# Patient Record
Sex: MALE | Race: WHITE | NOT HISPANIC OR LATINO | Employment: OTHER | URBAN - METROPOLITAN AREA
[De-identification: names, ages, dates, MRNs, and addresses within clinical notes are randomized per-mention and may not be internally consistent; named-entity substitution may affect disease eponyms.]

---

## 2017-05-11 ENCOUNTER — CONVERSION ENCOUNTER (OUTPATIENT)
Dept: CARDIOLOGY | Facility: CLINIC | Age: 52
End: 2017-05-11

## 2017-05-22 ENCOUNTER — HOSPITAL ENCOUNTER (OUTPATIENT)
Dept: CARDIOLOGY | Facility: HOSPITAL | Age: 52
Discharge: HOME | End: 2017-05-22
Attending: INTERNAL MEDICINE | Admitting: INTERNAL MEDICINE

## 2017-12-09 ENCOUNTER — CONVERSION ENCOUNTER (OUTPATIENT)
Dept: FAMILY MEDICINE CLINIC | Facility: CLINIC | Age: 52
End: 2017-12-09

## 2017-12-09 LAB — PSA SERPL-MCNC: 0.7 NG/ML

## 2017-12-13 LAB
ALBUMIN SERPL-MCNC: 4.3 G/DL (ref 3.6–5.1)
ALP SERPL-CCNC: 101 U/L (ref 40–115)
ALT SERPL-CCNC: 85 U/L (ref 9–46)
AST SERPL-CCNC: 49 U/L (ref 10–35)
BASOPHILS # BLD AUTO: 70 CELLS/UL (ref 0–200)
BASOPHILS NFR BLD AUTO: 1.2 %
BILIRUB SERPL-MCNC: 0.4 MG/DL (ref 0.2–1.2)
BUN SERPL-MCNC: 17 MG/DL (ref 7–25)
BUN/CREAT SERPL: ABNORMAL (CALC) (ref 6–22)
CALCIUM SERPL-MCNC: 9.4 MG/DL (ref 8.6–10.3)
CHLORIDE SERPL-SCNC: 104 MMOL/L (ref 98–110)
CHOLEST SERPL-MCNC: 157 MG/DL
CHOLEST/HDLC SERPL: 2.6 (CALC)
CONV CO2: 25 MMOL/L (ref 20–31)
CONV TOTAL PROTEIN: 7 G/DL (ref 6.1–8.1)
CREAT UR-MCNC: 0.95 MG/DL (ref 0.7–1.33)
EOSINOPHIL # BLD AUTO: 10.1 %
EOSINOPHIL # BLD AUTO: 586 CELLS/UL (ref 15–500)
ERYTHROCYTE [DISTWIDTH] IN BLOOD BY AUTOMATED COUNT: 13 % (ref 11–15)
GLOBULIN UR ELPH-MCNC: 2.7 MG/DL (ref 1.9–3.7)
GLUCOSE UR QL: 146 MG/DL (ref 65–99)
HCT VFR BLD AUTO: 48.6 % (ref 38.5–50)
HDLC SERPL-MCNC: 60 MG/DL
HGB BLD-MCNC: 16.3 G/DL (ref 13.2–17.1)
INSULIN SERPL-ACNC: 1.6 (CALC) (ref 1–2.5)
LDLC SERPL CALC-MCNC: 80 MG/DL
LYMPHOCYTES # BLD AUTO: 1293 CELLS/UL (ref 850–3900)
LYMPHOCYTES NFR BLD AUTO: 22.3 %
MCH RBC QN AUTO: 29 PG (ref 27–33)
MCHC RBC AUTO-ENTMCNC: 33.5 G/DL (ref 32–36)
MCV RBC AUTO: 86.3 FL (ref 80–100)
MONOCYTES # BLD AUTO: 986 CELLS/UL (ref 200–950)
MONOCYTES NFR BLD AUTO: 17 %
NEUTROPHILS # BLD AUTO: 2865 CELLS/UL (ref 1500–7800)
NEUTROPHILS NFR BLD AUTO: 49.4 %
NONHDLC SERPL-MCNC: 97 MG/DL
PLATELET # BLD AUTO: 254 10*3/UL (ref 140–400)
PMV BLD AUTO: 11 FL (ref 7.5–12.5)
POTASSIUM SERPL-SCNC: 4.8 MMOL/L (ref 3.5–5.3)
RBC # BLD AUTO: 5.63 MILLION/UL (ref 4.2–5.8)
SODIUM SERPL-SCNC: 139 MMOL/L (ref 135–146)
TRIGL SERPL-MCNC: 85 MG/DL
TSH SERPL-ACNC: 0.84 MIU/L (ref 0.4–4.5)
WBC # BLD AUTO: 5.8 10*3/UL (ref 3.8–10.8)

## 2018-03-22 ENCOUNTER — CONVERSION ENCOUNTER (OUTPATIENT)
Dept: CARDIOLOGY | Facility: CLINIC | Age: 53
End: 2018-03-22

## 2018-11-15 ENCOUNTER — CONVERSION ENCOUNTER (OUTPATIENT)
Dept: CARDIOLOGY | Facility: CLINIC | Age: 53
End: 2018-11-15

## 2019-06-04 VITALS
SYSTOLIC BLOOD PRESSURE: 142 MMHG | WEIGHT: 226 LBS | OXYGEN SATURATION: 98 % | OXYGEN SATURATION: 98 % | DIASTOLIC BLOOD PRESSURE: 81 MMHG | BODY MASS INDEX: 36.48 KG/M2 | DIASTOLIC BLOOD PRESSURE: 89 MMHG | WEIGHT: 227.5 LBS | HEART RATE: 72 BPM | WEIGHT: 216 LBS | OXYGEN SATURATION: 97 % | DIASTOLIC BLOOD PRESSURE: 89 MMHG | HEART RATE: 65 BPM | SYSTOLIC BLOOD PRESSURE: 147 MMHG | BODY MASS INDEX: 34.86 KG/M2 | HEART RATE: 75 BPM | BODY MASS INDEX: 36.72 KG/M2 | SYSTOLIC BLOOD PRESSURE: 147 MMHG

## 2019-07-23 RX ORDER — ROSUVASTATIN CALCIUM 20 MG/1
20 TABLET, COATED ORAL DAILY
Qty: 30 TABLET | Refills: 6 | Status: SHIPPED | OUTPATIENT
Start: 2019-07-23 | End: 2020-02-14

## 2019-07-29 PROBLEM — Z12.12 ENCOUNTER FOR COLORECTAL CANCER SCREENING: Status: ACTIVE | Noted: 2019-07-29

## 2019-07-29 PROBLEM — Z13.9 ENCOUNTER FOR SCREENING: Status: ACTIVE | Noted: 2019-07-29

## 2019-07-29 PROBLEM — Z12.5 SCREENING PSA (PROSTATE SPECIFIC ANTIGEN): Status: ACTIVE | Noted: 2019-07-29

## 2019-07-29 PROBLEM — IMO0002 ELEVATION OF LEVEL OF TRANSAMINASE OR LACTIC ACID DEHYDROGENASE (LDH): Status: ACTIVE | Noted: 2019-07-29

## 2019-07-29 PROBLEM — E11.9 DIABETES MELLITUS: Status: ACTIVE | Noted: 2018-11-15

## 2019-07-29 PROBLEM — I10 BENIGN HYPERTENSION: Status: ACTIVE | Noted: 2019-07-29

## 2019-07-29 PROBLEM — R19.5 POSITIVE COLORECTAL CANCER SCREENING USING COLOGUARD TEST: Status: ACTIVE | Noted: 2019-07-29

## 2019-07-29 PROBLEM — Z12.11 ENCOUNTER FOR COLORECTAL CANCER SCREENING: Status: ACTIVE | Noted: 2019-07-29

## 2019-07-29 PROBLEM — E88.81 METABOLIC SYNDROME: Status: ACTIVE | Noted: 2019-07-29

## 2019-07-29 PROBLEM — R09.89 BRUIT OF LEFT CAROTID ARTERY: Status: ACTIVE | Noted: 2019-07-29

## 2019-07-29 PROBLEM — K21.9 GASTROESOPHAGEAL REFLUX DISEASE: Status: ACTIVE | Noted: 2019-07-29

## 2019-07-29 PROBLEM — Z00.01 ENCOUNTER FOR ANNUAL GENERAL MEDICAL EXAMINATION WITH ABNORMAL FINDINGS IN ADULT: Status: ACTIVE | Noted: 2019-07-29

## 2019-07-29 PROBLEM — E66.3 OVERWEIGHT: Status: ACTIVE | Noted: 2019-07-29

## 2019-07-29 PROBLEM — R73.01 ELEVATED FASTING GLUCOSE: Status: ACTIVE | Noted: 2019-07-29

## 2019-07-29 PROBLEM — Z23 NEED FOR DIPHTHERIA-TETANUS-PERTUSSIS (TDAP) VACCINE, ADULT/ADOLESCENT: Status: ACTIVE | Noted: 2019-07-29

## 2019-07-29 PROBLEM — Z00.00 ANNUAL PHYSICAL EXAM: Status: ACTIVE | Noted: 2019-07-29

## 2019-07-29 PROBLEM — E78.5 HYPERLIPIDEMIA: Status: ACTIVE | Noted: 2019-07-29

## 2019-07-29 PROBLEM — E88.810 METABOLIC SYNDROME: Status: ACTIVE | Noted: 2019-07-29

## 2019-07-29 PROBLEM — M25.571 ACUTE BILATERAL ANKLE PAIN: Status: ACTIVE | Noted: 2019-07-29

## 2019-07-29 PROBLEM — E11.9 DIABETES MELLITUS (HCC): Status: ACTIVE | Noted: 2018-11-01

## 2019-07-29 PROBLEM — R73.09 ELEVATED HEMOGLOBIN A1C: Status: ACTIVE | Noted: 2019-07-29

## 2019-07-29 PROBLEM — M25.572 ACUTE BILATERAL ANKLE PAIN: Status: ACTIVE | Noted: 2019-07-29

## 2019-07-29 PROBLEM — J01.00 ACUTE NON-RECURRENT MAXILLARY SINUSITIS: Status: ACTIVE | Noted: 2019-07-29

## 2019-07-29 PROBLEM — Z02.89 ENCOUNTER FOR OCCUPATIONAL HEALTH EXAMINATION: Status: ACTIVE | Noted: 2019-07-29

## 2019-07-29 PROBLEM — J30.1 SEASONAL ALLERGIC RHINITIS DUE TO POLLEN: Status: ACTIVE | Noted: 2019-07-29

## 2019-07-29 PROBLEM — N52.9 VASCULOGENIC ERECTILE DYSFUNCTION: Status: ACTIVE | Noted: 2019-07-29

## 2019-07-29 PROBLEM — Z00.00 GENERAL MEDICAL EXAMINATION: Status: ACTIVE | Noted: 2019-07-29

## 2019-07-29 PROBLEM — Z23 NEED FOR IMMUNIZATION AGAINST INFLUENZA: Status: ACTIVE | Noted: 2019-07-29

## 2019-07-29 RX ORDER — MONTELUKAST SODIUM 10 MG/1
10 TABLET ORAL NIGHTLY
COMMUNITY
Start: 2019-05-07 | End: 2020-05-15

## 2019-07-29 RX ORDER — NITROGLYCERIN 0.4 MG/1
TABLET SUBLINGUAL
COMMUNITY
Start: 2016-10-04 | End: 2019-08-13

## 2019-07-29 RX ORDER — LORATADINE 10 MG/1
TABLET ORAL
COMMUNITY
Start: 2019-05-07 | End: 2019-08-13

## 2019-07-29 RX ORDER — FLUTICASONE PROPIONATE 50 MCG
SPRAY, SUSPENSION (ML) NASAL
COMMUNITY
Start: 2019-05-07 | End: 2019-08-13

## 2019-07-29 RX ORDER — SILDENAFIL 50 MG/1
TABLET, FILM COATED ORAL DAILY
COMMUNITY
Start: 2019-05-24 | End: 2019-08-13

## 2019-07-29 RX ORDER — METOPROLOL TARTRATE 50 MG/1
TABLET, FILM COATED ORAL 3 TIMES DAILY
COMMUNITY
Start: 2015-05-11 | End: 2019-08-01 | Stop reason: SDUPTHER

## 2019-07-29 RX ORDER — AMLODIPINE BESYLATE 5 MG/1
5 TABLET ORAL DAILY
COMMUNITY
Start: 2018-08-23 | End: 2020-02-17

## 2019-07-29 RX ORDER — ASPIRIN 81 MG/1
81 TABLET ORAL DAILY
COMMUNITY
Start: 2019-05-07

## 2019-08-02 RX ORDER — METOPROLOL TARTRATE 50 MG/1
TABLET, FILM COATED ORAL
Qty: 90 TABLET | Refills: 3 | Status: SHIPPED | OUTPATIENT
Start: 2019-08-02 | End: 2019-12-01 | Stop reason: SDUPTHER

## 2019-08-12 ENCOUNTER — OFFICE VISIT (OUTPATIENT)
Dept: CARDIOLOGY | Facility: CLINIC | Age: 54
End: 2019-08-12

## 2019-08-12 VITALS
BODY MASS INDEX: 36.14 KG/M2 | SYSTOLIC BLOOD PRESSURE: 143 MMHG | DIASTOLIC BLOOD PRESSURE: 79 MMHG | HEART RATE: 73 BPM | WEIGHT: 220.5 LBS | OXYGEN SATURATION: 96 %

## 2019-08-12 DIAGNOSIS — I48.0 PAROXYSMAL ATRIAL FIBRILLATION (HCC): ICD-10-CM

## 2019-08-12 DIAGNOSIS — I10 ESSENTIAL HYPERTENSION: ICD-10-CM

## 2019-08-12 DIAGNOSIS — I25.118 CORONARY ARTERY DISEASE OF NATIVE ARTERY OF NATIVE HEART WITH STABLE ANGINA PECTORIS (HCC): Primary | ICD-10-CM

## 2019-08-12 DIAGNOSIS — E78.00 PURE HYPERCHOLESTEROLEMIA: ICD-10-CM

## 2019-08-12 DIAGNOSIS — I65.23 BILATERAL CAROTID ARTERY STENOSIS: ICD-10-CM

## 2019-08-12 DIAGNOSIS — E11.9 TYPE 2 DIABETES MELLITUS WITHOUT COMPLICATION, WITHOUT LONG-TERM CURRENT USE OF INSULIN (HCC): ICD-10-CM

## 2019-08-12 PROBLEM — J01.00 ACUTE NON-RECURRENT MAXILLARY SINUSITIS: Status: RESOLVED | Noted: 2019-07-29 | Resolved: 2019-08-12

## 2019-08-12 PROBLEM — E88.810 METABOLIC SYNDROME: Status: RESOLVED | Noted: 2019-07-29 | Resolved: 2019-08-12

## 2019-08-12 PROBLEM — Z23 NEED FOR IMMUNIZATION AGAINST INFLUENZA: Status: RESOLVED | Noted: 2019-07-29 | Resolved: 2019-08-12

## 2019-08-12 PROBLEM — R19.5 POSITIVE COLORECTAL CANCER SCREENING USING COLOGUARD TEST: Status: RESOLVED | Noted: 2019-07-29 | Resolved: 2019-08-12

## 2019-08-12 PROBLEM — M25.572 ACUTE BILATERAL ANKLE PAIN: Status: RESOLVED | Noted: 2019-07-29 | Resolved: 2019-08-12

## 2019-08-12 PROBLEM — R73.09 ELEVATED HEMOGLOBIN A1C: Status: RESOLVED | Noted: 2019-07-29 | Resolved: 2019-08-12

## 2019-08-12 PROBLEM — R73.01 ELEVATED FASTING GLUCOSE: Status: RESOLVED | Noted: 2019-07-29 | Resolved: 2019-08-12

## 2019-08-12 PROBLEM — E78.5 HYPERLIPIDEMIA: Status: RESOLVED | Noted: 2019-07-29 | Resolved: 2019-08-12

## 2019-08-12 PROBLEM — Z23 NEED FOR DIPHTHERIA-TETANUS-PERTUSSIS (TDAP) VACCINE, ADULT/ADOLESCENT: Status: RESOLVED | Noted: 2019-07-29 | Resolved: 2019-08-12

## 2019-08-12 PROBLEM — M25.571 ACUTE BILATERAL ANKLE PAIN: Status: RESOLVED | Noted: 2019-07-29 | Resolved: 2019-08-12

## 2019-08-12 PROBLEM — Z00.00 GENERAL MEDICAL EXAMINATION: Status: RESOLVED | Noted: 2019-07-29 | Resolved: 2019-08-12

## 2019-08-12 PROBLEM — IMO0002 ELEVATION OF LEVEL OF TRANSAMINASE OR LACTIC ACID DEHYDROGENASE (LDH): Status: RESOLVED | Noted: 2019-07-29 | Resolved: 2019-08-12

## 2019-08-12 PROBLEM — E88.81 METABOLIC SYNDROME: Status: RESOLVED | Noted: 2019-07-29 | Resolved: 2019-08-12

## 2019-08-12 PROCEDURE — 99214 OFFICE O/P EST MOD 30 MIN: CPT | Performed by: INTERNAL MEDICINE

## 2019-08-12 NOTE — PROGRESS NOTES
Subjective:     Encounter Date:08/12/2019      Patient ID: Mendel Melendez is a 54 y.o. male.    Chief Complaint:  History of Present Illness 54-year-old white male with history of coronary artery disease status post coronary bypass surgery history of diabetes coronary disease paroxysmal atrial fibrillation hypertension hyperlipidemia presents to my office for follow-up.  Patient is having occasional episodes of chest pain which she describes as a stabbing kind of pain but no shortness of breath.  No complaints of any PND orthopnea.  He has occasional palpitation without any dizziness or syncope.  No swelling of the feet.  Patient says that he is taking his medicines regularly.  He does not smoke.  He is also trying to exercise regularly.  He follows a good diet.    The following portions of the patient's history were reviewed and updated as appropriate: allergies, current medications, past family history, past medical history, past social history, past surgical history and problem list.  Past Medical History:   Diagnosis Date   • Coronary artery disease    • Hyperlipidemia    • Hypertension      History reviewed. No pertinent surgical history.  /79 (BP Location: Left arm, Patient Position: Sitting)   Pulse 73   Wt 100 kg (220 lb 8 oz)   SpO2 96%   BMI 36.14 kg/m²   Family History   Problem Relation Age of Onset   • Heart disease Father    • Heart disease Paternal Uncle        Current Outpatient Medications:   •  amLODIPine (NORVASC) 5 MG tablet, Daily., Disp: , Rfl:   •  aspirin (ASPIRIN ADULT LOW STRENGTH) 81 MG EC tablet, Take  by mouth Daily., Disp: , Rfl:   •  fluticasone (FLONASE) 50 MCG/ACT nasal spray, into the nostril(s) as directed by provider., Disp: , Rfl:   •  loratadine (CLARITIN) 10 MG tablet, Take  by mouth., Disp: , Rfl:   •  metFORMIN (GLUCOPHAGE) 500 MG tablet, Take  by mouth Daily., Disp: , Rfl:   •  metoprolol tartrate (LOPRESSOR) 50 MG tablet, TAKE 1 TABLET BY MOUTH EVERY 8 HOURS,  Disp: 90 tablet, Rfl: 3  •  montelukast (SINGULAIR) 10 MG tablet, Take  by mouth., Disp: , Rfl:   •  nitroglycerin (NITROSTAT) 0.4 MG SL tablet, NITROSTAT 0.4 MG SUBL, Disp: , Rfl:   •  rosuvastatin (CRESTOR) 20 MG tablet, Take 1 tablet by mouth Daily., Disp: 30 tablet, Rfl: 6  •  sildenafil (VIAGRA) 50 MG tablet, Take  by mouth Daily., Disp: , Rfl:   Allergies   Allergen Reactions   • Niacin Swelling     Social History     Socioeconomic History   • Marital status:      Spouse name: Not on file   • Number of children: Not on file   • Years of education: Not on file   • Highest education level: Not on file   Tobacco Use   • Smoking status: Never Smoker   Substance and Sexual Activity   • Alcohol use: No     Frequency: Never     Review of Systems   Constitution: Positive for malaise/fatigue. Negative for fever.   HENT: Negative for ear pain and nosebleeds.    Eyes: Negative for blurred vision and double vision.   Cardiovascular: Positive for chest pain and palpitations. Negative for dyspnea on exertion.   Respiratory: Negative for cough and shortness of breath.    Skin: Negative for rash.   Musculoskeletal: Negative for joint pain.   Gastrointestinal: Negative for abdominal pain, nausea and vomiting.   Neurological: Negative for focal weakness and headaches.   Psychiatric/Behavioral: Negative for depression. The patient is not nervous/anxious.    All other systems reviewed and are negative.             Objective:     Physical Exam   Constitutional: He appears well-developed and well-nourished.   HENT:   Head: Normocephalic and atraumatic.   Eyes: Conjunctivae and EOM are normal. Pupils are equal, round, and reactive to light. No scleral icterus.   Neck: Normal range of motion. Neck supple. No JVD present. Carotid bruit is not present.   Cardiovascular: Normal rate, regular rhythm, S1 normal, S2 normal, normal heart sounds and intact distal pulses. PMI is not displaced.   Pulmonary/Chest: Effort normal and  breath sounds normal. He has no wheezes. He has no rales.   Abdominal: Soft. Bowel sounds are normal.   Musculoskeletal: Normal range of motion.   Neurological: He is alert. He has normal strength.   No focal deficits   Skin: Skin is warm and dry. No rash noted.   Psychiatric: He has a normal mood and affect.     Procedures    Lab Review:       Assessment:          Diagnosis Plan   1. Coronary artery disease of native artery of native heart with stable angina pectoris (CMS/[facility])     2. Essential hypertension     3. Pure hypercholesterolemia     4. Type 2 diabetes mellitus without complication, without long-term current use of insulin (CMS/formerly [facility])     5. Paroxysmal atrial fibrillation (CMS/formerly [facility])     6. Bilateral carotid artery stenosis            Plan:       Patient has occasional episodes of chest pain which is atypical for angina.  Patient has nonobstructive disease in the past with normal function is currently stable on medications per  Patient's lipid levels and sugar levels are followed by primary care doctor per  Patient has occasional episodes of palpitations but no significant atrial fibrillation.  Patient has bilateral coronary disease and is followed by the primary care doctor for the same.  Continue current medical therapy and follow-up in 6 months with a stress Myoview study.

## 2019-08-13 ENCOUNTER — OFFICE VISIT (OUTPATIENT)
Dept: FAMILY MEDICINE CLINIC | Facility: CLINIC | Age: 54
End: 2019-08-13

## 2019-08-13 VITALS
DIASTOLIC BLOOD PRESSURE: 70 MMHG | OXYGEN SATURATION: 98 % | WEIGHT: 221.8 LBS | SYSTOLIC BLOOD PRESSURE: 128 MMHG | RESPIRATION RATE: 18 BRPM | HEART RATE: 86 BPM | TEMPERATURE: 99.1 F | BODY MASS INDEX: 35.65 KG/M2 | HEIGHT: 66 IN

## 2019-08-13 DIAGNOSIS — E78.2 MIXED HYPERLIPIDEMIA: ICD-10-CM

## 2019-08-13 DIAGNOSIS — E11.59 TYPE 2 DIABETES MELLITUS WITH OTHER CIRCULATORY COMPLICATION, WITHOUT LONG-TERM CURRENT USE OF INSULIN (HCC): Primary | ICD-10-CM

## 2019-08-13 DIAGNOSIS — I10 BENIGN HYPERTENSION: ICD-10-CM

## 2019-08-13 DIAGNOSIS — I48.0 PAROXYSMAL ATRIAL FIBRILLATION (HCC): ICD-10-CM

## 2019-08-13 DIAGNOSIS — I25.10 CORONARY ARTERY DISEASE INVOLVING NATIVE CORONARY ARTERY OF NATIVE HEART WITHOUT ANGINA PECTORIS: ICD-10-CM

## 2019-08-13 LAB
BILIRUB BLD-MCNC: NEGATIVE MG/DL
CLARITY, POC: CLEAR
COLOR UR: YELLOW
GLUCOSE BLDC GLUCOMTR-MCNC: 134 MG/DL (ref 70–130)
GLUCOSE UR STRIP-MCNC: NEGATIVE MG/DL
HBA1C MFR BLD: 6.6 %
KETONES UR QL: NEGATIVE
LEUKOCYTE EST, POC: NEGATIVE
NITRITE UR-MCNC: NEGATIVE MG/ML
PH UR: 5 [PH] (ref 5–8)
PROT UR STRIP-MCNC: NEGATIVE MG/DL
RBC # UR STRIP: NEGATIVE /UL
SP GR UR: 1.01 (ref 1–1.03)
UROBILINOGEN UR QL: NORMAL

## 2019-08-13 PROCEDURE — 81002 URINALYSIS NONAUTO W/O SCOPE: CPT | Performed by: FAMILY MEDICINE

## 2019-08-13 PROCEDURE — 99214 OFFICE O/P EST MOD 30 MIN: CPT | Performed by: FAMILY MEDICINE

## 2019-08-13 PROCEDURE — 83036 HEMOGLOBIN GLYCOSYLATED A1C: CPT | Performed by: FAMILY MEDICINE

## 2019-08-13 PROCEDURE — 82962 GLUCOSE BLOOD TEST: CPT | Performed by: FAMILY MEDICINE

## 2019-08-13 NOTE — PROGRESS NOTES
Subjective   Mendel Melendez is a 54 y.o. male.     Mendel was seen by Dr. Griffin- cardiology on 08/12/2019. He was scheduled to have a stress test in 06/2020 and is suppose to have a carotid doppler at some point prior to stress test.      Diabetes   He presents for his follow-up diabetic visit. He has type 2 diabetes mellitus. Pertinent negatives for hypoglycemia include no dizziness or headaches. Pertinent negatives for diabetes include no blurred vision, no chest pain, no fatigue, no polydipsia, no polyuria and no weakness. Risk factors for coronary artery disease include diabetes mellitus, dyslipidemia, hypertension, male sex, obesity and family history. Meal planning includes avoidance of concentrated sweets. He participates in exercise three times a week. His overall blood glucose range is 140-180 mg/dl. An ACE inhibitor/angiotensin II receptor blocker is not being taken. He does not see a podiatrist.Eye exam is not current.   Hypertension   This is a chronic problem. The current episode started more than 1 year ago. The problem is controlled. Pertinent negatives include no blurred vision, chest pain, headaches, palpitations, peripheral edema or shortness of breath. Risk factors for coronary artery disease include dyslipidemia, diabetes mellitus, family history, male gender and obesity. Current antihypertension treatment includes calcium channel blockers and beta blockers. The current treatment provides significant improvement.   Hyperlipidemia   This is a chronic problem. The current episode started more than 1 year ago. The problem is controlled. Recent lipid tests were reviewed and are normal. Exacerbating diseases include diabetes and obesity. Pertinent negatives include no chest pain, myalgias or shortness of breath. Current antihyperlipidemic treatment includes statins. The current treatment provides significant improvement of lipids. Risk factors for coronary artery disease include diabetes mellitus,  dyslipidemia, family history, hypertension, male sex and obesity.   Coronary Artery Disease   Presents for follow-up visit. Pertinent negatives include no chest pain, chest pressure, chest tightness, dizziness, leg swelling, palpitations or shortness of breath. Risk factors include hyperlipidemia and obesity. The symptoms have been stable. Compliance with diet is good. Compliance with exercise is good. Compliance with medications is good.        The following portions of the patient's history were reviewed and updated as appropriate: allergies, current medications, past family history, past medical history, past social history, past surgical history and problem list.    Family History   Problem Relation Age of Onset   • Heart disease Father    • Heart disease Paternal Uncle    • Heart disease Maternal Aunt    • Heart disease Maternal Uncle    • Breast cancer Paternal Aunt    • Heart disease Paternal Aunt    • Ovarian cancer Paternal Aunt        Social History     Tobacco Use   • Smoking status: Never Smoker   • Smokeless tobacco: Never Used   Substance Use Topics   • Alcohol use: No     Frequency: Never   • Drug use: No       Past Surgical History:   Procedure Laterality Date   • CARDIAC CATHETERIZATION  04/09/2015    BHF Left main distal 60-70% LCX 80-90% RCA proximal 90% and mid 100% with left to right collaterals.   • CORONARY ARTERY BYPASS GRAFT  04/13/2015    Four, left main and three vessel cononary artery disease. Dr. Franklyn Smith   • VASECTOMY  1998       Patient Active Problem List   Diagnosis   • Annual physical exam   • Encounter for annual general medical examination with abnormal findings in adult   • Encounter for colorectal cancer screening   • Encounter for occupational health examination   • Encounter for screening   • Screening PSA (prostate specific antigen)   • Benign hypertension   • Bruit of left carotid artery   • Coronary artery disease involving native coronary artery of native heart without  angina pectoris   • Diabetes mellitus (CMS/HCC)   • Gastroesophageal reflux disease   • Mixed hyperlipidemia   • Occlusion and stenosis of unspecified carotid artery   • Overweight   • Paroxysmal atrial fibrillation (CMS/HCC)   • Seasonal allergic rhinitis due to pollen   • Status post coronary artery bypass graft   • Vasculogenic erectile dysfunction       Current Outpatient Medications on File Prior to Visit   Medication Sig Dispense Refill   • amLODIPine (NORVASC) 5 MG tablet Take 5 mg by mouth Daily.     • aspirin (ASPIRIN ADULT LOW STRENGTH) 81 MG EC tablet Take 81 mg by mouth Daily.     • metFORMIN (GLUCOPHAGE) 500 MG tablet Take 500 mg by mouth Daily.     • metoprolol tartrate (LOPRESSOR) 50 MG tablet TAKE 1 TABLET BY MOUTH EVERY 8 HOURS 90 tablet 3   • montelukast (SINGULAIR) 10 MG tablet Take 10 mg by mouth Every Night.     • rosuvastatin (CRESTOR) 20 MG tablet Take 1 tablet by mouth Daily. 30 tablet 6     No current facility-administered medications on file prior to visit.        Allergies   Allergen Reactions   • Niacin Swelling       Review of Systems   Constitutional: Negative for appetite change, fatigue and fever.   HENT: Negative for congestion, ear pain, sinus pressure, sore throat and tinnitus.    Eyes: Negative for blurred vision and visual disturbance.   Respiratory: Negative for cough, chest tightness, shortness of breath and wheezing.    Cardiovascular: Negative for chest pain, palpitations and leg swelling.   Gastrointestinal: Negative for abdominal pain, constipation, diarrhea, nausea and vomiting.   Endocrine: Negative.  Negative for cold intolerance, heat intolerance, polydipsia and polyuria.   Genitourinary: Negative for dysuria, frequency and hematuria.   Musculoskeletal: Negative for arthralgias, joint swelling and myalgias.   Skin: Negative for rash and bruise.   Allergic/Immunologic: Negative for environmental allergies.   Neurological: Negative for dizziness, syncope, weakness and  "headache.   Hematological: Negative for adenopathy. Does not bruise/bleed easily.   Psychiatric/Behavioral: Negative for depressed mood.       Objective   Visit Vitals  /70 (BP Location: Right arm, Patient Position: Sitting, Cuff Size: Large Adult)   Pulse 86   Temp 99.1 °F (37.3 °C) (Oral)   Resp 18   Ht 166.4 cm (65.5\")   Wt 101 kg (221 lb 12.8 oz)   SpO2 98% Comment: Room air   BMI 36.35 kg/m²     Physical Exam   Constitutional: He is oriented to person, place, and time. He appears well-developed and well-nourished. No distress.   HENT:   Right Ear: Tympanic membrane and external ear normal.   Left Ear: Tympanic membrane and external ear normal.   Mouth/Throat: Oropharynx is clear and moist.   Eyes: Conjunctivae are normal.   Neck: Neck supple. No JVD present. No thyromegaly present.   Cardiovascular: Normal rate, regular rhythm, normal heart sounds and intact distal pulses. Exam reveals no gallop and no friction rub.   No murmur heard.  Pulses:       Carotid pulses are 2+ on the right side, and 2+ on the left side.       Dorsalis pedis pulses are 2+ on the right side, and 2+ on the left side.   Pulmonary/Chest: Effort normal and breath sounds normal. No respiratory distress. He has no wheezes. He has no rales.   Abdominal: Soft. Bowel sounds are normal. He exhibits no distension and no mass. There is no tenderness.   Musculoskeletal: He exhibits no edema.   Lymphadenopathy:     He has no cervical adenopathy.   Neurological: He is alert and oriented to person, place, and time. He displays normal reflexes. Coordination normal.   Skin: Skin is warm. No rash noted. No erythema.   Psychiatric: He has a normal mood and affect.   Vitals reviewed.        Assessment/Plan .  Problem List Items Addressed This Visit        Cardiovascular and Mediastinum    Benign hypertension    Overview     Controlled.         Relevant Orders    TSH (Completed)    Coronary artery disease involving native coronary artery of native " heart without angina pectoris    Overview     No sxs doing well  Followed with Gaby  s/p CABG x 3, 2015         Paroxysmal atrial fibrillation (CMS/[facility])    Overview     During heart surgery 2015, no break thru since         Mixed hyperlipidemia    Overview     Stable, Continue Crestor  LDL 85         Current Assessment & Plan     Lipid abnormalities are improving with treatment.  Pharmacotherapy as ordered.  Lipids will be reassessed in 3 months.         Relevant Orders    Lipid Panel With / Chol / HDL Ratio (Completed)       Endocrine    Diabetes mellitus (CMS/[facility]) - Primary    Overview     A1c 6.6 0813/2019 improved.   A1c 6.9 5/7/2019 on  A1c 7.0 02/12/2018    A1c 7.1, 11/1/2018 new onset         Current Assessment & Plan     Diabetes is improving with treatment.   Continue current treatment regimen.  Diabetes will be reassessed in 3 months.         Relevant Orders    POCT urinalysis dipstick, manual (Completed)    POCT Glucose (Completed)    POC Glycosylated Hemoglobin (Hb A1C) (Completed)    CBC Auto Differential    Comprehensive Metabolic Panel (Completed)

## 2019-08-13 NOTE — PATIENT INSTRUCTIONS
Atherosclerosis  Atherosclerosis is narrowing and hardening of the arteries. Arteries are blood vessels that carry blood from the heart to all parts of the body. This blood contains oxygen. Arteries can become narrow or clogged with a buildup of fat, cholesterol, calcium, and other substances (plaque). Plaque decreases the amount of blood that can flow through the artery.  Atherosclerosis can affect any artery in the body, including:  · Heart arteries (coronary artery disease). This may cause a heart attack.  · Brain arteries. This may cause a stroke (cerebrovascular accident).  · Leg, arm, and pelvis arteries (peripheral artery disease). This may cause pain and numbness.  · Kidney arteries. This may cause kidney (renal) failure.    Treatment may slow the disease and prevent further damage to the heart, brain, peripheral arteries, and kidneys.  What are the causes?  Atherosclerosis develops slowly over many years. The inner layers of your arteries become damaged and allow the gradual buildup of plaque. The exact cause of atherosclerosis is not fully understood. Symptoms of atherosclerosis do not occur until the artery becomes narrow or blocked.  What increases the risk?  The following factors may make you more likely to develop this condition:  · High blood pressure.  · High cholesterol.  · Being middle-aged or older.  · Having a family history of atherosclerosis.  · Having high blood fats (triglycerides).  · Diabetes.  · Being overweight.  · Smoking tobacco.  · Not exercising enough (sedentary lifestyle).  · Having a substance in the blood called C-reactive protein (CRP). This is a sign of increased levels of inflammation in the body.  · Sleep apnea.  · Being stressed.  · Drinking too much alcohol.    What are the signs or symptoms?  This condition may not cause any symptoms. If you have symptoms, they are caused by damage to an area of your body that is not getting enough blood.  · Coronary artery disease may  "cause chest pain and shortness of breath.  · Decreased blood supply to your brain may cause a stroke. Signs of a stroke may include sudden:  ? Weakness on one side of the body.  ? Confusion.  ? Changes in vision.  ? Inability to speak or understand speech.  ? Loss of balance, coordination, or the ability to walk.  ? Severe headache.  ? Loss of consciousness.  · Peripheral arterial disease may cause pain and numbness, often in the legs and hips.  · Renal failure may cause fatigue, nausea, swelling, and itchy skin.    How is this diagnosed?  This condition is diagnosed based on your medical history and a physical exam. During the exam:  · Your health care provider will:  ? Check your pulse in different places.  ? Listen for a \"whooshing\" sound over your arteries (bruit).  · You may have tests, such as:  ? Blood tests to check your levels of cholesterol, triglycerides, and CRP.  ? Electrocardiogram (ECG) to check for heart damage.  ? Chest X-ray to see if you have an enlarged heart, which is a sign of heart failure.  ? Stress test to see how your heart reacts to exercise.  ? Echocardiogram to get images of the inside of your heart.  ? Ankle-brachial index to compare blood pressure in your arms to blood pressure in your ankles.  ? Ultrasound of your peripheral arteries to check blood flow.  ? CT scan to check for damage to your heart or brain.  ? X-rays of blood vessels after dye has been injected (angiogram) to check blood flow.    How is this treated?  Treatment starts with lifestyle changes, which may include:  · Changing your diet.  · Losing weight.  · Reducing stress.  · Exercising and being physically active more regularly.  · Not smoking.    You may also need medicine to:  · Lower triglycerides and cholesterol.  · Control blood pressure.  · Prevent blood clots.  · Lower inflammation in your body.  · Control your blood sugar.    Sometimes, surgery is needed to:  · Remove plaque from an artery " (endarterectomy).  · Open or widen a narrowed heart artery (angioplasty).  · Create a new path for your blood with one of these procedures:  ? Heart (coronary) artery bypass graft surgery.  ? Peripheral artery bypass graft surgery.    Follow these instructions at home:  Eating and drinking  · Eat a heart-healthy diet. Talk with your health care provider or a diet and nutrition specialist (dietitian) if you need help. A heart-healthy diet involves:  ? Limiting unhealthy fats and increasing healthy fats. Some examples of healthy fats are olive oil and canola oil.  ? Eating plant-based foods, such as fruits, vegetables, nuts, whole grains, and legumes (such as peas and lentils).  · Limit alcohol intake to no more than 1 drink a day for nonpregnant women and 2 drinks a day for men. One drink equals 12 oz of beer, 5 oz of wine, or 1½ oz of hard liquor.  Lifestyle  · Follow an exercise program as told by your health care provider.  · Maintain a healthy weight. Lose weight if your health care provider says that you need to do that.  · Rest when you are tired.  · Learn to manage your stress.  · Do not use any products that contain nicotine or tobacco, such as cigarettes and e-cigarettes. If you need help quitting, ask your health care provider.  · Do not abuse drugs.  General instructions  · Take over-the-counter and prescription medicines only as told by your health care provider.  · Manage other health conditions as told by your health care provider.  · Keep all follow-up visits as told by your health care provider. This is important.  Contact a health care provider if:  · You have chest pain or discomfort. This includes squeezing chest pain that may feel like indigestion (angina).  · You have shortness of breath.  · You have an irregular heartbeat.  · You have unexplained fatigue.  · You have unexplained pain or numbness in an arm, leg, or hip.  · You have nausea, swelling of your hands or feet, and itchy skin.  Get help  "right away if:  · You have any symptoms of a heart attack, such as:  ? Chest pain.  ? Shortness of breath.  ? Pain in your neck, jaw, arms, back, or stomach.  ? Cold sweat.  ? Nausea.  ? Light-headedness.  · You have any symptoms of a stroke. \"BE FAST\" is an easy way to remember the main warning signs of a stroke:  ? B - Balance. Signs are dizziness, sudden trouble walking, or loss of balance.  ? E - Eyes. Signs are trouble seeing or a sudden change in vision.  ? F - Face. Signs are sudden weakness or numbness of the face, or the face or eyelid drooping on one side.  ? A - Arms. Signs are weakness or numbness in an arm. This happens suddenly and usually on one side of the body.  ? S - Speech. Signs are sudden trouble speaking, slurred speech, or trouble understanding what people say.  ? T - Time. Time to call emergency services. Write down what time symptoms started.  · You have other signs of a stroke, such as:  ? A sudden, severe headache with no known cause.  ? Nausea or vomiting.  ? Seizure.  These symptoms may represent a serious problem that is an emergency. Do not wait to see if the symptoms will go away. Get medical help right away. Call your local emergency services (911 in the [place] Do not drive yourself to the hospital.  Summary  · Atherosclerosis is narrowing and hardening of the arteries.  · Arteries can become narrow or clogged with a buildup of fat, cholesterol, calcium, and other substances (plaque).  · This condition may not cause any symptoms. If you do have symptoms, they are caused by damage to an area of your body that is not getting enough blood.  · Treatment may include lifestyle changes and medicines. In some cases, surgery is needed.  This information is not intended to replace advice given to you by your health care provider. Make sure you discuss any questions you have with your health care provider.  Document Released: 03/09/2005 Document Revised: 08/23/2018 Document Reviewed: " 08/23/2018  [employer] Interactive Patient Education © 2019 [employer]    Cholesterol  Cholesterol is a white, waxy, fat-like substance that is needed by the human body in small amounts. The liver makes all the cholesterol we need. Cholesterol is carried from the liver by the blood through the blood vessels. Deposits of cholesterol (plaques) may build up on blood vessel (artery) walls. Plaques make the arteries narrower and stiffer. Cholesterol plaques increase the risk for heart attack and stroke.  You cannot feel your cholesterol level even if it is very high. The only way to know that it is high is to have a blood test. Once you know your cholesterol levels, you should keep a record of the test results. Work with your health care provider to keep your levels in the desired range.  What do the results mean?  · Total cholesterol is a rough measure of all the cholesterol in your blood.  · LDL (low-density lipoprotein) is the “bad” cholesterol. This is the type that causes plaque to build up on the artery walls. You want this level to be low.  · HDL (high-density lipoprotein) is the “good” cholesterol because it cleans the arteries and carries the LDL away. You want this level to be high.  · Triglycerides are fat that the body can either burn for energy or store. High levels are closely linked to heart disease.  What are the desired levels of cholesterol?  · Total cholesterol below 200.  · LDL below 100 for people who are at risk, below 70 for people at very high risk.  · HDL above 40 is good. A level of 60 or higher is considered to be protective against heart disease.  · Triglycerides below 150.  How can I lower my cholesterol?  Diet  Follow your diet program as told by your health care provider.  · Choose fish or white meat chicken and turkey, roasted or baked. Limit fatty cuts of red meat, fried foods, and processed meats, such as sausage and lunch meats.  · Eat lots of fresh fruits and vegetables.  · Choose  whole grains, beans, pasta, potatoes, and cereals.  · Choose olive oil, corn oil, or canola oil, and use only small amounts.  · Avoid butter, mayonnaise, shortening, or palm kernel oils.  · Avoid foods with trans fats.  · Drink skim or nonfat milk and eat low-fat or nonfat yogurt and cheeses. Avoid whole milk, cream, ice cream, egg yolks, and full-fat cheeses.  · Healthier desserts include hal food cake, teodora snaps, animal crackers, hard candy, popsicles, and low-fat or nonfat frozen yogurt. Avoid pastries, cakes, pies, and cookies.    Exercise  · Follow your exercise program as told by your health care provider. A regular program:  ? Helps to decrease LDL and raise HDL.  ? Helps with weight control.  · Do things that increase your activity level, such as gardening, walking, and taking the stairs.  · Ask your health care provider about ways that you can be more active in your daily life.  Medicine  · Take over-the-counter and prescription medicines only as told by your health care provider.  ? Medicine may be prescribed by your health care provider to help lower cholesterol and decrease the risk for heart disease. This is usually done if diet and exercise have failed to bring down cholesterol levels.  ? If you have several risk factors, you may need medicine even if your levels are normal.  This information is not intended to replace advice given to you by your health care provider. Make sure you discuss any questions you have with your health care provider.  Document Released: 09/12/2002 Document Revised: 07/15/2017 Document Reviewed: 06/17/2017  [employer] Interactive Patient Education © 2019 [employer]

## 2019-08-14 LAB
ALBUMIN SERPL-MCNC: 4.5 G/DL (ref 3.5–5.5)
ALBUMIN/GLOB SERPL: 1.8 {RATIO} (ref 1.2–2.2)
ALP SERPL-CCNC: 81 IU/L (ref 39–117)
ALT SERPL-CCNC: 49 IU/L (ref 0–44)
AST SERPL-CCNC: 33 IU/L (ref 0–40)
BASOPHILS # BLD AUTO: 0.1 X10E3/UL (ref 0–0.2)
BASOPHILS NFR BLD AUTO: 1 %
BILIRUB SERPL-MCNC: 0.3 MG/DL (ref 0–1.2)
BUN SERPL-MCNC: 15 MG/DL (ref 6–24)
BUN/CREAT SERPL: 16 (ref 9–20)
CALCIUM SERPL-MCNC: 9.4 MG/DL (ref 8.7–10.2)
CHLORIDE SERPL-SCNC: 104 MMOL/L (ref 96–106)
CHOLEST SERPL-MCNC: 163 MG/DL (ref 100–199)
CHOLEST/HDLC SERPL: 3 RATIO (ref 0–5)
CO2 SERPL-SCNC: 23 MMOL/L (ref 20–29)
CREAT SERPL-MCNC: 0.92 MG/DL (ref 0.76–1.27)
EOSINOPHIL # BLD AUTO: 0.2 X10E3/UL (ref 0–0.4)
EOSINOPHIL NFR BLD AUTO: 3 %
ERYTHROCYTE [DISTWIDTH] IN BLOOD BY AUTOMATED COUNT: 14.1 % (ref 12.3–15.4)
GLOBULIN SER CALC-MCNC: 2.5 G/DL (ref 1.5–4.5)
GLUCOSE SERPL-MCNC: 140 MG/DL (ref 65–99)
HCT VFR BLD AUTO: 47.7 % (ref 37.5–51)
HDLC SERPL-MCNC: 55 MG/DL
HGB BLD-MCNC: 15.7 G/DL (ref 13–17.7)
IMM GRANULOCYTES # BLD AUTO: 0 X10E3/UL (ref 0–0.1)
IMM GRANULOCYTES NFR BLD AUTO: 0 %
LDLC SERPL CALC-MCNC: 90 MG/DL (ref 0–99)
LYMPHOCYTES # BLD AUTO: 1.4 X10E3/UL (ref 0.7–3.1)
LYMPHOCYTES NFR BLD AUTO: 19 %
MCH RBC QN AUTO: 29 PG (ref 26.6–33)
MCHC RBC AUTO-ENTMCNC: 32.9 G/DL (ref 31.5–35.7)
MCV RBC AUTO: 88 FL (ref 79–97)
MONOCYTES # BLD AUTO: 0.8 X10E3/UL (ref 0.1–0.9)
MONOCYTES NFR BLD AUTO: 10 %
NEUTROPHILS # BLD AUTO: 5.1 X10E3/UL (ref 1.4–7)
NEUTROPHILS NFR BLD AUTO: 67 %
PLATELET # BLD AUTO: 251 X10E3/UL (ref 150–450)
POTASSIUM SERPL-SCNC: 4.9 MMOL/L (ref 3.5–5.2)
PROT SERPL-MCNC: 7 G/DL (ref 6–8.5)
RBC # BLD AUTO: 5.42 X10E6/UL (ref 4.14–5.8)
SODIUM SERPL-SCNC: 142 MMOL/L (ref 134–144)
TRIGL SERPL-MCNC: 92 MG/DL (ref 0–149)
TSH SERPL DL<=0.005 MIU/L-ACNC: 0.99 UIU/ML (ref 0.45–4.5)
VLDLC SERPL CALC-MCNC: 18 MG/DL (ref 5–40)
WBC # BLD AUTO: 7.6 X10E3/UL (ref 3.4–10.8)

## 2019-08-30 LAB
PSA SERPL-MCNC: 1.1 NG/ML (ref 0–4)
WRITTEN AUTHORIZATION: NORMAL

## 2019-11-18 ENCOUNTER — OFFICE VISIT (OUTPATIENT)
Dept: FAMILY MEDICINE CLINIC | Facility: CLINIC | Age: 54
End: 2019-11-18

## 2019-11-18 VITALS
WEIGHT: 226.6 LBS | BODY MASS INDEX: 36.42 KG/M2 | RESPIRATION RATE: 18 BRPM | HEART RATE: 96 BPM | TEMPERATURE: 97.9 F | OXYGEN SATURATION: 98 % | SYSTOLIC BLOOD PRESSURE: 130 MMHG | HEIGHT: 66 IN | DIASTOLIC BLOOD PRESSURE: 70 MMHG

## 2019-11-18 DIAGNOSIS — E78.2 MIXED HYPERLIPIDEMIA: ICD-10-CM

## 2019-11-18 DIAGNOSIS — R09.89 BRUIT OF LEFT CAROTID ARTERY: ICD-10-CM

## 2019-11-18 DIAGNOSIS — I25.10 CORONARY ARTERY DISEASE INVOLVING NATIVE CORONARY ARTERY OF NATIVE HEART WITHOUT ANGINA PECTORIS: ICD-10-CM

## 2019-11-18 DIAGNOSIS — E66.3 OVERWEIGHT: ICD-10-CM

## 2019-11-18 DIAGNOSIS — I48.0 PAROXYSMAL ATRIAL FIBRILLATION (HCC): ICD-10-CM

## 2019-11-18 DIAGNOSIS — E11.59 TYPE 2 DIABETES MELLITUS WITH OTHER CIRCULATORY COMPLICATION, WITHOUT LONG-TERM CURRENT USE OF INSULIN (HCC): Primary | ICD-10-CM

## 2019-11-18 DIAGNOSIS — I10 BENIGN HYPERTENSION: ICD-10-CM

## 2019-11-18 DIAGNOSIS — Z23 NEED FOR INFLUENZA VACCINATION: ICD-10-CM

## 2019-11-18 LAB
BILIRUB BLD-MCNC: NEGATIVE MG/DL
CLARITY, POC: CLEAR
COLOR UR: YELLOW
GLUCOSE BLDC GLUCOMTR-MCNC: 99 MG/DL (ref 70–130)
GLUCOSE UR STRIP-MCNC: NEGATIVE MG/DL
HBA1C MFR BLD: 7.3 %
KETONES UR QL: NEGATIVE
LEUKOCYTE EST, POC: NEGATIVE
NITRITE UR-MCNC: NEGATIVE MG/ML
PH UR: 5 [PH] (ref 5–8)
PROT UR STRIP-MCNC: NEGATIVE MG/DL
RBC # UR STRIP: NEGATIVE /UL
SP GR UR: 1.02 (ref 1–1.03)
UROBILINOGEN UR QL: NORMAL

## 2019-11-18 PROCEDURE — 90471 IMMUNIZATION ADMIN: CPT | Performed by: FAMILY MEDICINE

## 2019-11-18 PROCEDURE — 99214 OFFICE O/P EST MOD 30 MIN: CPT | Performed by: FAMILY MEDICINE

## 2019-11-18 PROCEDURE — 82962 GLUCOSE BLOOD TEST: CPT | Performed by: FAMILY MEDICINE

## 2019-11-18 PROCEDURE — 83036 HEMOGLOBIN GLYCOSYLATED A1C: CPT | Performed by: FAMILY MEDICINE

## 2019-11-18 PROCEDURE — 81002 URINALYSIS NONAUTO W/O SCOPE: CPT | Performed by: FAMILY MEDICINE

## 2019-11-18 PROCEDURE — 90674 CCIIV4 VAC NO PRSV 0.5 ML IM: CPT | Performed by: FAMILY MEDICINE

## 2019-11-18 NOTE — PROGRESS NOTES
Subjective   Mendel Melendez is a 54 y.o. male.     Chief Complaint   Patient presents with   • Diabetes   • Coronary Artery Disease   • Atrial Fibrillation   • Hypertension   • Hyperlipidemia       Diabetes   He presents for his follow-up diabetic visit. He has type 2 diabetes mellitus. Pertinent negatives for hypoglycemia include no dizziness or headaches. Pertinent negatives for diabetes include no chest pain, no fatigue, no polydipsia, no polyuria and no weakness. Risk factors for coronary artery disease include diabetes mellitus, dyslipidemia, hypertension, male sex, obesity and family history. Meal planning includes avoidance of concentrated sweets. His overall blood glucose range is 140-180 mg/dl. An ACE inhibitor/angiotensin II receptor blocker is not being taken. He does not see a podiatrist.Eye exam is current (10/2019 [facility]).   Coronary Artery Disease   Presents for follow-up visit. Pertinent negatives include no chest pain, chest pressure, chest tightness, dizziness, leg swelling, palpitations or shortness of breath. Risk factors include hyperlipidemia and obesity. The symptoms have been stable. Compliance with diet is good. Compliance with exercise is good. Compliance with medications is good.   Atrial Fibrillation   Presents for follow-up visit. Symptoms are negative for chest pain, dizziness, palpitations, shortness of breath and weakness. The symptoms have been stable. Past medical history includes atrial fibrillation, CAD and hyperlipidemia.   Hypertension   This is a chronic problem. The current episode started more than 1 year ago. The problem is controlled. Pertinent negatives include no chest pain, headaches, palpitations, peripheral edema or shortness of breath. Risk factors for coronary artery disease include dyslipidemia, diabetes mellitus, family history, male gender and obesity. Current antihypertension treatment includes calcium channel blockers and beta blockers. The current treatment  provides significant improvement.   Hyperlipidemia   This is a chronic problem. The current episode started more than 1 year ago. The problem is controlled. Recent lipid tests were reviewed and are normal. Exacerbating diseases include diabetes and obesity. Pertinent negatives include no chest pain, myalgias or shortness of breath. Current antihyperlipidemic treatment includes statins. The current treatment provides significant improvement of lipids. Risk factors for coronary artery disease include diabetes mellitus, dyslipidemia, family history, hypertension, male sex and obesity.        The following portions of the patient's history were reviewed and updated as appropriate: allergies, current medications, past family history, past medical history, past social history, past surgical history and problem list.    Allergies:  Allergies   Allergen Reactions   • Niacin Swelling       Social History:  Social History     Socioeconomic History   • Marital status:      Spouse name: Not on file   • Number of children: Not on file   • Years of education: Not on file   • Highest education level: Not on file   Tobacco Use   • Smoking status: Never Smoker   • Smokeless tobacco: Never Used   Substance and Sexual Activity   • Alcohol use: No     Frequency: Never   • Drug use: No   • Sexual activity: Defer       Family History:  Family History   Problem Relation Age of Onset   • Heart disease Father    • Heart disease Paternal Uncle    • Heart disease Maternal Aunt    • Heart disease Maternal Uncle    • Breast cancer Paternal Aunt    • Heart disease Paternal Aunt    • Ovarian cancer Paternal Aunt        Past Medical History :  Patient Active Problem List   Diagnosis   • Annual physical exam   • Encounter for annual general medical examination with abnormal findings in adult   • Encounter for colorectal cancer screening   • Encounter for occupational health examination   • Encounter for screening   • Screening PSA (prostate  specific antigen)   • Benign hypertension   • Bruit of left carotid artery   • Coronary artery disease involving native coronary artery of native heart without angina pectoris   • Diabetes mellitus (CMS/HCC)   • Gastroesophageal reflux disease   • Mixed hyperlipidemia   • Occlusion and stenosis of unspecified carotid artery   • Overweight   • Paroxysmal atrial fibrillation (CMS/HCC)   • Seasonal allergic rhinitis due to pollen   • Status post coronary artery bypass graft   • Vasculogenic erectile dysfunction       Medication List:    Current Outpatient Medications:   •  amLODIPine (NORVASC) 5 MG tablet, Take 5 mg by mouth Daily., Disp: , Rfl:   •  aspirin (ASPIRIN ADULT LOW STRENGTH) 81 MG EC tablet, Take 81 mg by mouth Daily., Disp: , Rfl:   •  metFORMIN (GLUCOPHAGE) 500 MG tablet, Take 500 mg by mouth Daily., Disp: , Rfl:   •  metoprolol tartrate (LOPRESSOR) 50 MG tablet, TAKE 1 TABLET BY MOUTH EVERY 8 HOURS, Disp: 90 tablet, Rfl: 3  •  montelukast (SINGULAIR) 10 MG tablet, Take 10 mg by mouth Every Night., Disp: , Rfl:   •  rosuvastatin (CRESTOR) 20 MG tablet, Take 1 tablet by mouth Daily., Disp: 30 tablet, Rfl: 6    Past Surgical History:  Past Surgical History:   Procedure Laterality Date   • CARDIAC CATHETERIZATION  04/09/2015    BHF Left main distal 60-70% LCX 80-90% RCA proximal 90% and mid 100% with left to right collaterals.   • CORONARY ARTERY BYPASS GRAFT  04/13/2015    Four, left main and three vessel cononary artery disease. Dr. Franklyn Smith   • VASECTOMY  1998       Review of Systems:  Review of Systems   Constitutional: Negative for appetite change, fatigue and fever.   HENT: Negative for congestion and sore throat.    Eyes: Negative for visual disturbance.   Respiratory: Negative for cough, chest tightness, shortness of breath and wheezing.    Cardiovascular: Negative for chest pain, palpitations and leg swelling.   Gastrointestinal: Negative for abdominal pain, constipation, diarrhea, nausea and  "vomiting.   Endocrine: Negative.  Negative for cold intolerance, heat intolerance, polydipsia and polyuria.   Genitourinary: Negative for dysuria, frequency and hematuria.   Musculoskeletal: Negative for arthralgias, joint swelling and myalgias.   Skin: Negative for rash and bruise.   Neurological: Negative for dizziness, syncope, weakness, numbness and headache.   Hematological: Negative for adenopathy. Does not bruise/bleed easily.   Psychiatric/Behavioral: Negative for depressed mood.       Physical Exam:  Vital Signs:  Visit Vitals  /70 (BP Location: Right arm, Patient Position: Sitting, Cuff Size: Large Adult)   Pulse 96   Temp 97.9 °F (36.6 °C) (Oral)   Resp 18   Ht 166.4 cm (65.5\")   Wt 103 kg (226 lb 9.6 oz)   SpO2 98% Comment: Room air   BMI 37.13 kg/m²       Physical Exam   Constitutional: He is oriented to person, place, and time. He appears well-developed and well-nourished. No distress.   Eyes: Conjunctivae are normal.   Neck: Neck supple. No JVD present. No thyromegaly present.   Cardiovascular: Normal rate, regular rhythm, normal heart sounds and intact distal pulses. Exam reveals no gallop and no friction rub.   No murmur heard.  Pulses:       Carotid pulses are 2+ on the right side, and 2+ on the left side.       Dorsalis pedis pulses are 2+ on the right side, and 2+ on the left side.   Negative homans' no edema.    Pulmonary/Chest: Effort normal and breath sounds normal. No respiratory distress. He has no wheezes. He has no rales.   Musculoskeletal: He exhibits no edema.   Lymphadenopathy:     He has no cervical adenopathy.   Neurological: He is alert and oriented to person, place, and time. He displays normal reflexes. Coordination normal.   Skin: Skin is warm. No rash noted. No erythema.   Psychiatric: He has a normal mood and affect.   Vitals reviewed.      Assessment and Plan:  Problem List Items Addressed This Visit        High    Benign hypertension    Overview     Controlled.         " Current Assessment & Plan     Hypertension is improving with treatment.  Continue current treatment regimen.  Blood pressure will be reassessed in 3 months.         Bruit of left carotid artery    Overview     60% stenosis by Carotid doppler 08/03/2016   No sxs  Continue Crestor and follow         Coronary artery disease involving native coronary artery of native heart without angina pectoris    Overview     No sxs doing well  Followed with Gaby  s/p CABG x 3, 2015         Diabetes mellitus (CMS/[facility]) - Primary    Overview     A1c 7.3 11/18/2019 slightly worse.   A1c 6.6 08/13/2019    A1c 6.9 5/7/2019 on  A1c 7.0 02/12/2018    A1c 7.1, 11/1/2018 new onset         Current Assessment & Plan     Diabetes is worsening.   Continue current treatment regimen.  Diabetes will be reassessed in 3 months.  He will improve diet and exercise which maybe difficult with winter.          Relevant Orders    POCT urinalysis dipstick, manual (Completed)    POCT Glucose (Completed)    POC Glycosylated Hemoglobin (Hb A1C) (Completed)    Paroxysmal atrial fibrillation (CMS/[facility])    Overview     During heart surgery 2015, no break thru since            Medium    Mixed hyperlipidemia    Overview     Stable, Continue Crestor  LDL 85         Current Assessment & Plan     Lipid abnormalities are improving with treatment.  Pharmacotherapy as ordered.  Lipids will be reassessed in 3 months.            Low    Overweight    Overview     Diet exercise and wt loss encouraged.            Other Visit Diagnoses     Need for influenza vaccination        Relevant Orders    Flucelvax Quad=>4Years (PFS) (Completed)          An After Visit Summary and PPPS were given to the patient.

## 2019-11-18 NOTE — ASSESSMENT & PLAN NOTE
Diabetes is worsening.   Continue current treatment regimen.  Diabetes will be reassessed in 3 months.  He will improve diet and exercise which maybe difficult with winter.

## 2019-12-02 RX ORDER — METOPROLOL TARTRATE 50 MG/1
TABLET, FILM COATED ORAL
Qty: 90 TABLET | Refills: 3 | Status: SHIPPED | OUTPATIENT
Start: 2019-12-02 | End: 2020-04-13

## 2019-12-30 ENCOUNTER — TELEPHONE (OUTPATIENT)
Dept: FAMILY MEDICINE CLINIC | Facility: CLINIC | Age: 54
End: 2019-12-30

## 2019-12-30 DIAGNOSIS — R09.89 BRUIT OF LEFT CAROTID ARTERY: Primary | ICD-10-CM

## 2020-01-03 ENCOUNTER — HOSPITAL ENCOUNTER (OUTPATIENT)
Dept: ULTRASOUND IMAGING | Facility: HOSPITAL | Age: 55
Discharge: HOME | End: 2020-01-03
Admitting: FAMILY MEDICINE

## 2020-01-03 DIAGNOSIS — R09.89 BRUIT OF LEFT CAROTID ARTERY: ICD-10-CM

## 2020-01-03 PROCEDURE — 93880 EXTRACRANIAL BILAT STUDY: CPT

## 2020-01-06 ENCOUNTER — TELEPHONE (OUTPATIENT)
Dept: FAMILY MEDICINE CLINIC | Facility: CLINIC | Age: 55
End: 2020-01-06

## 2020-01-06 NOTE — TELEPHONE ENCOUNTER
Spoke with carlos at   [phone]  No PA required for   [id number], reference is [id number]  bl

## 2020-01-14 ENCOUNTER — TELEPHONE (OUTPATIENT)
Dept: FAMILY MEDICINE CLINIC | Facility: CLINIC | Age: 55
End: 2020-01-14

## 2020-01-15 DIAGNOSIS — E78.2 MIXED HYPERLIPIDEMIA: Primary | ICD-10-CM

## 2020-01-27 ENCOUNTER — HOSPITAL ENCOUNTER (OUTPATIENT)
Dept: CT IMAGING | Facility: HOSPITAL | Age: 55
Discharge: HOME | End: 2020-01-27
Admitting: FAMILY MEDICINE

## 2020-01-27 ENCOUNTER — HOSPITAL ENCOUNTER (OUTPATIENT)
Dept: CT IMAGING | Facility: HOSPITAL | Age: 55
Discharge: HOME | End: 2020-01-27

## 2020-01-27 DIAGNOSIS — N32.0 STENOSIS (ACQUIRED) OF BLADDER NECK OR VESICOURETHRAL ORIFICE: ICD-10-CM

## 2020-01-27 LAB — CREAT BLDA-MCNC: 0.9 MG/DL (ref 0.6–1.3)

## 2020-01-27 PROCEDURE — 0 IOPAMIDOL PER 1 ML: Performed by: FAMILY MEDICINE

## 2020-01-27 PROCEDURE — 70498 CT ANGIOGRAPHY NECK: CPT

## 2020-01-27 PROCEDURE — 70496 CT ANGIOGRAPHY HEAD: CPT

## 2020-01-27 PROCEDURE — 82565 ASSAY OF CREATININE: CPT

## 2020-01-27 RX ADMIN — IOPAMIDOL 100 ML: 755 INJECTION, SOLUTION INTRAVENOUS at 10:50

## 2020-02-04 ENCOUNTER — APPOINTMENT (OUTPATIENT)
Dept: VASCULAR SURGERY | Facility: HOSPITAL | Age: 55
End: 2020-02-04

## 2020-02-04 PROCEDURE — G0463 HOSPITAL OUTPT CLINIC VISIT: HCPCS

## 2020-02-12 PROBLEM — Z00.00 ANNUAL PHYSICAL EXAM: Status: RESOLVED | Noted: 2019-07-29 | Resolved: 2020-02-12

## 2020-02-12 PROBLEM — Z13.9 ENCOUNTER FOR SCREENING: Status: RESOLVED | Noted: 2019-07-29 | Resolved: 2020-02-12

## 2020-02-14 ENCOUNTER — OFFICE VISIT (OUTPATIENT)
Dept: FAMILY MEDICINE CLINIC | Facility: CLINIC | Age: 55
End: 2020-02-14

## 2020-02-14 ENCOUNTER — TELEPHONE (OUTPATIENT)
Dept: FAMILY MEDICINE CLINIC | Facility: CLINIC | Age: 55
End: 2020-02-14

## 2020-02-14 VITALS
OXYGEN SATURATION: 98 % | WEIGHT: 228.6 LBS | HEART RATE: 72 BPM | RESPIRATION RATE: 18 BRPM | BODY MASS INDEX: 36.74 KG/M2 | TEMPERATURE: 98.6 F | DIASTOLIC BLOOD PRESSURE: 85 MMHG | HEIGHT: 66 IN | SYSTOLIC BLOOD PRESSURE: 136 MMHG

## 2020-02-14 DIAGNOSIS — R06.83 SNORING: ICD-10-CM

## 2020-02-14 DIAGNOSIS — E66.01 CLASS 2 SEVERE OBESITY DUE TO EXCESS CALORIES WITH SERIOUS COMORBIDITY AND BODY MASS INDEX (BMI) OF 37.0 TO 37.9 IN ADULT (HCC): ICD-10-CM

## 2020-02-14 DIAGNOSIS — I10 BENIGN HYPERTENSION: ICD-10-CM

## 2020-02-14 DIAGNOSIS — Z12.5 SCREENING PSA (PROSTATE SPECIFIC ANTIGEN): ICD-10-CM

## 2020-02-14 DIAGNOSIS — Z12.12 ENCOUNTER FOR COLORECTAL CANCER SCREENING: ICD-10-CM

## 2020-02-14 DIAGNOSIS — Z23 NEED FOR PNEUMOCOCCAL VACCINATION: ICD-10-CM

## 2020-02-14 DIAGNOSIS — I25.10 CORONARY ARTERY DISEASE INVOLVING NATIVE CORONARY ARTERY OF NATIVE HEART WITHOUT ANGINA PECTORIS: ICD-10-CM

## 2020-02-14 DIAGNOSIS — E11.59 TYPE 2 DIABETES MELLITUS WITH OTHER CIRCULATORY COMPLICATION, WITHOUT LONG-TERM CURRENT USE OF INSULIN (HCC): ICD-10-CM

## 2020-02-14 DIAGNOSIS — Z12.11 ENCOUNTER FOR COLORECTAL CANCER SCREENING: ICD-10-CM

## 2020-02-14 DIAGNOSIS — E78.2 MIXED HYPERLIPIDEMIA: ICD-10-CM

## 2020-02-14 DIAGNOSIS — I48.0 PAROXYSMAL ATRIAL FIBRILLATION (HCC): ICD-10-CM

## 2020-02-14 DIAGNOSIS — Z00.01 ENCOUNTER FOR ANNUAL GENERAL MEDICAL EXAMINATION WITH ABNORMAL FINDINGS IN ADULT: Primary | ICD-10-CM

## 2020-02-14 LAB
BILIRUB BLD-MCNC: NEGATIVE MG/DL
CLARITY, POC: CLEAR
COLOR UR: YELLOW
GLUCOSE BLDC GLUCOMTR-MCNC: 120 MG/DL (ref 70–130)
GLUCOSE UR STRIP-MCNC: NEGATIVE MG/DL
HBA1C MFR BLD: 7.5 %
KETONES UR QL: NEGATIVE
LEUKOCYTE EST, POC: NEGATIVE
NITRITE UR-MCNC: NEGATIVE MG/ML
PH UR: 7 [PH] (ref 5–8)
PROT UR STRIP-MCNC: NEGATIVE MG/DL
RBC # UR STRIP: NEGATIVE /UL
SP GR UR: 1.01 (ref 1–1.03)
UROBILINOGEN UR QL: NORMAL

## 2020-02-14 PROCEDURE — 83036 HEMOGLOBIN GLYCOSYLATED A1C: CPT | Performed by: FAMILY MEDICINE

## 2020-02-14 PROCEDURE — 99396 PREV VISIT EST AGE 40-64: CPT | Performed by: FAMILY MEDICINE

## 2020-02-14 PROCEDURE — 90732 PPSV23 VACC 2 YRS+ SUBQ/IM: CPT | Performed by: FAMILY MEDICINE

## 2020-02-14 PROCEDURE — 81002 URINALYSIS NONAUTO W/O SCOPE: CPT | Performed by: FAMILY MEDICINE

## 2020-02-14 PROCEDURE — 36415 COLL VENOUS BLD VENIPUNCTURE: CPT | Performed by: FAMILY MEDICINE

## 2020-02-14 PROCEDURE — 90471 IMMUNIZATION ADMIN: CPT | Performed by: FAMILY MEDICINE

## 2020-02-14 PROCEDURE — 99213 OFFICE O/P EST LOW 20 MIN: CPT | Performed by: FAMILY MEDICINE

## 2020-02-14 PROCEDURE — 82962 GLUCOSE BLOOD TEST: CPT | Performed by: FAMILY MEDICINE

## 2020-02-14 RX ORDER — ROSUVASTATIN CALCIUM 40 MG/1
40 TABLET, COATED ORAL DAILY
Qty: 30 TABLET | Refills: 12 | Status: SHIPPED | OUTPATIENT
Start: 2020-02-14 | End: 2021-03-09

## 2020-02-14 NOTE — PROGRESS NOTES
Subjective   Mendel Melendez is a 54 y.o. male.     Chief Complaint   Patient presents with   • Annual Exam   • Diabetes   • Coronary Artery Disease   • Atrial Fibrillation   • Hyperlipidemia       The patient is here: for coordination of medical care to discuss health maintenance and disease prevention to follow up on multiple medical problems. Overall has: moderate activity with work/home activities, good appetite, good energy level and is sleeping poorly. Nutrition: balanced diet. Last tetanus shot was 2018.     Diabetes   He presents for his follow-up diabetic visit. He has type 2 diabetes mellitus. Pertinent negatives for hypoglycemia include no confusion, dizziness or nervousness/anxiousness. Pertinent negatives for diabetes include no chest pain, no fatigue, no polydipsia, no polyuria, no weakness and no weight loss. Risk factors for coronary artery disease include diabetes mellitus, dyslipidemia, hypertension, male sex, obesity and family history. Current diabetic treatment includes oral agent (monotherapy). He is following a diabetic diet. Meal planning includes avoidance of concentrated sweets. He never participates in exercise. (Does not check blood sugars regularly) An ACE inhibitor/angiotensin II receptor blocker is not being taken. He does not see a podiatrist.Eye exam is current (10/2019 [facility]).   Coronary Artery Disease   Presents for follow-up visit. Pertinent negatives include no chest pain, chest pressure, dizziness, leg swelling, palpitations, shortness of breath or weight gain. Risk factors include hyperlipidemia and obesity. The symptoms have been stable. Compliance with diet is good. Compliance with exercise is good. Compliance with medications is good.   Atrial Fibrillation   Presents for follow-up visit. Symptoms are negative for chest pain, dizziness, palpitations, shortness of breath and weakness. The symptoms have been stable. Past medical history includes atrial fibrillation, CAD and  hyperlipidemia.   Hypertension   This is a chronic problem. The current episode started more than 1 year ago. The problem is controlled. Pertinent negatives include no chest pain, palpitations or shortness of breath. Risk factors for coronary artery disease include diabetes mellitus, family history, dyslipidemia, male gender and obesity. Current antihypertension treatment includes calcium channel blockers and beta blockers. The current treatment provides significant improvement. Hypertensive end-organ damage includes CAD/MI.   Hyperlipidemia   This is a chronic problem. The current episode started more than 1 year ago. The problem is controlled. Recent lipid tests were reviewed and are normal. Exacerbating diseases include diabetes and obesity. Pertinent negatives include no chest pain, myalgias or shortness of breath. Current antihyperlipidemic treatment includes statins. The current treatment provides significant improvement of lipids. Risk factors for coronary artery disease include diabetes mellitus, dyslipidemia, family history, hypertension, obesity and male sex.        I personally reviewed and updated the patient's allergies, medications, problem list, and past medical, surgical, social, and family history.     Allergies:  Allergies   Allergen Reactions   • Niacin Swelling       Social History:  Social History     Socioeconomic History   • Marital status:      Spouse name: Not on file   • Number of children: Not on file   • Years of education: Not on file   • Highest education level: Not on file   Tobacco Use   • Smoking status: Never Smoker   • Smokeless tobacco: Never Used   Substance and Sexual Activity   • Alcohol use: No     Frequency: Never   • Drug use: No   • Sexual activity: Defer       Family History:  Family History   Problem Relation Age of Onset   • Heart disease Father    • Heart disease Paternal Uncle    • Heart disease Maternal Aunt    • Heart disease Maternal Uncle    • Breast cancer  Paternal Aunt    • Heart disease Paternal Aunt    • Ovarian cancer Paternal Aunt        Past Medical History :  Patient Active Problem List   Diagnosis   • Encounter for annual general medical examination with abnormal findings in adult   • Encounter for colorectal cancer screening   • Encounter for occupational health examination   • Screening PSA (prostate specific antigen)   • Benign hypertension   • Bruit of left carotid artery   • Coronary artery disease involving native coronary artery of native heart without angina pectoris   • Diabetes mellitus (CMS/HCC)   • Gastroesophageal reflux disease   • Mixed hyperlipidemia   • Occlusion and stenosis of unspecified carotid artery   • Class 2 severe obesity due to excess calories with serious comorbidity and body mass index (BMI) of 37.0 to 37.9 in adult (CMS/HCC)   • Paroxysmal atrial fibrillation (CMS/[facility])   • Seasonal allergic rhinitis due to pollen   • Status post coronary artery bypass graft   • Vasculogenic erectile dysfunction   • Stenosis of left carotid artery       Medication List:    Current Outpatient Medications:   •  aspirin (ASPIRIN ADULT LOW STRENGTH) 81 MG EC tablet, Take 81 mg by mouth Daily., Disp: , Rfl:   •  metFORMIN (GLUCOPHAGE) 500 MG tablet, Take 1 tablet by mouth 2 (Two) Times a Day With Meals. with food., Disp: 60 tablet, Rfl: 12  •  metoprolol tartrate (LOPRESSOR) 50 MG tablet, TAKE 1 TABLET BY MOUTH EVERY 8 HOURS, Disp: 90 tablet, Rfl: 3  •  montelukast (SINGULAIR) 10 MG tablet, Take 10 mg by mouth Every Night., Disp: , Rfl:   •  rosuvastatin (CRESTOR) 40 MG tablet, Take 1 tablet by mouth Daily., Disp: 30 tablet, Rfl: 12  •  amLODIPine (NORVASC) 5 MG tablet, TAKE 1 TABLET BY MOUTH ONCE DAILY, Disp: 90 tablet, Rfl: 0  •  zolpidem (AMBIEN) 10 MG tablet, One po at hs for sleep test, Disp: 1 tablet, Rfl: 0    Past Surgical History:  Past Surgical History:   Procedure Laterality Date   • CARDIAC CATHETERIZATION  04/09/2015    F Left main  distal 60-70% LCX 80-90% RCA proximal 90% and mid 100% with left to right collaterals.   • CORONARY ARTERY BYPASS GRAFT  04/13/2015    Four, left main and three vessel cononary artery disease. Dr. Franklyn Smith   • VASECTOMY  1998       Depression Screen:   PHQ-2/PHQ-9 Depression Screening 8/13/2019   Little interest or pleasure in doing things 0   Feeling down, depressed, or hopeless 0   Total Score 0       Review Of Systems:  Review of Systems   Constitutional: Negative for activity change, fatigue, fever, unexpected weight gain and unexpected weight loss.   HENT: Negative for congestion, ear pain, hearing loss, rhinorrhea, sinus pressure, sore throat, swollen glands, trouble swallowing and voice change.    Eyes: Negative for visual disturbance.   Respiratory: Negative for apnea, shortness of breath and wheezing.         He has moderate snoring, sleep study will be arranged. His sxs are moderate of 1 -2 yr duration and worsening   Cardiovascular: Negative for chest pain, palpitations and leg swelling.   Gastrointestinal: Negative for abdominal pain, blood in stool, constipation, diarrhea, nausea, vomiting and indigestion.   Endocrine: Negative for cold intolerance, heat intolerance, polydipsia and polyuria.   Genitourinary: Negative for dysuria, flank pain, frequency, hematuria, testicular pain, urgency and urinary incontinence.   Musculoskeletal: Negative for arthralgias, joint swelling and myalgias.   Skin: Negative for rash, skin lesions and bruise.   Allergic/Immunologic: Negative for environmental allergies and immunocompromised state.   Neurological: Negative for dizziness, syncope, weakness, numbness, headache, memory problem and confusion.   Hematological: Negative for adenopathy. Does not bruise/bleed easily.   Psychiatric/Behavioral: Negative for suicidal ideas and depressed mood. The patient is not nervous/anxious.        I have reviewed and confirmed the accuracy of the ROS as documented by the MA/LPN/RN  "Mariajose Hernandez MD    Vital Signs:  Visit Vitals  /85 (BP Location: Right arm, Patient Position: Sitting, Cuff Size: Large Adult)   Pulse 72   Temp 98.6 °F (37 °C) (Oral)   Resp 18   Ht 167.6 cm (66\")   Wt 104 kg (228 lb 9.6 oz)   SpO2 98% Comment: Room air   BMI 36.90 kg/m²       Physical Exam   Constitutional: He is oriented to person, place, and time. He appears well-developed and well-nourished. No distress.   HENT:   Head: Normocephalic.   Right Ear: Tympanic membrane and external ear normal.   Left Ear: Tympanic membrane and external ear normal.   Nose: No mucosal edema.   Mouth/Throat: No oral lesions. No oropharyngeal exudate.   Eyes: Pupils are equal, round, and reactive to light. Conjunctivae and EOM are normal. No scleral icterus.   Neck: Normal range of motion. Neck supple. No JVD present. No edema present. No thyromegaly present.   Cardiovascular: Normal rate, regular rhythm, normal heart sounds and intact distal pulses.  No extrasystoles are present. Exam reveals no gallop and no friction rub.   No murmur heard.  Pulses:       Carotid pulses are 2+ on the right side, and 2+ on the left side.       Femoral pulses are 2+ on the right side, and 2+ on the left side.       Dorsalis pedis pulses are 2+ on the right side, and 2+ on the left side.   Pulmonary/Chest: Effort normal. He has no decreased breath sounds.   Abdominal: Soft. Normal appearance and bowel sounds are normal. He exhibits no distension and no mass. There is no hepatosplenomegaly. There is no tenderness. There is no CVA tenderness. No hernia. Hernia confirmed negative in the right inguinal area and confirmed negative in the left inguinal area.   Genitourinary: Testes normal.   Musculoskeletal: Normal range of motion.    Mendel had a diabetic foot exam performed today.   During the foot exam he had a monofilament test performed.  Lymphadenopathy:        Head (right side): No submandibular adenopathy present.        Head (left side): No " submandibular adenopathy present.     He has no cervical adenopathy.     He has no axillary adenopathy.        Right: No inguinal and no supraclavicular adenopathy present.        Left: No inguinal and no supraclavicular adenopathy present.   Neurological: He is alert and oriented to person, place, and time. He has normal strength and normal reflexes. He is not disoriented. No cranial nerve deficit or sensory deficit. He exhibits normal muscle tone. Coordination normal.   Skin: Skin is warm, dry and intact. Turgor is normal. No ecchymosis and no rash noted. No cyanosis or erythema.   Psychiatric: He has a normal mood and affect. His speech is normal. Judgment and thought content normal. Cognition and memory are normal.       Assessment and Plan:  Problem List Items Addressed This Visit        High    Benign hypertension    Overview     Controlled.         Current Assessment & Plan     Hypertension is improving with treatment.  Continue current treatment regimen.  Dietary sodium restriction.  Weight loss.  Regular aerobic exercise.  Blood pressure will be reassessed  1 yr.         Relevant Orders    Comprehensive Metabolic Panel (Completed)    CBC & Differential (Completed)    TSH (Completed)    Coronary artery disease involving native coronary artery of native heart without angina pectoris    Overview     No sxs doing well  Followed with Gaby  s/p CABG x 3, 2015         Diabetes mellitus (CMS/[facility])    Overview     A1c 7.5 02/14/2020 slightly worse, he will improve diet, and exercise and follow up  A1c 7.3 11/18/2019    A1c 6.6 08/13/2019    A1c 6.9 5/7/2019 on  A1c 7.0 02/12/2018    A1c 7.1, 11/1/2018 new onset         Current Assessment & Plan     Diabetes is worsening.   Continue current treatment regimen.  Reminded to bring in blood sugar diary at next visit.  Regular aerobic exercise.  Diabetes will be reassessed in 3 months.         Relevant Medications    metFORMIN (GLUCOPHAGE) 500 MG tablet    Other Relevant  Orders    POCT urinalysis dipstick, manual (Completed)    POC Glycosylated Hemoglobin (Hb A1C) (Completed)    POCT Glucose (Completed)    Paroxysmal atrial fibrillation (CMS/[facility])    Overview     During heart surgery 2015, no break thru since            Medium    Mixed hyperlipidemia    Overview     Stable, Continue Crestor 20mg  LDL 85            Relevant Medications    rosuvastatin (CRESTOR) 40 MG tablet    Other Relevant Orders    Lipid Panel With / Chol / HDL Ratio (Completed)       Low    Class 2 severe obesity due to excess calories with serious comorbidity and body mass index (BMI) of 37.0 to 37.9 in adult (CMS/[facility])    Overview     Diet exercise and wt loss encouraged.             Unprioritized    Encounter for annual general medical examination with abnormal findings in adult - Primary    Overview     Diet exercise wt loss, seat belts, sunscreens. Ear protection.   Previous lab reviewed, he eas notified of today's lab.  Meds refilled.           Encounter for colorectal cancer screening    Overview     Last colonoscopy 03/04/2019 repeat 2029         Screening PSA (prostate specific antigen)    Relevant Orders    PSA Screen (Completed)      Other Visit Diagnoses     Snoring        Relevant Orders    Ambulatory Referral to Sleep Medicine    Need for pneumococcal vaccination        Relevant Orders    Pneumococcal Polysaccharide Vaccine 23-Valent Greater Than or Equal To 3yo Subcutaneous / IM (Completed)          An After Visit Summary and PPPS were given to the patient.      Site care done- procedure tolerated well, dressing applied. Venipuncture was obtained after 1 time(s 2 tubes were drawn. Needle jono used was 22.

## 2020-02-15 LAB
ALBUMIN SERPL-MCNC: 4.3 G/DL (ref 3.8–4.9)
ALBUMIN/GLOB SERPL: 1.6 {RATIO} (ref 1.2–2.2)
ALP SERPL-CCNC: 105 IU/L (ref 39–117)
ALT SERPL-CCNC: 63 IU/L (ref 0–44)
AST SERPL-CCNC: 28 IU/L (ref 0–40)
BASOPHILS # BLD AUTO: 0.1 X10E3/UL (ref 0–0.2)
BASOPHILS NFR BLD AUTO: 1 %
BILIRUB SERPL-MCNC: 0.3 MG/DL (ref 0–1.2)
BUN SERPL-MCNC: 18 MG/DL (ref 6–24)
BUN/CREAT SERPL: 19 (ref 9–20)
CALCIUM SERPL-MCNC: 9.5 MG/DL (ref 8.7–10.2)
CHLORIDE SERPL-SCNC: 102 MMOL/L (ref 96–106)
CHOLEST SERPL-MCNC: 169 MG/DL (ref 100–199)
CHOLEST/HDLC SERPL: 3 RATIO (ref 0–5)
CO2 SERPL-SCNC: 24 MMOL/L (ref 20–29)
CREAT SERPL-MCNC: 0.95 MG/DL (ref 0.76–1.27)
EOSINOPHIL # BLD AUTO: 0.3 X10E3/UL (ref 0–0.4)
EOSINOPHIL NFR BLD AUTO: 3 %
ERYTHROCYTE [DISTWIDTH] IN BLOOD BY AUTOMATED COUNT: 13.1 % (ref 11.6–15.4)
GLOBULIN SER CALC-MCNC: 2.7 G/DL (ref 1.5–4.5)
GLUCOSE SERPL-MCNC: 149 MG/DL (ref 65–99)
HCT VFR BLD AUTO: 47 % (ref 37.5–51)
HDLC SERPL-MCNC: 57 MG/DL
HGB BLD-MCNC: 16 G/DL (ref 13–17.7)
IMM GRANULOCYTES # BLD AUTO: 0 X10E3/UL (ref 0–0.1)
IMM GRANULOCYTES NFR BLD AUTO: 0 %
LDLC SERPL CALC-MCNC: 87 MG/DL (ref 0–99)
LYMPHOCYTES # BLD AUTO: 1.6 X10E3/UL (ref 0.7–3.1)
LYMPHOCYTES NFR BLD AUTO: 19 %
MCH RBC QN AUTO: 29.4 PG (ref 26.6–33)
MCHC RBC AUTO-ENTMCNC: 34 G/DL (ref 31.5–35.7)
MCV RBC AUTO: 86 FL (ref 79–97)
MONOCYTES # BLD AUTO: 1 X10E3/UL (ref 0.1–0.9)
MONOCYTES NFR BLD AUTO: 12 %
NEUTROPHILS # BLD AUTO: 5.3 X10E3/UL (ref 1.4–7)
NEUTROPHILS NFR BLD AUTO: 65 %
PLATELET # BLD AUTO: 258 X10E3/UL (ref 150–450)
POTASSIUM SERPL-SCNC: 5.1 MMOL/L (ref 3.5–5.2)
PROT SERPL-MCNC: 7 G/DL (ref 6–8.5)
PSA SERPL-MCNC: 1 NG/ML (ref 0–4)
RBC # BLD AUTO: 5.44 X10E6/UL (ref 4.14–5.8)
SODIUM SERPL-SCNC: 140 MMOL/L (ref 134–144)
TRIGL SERPL-MCNC: 127 MG/DL (ref 0–149)
TSH SERPL DL<=0.005 MIU/L-ACNC: 1.29 UIU/ML (ref 0.45–4.5)
VLDLC SERPL CALC-MCNC: 25 MG/DL (ref 5–40)
WBC # BLD AUTO: 8.2 X10E3/UL (ref 3.4–10.8)

## 2020-02-17 ENCOUNTER — TRANSCRIBE ORDERS (OUTPATIENT)
Dept: ADMINISTRATIVE | Facility: HOSPITAL | Age: 55
End: 2020-02-17

## 2020-02-17 ENCOUNTER — OFFICE VISIT (OUTPATIENT)
Dept: NEUROLOGY | Facility: CLINIC | Age: 55
End: 2020-02-17

## 2020-02-17 VITALS
WEIGHT: 230 LBS | HEIGHT: 66 IN | SYSTOLIC BLOOD PRESSURE: 152 MMHG | DIASTOLIC BLOOD PRESSURE: 79 MMHG | HEART RATE: 85 BPM | BODY MASS INDEX: 36.96 KG/M2

## 2020-02-17 DIAGNOSIS — G47.33 OBSTRUCTIVE SLEEP APNEA: Primary | ICD-10-CM

## 2020-02-17 DIAGNOSIS — I65.23 BILATERAL CAROTID ARTERY OCCLUSION: Primary | ICD-10-CM

## 2020-02-17 DIAGNOSIS — E66.09 CLASS 2 OBESITY DUE TO EXCESS CALORIES WITH BODY MASS INDEX (BMI) OF 37.0 TO 37.9 IN ADULT, UNSPECIFIED WHETHER SERIOUS COMORBIDITY PRESENT: ICD-10-CM

## 2020-02-17 PROCEDURE — 99204 OFFICE O/P NEW MOD 45 MIN: CPT | Performed by: PSYCHIATRY & NEUROLOGY

## 2020-02-17 RX ORDER — ZOLPIDEM TARTRATE 10 MG/1
TABLET ORAL
Qty: 1 TABLET | Refills: 0 | Status: SHIPPED | OUTPATIENT
Start: 2020-02-17 | End: 2020-05-20

## 2020-02-17 RX ORDER — AMLODIPINE BESYLATE 5 MG/1
TABLET ORAL
Qty: 90 TABLET | Refills: 0 | Status: SHIPPED | OUTPATIENT
Start: 2020-02-17 | End: 2020-05-18

## 2020-02-17 NOTE — PROGRESS NOTES
Sleep Medicine initial Consultation    Mendel Melendez  : 1965  54 y.o. male   Date of Service: 2020  Referring provider: Mariajose Hernandez MD    Chief complaint: snoring and possible colton  New Sleep, neck measures 17 1/2 inches.     Mr. Mendel Melendez  is a 54 y.o. right handed  male patient has a H/O HTN, GERD is here for the evaluation of Snoring, Excessive Daytime Sleepiness, Restless Leg Syndrome, Insomnia and Disturbed Sleep.  Patient c/o tossing and turning wakes up allot during the night,   has RLS.   Patient has a history heart surgery 2016 at that time he had GERD really bad and snored, after the surgery those symptoms went away and started coming back this past year and heart burn time 2 weeks.   Patient mom snores but no family history of sleep apnea.          The patient c/o daytime sleepiness issues:  excessive daytime sleepiness,  and There is no H/O sleep paralysis, hypnagogic hallucinations or cataplexy..      The patient complains of snoring loud in all sleeping positions, has dry mouth at times when wakes, has gained 10 lbs of weight the the last 5 years.    The patient complains of Leg symptoms: discomfort on a creepy crawly feeling in legs when resting or relaxing and this may partially or completely improved by stretching or moving.     The patient complains of problems with insomnia:  difficulty falling asleep, frequent awakenings 3 and has restless sleep.    The patient reports history of these childhood sleep problems: There is no h/O sleepwalking or bedwetting or nightmares or sleep eating or acting out dreams    Sleep schedule: Bedtime:10-11 , gets out of bed at 6, sleep latency: 30-1 hour, Gets about 5-6 hours of sleep.      EPWORTH SLEEPINESS SCALE  Sitting and reading  2  WatchingTV  3  Sitting, inactive, in a public place  3  As a passenger in a car for 1 hour w/o a break  0  Lying down to rest in the afternoon  3  Sitting and talking to someone  0  Sitting quietly  after a lunch  3  In a car, while stopped for traffic or a light  0  Total 14        14/24.  Past Medical History:   Diagnosis Date   • Bruit of left carotid artery    • Coronary artery disease    • Hyperlipidemia    • Hypertension    • Nonspecific elevation of levels of transaminase or lactic acid dehydrogenase (LDH)    • Paroxysmal atrial fibrillation (CMS/HCC)    • Seasonal allergic rhinitis due to pollen    • 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)      Past Surgical History:   Procedure Laterality Date   • CARDIAC CATHETERIZATION  04/09/2015    BHF Left main distal 60-70% LCX 80-90% RCA proximal 90% and mid 100% with left to right collaterals.   • CORONARY ARTERY BYPASS GRAFT  04/13/2015    Four, left main and three vessel cononary artery disease. Dr. Franklyn Smith   • VASECTOMY  1998     Current Outpatient Medications on File Prior to Visit   Medication Sig Dispense Refill   • amLODIPine (NORVASC) 5 MG tablet TAKE 1 TABLET BY MOUTH ONCE DAILY 90 tablet 0   • aspirin (ASPIRIN ADULT LOW STRENGTH) 81 MG EC tablet Take 81 mg by mouth Daily.     • metFORMIN (GLUCOPHAGE) 500 MG tablet Take 1 tablet by mouth 2 (Two) Times a Day With Meals. with food. 60 tablet 12   • metoprolol tartrate (LOPRESSOR) 50 MG tablet TAKE 1 TABLET BY MOUTH EVERY 8 HOURS 90 tablet 3   • montelukast (SINGULAIR) 10 MG tablet Take 10 mg by mouth Every Night.     • rosuvastatin (CRESTOR) 40 MG tablet Take 1 tablet by mouth Daily. 30 tablet 12   • [DISCONTINUED] amLODIPine (NORVASC) 5 MG tablet Take 5 mg by mouth Daily.       No current facility-administered medications on file prior to visit.      Allergies   Allergen Reactions   • Niacin Swelling     Family History   Problem Relation Age of Onset   • Heart disease Father    • Heart disease Paternal Uncle    • Heart disease Maternal Aunt    • Heart disease Maternal Uncle    • Breast cancer Paternal Aunt    • Heart disease Paternal Aunt    • Ovarian cancer Paternal  Aunt      Social History     Socioeconomic History   • Marital status:      Spouse name: Not on file   • Number of children: Not on file   • Years of education: Not on file   • Highest education level: Not on file   Tobacco Use   • Smoking status: Never Smoker   • Smokeless tobacco: Never Used   Substance and Sexual Activity   • Alcohol use: No     Frequency: Never   • Drug use: No   • Sexual activity: Defer     Review of Systems   Constitutional: Positive for fatigue. Negative for appetite change.   HENT: Positive for postnasal drip. Negative for sinus pain.    Eyes: Negative for pain and itching.   Respiratory: Positive for shortness of breath. Negative for cough.    Cardiovascular: Positive for chest pain. Negative for palpitations.   Gastrointestinal: Negative for constipation and diarrhea.   Endocrine: Negative for cold intolerance and heat intolerance.   Genitourinary: Positive for frequency. Negative for difficulty urinating.   Musculoskeletal: Negative for back pain and neck pain.   Allergic/Immunologic: Negative for environmental allergies.   Neurological: Negative for dizziness, tremors, seizures, syncope, facial asymmetry, speech difficulty, weakness, light-headedness, numbness and headaches.   Psychiatric/Behavioral: Negative for agitation and confusion.       I reviewed and addressed ROS entered by MA.    Patient examination:  Vitals:    02/17/20 1713   BP: 152/79   Pulse: 85    Body mass index is 37.12 kg/m².     Physical Exam   Constitutional: He is oriented to person, place, and time. He appears well-developed and well-nourished.   HENT:   Nose: Nose normal.   Mouth/Throat: Oropharynx is clear and moist.   Eyes: Pupils are equal, round, and reactive to light. Conjunctivae and EOM are normal.   Cardiovascular: Normal rate, regular rhythm and normal heart sounds.   Pulmonary/Chest: Effort normal and breath sounds normal. No respiratory distress.   Musculoskeletal: Normal range of motion. He  exhibits no edema or deformity.   Neurological: He is alert and oriented to person, place, and time.   Station and gait normal   Psychiatric: He has a normal mood and affect. His behavior is normal.   Vitals reviewed.      ASSESSMENT AND PLAN:    The patient has symptoms of obstructive sleep apnea syndrome with hypersomnia. We will proceed with polysomnography and treat with CPAP therapy if needed.     Obesity,     Encounter Diagnoses   Name Primary?   • Obstructive sleep apnea Yes   • Class 2 obesity due to excess calories with body mass index (BMI) of 37.0 to 37.9 in adult, unspecified whether serious comorbidity present        Orders Placed This Encounter   Procedures   • Polysomnography 4 or More Parameters       Return in about 3 months (around 5/17/2020) for Recheck.    This document has been electronically signed by Joseph Seipel, MD  on February 17, 2020 5:57 PM

## 2020-02-22 PROBLEM — E66.01 CLASS 2 SEVERE OBESITY DUE TO EXCESS CALORIES WITH SERIOUS COMORBIDITY AND BODY MASS INDEX (BMI) OF 37.0 TO 37.9 IN ADULT (HCC): Status: ACTIVE | Noted: 2019-07-29

## 2020-02-22 PROBLEM — E66.812 CLASS 2 SEVERE OBESITY DUE TO EXCESS CALORIES WITH SERIOUS COMORBIDITY AND BODY MASS INDEX (BMI) OF 37.0 TO 37.9 IN ADULT: Status: ACTIVE | Noted: 2019-07-29

## 2020-02-23 PROBLEM — I65.22 STENOSIS OF LEFT CAROTID ARTERY: Status: ACTIVE | Noted: 2020-02-23

## 2020-02-23 NOTE — ASSESSMENT & PLAN NOTE
Diabetes is worsening.   Continue current treatment regimen.  Reminded to bring in blood sugar diary at next visit.  Regular aerobic exercise.  Diabetes will be reassessed in 3 months.

## 2020-03-08 ENCOUNTER — HOSPITAL ENCOUNTER (OUTPATIENT)
Dept: SLEEP MEDICINE | Facility: HOSPITAL | Age: 55
Discharge: HOME | End: 2020-03-08
Admitting: PSYCHIATRY & NEUROLOGY

## 2020-03-08 DIAGNOSIS — G47.33 OBSTRUCTIVE SLEEP APNEA: ICD-10-CM

## 2020-03-08 PROCEDURE — 95811 POLYSOM 6/>YRS CPAP 4/> PARM: CPT

## 2020-03-09 PROCEDURE — 95811 POLYSOM 6/>YRS CPAP 4/> PARM: CPT | Performed by: PSYCHIATRY & NEUROLOGY

## 2020-03-19 ENCOUNTER — TELEPHONE (OUTPATIENT)
Dept: NEUROLOGY | Facility: CLINIC | Age: 55
End: 2020-03-19

## 2020-03-19 DIAGNOSIS — G47.33 OBSTRUCTIVE SLEEP APNEA: Primary | ICD-10-CM

## 2020-03-23 ENCOUNTER — TELEPHONE (OUTPATIENT)
Dept: NEUROLOGY | Facility: CLINIC | Age: 55
End: 2020-03-23

## 2020-03-24 ENCOUNTER — CLINICAL SUPPORT (OUTPATIENT)
Dept: FAMILY MEDICINE CLINIC | Facility: CLINIC | Age: 55
End: 2020-03-24

## 2020-03-24 VITALS
TEMPERATURE: 98.1 F | HEART RATE: 79 BPM | OXYGEN SATURATION: 95 % | BODY MASS INDEX: 36.64 KG/M2 | WEIGHT: 228 LBS | DIASTOLIC BLOOD PRESSURE: 70 MMHG | RESPIRATION RATE: 18 BRPM | HEIGHT: 66 IN | SYSTOLIC BLOOD PRESSURE: 108 MMHG

## 2020-03-24 DIAGNOSIS — Z02.4 ENCOUNTER FOR CDL (COMMERCIAL DRIVING LICENSE) EXAM: Primary | ICD-10-CM

## 2020-03-24 LAB
BILIRUB BLD-MCNC: NEGATIVE MG/DL
CLARITY, POC: CLEAR
COLOR UR: YELLOW
GLUCOSE UR STRIP-MCNC: NEGATIVE MG/DL
KETONES UR QL: NEGATIVE
LEUKOCYTE EST, POC: NEGATIVE
NITRITE UR-MCNC: NEGATIVE MG/ML
PH UR: 5 [PH] (ref 5–8)
PROT UR STRIP-MCNC: NEGATIVE MG/DL
RBC # UR STRIP: NEGATIVE /UL
SP GR UR: 1.01 (ref 1–1.03)
UROBILINOGEN UR QL: NORMAL

## 2020-03-24 PROCEDURE — DOTPHY: Performed by: FAMILY MEDICINE

## 2020-03-24 PROCEDURE — 81003 URINALYSIS AUTO W/O SCOPE: CPT | Performed by: FAMILY MEDICINE

## 2020-03-24 NOTE — PROGRESS NOTES
" Medical Examination    Subjective   Mendel Melendez is a 54 y.o. male who presents today for a  fitness determination physical exam. The patient reports no problems.  The following portions of the patient's history were reviewed and updated as appropriate: allergies, current medications, past family history, past medical history, past social history, past surgical history and problem list.  Review of Systems  A comprehensive review of systems was negative.    Objective    Vision:   Visual Acuity Screening    Right eye Left eye Both eyes   Without correction:      With correction: 20/20 20/20 20/20       Applicant can recognize and distinguish among traffic control signals and devices showing standard red, green, and sera colors.  Applicant has peripheral vision to 90 degrees in each eye.    Applicant meets visual acuity requirement only when wearing corrective lenses.    Monocular Vision?: No      Hearing:  Applicant can distinguish forced whisper at a distance of 5 feet with both ears.         /70   Pulse 79   Temp 98.1 °F (36.7 °C)   Resp 18   Ht 167.6 cm (66\")   Wt 103 kg (228 lb)   SpO2 95%   BMI 36.80 kg/m²     General Appearance:    Alert, cooperative, no distress, appears stated age   Head:    Normocephalic, without obvious abnormality, atraumatic   Eyes:    PERRL, conjunctiva/corneas clear, EOM's intact, fundi     benign, both eyes        Ears:    Normal TM's and external ear canals, both ears   Nose:   Nares normal, septum midline, mucosa normal, no drainage    or sinus tenderness   Throat:   Lips, mucosa, and tongue normal; teeth and gums normal   Neck:   Supple, symmetrical, trachea midline, no adenopathy;        thyroid:  No enlargement/tenderness/nodules; no carotid    bruit or JVD   Back:     Symmetric, no curvature, ROM normal, no CVA tenderness   Lungs:     Clear to auscultation bilaterally, respirations unlabored   Chest wall:    No tenderness or " deformity   Heart:    Regular rate and rhythm, S1 and S2 normal, no murmur, rub   or gallop   Abdomen:     Soft, non-tender, bowel sounds active all four quadrants,     no masses, no organomegaly   Genitalia:    Normal male without lesion, discharge or tenderness   Rectal:    Normal tone, normal prostate, no masses or tenderness;    guaiac negative stool   Extremities:   Extremities normal, atraumatic, no cyanosis or edema   Pulses:   2+ and symmetric all extremities   Skin:   Skin color, texture, turgor normal, no rashes or lesions   Lymph nodes:   Cervical, supraclavicular, and axillary nodes normal   Neurologic:   CNII-XII intact. Normal strength, sensation and reflexes       throughout       Labs:  Lab Results   Component Value Date    SPECGRAV 1.015 02/14/2020    BILIRUBINUR Negative 02/14/2020    GLUCOSEU Negative 04/15/2019       Assessment/Plan   Healthy male exam.   Meets standards in 51 .41;  qualifies for 2 year certificate.     Medical examiners certificate completed and printed.  Return as needed.

## 2020-04-13 RX ORDER — METOPROLOL TARTRATE 50 MG/1
TABLET, FILM COATED ORAL
Qty: 90 TABLET | Refills: 1 | Status: SHIPPED | OUTPATIENT
Start: 2020-04-13 | End: 2020-06-15

## 2020-05-08 DIAGNOSIS — N52.9 VASCULOGENIC ERECTILE DYSFUNCTION, UNSPECIFIED VASCULOGENIC ERECTILE DYSFUNCTION TYPE: Primary | ICD-10-CM

## 2020-05-08 RX ORDER — SILDENAFIL CITRATE 20 MG/1
20 TABLET ORAL DAILY
Qty: 30 TABLET | Refills: 12 | Status: SHIPPED | OUTPATIENT
Start: 2020-05-08 | End: 2020-11-19

## 2020-05-15 RX ORDER — MONTELUKAST SODIUM 10 MG/1
TABLET ORAL
Qty: 30 TABLET | Refills: 0 | Status: SHIPPED | OUTPATIENT
Start: 2020-05-15 | End: 2020-06-15

## 2020-05-18 RX ORDER — AMLODIPINE BESYLATE 5 MG/1
TABLET ORAL
Qty: 90 TABLET | Refills: 0 | Status: SHIPPED | OUTPATIENT
Start: 2020-05-18 | End: 2020-08-14

## 2020-05-20 ENCOUNTER — OFFICE VISIT (OUTPATIENT)
Dept: FAMILY MEDICINE CLINIC | Facility: CLINIC | Age: 55
End: 2020-05-20

## 2020-05-20 VITALS
HEART RATE: 75 BPM | WEIGHT: 222.8 LBS | HEIGHT: 66 IN | RESPIRATION RATE: 18 BRPM | DIASTOLIC BLOOD PRESSURE: 70 MMHG | TEMPERATURE: 97.1 F | OXYGEN SATURATION: 95 % | BODY MASS INDEX: 35.81 KG/M2 | SYSTOLIC BLOOD PRESSURE: 122 MMHG

## 2020-05-20 DIAGNOSIS — R79.89 ELEVATED LFTS: ICD-10-CM

## 2020-05-20 DIAGNOSIS — I48.0 PAROXYSMAL ATRIAL FIBRILLATION (HCC): ICD-10-CM

## 2020-05-20 DIAGNOSIS — I10 BENIGN HYPERTENSION: ICD-10-CM

## 2020-05-20 DIAGNOSIS — E78.2 MIXED HYPERLIPIDEMIA: ICD-10-CM

## 2020-05-20 DIAGNOSIS — E11.59 TYPE 2 DIABETES MELLITUS WITH OTHER CIRCULATORY COMPLICATION, WITHOUT LONG-TERM CURRENT USE OF INSULIN (HCC): Primary | ICD-10-CM

## 2020-05-20 DIAGNOSIS — I25.10 CORONARY ARTERY DISEASE INVOLVING NATIVE CORONARY ARTERY OF NATIVE HEART WITHOUT ANGINA PECTORIS: ICD-10-CM

## 2020-05-20 DIAGNOSIS — E66.01 CLASS 2 SEVERE OBESITY DUE TO EXCESS CALORIES WITH SERIOUS COMORBIDITY AND BODY MASS INDEX (BMI) OF 37.0 TO 37.9 IN ADULT (HCC): ICD-10-CM

## 2020-05-20 LAB
BILIRUB BLD-MCNC: NEGATIVE MG/DL
CLARITY, POC: CLEAR
COLOR UR: YELLOW
GLUCOSE BLDC GLUCOMTR-MCNC: 138 MG/DL (ref 70–130)
GLUCOSE UR STRIP-MCNC: NEGATIVE MG/DL
HBA1C MFR BLD: 7 %
KETONES UR QL: NEGATIVE
LEUKOCYTE EST, POC: NEGATIVE
NITRITE UR-MCNC: NEGATIVE MG/ML
PH UR: 5 [PH] (ref 5–8)
PROT UR STRIP-MCNC: NEGATIVE MG/DL
RBC # UR STRIP: NEGATIVE /UL
SP GR UR: 1.02 (ref 1–1.03)
UROBILINOGEN UR QL: NORMAL

## 2020-05-20 PROCEDURE — 83036 HEMOGLOBIN GLYCOSYLATED A1C: CPT | Performed by: FAMILY MEDICINE

## 2020-05-20 PROCEDURE — 82962 GLUCOSE BLOOD TEST: CPT | Performed by: FAMILY MEDICINE

## 2020-05-20 PROCEDURE — 99214 OFFICE O/P EST MOD 30 MIN: CPT | Performed by: FAMILY MEDICINE

## 2020-05-20 PROCEDURE — 81002 URINALYSIS NONAUTO W/O SCOPE: CPT | Performed by: FAMILY MEDICINE

## 2020-05-20 NOTE — PROGRESS NOTES
Subjective   Mendel Melendez is a 54 y.o. male.     Chief Complaint   Patient presents with   • Diabetes   • Hypertension   • Hyperlipidemia       Mendel was seen on 02/17/2020 with Dr. Carpenter- Sleep apnea for a sleep evaluation.    Diabetes   He presents for his follow-up diabetic visit. He has type 2 diabetes mellitus. There are no hypoglycemic associated symptoms. Pertinent negatives for hypoglycemia include no dizziness, headaches or sweats. Pertinent negatives for diabetes include no chest pain, no fatigue, no polydipsia, no polyuria and no weakness. There are no hypoglycemic complications. Risk factors for coronary artery disease include diabetes mellitus, dyslipidemia, hypertension, obesity, male sex and family history. Current diabetic treatment includes oral agent (monotherapy). Meal planning includes avoidance of concentrated sweets. He participates in exercise daily. His overall blood glucose range is 130-140 mg/dl. He does not see a podiatrist.Eye exam is current (10/2019 [facility] ).   Hypertension   This is a chronic problem. The current episode started more than 1 year ago. The problem is controlled. Pertinent negatives include no chest pain, headaches, orthopnea, palpitations, peripheral edema, PND, shortness of breath or sweats. Risk factors for coronary artery disease include diabetes mellitus, dyslipidemia, family history, male gender and obesity. Current antihypertension treatment includes calcium channel blockers and beta blockers. The current treatment provides significant improvement.   Hyperlipidemia   This is a chronic problem. The current episode started more than 1 year ago. The problem is controlled. Recent lipid tests were reviewed and are normal. Exacerbating diseases include diabetes and obesity. Pertinent negatives include no chest pain, myalgias or shortness of breath. Current antihyperlipidemic treatment includes statins. The current treatment provides significant improvement of lipids.  Risk factors for coronary artery disease include diabetes mellitus, dyslipidemia, family history, hypertension, male sex and obesity.        The following portions of the patient's history were reviewed and updated as appropriate: allergies, current medications, past family history, past medical history, past social history, past surgical history and problem list.    Allergies:  Allergies   Allergen Reactions   • Niacin Swelling       Social History:  Social History     Socioeconomic History   • Marital status:      Spouse name: Not on file   • Number of children: Not on file   • Years of education: Not on file   • Highest education level: Not on file   Tobacco Use   • Smoking status: Never Smoker   • Smokeless tobacco: Never Used   Substance and Sexual Activity   • Alcohol use: No     Frequency: Never   • Drug use: No   • Sexual activity: Defer       Family History:  Family History   Problem Relation Age of Onset   • Heart disease Father    • Heart disease Paternal Uncle    • Heart disease Maternal Aunt    • Heart disease Maternal Uncle    • Breast cancer Paternal Aunt    • Heart disease Paternal Aunt    • Ovarian cancer Paternal Aunt        Past Medical History :  Patient Active Problem List   Diagnosis   • Encounter for annual general medical examination with abnormal findings in adult   • Encounter for colorectal cancer screening   • Encounter for occupational health examination   • Screening PSA (prostate specific antigen)   • Benign hypertension   • Bruit of left carotid artery   • Coronary artery disease involving native coronary artery of native heart without angina pectoris   • Diabetes mellitus (CMS/[facility])   • Gastroesophageal reflux disease   • Mixed hyperlipidemia   • Occlusion and stenosis of unspecified carotid artery   • Class 2 severe obesity due to excess calories with serious comorbidity and body mass index (BMI) of 37.0 to 37.9 in adult (CMS/HCC)   • Paroxysmal atrial fibrillation (CMS/HCC)    • Seasonal allergic rhinitis due to pollen   • Status post coronary artery bypass graft   • Vasculogenic erectile dysfunction   • Stenosis of left carotid artery   • Elevated LFTs       Medication List:  Outpatient Encounter Medications as of 5/20/2020   Medication Sig Dispense Refill   • amLODIPine (NORVASC) 5 MG tablet Take 1 tablet by mouth once daily 90 tablet 0   • aspirin (ASPIRIN ADULT LOW STRENGTH) 81 MG EC tablet Take 81 mg by mouth Daily.     • metFORMIN (GLUCOPHAGE) 500 MG tablet Take 1 tablet by mouth 2 (Two) Times a Day With Meals. with food. 60 tablet 12   • metoprolol tartrate (LOPRESSOR) 50 MG tablet TAKE 1 TABLET BY MOUTH EVERY 8 HOURS 90 tablet 1   • montelukast (SINGULAIR) 10 MG tablet TAKE 1 TABLET BY MOUTH ONCE DAILY FOR ALLERGIES WITH  LORATADINE 30 tablet 0   • rosuvastatin (CRESTOR) 40 MG tablet Take 1 tablet by mouth Daily. 30 tablet 12   • sildenafil (REVATIO) 20 MG tablet Take 1 tablet by mouth Daily. As needed 30 tablet 12   • [DISCONTINUED] zolpidem (AMBIEN) 10 MG tablet One po at hs for sleep test 1 tablet 0     No facility-administered encounter medications on file as of 5/20/2020.        Past Surgical History:  Past Surgical History:   Procedure Laterality Date   • CARDIAC CATHETERIZATION  04/09/2015    BHF Left main distal 60-70% LCX 80-90% RCA proximal 90% and mid 100% with left to right collaterals.   • CORONARY ARTERY BYPASS GRAFT  04/13/2015    Four, left main and three vessel cononary artery disease. Dr. Franklyn Smith   • VASECTOMY  1998       Review of Systems:  Review of Systems   Constitutional: Negative for appetite change, fatigue and fever.   HENT: Negative for congestion and sore throat.    Eyes: Negative for visual disturbance.   Respiratory: Negative for cough, shortness of breath and wheezing.    Cardiovascular: Negative for chest pain, palpitations, orthopnea, leg swelling and PND.   Gastrointestinal: Negative for abdominal pain, constipation, diarrhea, nausea and  "vomiting.   Endocrine: Negative.  Negative for cold intolerance, heat intolerance, polydipsia and polyuria.   Genitourinary: Negative for dysuria.   Musculoskeletal: Negative for arthralgias, joint swelling and myalgias.   Skin: Negative for rash and bruise.   Allergic/Immunologic: Negative for environmental allergies.   Neurological: Negative for dizziness, syncope, weakness and headache.   Hematological: Negative for adenopathy. Does not bruise/bleed easily.   Psychiatric/Behavioral: Negative for depressed mood.       I have reviewed and confirmed the accuracy of the ROS as documented by the MA/LPN/RN Mariajose Hernandez MD    Vital Signs:  Visit Vitals  /70 (BP Location: Right arm, Patient Position: Sitting, Cuff Size: Large Adult)   Pulse 75   Temp 97.1 °F (36.2 °C) (Oral)   Resp 18   Ht 167.6 cm (66\")   Wt 101 kg (222 lb 12.8 oz)   SpO2 95% Comment: Room air   BMI 35.96 kg/m²       Physical Exam   Constitutional: He is oriented to person, place, and time. He appears well-developed and well-nourished. No distress.   Eyes: Conjunctivae are normal.   Neck: Neck supple. No JVD present. No thyromegaly present.   Cardiovascular: Normal rate, regular rhythm, normal heart sounds and intact distal pulses. Exam reveals no gallop and no friction rub.   No murmur heard.  Pulses:       Carotid pulses are 2+ on the right side, and 2+ on the left side.       Dorsalis pedis pulses are 2+ on the right side, and 2+ on the left side.   Negative homans' no edema.    Pulmonary/Chest: Effort normal and breath sounds normal. No respiratory distress. He has no wheezes. He has no rales.   Musculoskeletal: He exhibits no edema.   Lymphadenopathy:     He has no cervical adenopathy.   Neurological: He is alert and oriented to person, place, and time. Coordination normal.   Skin: Skin is warm. No rash noted. No erythema.   Psychiatric: He has a normal mood and affect.       Assessment and Plan:  Problem List Items Addressed This Visit  "       High    Benign hypertension    Overview     Controlled.         Current Assessment & Plan     Hypertension is improving with treatment.  Continue current treatment regimen.  Dietary sodium restriction.  Weight loss.  Regular aerobic exercise.  Blood pressure will be reassessed in 3 months.         Coronary artery disease involving native coronary artery of native heart without angina pectoris    Overview     No sxs doing well  Followed with Gaby  s/p CABG x 3, 2015         Diabetes mellitus (CMS/[facility]) - Primary    Overview     A1c 7.0 improved, continue diet and exercise.   A1c 7.5 02/14/2020   A1c 7.3 11/18/2019    A1c 6.6 08/13/2019    A1c 6.9 5/7/2019 on  A1c 7.0 02/12/2018    A1c 7.1, 11/1/2018 new onset         Current Assessment & Plan     Diabetes is improving with treatment.   Continue current treatment regimen.  Reminded to bring in blood sugar diary at next visit.  Dietary recommendations for ADA diet.  Regular aerobic exercise.  Diabetes will be reassessed in 3 months.         Relevant Orders    POCT urinalysis dipstick, manual (Completed)    POCT Glucose (Completed)    POC Glycosylated Hemoglobin (Hb A1C) (Completed)    Microalbumin / Creatinine Urine Ratio - Urine, Clean Catch    Paroxysmal atrial fibrillation (CMS/[facility])    Overview     During heart surgery 2015, no break thru since            Medium    Mixed hyperlipidemia    Overview     Stable, Continue Crestor 40mg  LDL 87 02/2020               Low    Class 2 severe obesity due to excess calories with serious comorbidity and body mass index (BMI) of 37.0 to 37.9 in adult (CMS/[facility])    Overview     Diet exercise and wt loss encouraged.          Current Assessment & Plan     Obesity is improving with lifestyle modifications.  Discussed the patient's BMI.  The BMI is above average; BMI management plan is completed.  General weight loss/lifestyle modification strategies discussed (elicit support from others; identify saboteurs; non-food rewards,  etc).            Unprioritized    Elevated LFTs    Overview     Repeat ALT 65 02/2020               An After Visit Summary and PPPS were given to the patient.

## 2020-05-21 LAB
ALBUMIN/CREAT UR: 5 MG/G CREAT (ref 0–29)
CREAT UR-MCNC: 153.7 MG/DL
MICROALBUMIN UR-MCNC: 7.1 UG/ML

## 2020-06-01 ENCOUNTER — TELEPHONE (OUTPATIENT)
Dept: CARDIOLOGY | Facility: HOSPITAL | Age: 55
End: 2020-06-01

## 2020-06-03 DIAGNOSIS — E78.2 MIXED HYPERLIPIDEMIA: Primary | ICD-10-CM

## 2020-06-04 ENCOUNTER — APPOINTMENT (OUTPATIENT)
Dept: CARDIOLOGY | Facility: HOSPITAL | Age: 55
End: 2020-06-04

## 2020-06-04 ENCOUNTER — OFFICE VISIT (OUTPATIENT)
Dept: CARDIOLOGY | Facility: CLINIC | Age: 55
End: 2020-06-04

## 2020-06-04 VITALS
SYSTOLIC BLOOD PRESSURE: 137 MMHG | WEIGHT: 221 LBS | OXYGEN SATURATION: 98 % | HEART RATE: 69 BPM | HEIGHT: 66 IN | BODY MASS INDEX: 35.52 KG/M2 | DIASTOLIC BLOOD PRESSURE: 80 MMHG

## 2020-06-04 DIAGNOSIS — E11.9 TYPE 2 DIABETES MELLITUS WITHOUT COMPLICATION, WITHOUT LONG-TERM CURRENT USE OF INSULIN (HCC): ICD-10-CM

## 2020-06-04 DIAGNOSIS — I25.118 CORONARY ARTERY DISEASE OF NATIVE ARTERY OF NATIVE HEART WITH STABLE ANGINA PECTORIS (HCC): ICD-10-CM

## 2020-06-04 DIAGNOSIS — G47.33 OBSTRUCTIVE SLEEP APNEA: ICD-10-CM

## 2020-06-04 DIAGNOSIS — I20.9 ANGINA PECTORIS (HCC): Primary | ICD-10-CM

## 2020-06-04 DIAGNOSIS — I10 ESSENTIAL HYPERTENSION: ICD-10-CM

## 2020-06-04 DIAGNOSIS — R06.02 SHORTNESS OF BREATH: ICD-10-CM

## 2020-06-04 DIAGNOSIS — I77.9 CAROTID ARTERY DISEASE, UNSPECIFIED LATERALITY, UNSPECIFIED TYPE (HCC): ICD-10-CM

## 2020-06-04 DIAGNOSIS — E78.00 PURE HYPERCHOLESTEROLEMIA: ICD-10-CM

## 2020-06-04 PROCEDURE — 99214 OFFICE O/P EST MOD 30 MIN: CPT | Performed by: INTERNAL MEDICINE

## 2020-06-04 NOTE — PROGRESS NOTES
Subjective:     Encounter Date:06/04/2020      Patient ID: Mendel Melendez is a 55 y.o. male.    Chief Complaint:  History of Present Illness 55-year-old white male with history of coronary status post and placement the past history of carotid artery disease diabetes hypertension hyperlipidemia history of paroxysmal atrial fibrillation sleep apnea presents to my office for follow-up.  Patient has been having symptoms of chest pain or shortness of breath on and off the last few days.  Patient's chest pain is mostly substernal without radiation but is worse with shortness of breath.  Chest pain occurs with exertion relieved with rest.  He also has been having some palpitations without any dizziness syncope or swelling of the feet.  Has been taking his medicine regularly.  He was recently diagnosed with sleep apnea and is starting on CPAP machine.  Patient has been trying to exercise regularly and follow a good diet.  He does not smoke.    The following portions of the patient's history were reviewed and updated as appropriate: allergies, current medications, past family history, past medical history, past social history, past surgical history and problem list.  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   • CORONARY ARTERY BYPASS GRAFT  04/13/2015    Four, left main and three vessel cononary artery disease. Dr. Franklyn Smith   • VASECTOMY  1998     /80   " Pulse 69   Ht 167.6 cm (66\")   Wt 100 kg (221 lb)   SpO2 98%   BMI 35.67 kg/m²   Family History   Problem Relation Age of Onset   • Heart disease Father    • Heart disease Paternal Uncle    • Heart disease Maternal Aunt    • Heart disease Maternal Uncle    • Breast cancer Paternal Aunt    • Heart disease Paternal Aunt    • Ovarian cancer Paternal Aunt        Current Outpatient Medications:   •  amLODIPine (NORVASC) 5 MG tablet, Take 1 tablet by mouth once daily, Disp: 90 tablet, Rfl: 0  •  aspirin (ASPIRIN ADULT LOW STRENGTH) 81 MG EC tablet, Take 81 mg by mouth Daily., Disp: , Rfl:   •  metFORMIN (GLUCOPHAGE) 500 MG tablet, Take 1 tablet by mouth 2 (Two) Times a Day With Meals. with food., Disp: 60 tablet, Rfl: 12  •  metoprolol tartrate (LOPRESSOR) 50 MG tablet, TAKE 1 TABLET BY MOUTH EVERY 8 HOURS, Disp: 90 tablet, Rfl: 1  •  montelukast (SINGULAIR) 10 MG tablet, TAKE 1 TABLET BY MOUTH ONCE DAILY FOR ALLERGIES WITH  LORATADINE, Disp: 30 tablet, Rfl: 0  •  rosuvastatin (CRESTOR) 40 MG tablet, Take 1 tablet by mouth Daily., Disp: 30 tablet, Rfl: 12  •  sildenafil (REVATIO) 20 MG tablet, Take 1 tablet by mouth Daily. As needed, Disp: 30 tablet, Rfl: 12  Allergies   Allergen Reactions   • Niacin Swelling     Social History     Socioeconomic History   • Marital status:      Spouse name: Not on file   • Number of children: Not on file   • Years of education: Not on file   • Highest education level: Not on file   Tobacco Use   • Smoking status: Never Smoker   • Smokeless tobacco: Never Used   Substance and Sexual Activity   • Alcohol use: No     Frequency: Never   • Drug use: No   • Sexual activity: Defer     Review of Systems   Constitution: Positive for malaise/fatigue. Negative for fever.   HENT: Negative for ear pain and nosebleeds.    Eyes: Negative for blurred vision and double vision.   Cardiovascular: Positive for chest pain and palpitations. Negative for dyspnea on exertion and leg swelling. "   Respiratory: Positive for shortness of breath. Negative for cough.    Skin: Negative for rash.   Musculoskeletal: Negative for joint pain.   Gastrointestinal: Negative for abdominal pain, nausea and vomiting.   Neurological: Positive for numbness (hands). Negative for focal weakness, headaches and light-headedness.   Psychiatric/Behavioral: Negative for depression. The patient is not nervous/anxious.    All other systems reviewed and are negative.             Objective:     Physical Exam   Constitutional: He appears well-developed and well-nourished.   HENT:   Head: Normocephalic and atraumatic.   Eyes: Pupils are equal, round, and reactive to light. Conjunctivae and EOM are normal. No scleral icterus.   Neck: Normal range of motion. Neck supple. No JVD present. Carotid bruit is not present.   Cardiovascular: Normal rate, regular rhythm, S1 normal, S2 normal, normal heart sounds and intact distal pulses. PMI is not displaced.   Pulmonary/Chest: Effort normal and breath sounds normal. He has no wheezes. He has no rales.   Abdominal: Soft. Bowel sounds are normal.   Musculoskeletal: Normal range of motion.   Neurological: He is alert. He has normal strength.   No focal deficits   Skin: Skin is warm and dry. No rash noted.   Psychiatric: He has a normal mood and affect.     Procedures    Lab Review:       Assessment:          Diagnosis Plan   1. Angina pectoris (CMS/HCC)     2. Shortness of breath     3. Coronary artery disease of native artery of native heart with stable angina pectoris (CMS/HCC)     4. Pure hypercholesterolemia     5. Essential hypertension     6. Type 2 diabetes mellitus without complication, without long-term current use of insulin (CMS/HCC)     7. Obstructive sleep apnea     8. Carotid artery disease, unspecified laterality, unspecified type (CMS/HCC)            Plan:       Patient has history of coronary status post and placement the past and is currently having symptoms of chest pain or  shortness of breath and hence I will perform a stress my study  Patient's blood pressure and heart rate are stable  Patient's lipid levels are followed by the primary care doctor and is on a statin  Patient has diabetes and is on oral medicines  Patient is recently diagnosed sleep apnea and is using his CPAP machine  Patient also has carotid artery disease and is followed by the primary care doctor  We will follow him with the stress test results.

## 2020-06-15 DIAGNOSIS — J30.1 SEASONAL ALLERGIC RHINITIS DUE TO POLLEN: Primary | ICD-10-CM

## 2020-06-15 RX ORDER — METOPROLOL TARTRATE 50 MG/1
TABLET, FILM COATED ORAL
Qty: 90 TABLET | Refills: 1 | Status: SHIPPED | OUTPATIENT
Start: 2020-06-15 | End: 2020-08-14

## 2020-06-15 RX ORDER — MONTELUKAST SODIUM 10 MG/1
10 TABLET ORAL NIGHTLY
Qty: 30 TABLET | Refills: 12 | Status: SHIPPED | OUTPATIENT
Start: 2020-06-15 | End: 2021-07-06

## 2020-06-29 ENCOUNTER — TELEPHONE (OUTPATIENT)
Dept: CARDIOLOGY | Facility: HOSPITAL | Age: 55
End: 2020-06-29

## 2020-07-01 ENCOUNTER — HOSPITAL ENCOUNTER (OUTPATIENT)
Dept: CARDIOLOGY | Facility: HOSPITAL | Age: 55
Discharge: HOME | End: 2020-07-01

## 2020-07-01 DIAGNOSIS — R06.02 SHORTNESS OF BREATH: ICD-10-CM

## 2020-07-01 DIAGNOSIS — I25.118 CORONARY ARTERY DISEASE OF NATIVE ARTERY OF NATIVE HEART WITH STABLE ANGINA PECTORIS (HCC): ICD-10-CM

## 2020-07-01 DIAGNOSIS — G47.33 OBSTRUCTIVE SLEEP APNEA: ICD-10-CM

## 2020-07-01 DIAGNOSIS — I77.9 CAROTID ARTERY DISEASE, UNSPECIFIED LATERALITY, UNSPECIFIED TYPE (HCC): ICD-10-CM

## 2020-07-01 DIAGNOSIS — I10 ESSENTIAL HYPERTENSION: ICD-10-CM

## 2020-07-01 DIAGNOSIS — E11.9 TYPE 2 DIABETES MELLITUS WITHOUT COMPLICATION, WITHOUT LONG-TERM CURRENT USE OF INSULIN (HCC): ICD-10-CM

## 2020-07-01 DIAGNOSIS — E78.00 PURE HYPERCHOLESTEROLEMIA: ICD-10-CM

## 2020-07-01 DIAGNOSIS — I20.8 ANGINA AT REST (HCC): Primary | ICD-10-CM

## 2020-07-01 DIAGNOSIS — R94.39 ABNORMAL NUCLEAR STRESS TEST: ICD-10-CM

## 2020-07-01 DIAGNOSIS — I20.9 ANGINA PECTORIS (HCC): ICD-10-CM

## 2020-07-01 LAB
BH CV STRESS COMMENTS STAGE 1: NORMAL
BH CV STRESS DOSE REGADENOSON STAGE 1: 0.4
BH CV STRESS DURATION MIN STAGE 1: 0
BH CV STRESS DURATION SEC STAGE 1: 10
BH CV STRESS PROTOCOL 1: NORMAL
BH CV STRESS RECOVERY BP: NORMAL MMHG
BH CV STRESS RECOVERY HR: 94 BPM
BH CV STRESS STAGE 1: 1
LV EF NUC BP: 56 %
MAXIMAL PREDICTED HEART RATE: 167 BPM
STRESS BASELINE BP: NORMAL MMHG
STRESS BASELINE HR: 62 BPM
STRESS TARGET HR: 142 BPM

## 2020-07-01 PROCEDURE — A9500 TC99M SESTAMIBI: HCPCS | Performed by: INTERNAL MEDICINE

## 2020-07-01 PROCEDURE — 0 TECHNETIUM SESTAMIBI: Performed by: INTERNAL MEDICINE

## 2020-07-01 PROCEDURE — 93018 CV STRESS TEST I&R ONLY: CPT | Performed by: INTERNAL MEDICINE

## 2020-07-01 PROCEDURE — 93016 CV STRESS TEST SUPVJ ONLY: CPT | Performed by: INTERNAL MEDICINE

## 2020-07-01 PROCEDURE — 78452 HT MUSCLE IMAGE SPECT MULT: CPT | Performed by: INTERNAL MEDICINE

## 2020-07-01 PROCEDURE — 93017 CV STRESS TEST TRACING ONLY: CPT

## 2020-07-01 PROCEDURE — 78452 HT MUSCLE IMAGE SPECT MULT: CPT

## 2020-07-01 PROCEDURE — 25010000002 REGADENOSON 0.4 MG/5ML SOLUTION: Performed by: INTERNAL MEDICINE

## 2020-07-01 RX ADMIN — REGADENOSON 0.4 MG: 0.08 INJECTION, SOLUTION INTRAVENOUS at 11:15

## 2020-07-01 RX ADMIN — TECHNETIUM TC 99M SESTAMIBI 1 DOSE: 1 INJECTION INTRAVENOUS at 09:15

## 2020-07-06 DIAGNOSIS — Z01.818 PREOP TESTING: Primary | ICD-10-CM

## 2020-07-06 PROBLEM — I20.89 ANGINA AT REST: Status: ACTIVE | Noted: 2020-07-06

## 2020-07-06 PROBLEM — I20.8 ANGINA AT REST: Status: ACTIVE | Noted: 2020-07-06

## 2020-07-06 PROBLEM — R94.39 ABNORMAL NUCLEAR STRESS TEST: Status: ACTIVE | Noted: 2020-07-06

## 2020-07-08 ENCOUNTER — LAB (OUTPATIENT)
Dept: LAB | Facility: HOSPITAL | Age: 55
End: 2020-07-08

## 2020-07-08 DIAGNOSIS — I20.8 ANGINA AT REST (HCC): ICD-10-CM

## 2020-07-08 DIAGNOSIS — R94.39 ABNORMAL NUCLEAR STRESS TEST: ICD-10-CM

## 2020-07-08 DIAGNOSIS — Z01.818 PREOP TESTING: ICD-10-CM

## 2020-07-08 LAB
ANION GAP SERPL CALCULATED.3IONS-SCNC: 11.9 MMOL/L (ref 5–15)
BASOPHILS # BLD AUTO: 0.07 10*3/MM3 (ref 0–0.2)
BASOPHILS NFR BLD AUTO: 0.8 % (ref 0–1.5)
BUN SERPL-MCNC: 23 MG/DL (ref 6–20)
BUN/CREAT SERPL: 23 (ref 7–25)
CALCIUM SPEC-SCNC: 9.7 MG/DL (ref 8.6–10.5)
CHLORIDE SERPL-SCNC: 99 MMOL/L (ref 98–107)
CHOLEST SERPL-MCNC: 143 MG/DL (ref 0–200)
CO2 SERPL-SCNC: 24.1 MMOL/L (ref 22–29)
CREAT SERPL-MCNC: 1 MG/DL (ref 0.76–1.27)
DEPRECATED RDW RBC AUTO: 41.3 FL (ref 37–54)
EOSINOPHIL # BLD AUTO: 0.24 10*3/MM3 (ref 0–0.4)
EOSINOPHIL NFR BLD AUTO: 2.8 % (ref 0.3–6.2)
ERYTHROCYTE [DISTWIDTH] IN BLOOD BY AUTOMATED COUNT: 12.9 % (ref 12.3–15.4)
GFR SERPL CREATININE-BSD FRML MDRD: 78 ML/MIN/1.73
GLUCOSE SERPL-MCNC: 306 MG/DL (ref 65–99)
HCT VFR BLD AUTO: 46.3 % (ref 37.5–51)
HDLC SERPL-MCNC: 54 MG/DL (ref 40–60)
HGB BLD-MCNC: 15.2 G/DL (ref 13–17.7)
IMM GRANULOCYTES # BLD AUTO: 0.04 10*3/MM3 (ref 0–0.05)
IMM GRANULOCYTES NFR BLD AUTO: 0.5 % (ref 0–0.5)
LDLC SERPL CALC-MCNC: 62 MG/DL (ref 0–100)
LDLC/HDLC SERPL: 1.14 {RATIO}
LYMPHOCYTES # BLD AUTO: 1.1 10*3/MM3 (ref 0.7–3.1)
LYMPHOCYTES NFR BLD AUTO: 12.7 % (ref 19.6–45.3)
MCH RBC QN AUTO: 28.8 PG (ref 26.6–33)
MCHC RBC AUTO-ENTMCNC: 32.8 G/DL (ref 31.5–35.7)
MCV RBC AUTO: 87.9 FL (ref 79–97)
MONOCYTES # BLD AUTO: 0.72 10*3/MM3 (ref 0.1–0.9)
MONOCYTES NFR BLD AUTO: 8.3 % (ref 5–12)
NEUTROPHILS NFR BLD AUTO: 6.52 10*3/MM3 (ref 1.7–7)
NEUTROPHILS NFR BLD AUTO: 74.9 % (ref 42.7–76)
NRBC BLD AUTO-RTO: 0 /100 WBC (ref 0–0.2)
PLATELET # BLD AUTO: 277 10*3/MM3 (ref 140–450)
PMV BLD AUTO: 10.6 FL (ref 6–12)
POTASSIUM SERPL-SCNC: 4.5 MMOL/L (ref 3.5–5.2)
RBC # BLD AUTO: 5.27 10*6/MM3 (ref 4.14–5.8)
SODIUM SERPL-SCNC: 135 MMOL/L (ref 136–145)
TRIGL SERPL-MCNC: 136 MG/DL (ref 0–150)
VLDLC SERPL-MCNC: 27.2 MG/DL (ref 5–40)
WBC # BLD AUTO: 8.69 10*3/MM3 (ref 3.4–10.8)

## 2020-07-08 PROCEDURE — 80061 LIPID PANEL: CPT

## 2020-07-08 PROCEDURE — 80048 BASIC METABOLIC PNL TOTAL CA: CPT

## 2020-07-08 PROCEDURE — U0002 COVID-19 LAB TEST NON-CDC: HCPCS

## 2020-07-08 PROCEDURE — C9803 HOPD COVID-19 SPEC COLLECT: HCPCS

## 2020-07-08 PROCEDURE — 36415 COLL VENOUS BLD VENIPUNCTURE: CPT

## 2020-07-08 PROCEDURE — U0004 COV-19 TEST NON-CDC HGH THRU: HCPCS

## 2020-07-08 PROCEDURE — 85025 COMPLETE CBC W/AUTO DIFF WBC: CPT

## 2020-07-09 LAB
REF LAB TEST METHOD: NORMAL
SARS-COV-2 RNA RESP QL NAA+PROBE: NOT DETECTED

## 2020-07-10 ENCOUNTER — HOSPITAL ENCOUNTER (OUTPATIENT)
Facility: HOSPITAL | Age: 55
Setting detail: HOSPITAL OUTPATIENT SURGERY
Discharge: HOME | End: 2020-07-10
Attending: INTERNAL MEDICINE | Admitting: INTERNAL MEDICINE

## 2020-07-10 VITALS
BODY MASS INDEX: 35.56 KG/M2 | SYSTOLIC BLOOD PRESSURE: 125 MMHG | OXYGEN SATURATION: 97 % | WEIGHT: 213.41 LBS | HEIGHT: 65 IN | DIASTOLIC BLOOD PRESSURE: 62 MMHG | RESPIRATION RATE: 15 BRPM | HEART RATE: 73 BPM | TEMPERATURE: 97.7 F

## 2020-07-10 DIAGNOSIS — I20.8 ANGINA AT REST (HCC): ICD-10-CM

## 2020-07-10 DIAGNOSIS — R94.39 ABNORMAL NUCLEAR STRESS TEST: ICD-10-CM

## 2020-07-10 LAB — GLUCOSE BLDC GLUCOMTR-MCNC: 134 MG/DL (ref 70–105)

## 2020-07-10 PROCEDURE — 25010000002 FENTANYL CITRATE (PF) 100 MCG/2ML SOLUTION: Performed by: INTERNAL MEDICINE

## 2020-07-10 PROCEDURE — 93459 L HRT ART/GRFT ANGIO: CPT | Performed by: INTERNAL MEDICINE

## 2020-07-10 PROCEDURE — C1894 INTRO/SHEATH, NON-LASER: HCPCS | Performed by: INTERNAL MEDICINE

## 2020-07-10 PROCEDURE — C1769 GUIDE WIRE: HCPCS | Performed by: INTERNAL MEDICINE

## 2020-07-10 PROCEDURE — 0 IOPAMIDOL PER 1 ML: Performed by: INTERNAL MEDICINE

## 2020-07-10 PROCEDURE — 82962 GLUCOSE BLOOD TEST: CPT

## 2020-07-10 PROCEDURE — 25010000002 MIDAZOLAM PER 1 MG: Performed by: INTERNAL MEDICINE

## 2020-07-10 RX ORDER — FENTANYL CITRATE 50 UG/ML
INJECTION, SOLUTION INTRAMUSCULAR; INTRAVENOUS AS NEEDED
Status: DISCONTINUED | OUTPATIENT
Start: 2020-07-10 | End: 2020-07-10 | Stop reason: HOSPADM

## 2020-07-10 RX ORDER — ACETAMINOPHEN 325 MG/1
650 TABLET ORAL EVERY 4 HOURS PRN
Status: DISCONTINUED | OUTPATIENT
Start: 2020-07-10 | End: 2020-07-10 | Stop reason: HOSPADM

## 2020-07-10 RX ORDER — MIDAZOLAM HYDROCHLORIDE 1 MG/ML
INJECTION INTRAMUSCULAR; INTRAVENOUS AS NEEDED
Status: DISCONTINUED | OUTPATIENT
Start: 2020-07-10 | End: 2020-07-10 | Stop reason: HOSPADM

## 2020-07-10 RX ORDER — SODIUM CHLORIDE 9 MG/ML
250 INJECTION, SOLUTION INTRAVENOUS ONCE AS NEEDED
Status: DISCONTINUED | OUTPATIENT
Start: 2020-07-10 | End: 2020-07-10 | Stop reason: HOSPADM

## 2020-07-10 RX ORDER — SODIUM CHLORIDE 9 MG/ML
75 INJECTION, SOLUTION INTRAVENOUS CONTINUOUS
Status: DISCONTINUED | OUTPATIENT
Start: 2020-07-10 | End: 2020-07-10 | Stop reason: HOSPADM

## 2020-07-10 NOTE — DISCHARGE INSTRUCTIONS
Post Cath Instructions      Call Dr. Griffin’s office to schedule a follow up appointment in 3-4 weeks at [phone].  Specific Physician Instructions:    1) Drink plenty of fluids for the next 24 hours.  This helps to eliminate the dye used in your procedure through urination.  You may resume a normal diet; however, try to avoid foods that would cause gas or constipation.    2) Sedative medication given to you during your catheterization may decrease your judgement and reaction time for up to 24-48 hours.  Therefore:  a. DO NOT drive or operate hazardous machinery (48 hours)  b. DO NOT consume alcoholic beverages  c. DO NOT make any important/legal decisions  d. Have someone stay with you for at least 24 hours    3) To allow proper healing and prevent bleeding, the following activities are to be strictly avoided for the next 24-48 hours:  a. Excessive bending at wound site  b. Straining (anything that would tense up muscles around the affected puncture site)  c. Lifting objects greater than 5 pounds, pushing, or pulling for 5 days    i. For Groin Cases:  1. Refrain from sexual activity  2. Refrain from running or vigorous walking  3. No prolonged sitting or standing  4. Limit stair climbing as much as possible    4) Keep the puncture site clean and dry.  You may remove the dressing tomorrow and replace it with a band-aid for at least one additional day.  Gently clean the site with mild soap and water.  No scrubbing/rubbing and lightly pat the area dry.  Showers are acceptable; however, avoid submerging in water (tub baths, hot tubs, swimming pools, dishwater, etc…) for at least one week.  The site should be completely healed before resuming these activities to reduce the risk of infection.  Check the site often.  Watch for signs and symptoms of infection and notify your physician if any of the following occur:  a. Bleeding or an increase in swelling at the puncture site  b. Fever  c. Increased soreness around  puncture site  d. Foul odor or significant drainage from the puncture site  e. Swelling, redness, or warmth at the puncture site    **A bruise or small “pea sized” lump under the skin at the puncture site is not unusual.  This should disappear within 3-4 weeks.**  5) CONTACT YOUR PHYSICIAN OR CALL 911 IF YOU EXPERIENCE ANY OF THE FOLLOWING:  a. Increased angina (chest pain) or frequent sensations of pressure, burning, pain, or other discomfort in the chest, arm, jaws, or stomach  b. Lightheadedness, dizziness, faint feeling, sweating, or difficulty breathing  c. Odd sensation changes like numbness, tingling, coldness, or pain in the arm or leg in which the catheter was inserted  d. Limb in which the catheter was inserted becomes pale/bluish in color    IMPORTANT:  Although this occurs very rarely, if you should develop bright red or excessive bleeding, feel a “pop” inside at the insertion site, or notice a sudden increase in swelling larger than a walnut, you should call 911.  Hold continuous firm pressure to the access site until emergency personnel arrive.  It is best if someone else can do this for you.

## 2020-07-10 NOTE — H&P
Patient Care Team:  Mariajose Hernandez MD as PCP - General  Job Griffin MD as Consulting Physician (Cardiology)    Chief complaint chest pain    Subjective     History of Present Illness 55-year-old with coronary disease status post coronary artery bypass surgery surgery presented to my office for a evaluation her chest pain and a abnormal stress Myoview study and hence is referred for cardiac catheterization    Review of Systems   All other systems reviewed and are negative.         Past Medical History:   Diagnosis Date   • Bruit of left carotid artery    • Coronary artery disease    • Hyperlipidemia    • Hypertension    • Nonspecific elevation of levels of transaminase or lactic acid dehydrogenase (LDH)    • Paroxysmal atrial fibrillation (CMS/HCC)    • Seasonal allergic rhinitis due to pollen    • Sleep apnea    • 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)        Objective      Vital Signs  Temp:  [97.7 °F (36.5 °C)] 97.7 °F (36.5 °C)  Heart Rate:  [65-67] 65  Resp:  [15-19] 15  BP: (121-144)/(62-85) 121/62    Physical Exam   Constitutional: He appears well-developed.   HENT:   Head: Normocephalic.   Eyes: Pupils are equal, round, and reactive to light.   Neck: Normal range of motion.   Cardiovascular: Normal rate.   Pulmonary/Chest: Effort normal.   Abdominal: Soft.   Neurological: He is alert.       Results Review:    I reviewed the patient's new clinical results.      Assessment/Plan       Angina at rest (CMS/HCC)    Abnormal nuclear stress test      Assessment & Plan patient is scheduled for cardiac evaluation.  Discussed with patient and family with procedure recently for    I discussed the patients findings and my recommendations with patient    Job Griffin MD  07/10/20  12:33

## 2020-08-14 RX ORDER — AMLODIPINE BESYLATE 5 MG/1
TABLET ORAL
Qty: 90 TABLET | Refills: 0 | Status: SHIPPED | OUTPATIENT
Start: 2020-08-14 | End: 2020-11-11 | Stop reason: SDUPTHER

## 2020-08-14 RX ORDER — METOPROLOL TARTRATE 50 MG/1
TABLET, FILM COATED ORAL
Qty: 90 TABLET | Refills: 0 | Status: SHIPPED | OUTPATIENT
Start: 2020-08-14 | End: 2020-09-14

## 2020-08-20 ENCOUNTER — OFFICE VISIT (OUTPATIENT)
Dept: CARDIOLOGY | Facility: CLINIC | Age: 55
End: 2020-08-20

## 2020-08-20 VITALS
SYSTOLIC BLOOD PRESSURE: 131 MMHG | BODY MASS INDEX: 36.99 KG/M2 | DIASTOLIC BLOOD PRESSURE: 76 MMHG | HEIGHT: 65 IN | HEART RATE: 77 BPM | WEIGHT: 222 LBS | OXYGEN SATURATION: 94 %

## 2020-08-20 DIAGNOSIS — E78.2 MIXED HYPERLIPIDEMIA: ICD-10-CM

## 2020-08-20 DIAGNOSIS — I48.0 PAROXYSMAL ATRIAL FIBRILLATION (HCC): ICD-10-CM

## 2020-08-20 DIAGNOSIS — I10 BENIGN HYPERTENSION: Primary | ICD-10-CM

## 2020-08-20 DIAGNOSIS — I25.10 CORONARY ARTERY DISEASE INVOLVING NATIVE CORONARY ARTERY OF NATIVE HEART WITHOUT ANGINA PECTORIS: ICD-10-CM

## 2020-08-20 DIAGNOSIS — G47.33 OBSTRUCTIVE SLEEP APNEA: ICD-10-CM

## 2020-08-20 DIAGNOSIS — E11.59 TYPE 2 DIABETES MELLITUS WITH OTHER CIRCULATORY COMPLICATION, WITHOUT LONG-TERM CURRENT USE OF INSULIN (HCC): ICD-10-CM

## 2020-08-20 PROCEDURE — 99213 OFFICE O/P EST LOW 20 MIN: CPT | Performed by: INTERNAL MEDICINE

## 2020-08-20 RX ORDER — NITROGLYCERIN 0.4 MG/1
0.4 TABLET SUBLINGUAL
Qty: 25 TABLET | Refills: 0 | Status: SHIPPED | OUTPATIENT
Start: 2020-08-20 | End: 2020-11-19 | Stop reason: SDUPTHER

## 2020-08-20 RX ORDER — NITROGLYCERIN 0.4 MG/1
0.4 TABLET SUBLINGUAL
COMMUNITY
End: 2020-08-20 | Stop reason: SDUPTHER

## 2020-08-20 NOTE — PROGRESS NOTES
Subjective:     Encounter Date:08/20/2020      Patient ID: Mendel Melendez is a 55 y.o. male.    Chief Complaint:  History of Present Illness 55-year-old white male with history of coronary status post current bypass surgery history of diabetes hypertension hyperlipidemia presents to my office for follow-up.  Patient is currently stable without incidence of chest pain but has some shortness of breath with exertion.  No complains any PND orthopnea.  No palpitation dizziness syncope or swelling of the feet.  Patient has been taking all meds regular.  He recently had cardiac  catheterization which showed patent grafts and small vessel disease.    The following portions of the patient's history were reviewed and updated as appropriate: allergies, current medications, past family history, past medical history, past social history, past surgical history and problem list.  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   • CARDIAC CATHETERIZATION N/A 7/10/2020    Procedure: Left Heart Cath with angiogram;  Surgeon: Job Griffin MD;  Location: [facility] CATH INVASIVE LOCATION;  Service: Cardiovascular;  Laterality: N/A;   • CARDIAC CATHETERIZATION N/A 7/10/2020    Procedure: Saphenous Vein Graft;  Surgeon: Job Griffin MD;  Location: [facility] CATH INVASIVE LOCATION;  Service: Cardiovascular;  Laterality: N/A;  svg x 2  lima   • CARDIAC CATHETERIZATION N/A 7/10/2020  "   Procedure: Left ventriculography;  Surgeon: Job Griffin MD;  Location: [facility] INVASIVE LOCATION;  Service: Cardiovascular;  Laterality: N/A;   • CORONARY ARTERY BYPASS GRAFT  04/13/2015    Four, left main and three vessel cononary artery disease. Dr. Franklyn Smith   • VASECTOMY  1998     /76   Pulse 77   Ht 165.1 cm (65\")   Wt 101 kg (222 lb)   SpO2 94%   BMI 36.94 kg/m²   Family History   Problem Relation Age of Onset   • Heart disease Father    • Heart disease Paternal Uncle    • Heart disease Maternal Aunt    • Heart disease Maternal Uncle    • Breast cancer Paternal Aunt    • Heart disease Paternal Aunt    • Ovarian cancer Paternal Aunt        Current Outpatient Medications:   •  amLODIPine (NORVASC) 5 MG tablet, Take 1 tablet by mouth once daily, Disp: 90 tablet, Rfl: 0  •  aspirin (ASPIRIN ADULT LOW STRENGTH) 81 MG EC tablet, Take 81 mg by mouth Daily., Disp: , Rfl:   •  metFORMIN (GLUCOPHAGE) 500 MG tablet, Take 1 tablet by mouth 2 (Two) Times a Day With Meals. with food., Disp: 60 tablet, Rfl: 12  •  metoprolol tartrate (LOPRESSOR) 50 MG tablet, TAKE 1 TABLET BY MOUTH EVERY 8 HOURS, Disp: 90 tablet, Rfl: 0  •  montelukast (SINGULAIR) 10 MG tablet, Take 1 tablet by mouth Every Night., Disp: 30 tablet, Rfl: 12  •  rosuvastatin (CRESTOR) 40 MG tablet, Take 1 tablet by mouth Daily., Disp: 30 tablet, Rfl: 12  •  sildenafil (REVATIO) 20 MG tablet, Take 1 tablet by mouth Daily. As needed, Disp: 30 tablet, Rfl: 12  •  nitroglycerin (NITROSTAT) 0.4 MG SL tablet, Place 1 tablet under the tongue Every 5 (Five) Minutes As Needed for Chest Pain. Take no more than 3 doses in 15 minutes., Disp: 25 tablet, Rfl: 0  Allergies   Allergen Reactions   • Niacin Swelling     Social History     Socioeconomic History   • Marital status:      Spouse name: Not on file   • Number of children: Not on file   • Years of education: Not on file   • Highest education level: Not on file   Tobacco Use   • Smoking " "status: Never Smoker   • Smokeless tobacco: Never Used   Substance and Sexual Activity   • Alcohol use: No     Frequency: Never   • Drug use: No   • Sexual activity: Defer     Review of Systems   Constitution: Negative for fever and malaise/fatigue.   Cardiovascular: Negative for chest pain (\"often\"), dyspnea on exertion, leg swelling and palpitations.   Respiratory: Positive for shortness of breath. Negative for cough.    Skin: Negative for rash.   Gastrointestinal: Negative for abdominal pain, nausea and vomiting.   Neurological: Negative for focal weakness and headaches.   All other systems reviewed and are negative.             Objective:     Physical Exam   Constitutional: He appears well-developed and well-nourished.   HENT:   Head: Normocephalic and atraumatic.   Eyes: Conjunctivae are normal. No scleral icterus.   Neck: Normal range of motion. Neck supple. No JVD present. Carotid bruit is not present.   Cardiovascular: Normal rate, regular rhythm, S1 normal, S2 normal, normal heart sounds and intact distal pulses. PMI is not displaced.   Pulmonary/Chest: Effort normal and breath sounds normal. He has no wheezes. He has no rales.   Abdominal: Soft. Bowel sounds are normal.   Neurological: He is alert. He has normal strength.   Skin: Skin is warm and dry. No rash noted.     Procedures    Lab Review:       Assessment:          Diagnosis Plan   1. Benign hypertension     2. Mixed hyperlipidemia     3. Coronary artery disease involving native coronary artery of native heart without angina pectoris     4. Paroxysmal atrial fibrillation (CMS/HCC)     5. Type 2 diabetes mellitus with other circulatory complication, without long-term current use of insulin (CMS/[facility])     6. Obstructive sleep apnea            Plan:     Patient has history of coronary disease and is currently stable on medical therapy.  He had a cardiac catheterization which showed patent graft to the LAD marginal branch and diagonal branch.  Patient " has a small RCA vessel disease and normal LV function  Patient blood pressure heart rate stable  Patient lipid levels are followed by primary care doctor  Venous sleep apnea uses CPAP machine.

## 2020-09-14 RX ORDER — METOPROLOL TARTRATE 50 MG/1
TABLET, FILM COATED ORAL
Qty: 90 TABLET | Refills: 0 | Status: SHIPPED | OUTPATIENT
Start: 2020-09-14 | End: 2020-10-12

## 2020-09-16 ENCOUNTER — TELEPHONE (OUTPATIENT)
Dept: NEUROLOGY | Facility: CLINIC | Age: 55
End: 2020-09-16

## 2020-09-16 NOTE — TELEPHONE ENCOUNTER
Pt called yesterday stating he was tested in March and never received a cpap, I looked into it. Sha had order and documentation that they attempted 6 times and left messages for him. Anyway now his office note is too old . I called pt back and sched. A phone visit with Dr. Seipel to get current notes and sha is moving forward to get him set up.

## 2020-10-01 ENCOUNTER — OFFICE VISIT (OUTPATIENT)
Dept: NEUROLOGY | Facility: CLINIC | Age: 55
End: 2020-10-01

## 2020-10-01 VITALS — HEIGHT: 65 IN | BODY MASS INDEX: 35.82 KG/M2 | WEIGHT: 215 LBS

## 2020-10-01 DIAGNOSIS — G47.33 OBSTRUCTIVE SLEEP APNEA: Primary | ICD-10-CM

## 2020-10-01 PROBLEM — E78.5 HYPERLIPIDEMIA: Status: ACTIVE | Noted: 2020-10-01

## 2020-10-01 PROBLEM — I51.9 HEART DISEASE: Status: ACTIVE | Noted: 2020-10-01

## 2020-10-01 PROCEDURE — 99441 PR PHYS/QHP TELEPHONE EVALUATION 5-10 MIN: CPT | Performed by: PSYCHIATRY & NEUROLOGY

## 2020-10-01 NOTE — PROGRESS NOTES
TELE HEALTH   TIME 6 MINUTES  TELEPHONE  You have chosen to receive care through a telephone visit. Do you consent to use a telephone visit for your medical care today? Yes      Sleep medicine follow-up visit    Mendel Melendez   1965  55 y.o. male   DATE OF SERVICE: 10/1/2020     ASHLEY, f/u from sleep and titration study, currently not on a pap machine. Patient needs to have machine ordered waited to long and went over the 6 month time frame.       On NPSG at [facility] , 03/08/2020 patient had Severe obstructive sleep apnea syndrome with apnea-hypopnea index of 46 per sleep hour, minimum SpO2 of 84%    Restless leg syndrome, currently takes tylenol to help with sleep    Obesity, BMI-35.7    Cath 50% front 100%  Review of Systems   Constitutional: Positive for fatigue. Negative for appetite change.   HENT: Positive for postnasal drip. Negative for sinus pain.    Eyes: Negative for pain and itching.   Respiratory: Positive for apnea and shortness of breath. Negative for cough.    Cardiovascular: Positive for chest pain. Negative for palpitations.   Gastrointestinal: Negative for constipation and diarrhea.   Endocrine: Negative for cold intolerance and heat intolerance.   Genitourinary: Positive for frequency. Negative for difficulty urinating.   Musculoskeletal: Negative for back pain and neck pain.   Allergic/Immunologic: Negative for environmental allergies.   Neurological: Negative for dizziness, tremors, seizures, syncope, facial asymmetry, speech difficulty, weakness, light-headedness, numbness and headaches.   Psychiatric/Behavioral: Negative for agitation and confusion.     I reviewed and addressed ROS entered by MA.    The following portions of the patient's history were reviewed and updated as appropriate: allergies, current medications, past family history, past medical history, past social history, past surgical history and problem list.      Family History   Problem Relation Age of Onset   • Heart disease  Father    • Heart disease Paternal Uncle    • Heart disease Maternal Aunt    • Heart disease Maternal Uncle    • Breast cancer Paternal Aunt    • Heart disease Paternal Aunt    • Ovarian cancer Paternal Aunt        Past Medical History:   Diagnosis Date   • Bruit of left carotid artery    • Coronary artery disease    • Hyperlipidemia    • Hypertension    • Nonspecific elevation of levels of transaminase or lactic acid dehydrogenase (LDH)    • Paroxysmal atrial fibrillation (CMS/HCC)    • Seasonal allergic rhinitis due to pollen    • Sleep apnea    • 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)        Social History     Socioeconomic History   • Marital status:      Spouse name: Not on file   • Number of children: Not on file   • Years of education: Not on file   • Highest education level: Not on file   Tobacco Use   • Smoking status: Never Smoker   • Smokeless tobacco: Never Used   Substance and Sexual Activity   • Alcohol use: No     Frequency: Never   • Drug use: No   • Sexual activity: Defer         Current Outpatient Medications:   •  amLODIPine (NORVASC) 5 MG tablet, Take 1 tablet by mouth once daily, Disp: 90 tablet, Rfl: 0  •  aspirin (ASPIRIN ADULT LOW STRENGTH) 81 MG EC tablet, Take 81 mg by mouth Daily., Disp: , Rfl:   •  metFORMIN (GLUCOPHAGE) 500 MG tablet, Take 1 tablet by mouth 2 (Two) Times a Day With Meals. with food., Disp: 60 tablet, Rfl: 12  •  metoprolol tartrate (LOPRESSOR) 50 MG tablet, TAKE 1 TABLET BY MOUTH EVERY 8 HOURS, Disp: 90 tablet, Rfl: 0  •  montelukast (SINGULAIR) 10 MG tablet, Take 1 tablet by mouth Every Night., Disp: 30 tablet, Rfl: 12  •  nitroglycerin (NITROSTAT) 0.4 MG SL tablet, Place 1 tablet under the tongue Every 5 (Five) Minutes As Needed for Chest Pain. Take no more than 3 doses in 15 minutes., Disp: 25 tablet, Rfl: 0  •  rosuvastatin (CRESTOR) 40 MG tablet, Take 1 tablet by mouth Daily., Disp: 30 tablet, Rfl: 12  •  sildenafil (REVATIO)  20 MG tablet, Take 1 tablet by mouth Daily. As needed, Disp: 30 tablet, Rfl: 12    Allergies   Allergen Reactions   • Niacin Swelling        PHYSICAL EXAMINATION:  There were no vitals filed for this visit.   Body mass index is 35.78 kg/m².   PT WAS ALERT , NORMAL SPEECH    IMPRESSION: PLAN    [place]  Severe on npsg march 2019, did not get cpap due to covid    Will order auto cpap 11-20    Fu  In 30-90 days    EPWORTH SLEEPINESS SCALE  Sitting and reading  0  WatchingTV  3  Sitting, inactive, in a public place  0  As a passenger in a car for 1 hour w/o a break  0  Lying down to rest in the afternoon  3  Sitting and talking to someone  0  Sitting quietly after a lunch  0  In a car, while stopped for traffic or a light  0  Total 6        This document has been electronically signed by Joseph Seipel, MD on October 1, 2020 14:46 EDT

## 2020-10-02 ENCOUNTER — TELEPHONE (OUTPATIENT)
Dept: NEUROLOGY | Facility: CLINIC | Age: 55
End: 2020-10-02

## 2020-10-02 NOTE — TELEPHONE ENCOUNTER
RODGER WITH [facility] CALLED, STATED PATIENT CALLED THEM TO GET A SLEEP MACHINE.  THEY NEED A NEW ORDER FROM DR SEIPEL TO COMPLETE THIS.    PT LAST SEEN: 10-01-20    FAX TO: [phone]    CALL BACK: [phone]

## 2020-10-05 ENCOUNTER — TELEPHONE (OUTPATIENT)
Dept: NEUROLOGY | Facility: CLINIC | Age: 55
End: 2020-10-05

## 2020-10-05 DIAGNOSIS — G47.33 OBSTRUCTIVE SLEEP APNEA: Primary | ICD-10-CM

## 2020-10-07 ENCOUNTER — OFFICE VISIT (OUTPATIENT)
Dept: FAMILY MEDICINE CLINIC | Facility: CLINIC | Age: 55
End: 2020-10-07

## 2020-10-07 VITALS
WEIGHT: 219.8 LBS | SYSTOLIC BLOOD PRESSURE: 130 MMHG | HEART RATE: 87 BPM | OXYGEN SATURATION: 98 % | DIASTOLIC BLOOD PRESSURE: 70 MMHG | RESPIRATION RATE: 16 BRPM | BODY MASS INDEX: 36.62 KG/M2 | TEMPERATURE: 98.4 F | HEIGHT: 65 IN

## 2020-10-07 DIAGNOSIS — E66.01 CLASS 2 SEVERE OBESITY DUE TO EXCESS CALORIES WITH SERIOUS COMORBIDITY AND BODY MASS INDEX (BMI) OF 37.0 TO 37.9 IN ADULT (HCC): ICD-10-CM

## 2020-10-07 DIAGNOSIS — E78.2 MIXED HYPERLIPIDEMIA: ICD-10-CM

## 2020-10-07 DIAGNOSIS — Z23 NEED FOR INFLUENZA VACCINATION: ICD-10-CM

## 2020-10-07 DIAGNOSIS — I48.0 PAROXYSMAL ATRIAL FIBRILLATION (HCC): ICD-10-CM

## 2020-10-07 DIAGNOSIS — I10 BENIGN HYPERTENSION: ICD-10-CM

## 2020-10-07 DIAGNOSIS — I25.10 CORONARY ARTERY DISEASE INVOLVING NATIVE CORONARY ARTERY OF NATIVE HEART WITHOUT ANGINA PECTORIS: ICD-10-CM

## 2020-10-07 DIAGNOSIS — E11.59 TYPE 2 DIABETES MELLITUS WITH OTHER CIRCULATORY COMPLICATION, WITHOUT LONG-TERM CURRENT USE OF INSULIN (HCC): Primary | ICD-10-CM

## 2020-10-07 LAB
BILIRUB BLD-MCNC: NEGATIVE MG/DL
CLARITY, POC: CLEAR
COLOR UR: YELLOW
GLUCOSE BLDC GLUCOMTR-MCNC: 158 MG/DL (ref 70–130)
GLUCOSE UR STRIP-MCNC: NEGATIVE MG/DL
HBA1C MFR BLD: 7.2 %
KETONES UR QL: NEGATIVE
LEUKOCYTE EST, POC: NEGATIVE
NITRITE UR-MCNC: NEGATIVE MG/ML
PH UR: 5 [PH] (ref 5–8)
PROT UR STRIP-MCNC: NEGATIVE MG/DL
RBC # UR STRIP: NEGATIVE /UL
SP GR UR: 1.01 (ref 1–1.03)
UROBILINOGEN UR QL: NORMAL

## 2020-10-07 PROCEDURE — 90471 IMMUNIZATION ADMIN: CPT | Performed by: FAMILY MEDICINE

## 2020-10-07 PROCEDURE — 82962 GLUCOSE BLOOD TEST: CPT | Performed by: FAMILY MEDICINE

## 2020-10-07 PROCEDURE — 83036 HEMOGLOBIN GLYCOSYLATED A1C: CPT | Performed by: FAMILY MEDICINE

## 2020-10-07 PROCEDURE — 81002 URINALYSIS NONAUTO W/O SCOPE: CPT | Performed by: FAMILY MEDICINE

## 2020-10-07 PROCEDURE — 99396 PREV VISIT EST AGE 40-64: CPT | Performed by: FAMILY MEDICINE

## 2020-10-07 PROCEDURE — 90686 IIV4 VACC NO PRSV 0.5 ML IM: CPT | Performed by: FAMILY MEDICINE

## 2020-10-07 RX ORDER — ISOSORBIDE MONONITRATE 20 MG/1
10 TABLET ORAL DAILY
Qty: 30 TABLET | Refills: 30 | Status: SHIPPED | OUTPATIENT
Start: 2020-10-07 | End: 2020-10-19

## 2020-10-07 NOTE — PROGRESS NOTES
Subjective   Mendel Melendez is a 55 y.o. male.     Chief Complaint   Patient presents with   • Diabetes   • Coronary Artery Disease   • Hypertension   • Hyperlipidemia       Diabetes  He presents for his follow-up diabetic visit. He has type 2 diabetes mellitus. His disease course has been stable. There are no hypoglycemic associated symptoms. Pertinent negatives for hypoglycemia include no dizziness, headaches or sweats. Associated symptoms include chest pain (He reports exertional angina 1-2 x per week. ). Pertinent negatives for diabetes include no fatigue, no polydipsia, no polyuria, no weakness and no weight loss. There are no hypoglycemic complications. Diabetic complications include heart disease. Risk factors for coronary artery disease include diabetes mellitus, dyslipidemia, hypertension, obesity, male sex and family history. Current diabetic treatment includes oral agent (monotherapy). Meal planning includes avoidance of concentrated sweets. He participates in exercise daily. His overall blood glucose range is 130-140 mg/dl. An ACE inhibitor/angiotensin II receptor blocker is not being taken. He does not see a podiatrist.Eye exam is current (10/06/2020 [facility]).   Coronary Artery Disease  Presents for follow-up visit. Symptoms include chest pain (He reports exertional angina 1-2 x per week. ). Pertinent negatives include no chest pressure, dizziness, leg swelling, palpitations, shortness of breath or weight gain. Risk factors include hyperlipidemia and obesity. The symptoms have been stable. Compliance with diet is good. Compliance with exercise is good. Compliance with medications is good.   Hypertension  This is a chronic problem. The current episode started more than 1 year ago. The problem is controlled. Associated symptoms include chest pain (He reports exertional angina 1-2 x per week. ). Pertinent negatives include no headaches, orthopnea, palpitations, peripheral edema, PND, shortness of breath or  sweats. Risk factors for coronary artery disease include diabetes mellitus, dyslipidemia, family history, male gender and obesity. Current antihypertension treatment includes calcium channel blockers and beta blockers. The current treatment provides significant improvement. Hypertensive end-organ damage includes CAD/MI.   Hyperlipidemia  This is a chronic problem. The current episode started more than 1 year ago. The problem is controlled. Recent lipid tests were reviewed and are normal. Exacerbating diseases include diabetes and obesity. Associated symptoms include chest pain (He reports exertional angina 1-2 x per week. ). Pertinent negatives include no myalgias or shortness of breath. Current antihyperlipidemic treatment includes statins. The current treatment provides significant improvement of lipids. Risk factors for coronary artery disease include diabetes mellitus, dyslipidemia, family history, hypertension, male sex and obesity.        The following portions of the patient's history were reviewed and updated as appropriate: allergies, current medications, past family history, past medical history, past social history, past surgical history and problem list.    Allergies:  Allergies   Allergen Reactions   • Niacin Swelling       Social History:  Social History     Socioeconomic History   • Marital status:      Spouse name: Not on file   • Number of children: Not on file   • Years of education: Not on file   • Highest education level: Not on file   Tobacco Use   • Smoking status: Never Smoker   • Smokeless tobacco: Never Used   Substance and Sexual Activity   • Alcohol use: No     Frequency: Never   • Drug use: No   • Sexual activity: Defer       Family History:  Family History   Problem Relation Age of Onset   • Heart disease Father    • Heart disease Paternal Uncle    • Heart disease Maternal Aunt    • Heart disease Maternal Uncle    • Breast cancer Paternal Aunt    • Heart disease Paternal Aunt    •  Ovarian cancer Paternal Aunt        Past Medical History :  Patient Active Problem List   Diagnosis   • Encounter for annual general medical examination with abnormal findings in adult   • Encounter for colorectal cancer screening   • Encounter for occupational health examination   • Screening PSA (prostate specific antigen)   • Benign hypertension   • Bruit of left carotid artery   • Coronary artery disease involving native coronary artery of native heart without angina pectoris   • Diabetes mellitus (CMS/HCC)   • Gastroesophageal reflux disease   • Mixed hyperlipidemia   • Occlusion and stenosis of unspecified carotid artery   • Class 2 severe obesity due to excess calories with serious comorbidity and body mass index (BMI) of 37.0 to 37.9 in adult (CMS/HCC)   • Paroxysmal atrial fibrillation (CMS/[facility])   • Seasonal allergic rhinitis due to pollen   • Status post coronary artery bypass graft   • Vasculogenic erectile dysfunction   • Stenosis of left carotid artery   • Elevated LFTs   • Angina at rest (CMS/[facility])   • Abnormal nuclear stress test   • Heart disease   • Hyperlipidemia       Medication List:  Outpatient Encounter Medications as of 10/7/2020   Medication Sig Dispense Refill   • amLODIPine (NORVASC) 5 MG tablet Take 1 tablet by mouth once daily 90 tablet 0   • aspirin (ASPIRIN ADULT LOW STRENGTH) 81 MG EC tablet Take 81 mg by mouth Daily.     • metFORMIN (GLUCOPHAGE) 500 MG tablet Take 1 tablet by mouth 2 (Two) Times a Day With Meals. with food. 60 tablet 12   • metoprolol tartrate (LOPRESSOR) 50 MG tablet TAKE 1 TABLET BY MOUTH EVERY 8 HOURS 90 tablet 0   • montelukast (SINGULAIR) 10 MG tablet Take 1 tablet by mouth Every Night. 30 tablet 12   • nitroglycerin (NITROSTAT) 0.4 MG SL tablet Place 1 tablet under the tongue Every 5 (Five) Minutes As Needed for Chest Pain. Take no more than 3 doses in 15 minutes. 25 tablet 0   • rosuvastatin (CRESTOR) 40 MG tablet Take 1 tablet by mouth Daily. 30 tablet 12   •  sildenafil (REVATIO) 20 MG tablet Take 1 tablet by mouth Daily. As needed 30 tablet 12   • isosorbide mononitrate (ISMO,MONOKET) 20 MG tablet Take 0.5 tablets by mouth Daily. 30 tablet 30     No facility-administered encounter medications on file as of 10/7/2020.        Past Surgical History:  Past Surgical History:   Procedure Laterality Date   • CARDIAC CATHETERIZATION  04/09/2015    BHF Left main distal 60-70% LCX 80-90% RCA proximal 90% and mid 100% with left to right collaterals.   • CARDIAC CATHETERIZATION N/A 7/10/2020    Procedure: Left Heart Cath with angiogram;  Surgeon: Job Griffin MD;  Location: [facility] CATH INVASIVE LOCATION;  Service: Cardiovascular;  Laterality: N/A;   • CARDIAC CATHETERIZATION N/A 7/10/2020    Procedure: Saphenous Vein Graft;  Surgeon: Job Griffin MD;  Location: [facility] CATH INVASIVE LOCATION;  Service: Cardiovascular;  Laterality: N/A;  svg x 2  lima   • CARDIAC CATHETERIZATION N/A 7/10/2020    Procedure: Left ventriculography;  Surgeon: Job Griffin MD;  Location: [facility] CATH INVASIVE LOCATION;  Service: Cardiovascular;  Laterality: N/A;   • CORONARY ARTERY BYPASS GRAFT  04/13/2015    Four, left main and three vessel cononary artery disease. Dr. Franklyn Smith   • VASECTOMY  1998       Review of Systems:  Review of Systems   Constitutional: Negative for appetite change, fatigue, fever, unexpected weight gain and unexpected weight loss.   HENT: Negative for congestion, ear pain, sinus pressure and sore throat.    Eyes: Negative for visual disturbance.   Respiratory: Negative for cough, shortness of breath and wheezing.    Cardiovascular: Positive for chest pain (He reports exertional angina 1-2 x per week. ). Negative for palpitations, orthopnea, leg swelling and PND.   Gastrointestinal: Negative for abdominal pain, constipation, diarrhea, nausea and vomiting.   Endocrine: Negative.  Negative for cold intolerance, heat intolerance, polydipsia and polyuria.   Genitourinary:  "Negative for dysuria, frequency and hematuria.   Musculoskeletal: Negative for arthralgias, joint swelling and myalgias.   Skin: Negative for rash and bruise.   Allergic/Immunologic: Negative for environmental allergies.   Neurological: Negative for dizziness, syncope, weakness and headache.   Hematological: Negative for adenopathy. Does not bruise/bleed easily.   Psychiatric/Behavioral: Negative for depressed mood.       I have reviewed and confirmed the accuracy of the HPI and ROS as documented by the MA/LPN/RN Mariajose Hernandez MD    Vital Signs:  Visit Vitals  /70 (BP Location: Right arm, Patient Position: Sitting, Cuff Size: Large Adult)   Pulse 87   Temp 98.4 °F (36.9 °C) (Temporal)   Resp 16   Ht 165.1 cm (65\")   Wt 99.7 kg (219 lb 12.8 oz)   SpO2 98% Comment: Room air   BMI 36.58 kg/m²       Physical Exam  Vitals signs reviewed.   Constitutional:       General: He is not in acute distress.     Appearance: He is well-developed.   Eyes:      Conjunctiva/sclera: Conjunctivae normal.   Neck:      Musculoskeletal: Neck supple.      Thyroid: No thyromegaly.      Vascular: Carotid bruit present. No JVD.   Cardiovascular:      Rate and Rhythm: Normal rate and regular rhythm.      Pulses:           Carotid pulses are 2+ on the right side and 2+ on the left side.       Dorsalis pedis pulses are 2+ on the right side and 2+ on the left side.      Heart sounds: Normal heart sounds. No murmur. No friction rub. No gallop.       Comments: Negative Duy's  Pulmonary:      Effort: Pulmonary effort is normal. No respiratory distress.      Breath sounds: Normal breath sounds. No wheezing or rales.   Musculoskeletal:      Right lower leg: No edema.      Left lower leg: No edema.   Lymphadenopathy:      Cervical: No cervical adenopathy.   Skin:     General: Skin is warm.      Findings: No erythema or rash.   Neurological:      Mental Status: He is alert and oriented to person, place, and time.      Coordination: " Coordination normal.         Assessment and Plan:  Problem List Items Addressed This Visit        High    Benign hypertension    Overview     Controlled.         Current Assessment & Plan     Hypertension is improving with treatment.  Continue current treatment regimen.  Dietary sodium restriction.  Regular aerobic exercise.  Blood pressure will be reassessed in 3 months.         Coronary artery disease involving native coronary artery of native heart without angina pectoris    Overview     He is having mild angina likely from non bypassed RCA begin Isosorbide and follow.   Followed with Gab[facility]  s/p CABG x 3, 2015, s/p cardiac cath 07/2020 showing all three grafts to be free of disease.          Relevant Medications    isosorbide mononitrate (ISMO,MONOKET) 20 MG tablet    Diabetes mellitus (CMS/[facility]) - Primary    Overview     A1c 7.2 10/06/2020 stable.   A1c 7.0 07/10/2020  A1c 7.5 02/14/2020   A1c 7.3 11/18/2019    A1c 6.6 08/13/2019    A1c 6.9 5/7/2019 on  A1c 7.0 02/12/2018    A1c 7.1, 11/1/2018 new onset         Current Assessment & Plan     Diabetes is improving with treatment.   Continue current treatment regimen.  Dietary recommendations for ADA diet.  Regular aerobic exercise.  Diabetes will be reassessed in 3 months.         Relevant Orders    POCT urinalysis dipstick, manual (Completed)    POC Glycosylated Hemoglobin (Hb A1C) (Completed)    POCT Glucose (Completed)    Paroxysmal atrial fibrillation (CMS/[facility])    Overview     During heart surgery 2015, no break thru since         Relevant Medications    isosorbide mononitrate (ISMO,MONOKET) 20 MG tablet       Medium    Mixed hyperlipidemia    Overview     Stable, Continue Crestor 40mg  LDL 87 02/2020            Current Assessment & Plan     Lipid abnormalities are improving with lifestyle modifications.  Pharmacotherapy as ordered.  Lipids will be reassessed in 3 months.            Low    Class 2 severe obesity due to excess calories with serious comorbidity  and body mass index (BMI) of 37.0 to 37.9 in adult (CMS/[facility])    Overview     Diet exercise and wt loss encouraged.            Other Visit Diagnoses     Need for influenza vaccination        Relevant Orders    Fluarix/Fluzone/Afluria Quad>6 Months (Completed)          An After Visit Summary and PPPS were given to the patient.       I wore protective equipment throughout this patient encounter to include mask and eye protection. Hand hygiene was performed before donning protective equipment and after removal when leaving the room.

## 2020-10-12 RX ORDER — METOPROLOL TARTRATE 50 MG/1
TABLET, FILM COATED ORAL
Qty: 90 TABLET | Refills: 1 | Status: SHIPPED | OUTPATIENT
Start: 2020-10-12 | End: 2020-12-10

## 2020-10-19 ENCOUNTER — OFFICE VISIT (OUTPATIENT)
Dept: FAMILY MEDICINE CLINIC | Facility: CLINIC | Age: 55
End: 2020-10-19

## 2020-10-19 VITALS
BODY MASS INDEX: 37.29 KG/M2 | HEART RATE: 81 BPM | SYSTOLIC BLOOD PRESSURE: 156 MMHG | DIASTOLIC BLOOD PRESSURE: 86 MMHG | WEIGHT: 223.8 LBS | RESPIRATION RATE: 16 BRPM | TEMPERATURE: 98.2 F | OXYGEN SATURATION: 98 % | HEIGHT: 65 IN

## 2020-10-19 DIAGNOSIS — E78.2 MIXED HYPERLIPIDEMIA: ICD-10-CM

## 2020-10-19 DIAGNOSIS — I20.0 UNSTABLE ANGINA (HCC): Primary | ICD-10-CM

## 2020-10-19 DIAGNOSIS — E66.01 CLASS 2 SEVERE OBESITY DUE TO EXCESS CALORIES WITH SERIOUS COMORBIDITY AND BODY MASS INDEX (BMI) OF 36.0 TO 36.9 IN ADULT (HCC): ICD-10-CM

## 2020-10-19 DIAGNOSIS — I10 BENIGN HYPERTENSION: ICD-10-CM

## 2020-10-19 DIAGNOSIS — I25.10 CORONARY ARTERY DISEASE INVOLVING NATIVE CORONARY ARTERY OF NATIVE HEART WITHOUT ANGINA PECTORIS: ICD-10-CM

## 2020-10-19 DIAGNOSIS — E11.59 TYPE 2 DIABETES MELLITUS WITH OTHER CIRCULATORY COMPLICATION, WITHOUT LONG-TERM CURRENT USE OF INSULIN (HCC): ICD-10-CM

## 2020-10-19 PROCEDURE — 93000 ELECTROCARDIOGRAM COMPLETE: CPT | Performed by: FAMILY MEDICINE

## 2020-10-19 PROCEDURE — 99214 OFFICE O/P EST MOD 30 MIN: CPT | Performed by: FAMILY MEDICINE

## 2020-10-19 RX ORDER — ISOSORBIDE MONONITRATE 20 MG/1
10 TABLET ORAL 2 TIMES DAILY
Qty: 60 TABLET | Refills: 30 | Status: SHIPPED | OUTPATIENT
Start: 2020-10-19 | End: 2020-11-30

## 2020-10-19 NOTE — PROGRESS NOTES
Subjective   Mendel Melendez is a 55 y.o. male.     Chief Complaint   Patient presents with   • Chest Pain       Chest Pain   This is a recurrent problem. The current episode started more than 1 year ago. The onset quality is gradual. The problem occurs 2 to 4 times per day. The problem has been rapidly worsening. The pain is present in the substernal region. The pain is at a severity of 8/10. The pain is moderate. The quality of the pain is described as squeezing. The pain does not radiate. Pertinent negatives include no abdominal pain, back pain, cough, dizziness, exertional chest pressure, fever, headaches, leg pain, nausea, near-syncope, numbness, orthopnea, palpitations, PND, shortness of breath, syncope, vomiting or weakness. Associated symptoms comments: He reports that his sxs begin if he walks up an incline or with intercourse. . The pain is aggravated by exertion. He has tried nitroglycerin (He reports that his sxs abort instantly with NTG. ) for the symptoms. The treatment provided mild relief. Risk factors include male gender and obesity.   His past medical history is significant for CAD, diabetes, heart disease, hyperlipidemia and hypertension.   His family medical history is significant for heart disease. Prior diagnostic workup includes cardiac catheterization.   Diabetes  He presents for his follow-up diabetic visit. He has type 2 diabetes mellitus. There are no hypoglycemic associated symptoms. Pertinent negatives for hypoglycemia include no dizziness, headaches or sweats. Associated symptoms include chest pain. Pertinent negatives for diabetes include no fatigue, no polydipsia, no polyuria, no weakness and no weight loss. There are no hypoglycemic complications. Diabetic complications include heart disease. Risk factors for coronary artery disease include family history, dyslipidemia, male sex, diabetes mellitus, obesity and hypertension. Current diabetic treatment includes oral agent (monotherapy).  Meal planning includes avoidance of concentrated sweets. (Mendel does not check his blood sugars regularly) An ACE inhibitor/angiotensin II receptor blocker is not being taken. He does not see a podiatrist.Eye exam is current (10/2020 Dr. Hodges).   Hyperlipidemia  This is a chronic problem. The current episode started more than 1 year ago. The problem is controlled. Recent lipid tests were reviewed and are normal. Exacerbating diseases include diabetes and obesity. Associated symptoms include chest pain. Pertinent negatives include no leg pain, myalgias or shortness of breath. Current antihyperlipidemic treatment includes statins. The current treatment provides significant improvement of lipids. Risk factors for coronary artery disease include obesity, male sex, hypertension, diabetes mellitus, dyslipidemia and family history.   Hypertension  This is a chronic problem. The current episode started more than 1 year ago. The problem is uncontrolled. Associated symptoms include chest pain. Pertinent negatives include no headaches, orthopnea, palpitations, peripheral edema, PND, shortness of breath or sweats. Risk factors for coronary artery disease include family history, dyslipidemia, diabetes mellitus, male gender and obesity. Current antihypertension treatment includes beta blockers and calcium channel blockers. The current treatment provides moderate improvement. Hypertensive end-organ damage includes CAD/MI.   Coronary Artery Disease  Presents for follow-up visit. Symptoms include chest pain and chest tightness. Pertinent negatives include no chest pressure, dizziness, palpitations, shortness of breath or weight gain. Risk factors include hyperlipidemia, hypertension and obesity. The symptoms have been worsening. Compliance with diet is good. Compliance with exercise is good. Compliance with medications is good.        The following portions of the patient's history were reviewed and updated as appropriate:  allergies, current medications, past family history, past medical history, past social history, past surgical history and problem list.    Allergies:  Allergies   Allergen Reactions   • Niacin Swelling       Social History:  Social History     Socioeconomic History   • Marital status:      Spouse name: Not on file   • Number of children: Not on file   • Years of education: Not on file   • Highest education level: Not on file   Tobacco Use   • Smoking status: Never Smoker   • Smokeless tobacco: Never Used   Substance and Sexual Activity   • Alcohol use: No     Frequency: Never   • Drug use: No   • Sexual activity: Defer       Family History:  Family History   Problem Relation Age of Onset   • Heart disease Father    • Heart disease Paternal Uncle    • Heart disease Maternal Aunt    • Heart disease Maternal Uncle    • Breast cancer Paternal Aunt    • Heart disease Paternal Aunt    • Ovarian cancer Paternal Aunt        Past Medical History :  Patient Active Problem List   Diagnosis   • Encounter for annual general medical examination with abnormal findings in adult   • Encounter for colorectal cancer screening   • Encounter for occupational health examination   • Screening PSA (prostate specific antigen)   • Benign hypertension   • Bruit of left carotid artery   • Coronary artery disease involving native coronary artery of native heart without angina pectoris   • Diabetes mellitus (CMS/[facility])   • Gastroesophageal reflux disease   • Mixed hyperlipidemia   • Occlusion and stenosis of unspecified carotid artery   • Class 2 severe obesity due to excess calories with serious comorbidity and body mass index (BMI) of 36.0 to 36.9 in adult (CMS/[facility])   • Paroxysmal atrial fibrillation (CMS/[facility])   • Seasonal allergic rhinitis due to pollen   • Status post coronary artery bypass graft   • Vasculogenic erectile dysfunction   • Stenosis of left carotid artery   • Elevated LFTs   • Angina at rest (CMS/[facility])   • Abnormal nuclear  stress test   • Heart disease       Medication List:  Outpatient Encounter Medications as of 10/19/2020   Medication Sig Dispense Refill   • amLODIPine (NORVASC) 5 MG tablet Take 1 tablet by mouth once daily 90 tablet 0   • aspirin (ASPIRIN ADULT LOW STRENGTH) 81 MG EC tablet Take 81 mg by mouth Daily.     • isosorbide mononitrate (ISMO,MONOKET) 20 MG tablet Take 0.5 tablets by mouth 2 (Two) Times a Day. 60 tablet 30   • metFORMIN (GLUCOPHAGE) 500 MG tablet Take 1 tablet by mouth 2 (Two) Times a Day With Meals. with food. 60 tablet 12   • metoprolol tartrate (LOPRESSOR) 50 MG tablet TAKE 1 TABLET BY MOUTH EVERY 8 HOURS 90 tablet 1   • montelukast (SINGULAIR) 10 MG tablet Take 1 tablet by mouth Every Night. 30 tablet 12   • nitroglycerin (NITROSTAT) 0.4 MG SL tablet Place 1 tablet under the tongue Every 5 (Five) Minutes As Needed for Chest Pain. Take no more than 3 doses in 15 minutes. 25 tablet 0   • rosuvastatin (CRESTOR) 40 MG tablet Take 1 tablet by mouth Daily. 30 tablet 12   • sildenafil (REVATIO) 20 MG tablet Take 1 tablet by mouth Daily. As needed 30 tablet 12   • [DISCONTINUED] isosorbide mononitrate (ISMO,MONOKET) 20 MG tablet Take 0.5 tablets by mouth Daily. 30 tablet 30     No facility-administered encounter medications on file as of 10/19/2020.        Past Surgical History:  Past Surgical History:   Procedure Laterality Date   • CARDIAC CATHETERIZATION  04/09/2015    [facility] Left main distal 60-70% LCX 80-90% RCA proximal 90% and mid 100% with left to right collaterals.   • CARDIAC CATHETERIZATION N/A 7/10/2020    Procedure: Left Heart Cath with angiogram;  Surgeon: Job Griffin MD;  Location: [facility] CATH INVASIVE LOCATION;  Service: Cardiovascular;  Laterality: N/A;   • CARDIAC CATHETERIZATION N/A 7/10/2020    Procedure: Saphenous Vein Graft;  Surgeon: Job Griffin MD;  Location: [facility] CATH INVASIVE LOCATION;  Service: Cardiovascular;  Laterality: N/A;  svg x 2  lima   • CARDIAC CATHETERIZATION N/A  "7/10/2020    Procedure: Left ventriculography;  Surgeon: Job Griffin MD;  Location: [facility] CATH INVASIVE LOCATION;  Service: Cardiovascular;  Laterality: N/A;   • CORONARY ARTERY BYPASS GRAFT  04/13/2015    Four, left main and three vessel cononary artery disease. Dr. Franklyn Smith   • VASECTOMY  1998       Review of Systems:  Review of Systems   Constitutional: Negative for fatigue, fever, unexpected weight gain and unexpected weight loss.   HENT: Negative for congestion and rhinorrhea.    Eyes: Negative for visual disturbance.   Respiratory: Positive for chest tightness. Negative for cough, shortness of breath and wheezing.    Cardiovascular: Positive for chest pain. Negative for palpitations, orthopnea, syncope, PND and near-syncope.   Gastrointestinal: Negative for abdominal pain, nausea and vomiting.   Endocrine: Negative for cold intolerance, heat intolerance, polydipsia and polyuria.   Genitourinary: Negative for dysuria.   Musculoskeletal: Negative for back pain and myalgias.   Skin: Negative for rash.   Neurological: Negative for dizziness, weakness, numbness and headache.   Hematological: Negative for adenopathy. Does not bruise/bleed easily.       I have reviewed and confirmed the accuracy of the HPI and ROS as documented by the MA/LPN/RN Mariajose Hernandez MD    Vital Signs:  Visit Vitals  /86 (BP Location: Left arm, Patient Position: Sitting, Cuff Size: Adult)   Pulse 81   Temp 98.2 °F (36.8 °C) (Temporal)   Resp 16   Ht 165.1 cm (65\")   Wt 102 kg (223 lb 12.8 oz)   SpO2 98% Comment: Room air   BMI 37.24 kg/m²       Physical Exam  Vitals signs reviewed.   Constitutional:       General: He is not in acute distress.     Appearance: He is well-developed.   Eyes:      Conjunctiva/sclera: Conjunctivae normal.   Neck:      Musculoskeletal: Neck supple.      Thyroid: No thyromegaly.      Vascular: No carotid bruit or JVD.   Cardiovascular:      Rate and Rhythm: Normal rate and regular rhythm.      Pulses: "           Dorsalis pedis pulses are 2+ on the right side and 2+ on the left side.      Heart sounds: Normal heart sounds. No murmur. No friction rub. No gallop.       Comments: Negative Duy's  Pulmonary:      Effort: Pulmonary effort is normal. No respiratory distress.      Breath sounds: Normal breath sounds. No wheezing or rales.   Abdominal:      General: Bowel sounds are normal. There is no distension.      Palpations: Abdomen is soft. There is no mass.      Tenderness: There is no abdominal tenderness.   Musculoskeletal:      Right lower leg: No edema.      Left lower leg: No edema.   Lymphadenopathy:      Cervical: No cervical adenopathy.   Skin:     General: Skin is warm.      Findings: No erythema or rash.   Neurological:      Mental Status: He is alert and oriented to person, place, and time.      Coordination: Coordination normal.         ECG 12 Lead    Date/Time: 10/19/2020 11:42 AM  Performed by: Mariajose Hernandez MD  Authorized by: Mariajose Hernandez MD   Comparison: compared with previous ECG   Similar to previous ECG  Rhythm: sinus rhythm  Rate: normal  BPM: 70  Conduction: conduction normal  ST Segments: ST segments normal  T Waves: T waves normal  QRS axis: normal    Clinical impression: normal ECG          Assessment and Plan:  Problem List Items Addressed This Visit        High    Benign hypertension    Overview     Slightly elevated today because he is concerned.          Coronary artery disease involving native coronary artery of native heart without angina pectoris - Primary    Overview     He is having mild angina likely from non bypassed RCA incrase Isosorbide and follow.   Followed with [facility]  s/p CABG x 3, 2015, s/p cardiac cath 07/2020 showing all three grafts to be free of disease.   Risk factors modified         Relevant Medications    isosorbide mononitrate (ISMO,MONOKET) 20 MG tablet    Other Relevant Orders    Ambulatory Referral to Cardiology    Diabetes mellitus (CMS/[facility])     Overview     A1c 7.2 10/06/2020 stable. Near marginal control  A1c 7.0 07/10/2020  A1c 7.5 02/14/2020   A1c 7.3 11/18/2019    A1c 6.6 08/13/2019    A1c 6.9 5/7/2019 on  A1c 7.0 02/12/2018    A1c 7.1, 11/1/2018 new onset            Medium    Mixed hyperlipidemia    Overview     Stable, Continue Crestor 40mg he is complaint  LDL 62 07/08/2020               Low    Class 2 severe obesity due to excess calories with serious comorbidity and body mass index (BMI) of 36.0 to 36.9 in adult (CMS/[facility])    Overview     Diet exercise and wt loss encouraged.            Other Visit Diagnoses     Unstable angina (CMS/[facility])        Neg EKG  Increase Isosorbide 10 bid if sxs continue  20 bid Cardiology follow up arranged.   cardiac cath 07/2020 no evidence of significant disease, RCA 70%     Relevant Medications    isosorbide mononitrate (ISMO,MONOKET) 20 MG tablet          An After Visit Summary and PPPS were given to the patient.       I wore protective equipment throughout this patient encounter to include mask and eye protection. Hand hygiene was performed before donning protective equipment and after removal when leaving the room.

## 2020-10-21 ENCOUNTER — OFFICE VISIT (OUTPATIENT)
Dept: CARDIOLOGY | Facility: CLINIC | Age: 55
End: 2020-10-21

## 2020-10-21 VITALS
BODY MASS INDEX: 36.65 KG/M2 | OXYGEN SATURATION: 97 % | HEIGHT: 65 IN | HEART RATE: 69 BPM | WEIGHT: 220 LBS | DIASTOLIC BLOOD PRESSURE: 72 MMHG | SYSTOLIC BLOOD PRESSURE: 139 MMHG

## 2020-10-21 DIAGNOSIS — G47.33 OBSTRUCTIVE SLEEP APNEA: ICD-10-CM

## 2020-10-21 DIAGNOSIS — I25.708 CORONARY ARTERY DISEASE OF BYPASS GRAFT OF NATIVE HEART WITH STABLE ANGINA PECTORIS (HCC): ICD-10-CM

## 2020-10-21 DIAGNOSIS — I65.23 BILATERAL CAROTID ARTERY STENOSIS: ICD-10-CM

## 2020-10-21 DIAGNOSIS — I20.9 ANGINA PECTORIS (HCC): Primary | ICD-10-CM

## 2020-10-21 DIAGNOSIS — I48.0 PAROXYSMAL ATRIAL FIBRILLATION (HCC): ICD-10-CM

## 2020-10-21 DIAGNOSIS — E78.2 MIXED HYPERLIPIDEMIA: ICD-10-CM

## 2020-10-21 DIAGNOSIS — E11.59 TYPE 2 DIABETES MELLITUS WITH OTHER CIRCULATORY COMPLICATION, WITHOUT LONG-TERM CURRENT USE OF INSULIN (HCC): ICD-10-CM

## 2020-10-21 DIAGNOSIS — I10 BENIGN HYPERTENSION: ICD-10-CM

## 2020-10-21 PROCEDURE — 99214 OFFICE O/P EST MOD 30 MIN: CPT | Performed by: INTERNAL MEDICINE

## 2020-10-21 NOTE — PROGRESS NOTES
Subjective:     Encounter Date:10/21/2020      Patient ID: Mendel Melendez is a 55 y.o. male.    Chief Complaint:  History of Present Illness 55-year-old white male with history of coronary disease response coronary artery bypass surgery history of carotid artery disease diabetes hypertension hyperlipidemia and sleep apnea presents to my office for follow-up.  Patient is currently having symptoms of chest pain with exertion.  Chest pain is mostly substernal without radiation but associate with shortness of breath or diaphoresis.  No complains of any PND orthopnea.  No palpitation dizziness syncope or swelling of the feet.  Has been taking his medicines regularly.  He is also using nitroglycerin occasions.  He strained excessively but he does not smoke.    The following portions of the patient's history were reviewed and updated as appropriate: allergies, current medications, past family history, past medical history, past social history, past surgical history and problem list.  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   • CARDIAC CATHETERIZATION N/A 7/10/2020    Procedure: Left Heart Cath with angiogram;  Surgeon: Job Griffin MD;  Location: [facility] INVASIVE LOCATION;  Service: Cardiovascular;  Laterality: N/A;   • CARDIAC CATHETERIZATION N/A 7/10/2020    Procedure: Saphenous Vein Graft;  Surgeon: Job Griffin MD;  Location: [facility] CATH  "INVASIVE LOCATION;  Service: Cardiovascular;  Laterality: N/A;  svg x 2  lima   • CARDIAC CATHETERIZATION N/A 7/10/2020    Procedure: Left ventriculography;  Surgeon: Job Griffin MD;  Location: [facility] CATH INVASIVE LOCATION;  Service: Cardiovascular;  Laterality: N/A;   • CORONARY ARTERY BYPASS GRAFT  04/13/2015    Four, left main and three vessel cononary artery disease. Dr. Franklyn Smith   • VASECTOMY  1998     /72   Pulse 69   Ht 165.1 cm (65\")   Wt 99.8 kg (220 lb)   SpO2 97%   BMI 36.61 kg/m²   Family History   Problem Relation Age of Onset   • Heart disease Father    • Heart disease Paternal Uncle    • Heart disease Maternal Aunt    • Heart disease Maternal Uncle    • Breast cancer Paternal Aunt    • Heart disease Paternal Aunt    • Ovarian cancer Paternal Aunt        Current Outpatient Medications:   •  amLODIPine (NORVASC) 5 MG tablet, Take 1 tablet by mouth once daily, Disp: 90 tablet, Rfl: 0  •  aspirin (ASPIRIN ADULT LOW STRENGTH) 81 MG EC tablet, Take 81 mg by mouth Daily., Disp: , Rfl:   •  isosorbide mononitrate (ISMO,MONOKET) 20 MG tablet, Take 0.5 tablets by mouth 2 (Two) Times a Day., Disp: 60 tablet, Rfl: 30  •  metFORMIN (GLUCOPHAGE) 500 MG tablet, Take 1 tablet by mouth 2 (Two) Times a Day With Meals. with food., Disp: 60 tablet, Rfl: 12  •  metoprolol tartrate (LOPRESSOR) 50 MG tablet, TAKE 1 TABLET BY MOUTH EVERY 8 HOURS, Disp: 90 tablet, Rfl: 1  •  montelukast (SINGULAIR) 10 MG tablet, Take 1 tablet by mouth Every Night., Disp: 30 tablet, Rfl: 12  •  nitroglycerin (NITROSTAT) 0.4 MG SL tablet, Place 1 tablet under the tongue Every 5 (Five) Minutes As Needed for Chest Pain. Take no more than 3 doses in 15 minutes., Disp: 25 tablet, Rfl: 0  •  rosuvastatin (CRESTOR) 40 MG tablet, Take 1 tablet by mouth Daily., Disp: 30 tablet, Rfl: 12  •  sildenafil (REVATIO) 20 MG tablet, Take 1 tablet by mouth Daily. As needed, Disp: 30 tablet, Rfl: 12  Allergies   Allergen Reactions   • Niacin " Swelling     Social History     Socioeconomic History   • Marital status:      Spouse name: Not on file   • Number of children: Not on file   • Years of education: Not on file   • Highest education level: Not on file   Tobacco Use   • Smoking status: Never Smoker   • Smokeless tobacco: Never Used   Substance and Sexual Activity   • Alcohol use: No     Frequency: Never   • Drug use: No   • Sexual activity: Defer     Review of Systems   Constitution: Positive for malaise/fatigue. Negative for fever.   HENT: Negative for ear pain and nosebleeds.    Eyes: Negative for blurred vision and double vision.   Cardiovascular: Positive for chest pain. Negative for dyspnea on exertion, leg swelling and palpitations.   Respiratory: Positive for shortness of breath (associated w/ chest pain). Negative for cough.    Skin: Negative for rash.   Musculoskeletal: Negative for joint pain.   Gastrointestinal: Negative for abdominal pain, nausea and vomiting.   Neurological: Negative for focal weakness, headaches, light-headedness and numbness.   Psychiatric/Behavioral: Negative for depression. The patient is not nervous/anxious.    All other systems reviewed and are negative.             Objective:     Constitutional:       Appearance: Well-developed.   Eyes:      General: No scleral icterus.     Conjunctiva/sclera: Conjunctivae normal.      Pupils: Pupils are equal, round, and reactive to light.   HENT:      Head: Normocephalic and atraumatic.   Neck:      Musculoskeletal: Normal range of motion and neck supple.      Vascular: No carotid bruit or JVD.   Pulmonary:      Effort: Pulmonary effort is normal.      Breath sounds: Normal breath sounds. No wheezing. No rales.   Cardiovascular:      Normal rate. Regular rhythm.   Pulses:     Intact distal pulses.   Abdominal:      General: Bowel sounds are normal.      Palpations: Abdomen is soft.   Musculoskeletal: Normal range of motion.   Skin:     General: Skin is warm and dry.       Findings: No rash.   Neurological:      Mental Status: Alert.      Comments: No focal deficits       Procedures    Lab Review:       Assessment:          Diagnosis Plan   1. Angina pectoris (CMS/[facility])     2. Coronary artery disease of bypass graft of native heart with stable angina pectoris (CMS/[facility])     3. Benign hypertension     4. Mixed hyperlipidemia     5. Paroxysmal atrial fibrillation (CMS/[facility])     6. Type 2 diabetes mellitus with other circulatory complication, without long-term current use of insulin (CMS/[facility])     7. Obstructive sleep apnea     8. Bilateral carotid artery stenosis            Plan:       Pain has been having symptoms of chest pain with exertion and had a recent cardiac catheterization which showed patent grafts to the LAD diagonal branch and marginal branch  Patient has a small RCA vessel with diffuse disease and is on medical therapy  Discussed with patient about medical therapy versus PCI of his small vessel which will have recurrent restenosis problems especially with his diabetes  Patient to be started on Ranexa along with his other medications and will ask him to stop his sildenafil for now  Patient's blood pressure heart rate stable  Patient's lipid levels are followed by the primary care doctor  Patient has diabetes and is on oral medicines  Patient has sleep apnea using his CPAP machine.

## 2020-11-11 RX ORDER — AMLODIPINE BESYLATE 5 MG/1
5 TABLET ORAL DAILY
Qty: 90 TABLET | Refills: 3 | Status: SHIPPED | OUTPATIENT
Start: 2020-11-11 | End: 2022-03-04

## 2020-11-11 RX ORDER — AMLODIPINE BESYLATE 5 MG/1
TABLET ORAL
Qty: 90 TABLET | Refills: 3 | OUTPATIENT
Start: 2020-11-11

## 2020-11-11 RX ORDER — NITROGLYCERIN 0.4 MG/1
TABLET SUBLINGUAL
Qty: 25 TABLET | Refills: 0 | OUTPATIENT
Start: 2020-11-11

## 2020-11-18 RX ORDER — RANOLAZINE 500 MG/1
500 TABLET, EXTENDED RELEASE ORAL 2 TIMES DAILY
COMMUNITY
Start: 2020-10-21 | End: 2020-11-19

## 2020-11-19 ENCOUNTER — OFFICE VISIT (OUTPATIENT)
Dept: CARDIOLOGY | Facility: CLINIC | Age: 55
End: 2020-11-19

## 2020-11-19 ENCOUNTER — TELEPHONE (OUTPATIENT)
Dept: CARDIOLOGY | Facility: CLINIC | Age: 55
End: 2020-11-19

## 2020-11-19 VITALS
DIASTOLIC BLOOD PRESSURE: 74 MMHG | BODY MASS INDEX: 37.15 KG/M2 | HEART RATE: 90 BPM | TEMPERATURE: 98.3 F | HEIGHT: 65 IN | SYSTOLIC BLOOD PRESSURE: 151 MMHG | WEIGHT: 223 LBS | OXYGEN SATURATION: 98 %

## 2020-11-19 DIAGNOSIS — I10 BENIGN HYPERTENSION: ICD-10-CM

## 2020-11-19 DIAGNOSIS — E78.2 MIXED HYPERLIPIDEMIA: ICD-10-CM

## 2020-11-19 DIAGNOSIS — G47.33 OBSTRUCTIVE SLEEP APNEA: ICD-10-CM

## 2020-11-19 DIAGNOSIS — E11.59 TYPE 2 DIABETES MELLITUS WITH OTHER CIRCULATORY COMPLICATION, WITHOUT LONG-TERM CURRENT USE OF INSULIN (HCC): ICD-10-CM

## 2020-11-19 DIAGNOSIS — I25.708 CORONARY ARTERY DISEASE OF BYPASS GRAFT OF NATIVE HEART WITH STABLE ANGINA PECTORIS (HCC): Primary | ICD-10-CM

## 2020-11-19 DIAGNOSIS — I65.23 BILATERAL CAROTID ARTERY STENOSIS: ICD-10-CM

## 2020-11-19 PROCEDURE — 99213 OFFICE O/P EST LOW 20 MIN: CPT | Performed by: INTERNAL MEDICINE

## 2020-11-19 RX ORDER — NITROGLYCERIN 0.4 MG/1
0.4 TABLET SUBLINGUAL
Qty: 25 TABLET | Refills: 0 | Status: SHIPPED | OUTPATIENT
Start: 2020-11-19 | End: 2022-08-19 | Stop reason: SDUPTHER

## 2020-11-19 RX ORDER — RANOLAZINE 500 MG/1
500 TABLET, EXTENDED RELEASE ORAL 2 TIMES DAILY
COMMUNITY
End: 2022-08-19 | Stop reason: SDUPTHER

## 2020-11-19 NOTE — PROGRESS NOTES
Subjective:     Encounter Date:11/19/2020      Patient ID: Mendel Melendez is a 55 y.o. male.    Chief Complaint:  History of Present Illness 55-year-old white man with history of coronary disease and is post coronary artery bypass surgery history of carotid disease history of hypertension hyperlipidemia sleep apnea presents to my office for follow-up.  Patient is still having symptoms of chest pain and shortness of breath both at rest as well as with exertion.  No complaint of any PND orthopnea.  No palpitation dizziness syncope or swelling of the feet.  Patient has been taking all the medicines regularly.  Patient does not smoke.  Patient is trying to exercise regularly    The following portions of the patient's history were reviewed and updated as appropriate: allergies, current medications, past family history, past medical history, past social history, past surgical history and problem list.  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   • CARDIAC CATHETERIZATION N/A 7/10/2020    Procedure: Left Heart Cath with angiogram;  Surgeon: Job Griffin MD;  Location: [facility] CATH INVASIVE LOCATION;  Service: Cardiovascular;  Laterality: N/A;   • CARDIAC CATHETERIZATION N/A 7/10/2020    Procedure: Saphenous Vein Graft;  Surgeon: Job Griffin MD;  Location: [facility] CATH INVASIVE LOCATION;  Service: Cardiovascular;  Laterality: N/A;  svg x 2  lima   • CARDIAC  "CATHETERIZATION N/A 7/10/2020    Procedure: Left ventriculography;  Surgeon: Job Griffin MD;  Location: [facility] CATH INVASIVE LOCATION;  Service: Cardiovascular;  Laterality: N/A;   • CORONARY ARTERY BYPASS GRAFT  04/13/2015    Four, left main and three vessel cononary artery disease. Dr. Franklyn Smith   • VASECTOMY  1998     /74 (BP Location: Left arm, Patient Position: Sitting)   Pulse 90   Temp 98.3 °F (36.8 °C)   Ht 165.1 cm (65\")   Wt 101 kg (223 lb)   SpO2 98%   BMI 37.11 kg/m²   Family History   Problem Relation Age of Onset   • Heart disease Father    • Heart disease Paternal Uncle    • Heart disease Maternal Aunt    • Heart disease Maternal Uncle    • Breast cancer Paternal Aunt    • Heart disease Paternal Aunt    • Ovarian cancer Paternal Aunt        Current Outpatient Medications:   •  amLODIPine (NORVASC) 5 MG tablet, Take 1 tablet by mouth Daily., Disp: 90 tablet, Rfl: 3  •  aspirin (ASPIRIN ADULT LOW STRENGTH) 81 MG EC tablet, Take 81 mg by mouth Daily., Disp: , Rfl:   •  isosorbide mononitrate (ISMO,MONOKET) 20 MG tablet, Take 0.5 tablets by mouth 2 (Two) Times a Day., Disp: 60 tablet, Rfl: 30  •  metFORMIN (GLUCOPHAGE) 500 MG tablet, Take 1 tablet by mouth 2 (Two) Times a Day With Meals. with food., Disp: 60 tablet, Rfl: 12  •  metoprolol tartrate (LOPRESSOR) 50 MG tablet, TAKE 1 TABLET BY MOUTH EVERY 8 HOURS, Disp: 90 tablet, Rfl: 1  •  montelukast (SINGULAIR) 10 MG tablet, Take 1 tablet by mouth Every Night., Disp: 30 tablet, Rfl: 12  •  nitroglycerin (NITROSTAT) 0.4 MG SL tablet, Place 1 tablet under the tongue Every 5 (Five) Minutes As Needed for Chest Pain. Take no more than 3 doses in 15 minutes., Disp: 25 tablet, Rfl: 0  •  rosuvastatin (CRESTOR) 40 MG tablet, Take 1 tablet by mouth Daily., Disp: 30 tablet, Rfl: 12  Allergies   Allergen Reactions   • Niacin Swelling     Social History     Socioeconomic History   • Marital status:      Spouse name: Not on file   • Number of " children: Not on file   • Years of education: Not on file   • Highest education level: Not on file   Tobacco Use   • Smoking status: Never Smoker   • Smokeless tobacco: Never Used   Substance and Sexual Activity   • Alcohol use: No     Frequency: Never   • Drug use: No   • Sexual activity: Defer     Review of Systems   Constitution: Positive for malaise/fatigue. Negative for fever.   Cardiovascular: Positive for chest pain. Negative for dyspnea on exertion and palpitations.   Respiratory: Positive for shortness of breath. Negative for cough.    Skin: Negative for rash.   Gastrointestinal: Negative for abdominal pain, nausea and vomiting.   Neurological: Negative for focal weakness and headaches.   All other systems reviewed and are negative.             Objective:     Constitutional:       Appearance: Well-developed.   Eyes:      General: No scleral icterus.     Conjunctiva/sclera: Conjunctivae normal.   HENT:      Head: Normocephalic and atraumatic.   Neck:      Musculoskeletal: Normal range of motion and neck supple.      Vascular: No carotid bruit or JVD.   Pulmonary:      Effort: Pulmonary effort is normal.      Breath sounds: Normal breath sounds. No wheezing. No rales.   Cardiovascular:      Normal rate. Regular rhythm.   Pulses:     Intact distal pulses.   Abdominal:      General: Bowel sounds are normal.      Palpations: Abdomen is soft.   Skin:     General: Skin is warm and dry.      Findings: No rash.   Neurological:      Mental Status: Alert.       Procedures    Lab Review:       Assessment:          Diagnosis Plan   1. Coronary artery disease of bypass graft of native heart with stable angina pectoris (CMS/[facility])     2. Benign hypertension     3. Mixed hyperlipidemia     4. Type 2 diabetes mellitus with other circulatory complication, without long-term current use of insulin (CMS/[facility])     5. Obstructive sleep apnea     6. Bilateral carotid artery stenosis            Plan:       Patient has history of  coronary disease with a LIMA to LAD and saphenous graft to the diagonal branch and to the marginal branch of the circumflex artery  Patient is a 50% disease in the SVG to diagonal branch  Patient has diffuse disease in the small nondominant RCA which is not suitable for percutaneous core intervention  Patient is currently on medical therapy.  He is on aspirin Plavix beta-blockers statins nitrates.  I will also increase the nitrates at this time and place him on Ranexa if needed.  Patient sleep apnea using CPAP machine

## 2020-11-19 NOTE — TELEPHONE ENCOUNTER
Patient called, he got home and realized he is taking Ranexa still. Wants to make you aware and see if he still needs to double up on his Isosorbide.

## 2020-11-30 ENCOUNTER — OFFICE VISIT (OUTPATIENT)
Dept: FAMILY MEDICINE CLINIC | Facility: CLINIC | Age: 55
End: 2020-11-30

## 2020-11-30 VITALS
RESPIRATION RATE: 16 BRPM | OXYGEN SATURATION: 96 % | SYSTOLIC BLOOD PRESSURE: 128 MMHG | TEMPERATURE: 98.2 F | BODY MASS INDEX: 37.42 KG/M2 | WEIGHT: 224.6 LBS | HEART RATE: 75 BPM | HEIGHT: 65 IN | DIASTOLIC BLOOD PRESSURE: 80 MMHG

## 2020-11-30 DIAGNOSIS — G47.33 OBSTRUCTIVE SLEEP APNEA: ICD-10-CM

## 2020-11-30 DIAGNOSIS — E66.01 CLASS 2 SEVERE OBESITY DUE TO EXCESS CALORIES WITH SERIOUS COMORBIDITY AND BODY MASS INDEX (BMI) OF 36.0 TO 36.9 IN ADULT (HCC): ICD-10-CM

## 2020-11-30 DIAGNOSIS — I25.10 CORONARY ARTERY DISEASE INVOLVING NATIVE CORONARY ARTERY OF NATIVE HEART WITHOUT ANGINA PECTORIS: Primary | ICD-10-CM

## 2020-11-30 DIAGNOSIS — E11.51 TYPE 2 DIABETES MELLITUS WITH DIABETIC PERIPHERAL ANGIOPATHY WITHOUT GANGRENE, WITHOUT LONG-TERM CURRENT USE OF INSULIN (HCC): ICD-10-CM

## 2020-11-30 DIAGNOSIS — I20.0 UNSTABLE ANGINA (HCC): ICD-10-CM

## 2020-11-30 DIAGNOSIS — I10 BENIGN HYPERTENSION: ICD-10-CM

## 2020-11-30 DIAGNOSIS — I48.0 PAROXYSMAL ATRIAL FIBRILLATION (HCC): ICD-10-CM

## 2020-11-30 LAB — GLUCOSE BLDC GLUCOMTR-MCNC: 114 MG/DL (ref 70–130)

## 2020-11-30 PROCEDURE — 82962 GLUCOSE BLOOD TEST: CPT | Performed by: FAMILY MEDICINE

## 2020-11-30 PROCEDURE — 99214 OFFICE O/P EST MOD 30 MIN: CPT | Performed by: FAMILY MEDICINE

## 2020-11-30 RX ORDER — HYDROXYZINE PAMOATE 25 MG/1
25 CAPSULE ORAL NIGHTLY PRN
Qty: 30 CAPSULE | Refills: 12 | Status: SHIPPED | OUTPATIENT
Start: 2020-11-30 | End: 2022-08-19

## 2020-11-30 RX ORDER — ISOSORBIDE MONONITRATE 20 MG/1
20 TABLET ORAL 2 TIMES DAILY
Qty: 60 TABLET | Refills: 12 | Status: SHIPPED | OUTPATIENT
Start: 2020-11-30 | End: 2022-08-19 | Stop reason: DRUGHIGH

## 2020-12-05 PROBLEM — G47.33 OBSTRUCTIVE SLEEP APNEA: Status: ACTIVE | Noted: 2020-12-05

## 2020-12-09 ENCOUNTER — TELEPHONE (OUTPATIENT)
Dept: NEUROLOGY | Facility: CLINIC | Age: 55
End: 2020-12-09

## 2020-12-09 NOTE — TELEPHONE ENCOUNTER
KAUSHIK FROM [facility] CALLED NEEDING TO KNOW IF THE OFFICE STILL HAS THE CMN FORM THAT WAS FAXED OVER FOR PT'S SUPPLIES ON 11/6/20 AS SHE HASN'T RECEIVED IT BACK YET. PLEASE REVIEW AND ADVISE      FAX- [phone]  CALL BACK- [phone]

## 2020-12-10 RX ORDER — METOPROLOL TARTRATE 50 MG/1
TABLET, FILM COATED ORAL
Qty: 90 TABLET | Refills: 3 | Status: SHIPPED | OUTPATIENT
Start: 2020-12-10

## 2021-02-09 ENCOUNTER — HOSPITAL ENCOUNTER (OUTPATIENT)
Dept: CARDIOLOGY | Facility: HOSPITAL | Age: 56
Discharge: HOME | End: 2021-02-09

## 2021-02-09 ENCOUNTER — APPOINTMENT (OUTPATIENT)
Dept: VASCULAR SURGERY | Facility: HOSPITAL | Age: 56
End: 2021-02-09

## 2021-02-09 DIAGNOSIS — I65.23 BILATERAL CAROTID ARTERY OCCLUSION: ICD-10-CM

## 2021-02-09 LAB
BH CV XLRA MEAS LEFT BULB EDV: 54.6 CM/SEC
BH CV XLRA MEAS LEFT BULB PSV: 136 CM/SEC
BH CV XLRA MEAS LEFT CCA RATIO VEL: 77.2 CM/SEC
BH CV XLRA MEAS LEFT DIST CCA EDV: 18.7 CM/SEC
BH CV XLRA MEAS LEFT DIST CCA PSV: 68.8 CM/SEC
BH CV XLRA MEAS LEFT DIST ICA EDV: -32.9 CM/SEC
BH CV XLRA MEAS LEFT DIST ICA PSV: -80.1 CM/SEC
BH CV XLRA MEAS LEFT ICA RATIO VEL: -256 CM/SEC
BH CV XLRA MEAS LEFT ICA/CCA RATIO: 3.6
BH CV XLRA MEAS LEFT PROX CCA EDV: 19.7 CM/SEC
BH CV XLRA MEAS LEFT PROX CCA PSV: 77.2 CM/SEC
BH CV XLRA MEAS LEFT PROX ECA PSV: 383 CM/SEC
BH CV XLRA MEAS LEFT PROX ICA EDV: 75 CM/SEC
BH CV XLRA MEAS LEFT PROX ICA PSV: 281 CM/SEC
BH CV XLRA MEAS LEFT PROX SCLA PSV: 154 CM/SEC
BH CV XLRA MEAS LEFT VERTEBRAL A PSV: 41.7 CM/SEC
BH CV XLRA MEAS RIGHT CCA RATIO VEL: 101 CM/SEC
BH CV XLRA MEAS RIGHT DIST CCA EDV: 19.2 CM/SEC
BH CV XLRA MEAS RIGHT DIST CCA PSV: 74.2 CM/SEC
BH CV XLRA MEAS RIGHT DIST ICA EDV: -24.6 CM/SEC
BH CV XLRA MEAS RIGHT DIST ICA PSV: -71.3 CM/SEC
BH CV XLRA MEAS RIGHT ICA RATIO VEL: -110 CM/SEC
BH CV XLRA MEAS RIGHT ICA/CCA RATIO: -1.1
BH CV XLRA MEAS RIGHT PROX CCA EDV: 27.3 CM/SEC
BH CV XLRA MEAS RIGHT PROX CCA PSV: 101 CM/SEC
BH CV XLRA MEAS RIGHT PROX ECA PSV: 129 CM/SEC
BH CV XLRA MEAS RIGHT PROX ICA EDV: -46.9 CM/SEC
BH CV XLRA MEAS RIGHT PROX ICA PSV: -110 CM/SEC
BH CV XLRA MEAS RIGHT PROX SCLA PSV: 310 CM/SEC
BH CV XLRA MEAS RIGHT VERTEBRAL A PSV: 44.2 CM/SEC

## 2021-02-09 PROCEDURE — G0463 HOSPITAL OUTPT CLINIC VISIT: HCPCS

## 2021-02-09 PROCEDURE — 93880 EXTRACRANIAL BILAT STUDY: CPT

## 2021-02-15 ENCOUNTER — TRANSCRIBE ORDERS (OUTPATIENT)
Dept: ADMINISTRATIVE | Facility: HOSPITAL | Age: 56
End: 2021-02-15

## 2021-02-15 ENCOUNTER — OFFICE VISIT (OUTPATIENT)
Dept: FAMILY MEDICINE CLINIC | Facility: CLINIC | Age: 56
End: 2021-02-15

## 2021-02-15 VITALS
OXYGEN SATURATION: 96 % | SYSTOLIC BLOOD PRESSURE: 118 MMHG | HEART RATE: 80 BPM | WEIGHT: 225.6 LBS | RESPIRATION RATE: 16 BRPM | TEMPERATURE: 97.8 F | HEIGHT: 65 IN | DIASTOLIC BLOOD PRESSURE: 70 MMHG | BODY MASS INDEX: 37.59 KG/M2

## 2021-02-15 DIAGNOSIS — Z12.5 SCREENING PSA (PROSTATE SPECIFIC ANTIGEN): ICD-10-CM

## 2021-02-15 DIAGNOSIS — I65.23 BILATERAL CAROTID ARTERY OCCLUSION: Primary | ICD-10-CM

## 2021-02-15 DIAGNOSIS — G47.33 OBSTRUCTIVE SLEEP APNEA: ICD-10-CM

## 2021-02-15 DIAGNOSIS — E78.2 MIXED HYPERLIPIDEMIA: ICD-10-CM

## 2021-02-15 DIAGNOSIS — Z12.11 ENCOUNTER FOR COLORECTAL CANCER SCREENING: ICD-10-CM

## 2021-02-15 DIAGNOSIS — E66.01 CLASS 2 SEVERE OBESITY DUE TO EXCESS CALORIES WITH SERIOUS COMORBIDITY AND BODY MASS INDEX (BMI) OF 37.0 TO 37.9 IN ADULT (HCC): ICD-10-CM

## 2021-02-15 DIAGNOSIS — Z11.59 NEED FOR HEPATITIS C SCREENING TEST: ICD-10-CM

## 2021-02-15 DIAGNOSIS — E11.51 TYPE 2 DIABETES MELLITUS WITH DIABETIC PERIPHERAL ANGIOPATHY WITHOUT GANGRENE, WITHOUT LONG-TERM CURRENT USE OF INSULIN (HCC): ICD-10-CM

## 2021-02-15 DIAGNOSIS — I25.10 CORONARY ARTERY DISEASE INVOLVING NATIVE CORONARY ARTERY OF NATIVE HEART WITHOUT ANGINA PECTORIS: ICD-10-CM

## 2021-02-15 DIAGNOSIS — Z00.01 ENCOUNTER FOR ANNUAL GENERAL MEDICAL EXAMINATION WITH ABNORMAL FINDINGS IN ADULT: Primary | ICD-10-CM

## 2021-02-15 DIAGNOSIS — Z12.12 ENCOUNTER FOR COLORECTAL CANCER SCREENING: ICD-10-CM

## 2021-02-15 DIAGNOSIS — I10 BENIGN HYPERTENSION: ICD-10-CM

## 2021-02-15 PROBLEM — I20.89 ANGINA AT REST: Status: RESOLVED | Noted: 2020-07-06 | Resolved: 2021-02-15

## 2021-02-15 PROBLEM — I20.8 ANGINA AT REST: Status: RESOLVED | Noted: 2020-07-06 | Resolved: 2021-02-15

## 2021-02-15 PROBLEM — I51.9 HEART DISEASE: Status: RESOLVED | Noted: 2020-10-01 | Resolved: 2021-02-15

## 2021-02-15 LAB
BILIRUB BLD-MCNC: NEGATIVE MG/DL
CLARITY, POC: CLEAR
COLOR UR: YELLOW
GLUCOSE BLDC GLUCOMTR-MCNC: 146 MG/DL (ref 70–130)
GLUCOSE UR STRIP-MCNC: NEGATIVE MG/DL
HBA1C MFR BLD: 7.1 %
KETONES UR QL: NEGATIVE
LEUKOCYTE EST, POC: NEGATIVE
NITRITE UR-MCNC: NEGATIVE MG/ML
PH UR: 5 [PH] (ref 5–8)
PROT UR STRIP-MCNC: NEGATIVE MG/DL
RBC # UR STRIP: NEGATIVE /UL
SP GR UR: 1.02 (ref 1–1.03)
UROBILINOGEN UR QL: NORMAL

## 2021-02-15 PROCEDURE — 36415 COLL VENOUS BLD VENIPUNCTURE: CPT | Performed by: FAMILY MEDICINE

## 2021-02-15 PROCEDURE — 83036 HEMOGLOBIN GLYCOSYLATED A1C: CPT | Performed by: FAMILY MEDICINE

## 2021-02-15 PROCEDURE — 81002 URINALYSIS NONAUTO W/O SCOPE: CPT | Performed by: FAMILY MEDICINE

## 2021-02-15 PROCEDURE — 99396 PREV VISIT EST AGE 40-64: CPT | Performed by: FAMILY MEDICINE

## 2021-02-15 PROCEDURE — 99214 OFFICE O/P EST MOD 30 MIN: CPT | Performed by: FAMILY MEDICINE

## 2021-02-15 PROCEDURE — 82962 GLUCOSE BLOOD TEST: CPT | Performed by: FAMILY MEDICINE

## 2021-02-15 NOTE — PROGRESS NOTES
Chief Complaint  Annual Exam, Diabetes, Hypertension, Hyperlipidemia, and Coronary Artery Disease    Subjective     CC  Problem List  Visit Diagnosis   Encounters  Notes  Medications  Labs  Result Review Imaging  Media    Mendel Melendez presents to [facility] for an annual exam. The patient is here: for coordination of medical care to discuss health maintenance and disease prevention to follow up on multiple medical problems. Overall has: minimal activity with work/home activities, good appetite, feels well with minor complaints, decreased energy level and is sleeping poorly. Nutrition: balanced diet. Last tetanus shot was 2018.     Mendel was seen by Dr. Jovon Espniosa-Vascular surgery for a follow up on left carotid stenosis. A carotid doppler was performed at that time. Mendel has also seen Dr. Ocasio-Cardiology for a follow up on CAD. He is scheduled to see Dr. Clarke-cardiology on 02/17/2021 for discussion of possible surgery to remove complete blockage on right side of heart.    Diabetes  He presents for his follow-up diabetic visit. He has type 2 diabetes mellitus. There are no hypoglycemic associated symptoms. Pertinent negatives for hypoglycemia include no confusion, dizziness, headaches, nervousness/anxiousness or sweats. Associated symptoms include chest pain (He continues to have unstable angina. He has had a second opinion with Ninfa and has an apat with interventional cardiology). Pertinent negatives for diabetes include no fatigue, no polydipsia, no polyuria, no weakness and no weight loss. There are no hypoglycemic complications. There are no diabetic complications. Risk factors for coronary artery disease include diabetes mellitus, dyslipidemia, hypertension, male sex, obesity and family history. Current diabetic treatment includes oral agent (monotherapy). He is following a diabetic diet. Meal planning includes avoidance of concentrated sweets. (Does not  check blood sugar) An ACE inhibitor/angiotensin II receptor blocker is not being taken. He does not see a podiatrist.Eye exam is current ([facility] 10/2020).   Hypertension  This is a chronic problem. The current episode started more than 1 year ago. The problem is controlled. Associated symptoms include chest pain (He continues to have unstable angina. He has had a second opinion with Stamariah and has an apat with interventional cardiology). Pertinent negatives include no headaches, orthopnea, palpitations, peripheral edema, PND, shortness of breath or sweats. Risk factors for coronary artery disease include diabetes mellitus, dyslipidemia, family history, male gender and obesity. Current antihypertension treatment includes calcium channel blockers and beta blockers. The current treatment provides significant improvement. Hypertensive end-organ damage includes CAD/MI.   Hyperlipidemia  This is a chronic problem. The current episode started more than 1 year ago. The problem is controlled. Recent lipid tests were reviewed and are normal. Exacerbating diseases include diabetes and obesity. Associated symptoms include chest pain (He continues to have unstable angina. He has had a second opinion with Ninfa and has an apat with interventional cardiology). Pertinent negatives include no myalgias or shortness of breath. Current antihyperlipidemic treatment includes statins. The current treatment provides significant improvement of lipids. Risk factors for coronary artery disease include diabetes mellitus, dyslipidemia, family history, hypertension, male sex and obesity.   Coronary Artery Disease  Presents for follow-up visit. Symptoms include chest pain (He continues to have unstable angina. He has had a second opinion with Ninfa and has an apat with interventional cardiology). Pertinent negatives include no chest pressure, chest tightness, dizziness, leg swelling, palpitations, shortness of breath or weight gain. Risk  "factors include hyperlipidemia and obesity. The symptoms have been stable. Compliance with diet is good. Compliance with exercise is good. Compliance with medications is good.       Review of Systems   Constitutional: Negative for activity change, fatigue, fever, unexpected weight gain and unexpected weight loss.   HENT: Negative for congestion, ear pain, hearing loss, rhinorrhea, sinus pressure, sore throat, swollen glands, trouble swallowing and voice change.    Eyes: Negative for visual disturbance.   Respiratory: Negative for apnea, chest tightness, shortness of breath and wheezing.    Cardiovascular: Positive for chest pain (He continues to have unstable angina. He has had a second opinion with Stamariah and has an apat with interventional cardiology). Negative for palpitations, orthopnea, leg swelling and PND.   Gastrointestinal: Negative for abdominal pain, blood in stool, constipation, diarrhea, nausea, vomiting and indigestion.   Endocrine: Negative for cold intolerance, heat intolerance, polydipsia and polyuria.   Genitourinary: Negative for dysuria, flank pain, frequency, hematuria, testicular pain, urgency and urinary incontinence.   Musculoskeletal: Negative for arthralgias, joint swelling and myalgias.   Skin: Negative for rash, skin lesions and bruise.   Allergic/Immunologic: Negative for environmental allergies and immunocompromised state.   Neurological: Negative for dizziness, syncope, weakness, numbness, headache, memory problem and confusion.   Hematological: Negative for adenopathy. Does not bruise/bleed easily.   Psychiatric/Behavioral: Negative for suicidal ideas and depressed mood. The patient is not nervous/anxious.         Objective   Vital Signs:   /70 (BP Location: Right arm, Patient Position: Sitting, Cuff Size: Large Adult)   Pulse 80   Temp 97.8 °F (36.6 °C) (Temporal)   Resp 16   Ht 165.1 cm (65\")   Wt 102 kg (225 lb 9.6 oz)   SpO2 96% Comment: Room air  BMI 37.54 kg/m²   "   Physical Exam  Constitutional:       General: He is not in acute distress.     Appearance: Normal appearance. He is well-developed. He is obese.   HENT:      Head: Normocephalic.      Right Ear: Tympanic membrane and external ear normal.      Left Ear: Tympanic membrane and external ear normal.      Nose: No mucosal edema.      Mouth/Throat:      Mouth: No oral lesions.      Pharynx: No oropharyngeal exudate.   Eyes:      General: No scleral icterus.     Conjunctiva/sclera: Conjunctivae normal.      Pupils: Pupils are equal, round, and reactive to light.   Neck:      Musculoskeletal: Normal range of motion and neck supple. No edema.      Thyroid: No thyromegaly.      Vascular: No JVD.   Cardiovascular:      Rate and Rhythm: Normal rate and regular rhythm.  No extrasystoles are present.     Pulses:           Carotid pulses are 2+ on the right side and 2+ on the left side.       Femoral pulses are 2+ on the right side and 2+ on the left side.       Dorsalis pedis pulses are 2+ on the right side and 2+ on the left side.      Heart sounds: Normal heart sounds. No murmur. No friction rub. No gallop.    Pulmonary:      Effort: Pulmonary effort is normal.      Breath sounds: No decreased breath sounds.   Abdominal:      General: Bowel sounds are normal. There is no distension.      Palpations: Abdomen is soft. There is no mass.      Tenderness: There is no abdominal tenderness.      Hernia: No hernia is present. There is no hernia in the left inguinal area.   Genitourinary:     Scrotum/Testes: Normal.   Musculoskeletal: Normal range of motion.   Feet:      Right foot:      Protective Sensation: 10 sites tested. 10 sites sensed.      Skin integrity: Skin integrity normal.      Left foot:      Protective Sensation: 10 sites tested. 10 sites sensed.      Skin integrity: Skin integrity normal.   Lymphadenopathy:      Head:      Right side of head: No submandibular adenopathy.      Left side of head: No submandibular  adenopathy.      Cervical: No cervical adenopathy.      Upper Body:      Right upper body: No supraclavicular adenopathy.      Left upper body: No supraclavicular adenopathy.   Skin:     General: Skin is warm and dry.      Findings: No ecchymosis, erythema or rash.   Neurological:      Mental Status: He is alert and oriented to person, place, and time. He is not disoriented.      Cranial Nerves: No cranial nerve deficit.      Sensory: No sensory deficit.      Motor: No abnormal muscle tone.      Coordination: Coordination normal.      Deep Tendon Reflexes: Reflexes are normal and symmetric.   Psychiatric:         Speech: Speech normal.         Thought Content: Thought content normal.         Judgment: Judgment normal.      Diabetic Foot Exam Performed and Monofilament Test Performed    Result Review :Labs  Result Review  Imaging  Med Tab  Media                 Assessment and Plan CC Problem List  Visit Diagnosis  ROS  Review (Popup)  Health Maintenance  Quality  BestPractice  Medications  SmartSets  SnapShot Encounters  Media  Problem List Items Addressed This Visit        High    Benign hypertension    Overview     Controlled, he is compliant         Current Assessment & Plan     Hypertension is improving with treatment.  Continue current treatment regimen.  Dietary sodium restriction.  Weight loss.  Regular aerobic exercise.  Blood pressure will be reassessed in 3 months.         Relevant Orders    TSH    Coronary artery disease involving native coronary artery of native heart without angina pectoris    Overview     He is having unstable angina  Followed with Ninfa Griffin  s/p CABG x 3, 2015, s/p cardiac cath 07/2020 showing all three grafts to be free of disease but distal and proximal stenosis of LAD , stenosis of LCX  Risk factors modified  Interventional cardiology apt pending.          Type 2 diabetes mellitus with diabetic peripheral angiopathy without gangrene, without long-term current  use of insulin (CMS/[facility])    Overview     A1c 7.1 02/15/2021 improved   A1c 7.2 10/06/2020 stable.   A1c 7.0 07/10/2020  A1c 7.5 02/14/2020   A1c 7.3 11/18/2019    A1c 6.6 08/13/2019    A1c 6.9 5/7/2019 on  A1c 7.0 02/12/2018    A1c 7.1, 11/1/2018 new onset         Relevant Orders    Microalbumin / Creatinine Urine Ratio - Urine, Clean Catch    POCT urinalysis dipstick, manual (Completed)    POC Glycosylated Hemoglobin (Hb A1C) (Completed)    POCT Glucose (Completed)    CBC & Differential    Comprehensive Metabolic Panel       Medium    Mixed hyperlipidemia    Overview     Stable, Continue Crestor 40mg he is complaint  LDL 62 07/08/2020            Current Assessment & Plan     Lipid abnormalities are improving with treatment.  Nutritional counseling was provided. and Pharmacotherapy as ordered.  Lipids will be reassessed in 3 months.         Relevant Orders    Lipid Panel With / Chol / HDL Ratio    Obstructive sleep apnea    Overview     He is having a lot of difficulty with CPAP, he is encouraged to continue to try, given it should help his angina. He has multiple delivery systems, mask [place] etc. He will continue to wear             Low    Class 2 severe obesity due to excess calories with serious comorbidity and body mass index (BMI) of 37.0 to 37.9 in adult (CMS/[facility])    Overview     Diet exercise and wt loss encouraged         Current Assessment & Plan     Patient's (Body mass index is 37.54 kg/m².) indicates that they are obese (BMI >30) with obesity-related health conditions that include hypertension, coronary heart disease, diabetes mellitus and dyslipidemias . Obesity is worsening. BMI is is above average; BMI management plan is completed. We discussed low calorie, low carb based diet program, portion control, increasing exercise and Weight Watchers or other Commercial based weight reduction program.             Unprioritized    Encounter for annual general medical examination with abnormal findings in adult -  Primary    Overview     Diet exercise wt loss, seat belts, sunscreens. Ear protection.   Previous lab reviewed, he eas notified of today's lab.  Meds refilled.           Encounter for colorectal cancer screening    Overview     Last colonoscopy 03/04/2019 repeat 2029         Screening PSA (prostate specific antigen)    Relevant Orders    PSA Screen      Other Visit Diagnoses     Need for hepatitis C screening test        Relevant Orders    Hepatitis C antibody          Follow Up Instructions Charge Capture  Follow-up Communications  Return in about 3 months (around 5/15/2021).  Patient was given instructions and counseling regarding his condition or for health maintenance advice. Please see specific information pulled into the AVS if appropriate.      Site care done- cleaned with alcohol swab, procedure tolerated well, dressing applied. Venipuncture was obtained after 1 time(s). 2 tubes were drawn. Needle gauge used was 22.

## 2021-02-16 LAB
ALBUMIN SERPL-MCNC: 4.4 G/DL (ref 3.8–4.9)
ALBUMIN/CREAT UR: 5 MG/G CREAT (ref 0–29)
ALBUMIN/GLOB SERPL: 1.6 {RATIO} (ref 1.2–2.2)
ALP SERPL-CCNC: 85 IU/L (ref 39–117)
ALT SERPL-CCNC: 39 IU/L (ref 0–44)
AST SERPL-CCNC: 21 IU/L (ref 0–40)
BASOPHILS # BLD AUTO: 0.1 X10E3/UL (ref 0–0.2)
BASOPHILS NFR BLD AUTO: 1 %
BILIRUB SERPL-MCNC: 0.3 MG/DL (ref 0–1.2)
BUN SERPL-MCNC: 17 MG/DL (ref 6–24)
BUN/CREAT SERPL: 15 (ref 9–20)
CALCIUM SERPL-MCNC: 9.5 MG/DL (ref 8.7–10.2)
CHLORIDE SERPL-SCNC: 103 MMOL/L (ref 96–106)
CHOLEST SERPL-MCNC: 164 MG/DL (ref 100–199)
CHOLEST/HDLC SERPL: 3 RATIO (ref 0–5)
CO2 SERPL-SCNC: 20 MMOL/L (ref 20–29)
CREAT SERPL-MCNC: 1.14 MG/DL (ref 0.76–1.27)
CREAT UR-MCNC: 167.1 MG/DL
EOSINOPHIL # BLD AUTO: 0.2 X10E3/UL (ref 0–0.4)
EOSINOPHIL NFR BLD AUTO: 3 %
ERYTHROCYTE [DISTWIDTH] IN BLOOD BY AUTOMATED COUNT: 13.5 % (ref 11.6–15.4)
GLOBULIN SER CALC-MCNC: 2.7 G/DL (ref 1.5–4.5)
GLUCOSE SERPL-MCNC: 147 MG/DL (ref 65–99)
HCT VFR BLD AUTO: 48 % (ref 37.5–51)
HCV AB S/CO SERPL IA: <0.1 S/CO RATIO (ref 0–0.9)
HDLC SERPL-MCNC: 54 MG/DL
HGB BLD-MCNC: 15.7 G/DL (ref 13–17.7)
IMM GRANULOCYTES # BLD AUTO: 0.1 X10E3/UL (ref 0–0.1)
IMM GRANULOCYTES NFR BLD AUTO: 1 %
LDLC SERPL CALC-MCNC: 79 MG/DL (ref 0–99)
LYMPHOCYTES # BLD AUTO: 1.5 X10E3/UL (ref 0.7–3.1)
LYMPHOCYTES NFR BLD AUTO: 16 %
MCH RBC QN AUTO: 28.8 PG (ref 26.6–33)
MCHC RBC AUTO-ENTMCNC: 32.7 G/DL (ref 31.5–35.7)
MCV RBC AUTO: 88 FL (ref 79–97)
MICROALBUMIN UR-MCNC: 8.3 UG/ML
MONOCYTES # BLD AUTO: 1 X10E3/UL (ref 0.1–0.9)
MONOCYTES NFR BLD AUTO: 11 %
NEUTROPHILS # BLD AUTO: 6.3 X10E3/UL (ref 1.4–7)
NEUTROPHILS NFR BLD AUTO: 68 %
PLATELET # BLD AUTO: 253 X10E3/UL (ref 150–450)
POTASSIUM SERPL-SCNC: 5 MMOL/L (ref 3.5–5.2)
PROT SERPL-MCNC: 7.1 G/DL (ref 6–8.5)
PSA SERPL-MCNC: 0.9 NG/ML (ref 0–4)
RBC # BLD AUTO: 5.46 X10E6/UL (ref 4.14–5.8)
SODIUM SERPL-SCNC: 140 MMOL/L (ref 134–144)
TRIGL SERPL-MCNC: 184 MG/DL (ref 0–149)
TSH SERPL DL<=0.005 MIU/L-ACNC: 2.6 UIU/ML (ref 0.45–4.5)
VLDLC SERPL CALC-MCNC: 31 MG/DL (ref 5–40)
WBC # BLD AUTO: 9.1 X10E3/UL (ref 3.4–10.8)

## 2021-02-16 NOTE — ASSESSMENT & PLAN NOTE
Patient's (Body mass index is 37.54 kg/m².) indicates that they are obese (BMI >30) with obesity-related health conditions that include hypertension, coronary heart disease, diabetes mellitus and dyslipidemias . Obesity is worsening. BMI is is above average; BMI management plan is completed. We discussed low calorie, low carb based diet program, portion control, increasing exercise and Weight Watchers or other Commercial based weight reduction program.

## 2021-03-05 ENCOUNTER — CLINICAL SUPPORT (OUTPATIENT)
Dept: FAMILY MEDICINE CLINIC | Facility: CLINIC | Age: 56
End: 2021-03-05

## 2021-03-05 VITALS
OXYGEN SATURATION: 96 % | DIASTOLIC BLOOD PRESSURE: 71 MMHG | TEMPERATURE: 97.5 F | SYSTOLIC BLOOD PRESSURE: 133 MMHG | HEIGHT: 65 IN | WEIGHT: 227.6 LBS | RESPIRATION RATE: 18 BRPM | BODY MASS INDEX: 37.92 KG/M2 | HEART RATE: 83 BPM

## 2021-03-05 DIAGNOSIS — Z02.4 ENCOUNTER FOR CDL (COMMERCIAL DRIVING LICENSE) EXAM: Primary | ICD-10-CM

## 2021-03-05 PROBLEM — I10 HYPERTENSION: Status: ACTIVE | Noted: 2020-10-01

## 2021-03-05 PROBLEM — G47.30 SLEEP APNEA: Status: ACTIVE | Noted: 2020-12-05

## 2021-03-05 PROBLEM — E11.9 TYPE 2 DIABETES MELLITUS: Status: ACTIVE | Noted: 2019-07-29

## 2021-03-05 LAB
BILIRUB BLD-MCNC: NEGATIVE MG/DL
CLARITY, POC: CLEAR
COLOR UR: YELLOW
GLUCOSE UR STRIP-MCNC: ABNORMAL MG/DL
KETONES UR QL: NEGATIVE
LEUKOCYTE EST, POC: NEGATIVE
NITRITE UR-MCNC: NEGATIVE MG/ML
PH UR: 5 [PH] (ref 5–8)
PROT UR STRIP-MCNC: NEGATIVE MG/DL
RBC # UR STRIP: NEGATIVE /UL
SP GR UR: 1.02 (ref 1–1.03)
UROBILINOGEN UR QL: NORMAL

## 2021-03-05 PROCEDURE — 81003 URINALYSIS AUTO W/O SCOPE: CPT | Performed by: FAMILY MEDICINE

## 2021-03-05 PROCEDURE — DOTPHY: Performed by: FAMILY MEDICINE

## 2021-03-05 NOTE — PROGRESS NOTES
Medical Examination    Subjective   Mendel Melendez is a 55 y.o. male who presents today for a  fitness determination physical exam. The patient reports no problems.  The following portions of the patient's history were reviewed and updated as appropriate: allergies, current medications, past family history, past medical history, past social history, past surgical history and problem list.  Review of Systems  A comprehensive review of systems was negative.    Objective    Vision:   Visual Acuity Screening    Right eye Left eye Both eyes   Without correction: 20/25 20/25 20/25   With correction:          Applicant can recognize and distinguish among traffic control signals and devices showing standard red, green, and sera colors.  Applicant has peripheral vision to 90 degrees in each eye.         Monocular Vision?: No      Hearing:  Applicant can distinguish forced whisper at a distance of 5 feet with both ears.         Physical Exam  Constitutional:       Appearance: He is well-developed.   HENT:      Head: Normocephalic and atraumatic.      Right Ear: External ear normal.      Left Ear: External ear normal.      Nose: Nose normal.   Eyes:      Pupils: Pupils are equal, round, and reactive to light.   Neck:      Musculoskeletal: Normal range of motion and neck supple.   Cardiovascular:      Rate and Rhythm: Normal rate and regular rhythm.      Heart sounds: Normal heart sounds.   Pulmonary:      Effort: Pulmonary effort is normal.      Breath sounds: Normal breath sounds.   Abdominal:      General: Bowel sounds are normal.      Palpations: Abdomen is soft.   Musculoskeletal: Normal range of motion.   Skin:     General: Skin is warm and dry.   Neurological:      Mental Status: He is alert and oriented to person, place, and time.   Psychiatric:         Behavior: Behavior normal.         Thought Content: Thought content normal.         Judgment: Judgment normal.          Labs:  Lab  Results   Component Value Date    SPECGRAV 1.020 03/05/2021    BILIRUBINUR Negative 03/05/2021    GLUCOSEU Negative 04/15/2019       Assessment/Plan   Healthy male exam.   Meets standards, but periodic monitoring required due to CAD.   qualified only for 1 year.     Medical examiners certificate completed and printed.  Return as needed.

## 2021-03-09 DIAGNOSIS — E78.2 MIXED HYPERLIPIDEMIA: ICD-10-CM

## 2021-03-09 RX ORDER — ROSUVASTATIN CALCIUM 40 MG/1
TABLET, COATED ORAL
Qty: 30 TABLET | Refills: 12 | Status: SHIPPED | OUTPATIENT
Start: 2021-03-09 | End: 2022-03-10

## 2021-03-29 RX ORDER — METOPROLOL TARTRATE 50 MG/1
TABLET, FILM COATED ORAL
Qty: 90 TABLET | Refills: 0 | OUTPATIENT
Start: 2021-03-29

## 2021-06-09 ENCOUNTER — OFFICE VISIT (OUTPATIENT)
Dept: FAMILY MEDICINE CLINIC | Facility: CLINIC | Age: 56
End: 2021-06-09

## 2021-06-09 VITALS
SYSTOLIC BLOOD PRESSURE: 128 MMHG | RESPIRATION RATE: 18 BRPM | WEIGHT: 219 LBS | TEMPERATURE: 97.1 F | HEART RATE: 96 BPM | HEIGHT: 66 IN | DIASTOLIC BLOOD PRESSURE: 84 MMHG | OXYGEN SATURATION: 96 % | BODY MASS INDEX: 35.2 KG/M2

## 2021-06-09 DIAGNOSIS — I25.709 CORONARY ARTERY DISEASE INVOLVING CORONARY BYPASS GRAFT OF NATIVE HEART WITH ANGINA PECTORIS (HCC): ICD-10-CM

## 2021-06-09 DIAGNOSIS — E66.01 CLASS 2 SEVERE OBESITY DUE TO EXCESS CALORIES WITH SERIOUS COMORBIDITY AND BODY MASS INDEX (BMI) OF 37.0 TO 37.9 IN ADULT (HCC): ICD-10-CM

## 2021-06-09 DIAGNOSIS — I48.0 PAROXYSMAL ATRIAL FIBRILLATION (HCC): ICD-10-CM

## 2021-06-09 DIAGNOSIS — E78.2 MIXED HYPERLIPIDEMIA: ICD-10-CM

## 2021-06-09 DIAGNOSIS — I10 BENIGN HYPERTENSION: ICD-10-CM

## 2021-06-09 DIAGNOSIS — E11.51 TYPE 2 DIABETES MELLITUS WITH DIABETIC PERIPHERAL ANGIOPATHY WITHOUT GANGRENE, WITHOUT LONG-TERM CURRENT USE OF INSULIN (HCC): Primary | ICD-10-CM

## 2021-06-09 LAB
BILIRUB BLD-MCNC: NEGATIVE MG/DL
CLARITY, POC: CLEAR
COLOR UR: YELLOW
GLUCOSE BLDC GLUCOMTR-MCNC: 128 MG/DL (ref 70–130)
GLUCOSE UR STRIP-MCNC: NEGATIVE MG/DL
HBA1C MFR BLD: 6.7 %
KETONES UR QL: NEGATIVE
LEUKOCYTE EST, POC: NEGATIVE
NITRITE UR-MCNC: NEGATIVE MG/ML
PH UR: 5 [PH] (ref 5–8)
PROT UR STRIP-MCNC: NEGATIVE MG/DL
RBC # UR STRIP: NEGATIVE /UL
SP GR UR: 1.01 (ref 1–1.03)
UROBILINOGEN UR QL: NORMAL

## 2021-06-09 PROCEDURE — 99214 OFFICE O/P EST MOD 30 MIN: CPT | Performed by: FAMILY MEDICINE

## 2021-06-09 PROCEDURE — 83036 HEMOGLOBIN GLYCOSYLATED A1C: CPT | Performed by: FAMILY MEDICINE

## 2021-06-09 PROCEDURE — 81002 URINALYSIS NONAUTO W/O SCOPE: CPT | Performed by: FAMILY MEDICINE

## 2021-06-09 NOTE — PROGRESS NOTES
Chief Complaint  Diabetes, Hypertension, Hyperlipidemia, Coronary Artery Disease, and Atrial Fibrillation    Subjective     CC  Problem List  Visit Diagnosis   Encounters  Notes  Medications  Labs  Result Review Imaging  Media    Mendel Melendez presents to [facility] for   Diabetes  He presents for his follow-up diabetic visit. He has type 2 diabetes mellitus. Pertinent negatives for hypoglycemia include no dizziness, headaches or sweats. Pertinent negatives for diabetes include no chest pain, no fatigue, no polydipsia, no polyuria and no weakness. Risk factors for coronary artery disease include diabetes mellitus, dyslipidemia, hypertension, male sex, obesity and family history. Meal planning includes avoidance of concentrated sweets. He participates in exercise every other day. His overall blood glucose range is 130-140 mg/dl. An ACE inhibitor/angiotensin II receptor blocker is not being taken. He does not see a podiatrist.Eye exam is not current.   Hypertension  This is a chronic problem. The current episode started more than 1 year ago. The problem is uncontrolled. Pertinent negatives include no chest pain, headaches, palpitations, peripheral edema, shortness of breath or sweats. Risk factors for coronary artery disease include diabetes mellitus, dyslipidemia, family history, post-menopausal state and obesity. Current antihypertension treatment includes beta blockers and calcium channel blockers. The current treatment provides moderate improvement.   Hyperlipidemia  This is a chronic problem. The current episode started more than 1 year ago. The problem is controlled. Recent lipid tests were reviewed and are normal. Exacerbating diseases include diabetes and obesity. Pertinent negatives include no chest pain or shortness of breath. Current antihyperlipidemic treatment includes statins. The current treatment provides moderate improvement of lipids. Risk factors for  "coronary artery disease include diabetes mellitus, dyslipidemia, family history, obesity, male sex and hypertension.   Coronary Artery Disease  Presents for follow-up visit. Pertinent negatives include no chest pain, dizziness, leg swelling, palpitations or shortness of breath. Risk factors include hyperlipidemia and obesity.   Atrial Fibrillation  Presents for follow-up visit. Symptoms are negative for chest pain, dizziness, palpitations, shortness of breath and weakness. Past medical history includes atrial fibrillation, CAD and hyperlipidemia.       Review of Systems   Constitutional: Negative for appetite change, fatigue and fever.   HENT: Negative for congestion, sore throat and tinnitus.    Eyes: Negative for visual disturbance.   Respiratory: Negative for cough, shortness of breath and wheezing.    Cardiovascular: Negative for chest pain, palpitations and leg swelling.        He remains in close cardiology follow up he has intermittent angina that is stable his anatomy makes it difficult to correct   Gastrointestinal: Negative for abdominal pain, constipation, diarrhea, nausea and vomiting.   Endocrine: Negative.  Negative for cold intolerance, heat intolerance, polydipsia and polyuria.   Genitourinary: Negative for dysuria.   Skin: Negative for rash and bruise.   Allergic/Immunologic: Negative for environmental allergies.   Neurological: Negative for dizziness, syncope and weakness.   Hematological: Negative for adenopathy. Does not bruise/bleed easily.   Psychiatric/Behavioral: Negative for depressed mood.        Objective   Vital Signs:   /84 (BP Location: Right arm, Patient Position: Sitting, Cuff Size: Adult)   Pulse 96   Temp 97.1 °F (36.2 °C) (Temporal)   Resp 18   Ht 167.6 cm (66\")   Wt 99.3 kg (219 lb)   SpO2 96% Comment: room air  BMI 35.35 kg/m²     Physical Exam  Constitutional:       General: He is not in acute distress.  Neck:      Thyroid: No thyromegaly.      Vascular: No JVD. "   Cardiovascular:      Rate and Rhythm: Normal rate and regular rhythm.      Pulses:           Dorsalis pedis pulses are 2+ on the right side and 2+ on the left side.      Heart sounds: No murmur heard.        Comments: Negative Duy's  Pulmonary:      Effort: Pulmonary effort is normal.      Breath sounds: Normal breath sounds. No wheezing.   Musculoskeletal:      Cervical back: Neck supple.      Right lower leg: No edema.      Left lower leg: No edema.   Lymphadenopathy:      Cervical: No cervical adenopathy.   Skin:     Capillary Refill: Capillary refill takes less than 2 seconds.      Findings: No rash.   Neurological:      Mental Status: He is alert.        Result Review :Labs  Result Review  Imaging  Med Tab  Media                 Assessment and Plan CC Problem List  Visit Diagnosis  ROS  Review (Popup)  Health Maintenance  Quality  BestPractice  Medications  SmartSets  SnapShot Encounters  Media  Problem List Items Addressed This Visit        High    Benign hypertension    Overview     Controlled, he is compliant         Current Assessment & Plan     Hypertension is improving with treatment.  Continue current treatment regimen.  Dietary sodium restriction.  Weight loss.  Regular aerobic exercise.  Blood pressure will be reassessed in 3 months.         Coronary artery disease involving coronary bypass graft of native heart with angina pectoris (CMS/[facility])    Overview     He is having unstable angina  [facility]   s/p CABG x 3, 2015, s/p cardiac cath 07/2020 showing all three grafts to be free of disease but distal and proximal stenosis of LAD , stenosis of LCX  Risk factors modified  Interventional cardiology apt pending.          Type 2 diabetes mellitus with diabetic peripheral angiopathy without gangrene, without long-term current use of insulin (CMS/[facility]) - Primary    Overview     A1c 6.7 06/09/2021 improved. More active.   A1c 7.1 02/15/2021 improved   A1c 7.2 10/06/2020   A1c 7.3  11/18/2019     A1c 6.9 5/7/2019 on  A1c 7.0 02/12/2018    A1c 7.1, 11/1/2018 new onset         Current Assessment & Plan     Diabetes is improving with treatment.   Continue current treatment regimen.  Dietary recommendations for ADA diet.  Regular aerobic exercise.  Diabetes will be reassessed in 3 months.         Relevant Orders    POCT urinalysis dipstick, manual (Completed)    POCT Glucose (Completed)    POC Glycosylated Hemoglobin (Hb A1C) (Completed)    Paroxysmal atrial fibrillation (CMS/[facility])    Overview     During heart surgery 2015, no break thru             Medium    Mixed hyperlipidemia    Overview     Stable, Continue Crestor 40mg he is complaint  LDL 62 07/08/2020            Current Assessment & Plan     Lipid abnormalities are improving with treatment.  Pharmacotherapy as ordered.  Lipids will be reassessed in 3 months.            Low    Class 2 severe obesity due to excess calories with serious comorbidity and body mass index (BMI) of 37.0 to 37.9 in adult (CMS/[facility])    Overview     Diet exercise and wt loss encouraged Techniques discussed.          Current Assessment & Plan     Patient's (Body mass index is 35.35 kg/m².) indicates that they are obese (BMI >30) with obesity-related health conditions that include hypertension, coronary heart disease, diabetes mellitus and dyslipidemias . Obesity is improving with lifestyle modifications. BMI is is above average; BMI management plan is completed. We discussed portion control and increasing exercise.                Follow Up Instructions Charge Capture  Follow-up Communications  Return in about 3 months (around 9/9/2021).  Patient was given instructions and counseling regarding his condition or for health maintenance advice. Please see specific information pulled into the AVS if appropriate.

## 2021-06-16 DIAGNOSIS — I25.709 CORONARY ARTERY DISEASE INVOLVING CORONARY BYPASS GRAFT OF NATIVE HEART WITH ANGINA PECTORIS (HCC): Primary | ICD-10-CM

## 2021-06-17 LAB
ALBUMIN SERPL-MCNC: 4.3 G/DL (ref 3.8–4.9)
ALBUMIN/GLOB SERPL: 2 {RATIO} (ref 1.2–2.2)
ALP SERPL-CCNC: 68 IU/L (ref 48–121)
ALT SERPL-CCNC: 23 IU/L (ref 0–44)
AST SERPL-CCNC: 17 IU/L (ref 0–40)
BASOPHILS # BLD AUTO: 0 X10E3/UL (ref 0–0.2)
BASOPHILS NFR BLD AUTO: 1 %
BILIRUB SERPL-MCNC: 0.3 MG/DL (ref 0–1.2)
BUN SERPL-MCNC: 22 MG/DL (ref 6–24)
BUN/CREAT SERPL: 23 (ref 9–20)
CALCIUM SERPL-MCNC: 9 MG/DL (ref 8.7–10.2)
CHLORIDE SERPL-SCNC: 106 MMOL/L (ref 96–106)
CHOLEST SERPL-MCNC: 150 MG/DL (ref 100–199)
CHOLEST/HDLC SERPL: 2.4 RATIO (ref 0–5)
CO2 SERPL-SCNC: 22 MMOL/L (ref 20–29)
CREAT SERPL-MCNC: 0.97 MG/DL (ref 0.76–1.27)
EOSINOPHIL # BLD AUTO: 0.3 X10E3/UL (ref 0–0.4)
EOSINOPHIL NFR BLD AUTO: 4 %
ERYTHROCYTE [DISTWIDTH] IN BLOOD BY AUTOMATED COUNT: 13.4 % (ref 11.6–15.4)
GLOBULIN SER CALC-MCNC: 2.1 G/DL (ref 1.5–4.5)
GLUCOSE SERPL-MCNC: 149 MG/DL (ref 65–99)
HCT VFR BLD AUTO: 46.3 % (ref 37.5–51)
HDLC SERPL-MCNC: 62 MG/DL
HGB BLD-MCNC: 15.3 G/DL (ref 13–17.7)
IMM GRANULOCYTES # BLD AUTO: 0.1 X10E3/UL (ref 0–0.1)
IMM GRANULOCYTES NFR BLD AUTO: 1 %
INR PPP: 1 (ref 0.9–1.2)
LDLC SERPL CALC-MCNC: 69 MG/DL (ref 0–99)
LYMPHOCYTES # BLD AUTO: 1.2 X10E3/UL (ref 0.7–3.1)
LYMPHOCYTES NFR BLD AUTO: 15 %
MCH RBC QN AUTO: 29.7 PG (ref 26.6–33)
MCHC RBC AUTO-ENTMCNC: 33 G/DL (ref 31.5–35.7)
MCV RBC AUTO: 90 FL (ref 79–97)
MONOCYTES # BLD AUTO: 0.9 X10E3/UL (ref 0.1–0.9)
MONOCYTES NFR BLD AUTO: 10 %
NEUTROPHILS # BLD AUTO: 6 X10E3/UL (ref 1.4–7)
NEUTROPHILS NFR BLD AUTO: 69 %
PLATELET # BLD AUTO: 233 X10E3/UL (ref 150–450)
POTASSIUM SERPL-SCNC: 4.6 MMOL/L (ref 3.5–5.2)
PROT SERPL-MCNC: 6.4 G/DL (ref 6–8.5)
PROTHROMBIN TIME: 10.3 SEC (ref 9.1–12)
RBC # BLD AUTO: 5.16 X10E6/UL (ref 4.14–5.8)
SODIUM SERPL-SCNC: 140 MMOL/L (ref 134–144)
TRIGL SERPL-MCNC: 106 MG/DL (ref 0–149)
VLDLC SERPL CALC-MCNC: 19 MG/DL (ref 5–40)
WBC # BLD AUTO: 8.5 X10E3/UL (ref 3.4–10.8)

## 2021-06-21 NOTE — ASSESSMENT & PLAN NOTE
Patient's (Body mass index is 35.35 kg/m².) indicates that they are obese (BMI >30) with obesity-related health conditions that include hypertension, coronary heart disease, diabetes mellitus and dyslipidemias . Obesity is improving with lifestyle modifications. BMI is is above average; BMI management plan is completed. We discussed portion control and increasing exercise.

## 2021-07-05 DIAGNOSIS — J30.1 SEASONAL ALLERGIC RHINITIS DUE TO POLLEN: ICD-10-CM

## 2021-07-06 RX ORDER — MONTELUKAST SODIUM 10 MG/1
TABLET ORAL
Qty: 30 TABLET | Refills: 12 | Status: SHIPPED | OUTPATIENT
Start: 2021-07-06 | End: 2022-07-12

## 2021-08-10 ENCOUNTER — APPOINTMENT (OUTPATIENT)
Dept: VASCULAR SURGERY | Facility: HOSPITAL | Age: 56
End: 2021-08-10

## 2021-08-10 ENCOUNTER — HOSPITAL ENCOUNTER (OUTPATIENT)
Dept: CARDIOLOGY | Facility: HOSPITAL | Age: 56
Discharge: HOME | End: 2021-08-10

## 2021-08-10 DIAGNOSIS — I65.23 BILATERAL CAROTID ARTERY OCCLUSION: ICD-10-CM

## 2021-08-10 LAB
BH CV XLRA MEAS LEFT BULB EDV: 48.3 CM/SEC
BH CV XLRA MEAS LEFT BULB PSV: 185 CM/SEC
BH CV XLRA MEAS LEFT CCA RATIO VEL: 68.8 CM/SEC
BH CV XLRA MEAS LEFT DIST CCA EDV: 24.6 CM/SEC
BH CV XLRA MEAS LEFT DIST CCA PSV: 68.8 CM/SEC
BH CV XLRA MEAS LEFT DIST ICA EDV: -27.4 CM/SEC
BH CV XLRA MEAS LEFT DIST ICA PSV: -58 CM/SEC
BH CV XLRA MEAS LEFT ICA RATIO VEL: -225 CM/SEC
BH CV XLRA MEAS LEFT ICA/CCA RATIO: -3.3
BH CV XLRA MEAS LEFT PROX CCA EDV: 25.5 CM/SEC
BH CV XLRA MEAS LEFT PROX CCA PSV: 67.1 CM/SEC
BH CV XLRA MEAS LEFT PROX ECA PSV: -159 CM/SEC
BH CV XLRA MEAS LEFT PROX ICA EDV: -60.7 CM/SEC
BH CV XLRA MEAS LEFT PROX ICA PSV: -225 CM/SEC
BH CV XLRA MEAS LEFT PROX SCLA PSV: 162 CM/SEC
BH CV XLRA MEAS LEFT VERTEBRAL A PSV: 41.7 CM/SEC
BH CV XLRA MEAS RIGHT BULB PSV: 93.6 CM/SEC
BH CV XLRA MEAS RIGHT CCA RATIO VEL: 85.1 CM/SEC
BH CV XLRA MEAS RIGHT DIST CCA EDV: 28.6 CM/SEC
BH CV XLRA MEAS RIGHT DIST CCA PSV: 75.8 CM/SEC
BH CV XLRA MEAS RIGHT DIST ICA EDV: -32.4 CM/SEC
BH CV XLRA MEAS RIGHT DIST ICA PSV: -84.5 CM/SEC
BH CV XLRA MEAS RIGHT ICA RATIO VEL: -133 CM/SEC
BH CV XLRA MEAS RIGHT ICA/CCA RATIO: -1.6
BH CV XLRA MEAS RIGHT PROX CCA EDV: 15.5 CM/SEC
BH CV XLRA MEAS RIGHT PROX CCA PSV: 85.1 CM/SEC
BH CV XLRA MEAS RIGHT PROX ECA PSV: 133 CM/SEC
BH CV XLRA MEAS RIGHT PROX ICA EDV: -52 CM/SEC
BH CV XLRA MEAS RIGHT PROX ICA PSV: -133 CM/SEC
BH CV XLRA MEAS RIGHT PROX SCLA PSV: 159 CM/SEC
BH CV XLRA MEAS RIGHT VERTEBRAL A PSV: -45 CM/SEC
LEFT ARM BP: NORMAL MMHG
MAXIMAL PREDICTED HEART RATE: 166 BPM
RIGHT ARM BP: NORMAL MMHG
STRESS TARGET HR: 141 BPM

## 2021-08-10 PROCEDURE — 93880 EXTRACRANIAL BILAT STUDY: CPT

## 2021-08-10 PROCEDURE — G0463 HOSPITAL OUTPT CLINIC VISIT: HCPCS

## 2021-09-24 ENCOUNTER — OFFICE VISIT (OUTPATIENT)
Dept: FAMILY MEDICINE CLINIC | Facility: CLINIC | Age: 56
End: 2021-09-24

## 2021-09-24 VITALS
RESPIRATION RATE: 18 BRPM | HEART RATE: 70 BPM | HEIGHT: 65 IN | DIASTOLIC BLOOD PRESSURE: 80 MMHG | OXYGEN SATURATION: 98 % | TEMPERATURE: 97.8 F | WEIGHT: 219.8 LBS | SYSTOLIC BLOOD PRESSURE: 140 MMHG | BODY MASS INDEX: 36.62 KG/M2

## 2021-09-24 DIAGNOSIS — I10 BENIGN HYPERTENSION: ICD-10-CM

## 2021-09-24 DIAGNOSIS — E78.2 MIXED HYPERLIPIDEMIA: ICD-10-CM

## 2021-09-24 DIAGNOSIS — I25.709 CORONARY ARTERY DISEASE INVOLVING CORONARY BYPASS GRAFT OF NATIVE HEART WITH ANGINA PECTORIS (HCC): ICD-10-CM

## 2021-09-24 DIAGNOSIS — I48.0 PAROXYSMAL ATRIAL FIBRILLATION (HCC): ICD-10-CM

## 2021-09-24 DIAGNOSIS — E11.51 TYPE 2 DIABETES MELLITUS WITH DIABETIC PERIPHERAL ANGIOPATHY WITHOUT GANGRENE, WITHOUT LONG-TERM CURRENT USE OF INSULIN (HCC): Primary | ICD-10-CM

## 2021-09-24 DIAGNOSIS — E66.01 CLASS 2 SEVERE OBESITY DUE TO EXCESS CALORIES WITH SERIOUS COMORBIDITY AND BODY MASS INDEX (BMI) OF 36.0 TO 36.9 IN ADULT (HCC): ICD-10-CM

## 2021-09-24 LAB
BILIRUB BLD-MCNC: NEGATIVE MG/DL
CLARITY, POC: CLEAR
COLOR UR: YELLOW
GLUCOSE BLDC GLUCOMTR-MCNC: 135 MG/DL (ref 70–130)
GLUCOSE UR STRIP-MCNC: NEGATIVE MG/DL
HBA1C MFR BLD: 6.6 %
KETONES UR QL: NEGATIVE
LEUKOCYTE EST, POC: NEGATIVE
NITRITE UR-MCNC: NEGATIVE MG/ML
PH UR: 5 [PH] (ref 5–8)
PROT UR STRIP-MCNC: NEGATIVE MG/DL
RBC # UR STRIP: NEGATIVE /UL
SP GR UR: 1.01 (ref 1–1.03)
UROBILINOGEN UR QL: NORMAL

## 2021-09-24 PROCEDURE — 90471 IMMUNIZATION ADMIN: CPT | Performed by: FAMILY MEDICINE

## 2021-09-24 PROCEDURE — 99214 OFFICE O/P EST MOD 30 MIN: CPT | Performed by: FAMILY MEDICINE

## 2021-09-24 PROCEDURE — 83036 HEMOGLOBIN GLYCOSYLATED A1C: CPT | Performed by: FAMILY MEDICINE

## 2021-09-24 PROCEDURE — 90686 IIV4 VACC NO PRSV 0.5 ML IM: CPT | Performed by: FAMILY MEDICINE

## 2021-09-24 PROCEDURE — 81002 URINALYSIS NONAUTO W/O SCOPE: CPT | Performed by: FAMILY MEDICINE

## 2021-09-24 RX ORDER — CLOPIDOGREL BISULFATE 75 MG/1
75 TABLET ORAL DAILY
COMMUNITY
Start: 2021-09-13 | End: 2022-08-19 | Stop reason: SDUPTHER

## 2021-10-03 NOTE — ASSESSMENT & PLAN NOTE
Patient's (Body mass index is 36.58 kg/m².) indicates that they are obese (BMI >30) with health conditions that include hypertension, coronary heart disease, diabetes mellitus and dyslipidemias . Weight is improving with treatment. BMI is is above average; BMI management plan is completed. We discussed portion control and increasing exercise.

## 2021-10-13 ENCOUNTER — TRANSCRIBE ORDERS (OUTPATIENT)
Dept: ADMINISTRATIVE | Facility: HOSPITAL | Age: 56
End: 2021-10-13

## 2021-10-13 DIAGNOSIS — I65.23 BILATERAL CAROTID ARTERY OCCLUSION: Primary | ICD-10-CM

## 2022-01-19 ENCOUNTER — OFFICE VISIT (OUTPATIENT)
Dept: FAMILY MEDICINE CLINIC | Facility: CLINIC | Age: 57
End: 2022-01-19

## 2022-01-19 VITALS
RESPIRATION RATE: 16 BRPM | BODY MASS INDEX: 37.15 KG/M2 | WEIGHT: 223 LBS | TEMPERATURE: 97.5 F | DIASTOLIC BLOOD PRESSURE: 72 MMHG | HEART RATE: 78 BPM | HEIGHT: 65 IN | OXYGEN SATURATION: 99 % | SYSTOLIC BLOOD PRESSURE: 128 MMHG

## 2022-01-19 DIAGNOSIS — I48.0 PAROXYSMAL ATRIAL FIBRILLATION: ICD-10-CM

## 2022-01-19 DIAGNOSIS — E78.2 MIXED HYPERLIPIDEMIA: ICD-10-CM

## 2022-01-19 DIAGNOSIS — E66.01 CLASS 2 SEVERE OBESITY DUE TO EXCESS CALORIES WITH SERIOUS COMORBIDITY AND BODY MASS INDEX (BMI) OF 36.0 TO 36.9 IN ADULT: ICD-10-CM

## 2022-01-19 DIAGNOSIS — E11.51 TYPE 2 DIABETES MELLITUS WITH DIABETIC PERIPHERAL ANGIOPATHY WITHOUT GANGRENE, WITHOUT LONG-TERM CURRENT USE OF INSULIN: Primary | ICD-10-CM

## 2022-01-19 DIAGNOSIS — I25.709 CORONARY ARTERY DISEASE INVOLVING CORONARY BYPASS GRAFT OF NATIVE HEART WITH ANGINA PECTORIS: ICD-10-CM

## 2022-01-19 DIAGNOSIS — I10 BENIGN HYPERTENSION: ICD-10-CM

## 2022-01-19 LAB
BILIRUB BLD-MCNC: NEGATIVE MG/DL
CLARITY, POC: CLEAR
COLOR UR: YELLOW
EXPIRATION DATE: NORMAL
GLUCOSE BLDC GLUCOMTR-MCNC: 150 MG/DL (ref 70–130)
GLUCOSE UR STRIP-MCNC: NEGATIVE MG/DL
HBA1C MFR BLD: 7.8 %
KETONES UR QL: NEGATIVE
LEUKOCYTE EST, POC: NEGATIVE
Lab: NORMAL
NITRITE UR-MCNC: NEGATIVE MG/ML
PH UR: 5 [PH] (ref 5–8)
PROT UR STRIP-MCNC: NEGATIVE MG/DL
RBC # UR STRIP: NEGATIVE /UL
SP GR UR: 1.03 (ref 1–1.03)
UROBILINOGEN UR QL: NORMAL

## 2022-01-19 PROCEDURE — 83036 HEMOGLOBIN GLYCOSYLATED A1C: CPT | Performed by: FAMILY MEDICINE

## 2022-01-19 PROCEDURE — 82962 GLUCOSE BLOOD TEST: CPT | Performed by: FAMILY MEDICINE

## 2022-01-19 PROCEDURE — 99214 OFFICE O/P EST MOD 30 MIN: CPT | Performed by: FAMILY MEDICINE

## 2022-01-19 PROCEDURE — 81002 URINALYSIS NONAUTO W/O SCOPE: CPT | Performed by: FAMILY MEDICINE

## 2022-01-20 LAB
ALBUMIN SERPL-MCNC: 4.4 G/DL (ref 3.8–4.9)
ALBUMIN/GLOB SERPL: 1.8 {RATIO} (ref 1.2–2.2)
ALP SERPL-CCNC: 88 IU/L (ref 44–121)
ALT SERPL-CCNC: 35 IU/L (ref 0–44)
AST SERPL-CCNC: 17 IU/L (ref 0–40)
BASOPHILS # BLD AUTO: 0.1 X10E3/UL (ref 0–0.2)
BASOPHILS NFR BLD AUTO: 1 %
BILIRUB SERPL-MCNC: 0.3 MG/DL (ref 0–1.2)
BUN SERPL-MCNC: 20 MG/DL (ref 6–24)
BUN/CREAT SERPL: 21 (ref 9–20)
CALCIUM SERPL-MCNC: 9.2 MG/DL (ref 8.7–10.2)
CHLORIDE SERPL-SCNC: 101 MMOL/L (ref 96–106)
CHOLEST SERPL-MCNC: 180 MG/DL (ref 100–199)
CHOLEST/HDLC SERPL: 2.9 RATIO (ref 0–5)
CO2 SERPL-SCNC: 22 MMOL/L (ref 20–29)
CREAT SERPL-MCNC: 0.94 MG/DL (ref 0.76–1.27)
EOSINOPHIL # BLD AUTO: 0.1 X10E3/UL (ref 0–0.4)
EOSINOPHIL NFR BLD AUTO: 2 %
ERYTHROCYTE [DISTWIDTH] IN BLOOD BY AUTOMATED COUNT: 13.1 % (ref 11.6–15.4)
GLOBULIN SER CALC-MCNC: 2.4 G/DL (ref 1.5–4.5)
GLUCOSE SERPL-MCNC: 145 MG/DL (ref 65–99)
HCT VFR BLD AUTO: 46.9 % (ref 37.5–51)
HDLC SERPL-MCNC: 62 MG/DL
HGB BLD-MCNC: 15.6 G/DL (ref 13–17.7)
IMM GRANULOCYTES # BLD AUTO: 0 X10E3/UL (ref 0–0.1)
IMM GRANULOCYTES NFR BLD AUTO: 1 %
LDLC SERPL CALC-MCNC: 94 MG/DL (ref 0–99)
LYMPHOCYTES # BLD AUTO: 1.3 X10E3/UL (ref 0.7–3.1)
LYMPHOCYTES NFR BLD AUTO: 18 %
MCH RBC QN AUTO: 28.7 PG (ref 26.6–33)
MCHC RBC AUTO-ENTMCNC: 33.3 G/DL (ref 31.5–35.7)
MCV RBC AUTO: 86 FL (ref 79–97)
MONOCYTES # BLD AUTO: 0.9 X10E3/UL (ref 0.1–0.9)
MONOCYTES NFR BLD AUTO: 12 %
NEUTROPHILS # BLD AUTO: 5 X10E3/UL (ref 1.4–7)
NEUTROPHILS NFR BLD AUTO: 66 %
PLATELET # BLD AUTO: 243 X10E3/UL (ref 150–450)
POTASSIUM SERPL-SCNC: 4.6 MMOL/L (ref 3.5–5.2)
PROT SERPL-MCNC: 6.8 G/DL (ref 6–8.5)
RBC # BLD AUTO: 5.44 X10E6/UL (ref 4.14–5.8)
SODIUM SERPL-SCNC: 138 MMOL/L (ref 134–144)
TRIGL SERPL-MCNC: 138 MG/DL (ref 0–149)
TSH SERPL DL<=0.005 MIU/L-ACNC: 1.32 UIU/ML (ref 0.45–4.5)
VLDLC SERPL CALC-MCNC: 24 MG/DL (ref 5–40)
WBC # BLD AUTO: 7.4 X10E3/UL (ref 3.4–10.8)

## 2022-02-01 NOTE — ASSESSMENT & PLAN NOTE
Patient's (Body mass index is 37.11 kg/m².) indicates that they are obese (BMI >30) with health conditions that include hypertension, coronary heart disease and diabetes mellitus . Weight is worsening. BMI is is above average; BMI management plan is completed. We discussed portion control and increasing exercise.

## 2022-02-09 ENCOUNTER — APPOINTMENT (OUTPATIENT)
Dept: VASCULAR SURGERY | Facility: HOSPITAL | Age: 57
End: 2022-02-09

## 2022-02-09 ENCOUNTER — HOSPITAL ENCOUNTER (OUTPATIENT)
Dept: CARDIOLOGY | Facility: HOSPITAL | Age: 57
Discharge: HOME | End: 2022-02-09

## 2022-02-09 ENCOUNTER — TELEPHONE (OUTPATIENT)
Dept: FAMILY MEDICINE CLINIC | Facility: CLINIC | Age: 57
End: 2022-02-09

## 2022-02-09 DIAGNOSIS — I65.23 BILATERAL CAROTID ARTERY OCCLUSION: ICD-10-CM

## 2022-02-09 LAB
BH CV XLRA MEAS LEFT BULB EDV: 62 CM/SEC
BH CV XLRA MEAS LEFT BULB PSV: 219 CM/SEC
BH CV XLRA MEAS LEFT CCA RATIO VEL: 73.6 CM/SEC
BH CV XLRA MEAS LEFT DIST CCA EDV: 25.2 CM/SEC
BH CV XLRA MEAS LEFT DIST CCA PSV: 70.1 CM/SEC
BH CV XLRA MEAS LEFT DIST ICA EDV: -15.7 CM/SEC
BH CV XLRA MEAS LEFT DIST ICA PSV: -56.4 CM/SEC
BH CV XLRA MEAS LEFT ICA RATIO VEL: -281 CM/SEC
BH CV XLRA MEAS LEFT ICA/CCA RATIO: -3.8
BH CV XLRA MEAS LEFT PROX CCA EDV: 27.3 CM/SEC
BH CV XLRA MEAS LEFT PROX CCA PSV: 73.6 CM/SEC
BH CV XLRA MEAS LEFT PROX ECA PSV: -146 CM/SEC
BH CV XLRA MEAS LEFT PROX ICA EDV: -73.6 CM/SEC
BH CV XLRA MEAS LEFT PROX ICA PSV: -281 CM/SEC
BH CV XLRA MEAS LEFT PROX SCLA PSV: 110 CM/SEC
BH CV XLRA MEAS LEFT VERTEBRAL A PSV: -47.2 CM/SEC
BH CV XLRA MEAS RIGHT BULB PSV: 101 CM/SEC
BH CV XLRA MEAS RIGHT CCA RATIO VEL: 103 CM/SEC
BH CV XLRA MEAS RIGHT DIST CCA EDV: 24.2 CM/SEC
BH CV XLRA MEAS RIGHT DIST CCA PSV: 84.5 CM/SEC
BH CV XLRA MEAS RIGHT DIST ICA EDV: -28 CM/SEC
BH CV XLRA MEAS RIGHT DIST ICA PSV: -86.4 CM/SEC
BH CV XLRA MEAS RIGHT ICA RATIO VEL: -136 CM/SEC
BH CV XLRA MEAS RIGHT ICA/CCA RATIO: -1.3
BH CV XLRA MEAS RIGHT PROX CCA EDV: 23 CM/SEC
BH CV XLRA MEAS RIGHT PROX CCA PSV: 103 CM/SEC
BH CV XLRA MEAS RIGHT PROX ECA PSV: -110 CM/SEC
BH CV XLRA MEAS RIGHT PROX ICA EDV: -48.5 CM/SEC
BH CV XLRA MEAS RIGHT PROX ICA PSV: -136 CM/SEC
BH CV XLRA MEAS RIGHT PROX SCLA PSV: 162 CM/SEC
BH CV XLRA MEAS RIGHT VERTEBRAL A PSV: -47.2 CM/SEC
LEFT ARM BP: NORMAL MMHG
RIGHT ARM BP: NORMAL MMHG

## 2022-02-09 PROCEDURE — 93880 EXTRACRANIAL BILAT STUDY: CPT

## 2022-02-09 PROCEDURE — G0463 HOSPITAL OUTPT CLINIC VISIT: HCPCS

## 2022-02-09 NOTE — TELEPHONE ENCOUNTER
Mendel call ed to let us know that he had the duplex carotid U/S today at the  Vascular clinic at [facility] today. They went over report with him today discussed moderate stenosis, will follow up in one year.

## 2022-02-10 ENCOUNTER — TRANSCRIBE ORDERS (OUTPATIENT)
Dept: ADMINISTRATIVE | Facility: HOSPITAL | Age: 57
End: 2022-02-10

## 2022-02-10 DIAGNOSIS — I65.23 BILATERAL CAROTID ARTERY OCCLUSION: Primary | ICD-10-CM

## 2022-03-04 ENCOUNTER — CLINICAL SUPPORT (OUTPATIENT)
Dept: FAMILY MEDICINE CLINIC | Facility: CLINIC | Age: 57
End: 2022-03-04

## 2022-03-04 VITALS
DIASTOLIC BLOOD PRESSURE: 70 MMHG | RESPIRATION RATE: 18 BRPM | WEIGHT: 226.8 LBS | OXYGEN SATURATION: 98 % | HEART RATE: 74 BPM | SYSTOLIC BLOOD PRESSURE: 134 MMHG | BODY MASS INDEX: 37.79 KG/M2 | HEIGHT: 65 IN | TEMPERATURE: 97.5 F

## 2022-03-04 DIAGNOSIS — Z02.4 ENCOUNTER FOR CDL (COMMERCIAL DRIVING LICENSE) EXAM: Primary | ICD-10-CM

## 2022-03-04 LAB
BILIRUB BLD-MCNC: NEGATIVE MG/DL
CLARITY, POC: CLEAR
COLOR UR: YELLOW
GLUCOSE UR STRIP-MCNC: NEGATIVE MG/DL
KETONES UR QL: NEGATIVE
LEUKOCYTE EST, POC: NEGATIVE
NITRITE UR-MCNC: NEGATIVE MG/ML
PH UR: 5 [PH] (ref 5–8)
PROT UR STRIP-MCNC: NEGATIVE MG/DL
RBC # UR STRIP: NEGATIVE /UL
SP GR UR: 1.03 (ref 1–1.03)
UROBILINOGEN UR QL: NORMAL

## 2022-03-04 PROCEDURE — DOTPHY: Performed by: FAMILY MEDICINE

## 2022-03-04 PROCEDURE — 81002 URINALYSIS NONAUTO W/O SCOPE: CPT | Performed by: FAMILY MEDICINE

## 2022-03-04 RX ORDER — NITROGLYCERIN 80 MG/1
PATCH TRANSDERMAL
COMMUNITY
Start: 2022-02-04 | End: 2022-08-19 | Stop reason: SDUPTHER

## 2022-03-04 RX ORDER — AMLODIPINE BESYLATE 10 MG/1
10 TABLET ORAL DAILY
COMMUNITY
Start: 2022-02-15 | End: 2022-07-06 | Stop reason: SDUPTHER

## 2022-03-04 NOTE — PROGRESS NOTES
Medical Examination    Subjective   Mendel Melendez is a 56 y.o. male who presents today for a  fitness determination physical exam. The patient reports no problems.  The following portions of the patient's history were reviewed and updated as appropriate: allergies, current medications, past family history, past medical history, past social history, past surgical history and problem list.  Review of Systems  A comprehensive review of systems was negative.    Objective    Vision:   Visual Acuity Screening    Right eye Left eye Both eyes   Without correction:      With correction: 20/20 20/20 20/20       Applicant can recognize and distinguish among traffic control signals and devices showing standard red, green, and sera colors.  Applicant has peripheral vision to 90 degrees in each eye.         Monocular Vision?: No      Hearing:  Applicant can distinguish forced whisper at a distance of 5 feet with both ears.     Physical Exam  Constitutional:       Appearance: He is well-developed.   HENT:      Head: Normocephalic and atraumatic.      Right Ear: External ear normal.      Left Ear: External ear normal.      Nose: Nose normal.   Eyes:      Pupils: Pupils are equal, round, and reactive to light.   Cardiovascular:      Rate and Rhythm: Normal rate and regular rhythm.      Heart sounds: Normal heart sounds.   Pulmonary:      Effort: Pulmonary effort is normal.      Breath sounds: Normal breath sounds.   Abdominal:      General: Bowel sounds are normal.      Palpations: Abdomen is soft.   Musculoskeletal:         General: Normal range of motion.      Cervical back: Normal range of motion and neck supple.   Skin:     General: Skin is warm and dry.   Neurological:      Mental Status: He is alert and oriented to person, place, and time.   Psychiatric:         Behavior: Behavior normal.         Thought Content: Thought content normal.         Judgment: Judgment normal.          Labs:  Lab  Results   Component Value Date    SPECGRAV 1.030 03/04/2022    BILIRUBINUR Negative 03/04/2022    GLUCOSEU Negative 04/15/2019       Assessment/Plan   Healthy male exam.   Meets standards, but periodic monitoring required due to CAD.   qualified only for 1 year.     Medical examiners certificate completed and printed.  Return as needed.

## 2022-03-10 DIAGNOSIS — E78.2 MIXED HYPERLIPIDEMIA: ICD-10-CM

## 2022-03-10 RX ORDER — ROSUVASTATIN CALCIUM 40 MG/1
TABLET, COATED ORAL
Qty: 30 TABLET | Refills: 12 | Status: SHIPPED | OUTPATIENT
Start: 2022-03-10 | End: 2023-03-24

## 2022-04-20 ENCOUNTER — OFFICE VISIT (OUTPATIENT)
Dept: FAMILY MEDICINE CLINIC | Facility: CLINIC | Age: 57
End: 2022-04-20

## 2022-04-20 VITALS
TEMPERATURE: 97.3 F | DIASTOLIC BLOOD PRESSURE: 90 MMHG | OXYGEN SATURATION: 98 % | WEIGHT: 224 LBS | SYSTOLIC BLOOD PRESSURE: 144 MMHG | BODY MASS INDEX: 37.32 KG/M2 | HEIGHT: 65 IN | RESPIRATION RATE: 18 BRPM | HEART RATE: 99 BPM

## 2022-04-20 DIAGNOSIS — I10 BENIGN HYPERTENSION: ICD-10-CM

## 2022-04-20 DIAGNOSIS — E66.01 CLASS 2 SEVERE OBESITY DUE TO EXCESS CALORIES WITH SERIOUS COMORBIDITY AND BODY MASS INDEX (BMI) OF 37.0 TO 37.9 IN ADULT: ICD-10-CM

## 2022-04-20 DIAGNOSIS — I25.709 CORONARY ARTERY DISEASE INVOLVING CORONARY BYPASS GRAFT OF NATIVE HEART WITH ANGINA PECTORIS: ICD-10-CM

## 2022-04-20 DIAGNOSIS — E11.51 TYPE 2 DIABETES MELLITUS WITH DIABETIC PERIPHERAL ANGIOPATHY WITHOUT GANGRENE, WITHOUT LONG-TERM CURRENT USE OF INSULIN: Primary | ICD-10-CM

## 2022-04-20 DIAGNOSIS — E78.2 MIXED HYPERLIPIDEMIA: ICD-10-CM

## 2022-04-20 LAB
BILIRUB BLD-MCNC: NEGATIVE MG/DL
CLARITY, POC: CLEAR
COLOR UR: YELLOW
EXPIRATION DATE: NORMAL
GLUCOSE BLDC GLUCOMTR-MCNC: 120 MG/DL (ref 70–130)
GLUCOSE UR STRIP-MCNC: NEGATIVE MG/DL
HBA1C MFR BLD: 7.6 %
KETONES UR QL: NEGATIVE
LEUKOCYTE EST, POC: NEGATIVE
Lab: NORMAL
NITRITE UR-MCNC: NEGATIVE MG/ML
PH UR: 5 [PH] (ref 5–8)
PROT UR STRIP-MCNC: ABNORMAL MG/DL
RBC # UR STRIP: NEGATIVE /UL
SP GR UR: 1.03 (ref 1–1.03)
UROBILINOGEN UR QL: NORMAL

## 2022-04-20 PROCEDURE — 82962 GLUCOSE BLOOD TEST: CPT | Performed by: FAMILY MEDICINE

## 2022-04-20 PROCEDURE — 83036 HEMOGLOBIN GLYCOSYLATED A1C: CPT | Performed by: FAMILY MEDICINE

## 2022-04-20 PROCEDURE — 99214 OFFICE O/P EST MOD 30 MIN: CPT | Performed by: FAMILY MEDICINE

## 2022-04-20 PROCEDURE — 81002 URINALYSIS NONAUTO W/O SCOPE: CPT | Performed by: FAMILY MEDICINE

## 2022-05-01 NOTE — ASSESSMENT & PLAN NOTE
Patient's (Body mass index is 37.28 kg/m².) indicates that they are obese (BMI >30) with health conditions that include obstructive sleep apnea, hypertension, coronary heart disease, diabetes mellitus and dyslipidemias . Weight is worsening. BMI is is above average; BMI management plan is completed. We discussed portion control and increasing exercise.

## 2022-07-06 DIAGNOSIS — I10 BENIGN HYPERTENSION: Primary | ICD-10-CM

## 2022-07-06 RX ORDER — AMLODIPINE BESYLATE 10 MG/1
10 TABLET ORAL DAILY
Qty: 90 TABLET | Refills: 3 | Status: SHIPPED | OUTPATIENT
Start: 2022-07-06

## 2022-07-12 DIAGNOSIS — J30.1 SEASONAL ALLERGIC RHINITIS DUE TO POLLEN: ICD-10-CM

## 2022-07-12 RX ORDER — MONTELUKAST SODIUM 10 MG/1
TABLET ORAL
Qty: 30 TABLET | Refills: 12 | Status: SHIPPED | OUTPATIENT
Start: 2022-07-12

## 2022-08-10 ENCOUNTER — APPOINTMENT (OUTPATIENT)
Dept: VASCULAR SURGERY | Facility: HOSPITAL | Age: 57
End: 2022-08-10

## 2022-08-10 ENCOUNTER — APPOINTMENT (OUTPATIENT)
Dept: CARDIOLOGY | Facility: HOSPITAL | Age: 57
End: 2022-08-10

## 2022-08-19 ENCOUNTER — OFFICE VISIT (OUTPATIENT)
Dept: FAMILY MEDICINE CLINIC | Facility: CLINIC | Age: 57
End: 2022-08-19

## 2022-08-19 VITALS
RESPIRATION RATE: 18 BRPM | TEMPERATURE: 97.7 F | WEIGHT: 216.2 LBS | HEART RATE: 80 BPM | OXYGEN SATURATION: 97 % | BODY MASS INDEX: 34.75 KG/M2 | HEIGHT: 66 IN | DIASTOLIC BLOOD PRESSURE: 82 MMHG | SYSTOLIC BLOOD PRESSURE: 134 MMHG

## 2022-08-19 DIAGNOSIS — I65.29 OCCLUSION AND STENOSIS OF UNSPECIFIED CAROTID ARTERY: ICD-10-CM

## 2022-08-19 DIAGNOSIS — E11.51 TYPE 2 DIABETES MELLITUS WITH DIABETIC PERIPHERAL ANGIOPATHY WITHOUT GANGRENE, WITHOUT LONG-TERM CURRENT USE OF INSULIN: ICD-10-CM

## 2022-08-19 DIAGNOSIS — I25.10 CORONARY ARTERY DISEASE INVOLVING NATIVE CORONARY ARTERY OF NATIVE HEART WITHOUT ANGINA PECTORIS: ICD-10-CM

## 2022-08-19 DIAGNOSIS — Z12.11 ENCOUNTER FOR COLORECTAL CANCER SCREENING: ICD-10-CM

## 2022-08-19 DIAGNOSIS — Z12.5 SCREENING PSA (PROSTATE SPECIFIC ANTIGEN): ICD-10-CM

## 2022-08-19 DIAGNOSIS — I25.709 CORONARY ARTERY DISEASE INVOLVING CORONARY BYPASS GRAFT OF NATIVE HEART WITH ANGINA PECTORIS: ICD-10-CM

## 2022-08-19 DIAGNOSIS — I10 BENIGN HYPERTENSION: ICD-10-CM

## 2022-08-19 DIAGNOSIS — E78.2 MIXED HYPERLIPIDEMIA: ICD-10-CM

## 2022-08-19 DIAGNOSIS — E66.01 CLASS 2 SEVERE OBESITY DUE TO EXCESS CALORIES WITH SERIOUS COMORBIDITY AND BODY MASS INDEX (BMI) OF 35.0 TO 35.9 IN ADULT: ICD-10-CM

## 2022-08-19 DIAGNOSIS — I48.0 PAROXYSMAL ATRIAL FIBRILLATION: ICD-10-CM

## 2022-08-19 DIAGNOSIS — Z00.01 ENCOUNTER FOR ANNUAL GENERAL MEDICAL EXAMINATION WITH ABNORMAL FINDINGS IN ADULT: Primary | ICD-10-CM

## 2022-08-19 DIAGNOSIS — J30.1 SEASONAL ALLERGIC RHINITIS DUE TO POLLEN: ICD-10-CM

## 2022-08-19 DIAGNOSIS — Z95.1 S/P CABG (CORONARY ARTERY BYPASS GRAFT): ICD-10-CM

## 2022-08-19 DIAGNOSIS — G47.33 OBSTRUCTIVE SLEEP APNEA SYNDROME: ICD-10-CM

## 2022-08-19 DIAGNOSIS — Z12.12 ENCOUNTER FOR COLORECTAL CANCER SCREENING: ICD-10-CM

## 2022-08-19 PROBLEM — K21.9 GASTROESOPHAGEAL REFLUX DISEASE: Status: RESOLVED | Noted: 2019-07-29 | Resolved: 2022-08-19

## 2022-08-19 PROBLEM — R94.39 ABNORMAL NUCLEAR STRESS TEST: Status: RESOLVED | Noted: 2020-07-06 | Resolved: 2022-08-19

## 2022-08-19 LAB
BILIRUB BLD-MCNC: NEGATIVE MG/DL
CLARITY, POC: CLEAR
COLOR UR: YELLOW
EXPIRATION DATE: NORMAL
GLUCOSE BLDC GLUCOMTR-MCNC: 148 MG/DL (ref 70–130)
GLUCOSE UR STRIP-MCNC: NEGATIVE MG/DL
HBA1C MFR BLD: 7 %
KETONES UR QL: NEGATIVE
LEUKOCYTE EST, POC: NEGATIVE
Lab: NORMAL
NITRITE UR-MCNC: NEGATIVE MG/ML
PH UR: 5 [PH] (ref 5–8)
PROT UR STRIP-MCNC: NEGATIVE MG/DL
RBC # UR STRIP: NEGATIVE /UL
SP GR UR: 1.03 (ref 1–1.03)
UROBILINOGEN UR QL: NORMAL

## 2022-08-19 PROCEDURE — 83036 HEMOGLOBIN GLYCOSYLATED A1C: CPT | Performed by: FAMILY MEDICINE

## 2022-08-19 PROCEDURE — 81002 URINALYSIS NONAUTO W/O SCOPE: CPT | Performed by: FAMILY MEDICINE

## 2022-08-19 PROCEDURE — 99396 PREV VISIT EST AGE 40-64: CPT | Performed by: FAMILY MEDICINE

## 2022-08-19 PROCEDURE — 82962 GLUCOSE BLOOD TEST: CPT | Performed by: FAMILY MEDICINE

## 2022-08-19 PROCEDURE — 99214 OFFICE O/P EST MOD 30 MIN: CPT | Performed by: FAMILY MEDICINE

## 2022-08-19 RX ORDER — ISOSORBIDE DINITRATE 30 MG/1
30 TABLET ORAL DAILY
COMMUNITY
End: 2022-08-19 | Stop reason: SDUPTHER

## 2022-08-19 RX ORDER — ISOSORBIDE DINITRATE 30 MG/1
30 TABLET ORAL DAILY
Qty: 90 TABLET | Refills: 3 | Status: SHIPPED | OUTPATIENT
Start: 2022-08-19 | End: 2022-09-23

## 2022-08-19 RX ORDER — CLOPIDOGREL BISULFATE 75 MG/1
75 TABLET ORAL DAILY
Qty: 90 TABLET | Refills: 3 | Status: SHIPPED | OUTPATIENT
Start: 2022-08-19

## 2022-08-19 RX ORDER — NITROGLYCERIN 0.4 MG/1
0.4 TABLET SUBLINGUAL
Qty: 25 TABLET | Refills: 0 | Status: SHIPPED | OUTPATIENT
Start: 2022-08-19 | End: 2022-09-12

## 2022-08-19 RX ORDER — NITROGLYCERIN 80 MG/1
PATCH TRANSDERMAL
Qty: 90 PATCH | Refills: 3 | Status: SHIPPED | OUTPATIENT
Start: 2022-08-19 | End: 2022-11-30 | Stop reason: SDUPTHER

## 2022-08-19 RX ORDER — RANOLAZINE 1000 MG/1
1000 TABLET, EXTENDED RELEASE ORAL 2 TIMES DAILY
Qty: 180 TABLET | Refills: 3 | Status: SHIPPED | OUTPATIENT
Start: 2022-08-19

## 2022-08-19 NOTE — PROGRESS NOTES
Chief Complaint  Annual Exam, Diabetes, Hypertension, Coronary Artery Disease, Atrial Fibrillation, and Hyperlipidemia    Subjective     CC  Problem List  Visit Diagnosis   Encounters  Notes  Medications  Labs  Result Review Imaging  Media    Mendel Melendez presents to [facility] for an annual exam. The patient is here: for coordination of medical care to discuss health maintenance and disease prevention to follow up on multiple medical problems. Overall has: moderate activity with work/home activities, good appetite, feels well with minor complaints, decreased energy level and is sleeping poorly. Nutrition: balanced diet. Last tetanus shot was 2018.     Mendel was seen at [facility]. He was admitted on 06/21/2022  for cardiac cath. He was discharged on 06/25/2022. Discharge diagnosis was s/p cardiac cath, anaphylatic reaction to lumason. Labs that were performed during the encounter included: CBC-normal, INR-normal and Cardiac-troponin elevated 0.043. Diagnostic studies that were performed included: MRI-Head Stroke protocol-no evidence of acute intracranial ischemia, ultrasound-Carotid Doppler-Right internal carotid artery with plaque but <50% stenosis, Left internal carotid artery with >70% stenosis, bilateral vertebral arteries with antegrade flow, CT Scan-Head Stroke protocol-no acute intracranial hemorrhage, subtle hypodensity left maria m and left cerebellar hemisphere and hemisphere. and cardiac catherization-left ventriculography was performed in the RAYMOND projection EF 55-60%, left maine shoes 80% lesion in the distal left main, Bifurcates into the left anterior descending and left circumflex, left circumflex is 100% occluded, Left andterior descending is 100% occluded with retrograde filling in the left internal mammary, saphenous graft to the diagonal was patent. LIMA to the left anterior descending was patent, a lesion noted in the distal anastomosis 30-40%,  Right coronary arterey has stent. Currently Mendel receives care at home. Complications from the hospital stay include none. The patient stated that they do not need help with their daily life and activities. The patient stated that they do have emotional support at home.      Diabetes  He presents for his follow-up diabetic visit. He has type 2 diabetes mellitus. Pertinent negatives for hypoglycemia include no confusion, dizziness, headaches, nervousness/anxiousness or sweats. Associated symptoms include chest pain ( he continues to have intermittent chest pain considered to be stable angina, he is followed with cardiology). Pertinent negatives for diabetes include no fatigue, no polydipsia, no polyuria, no weakness and no weight loss. Risk factors for coronary artery disease include diabetes mellitus, dyslipidemia, family history, hypertension, male sex and obesity. Current diabetic treatment includes oral agent (monotherapy). He is following a diabetic diet. Meal planning includes avoidance of concentrated sweets. An ACE inhibitor/angiotensin II receptor blocker is not being taken. He does not see a podiatrist.Eye exam is current (10/2021 [facility]).   Hypertension  This is a chronic problem. The current episode started more than 1 year ago. The problem is controlled. Associated symptoms include chest pain ( he continues to have intermittent chest pain considered to be stable angina, he is followed with cardiology). Pertinent negatives include no headaches, orthopnea, palpitations, peripheral edema, PND, shortness of breath or sweats. Risk factors for coronary artery disease include diabetes mellitus, dyslipidemia, family history, male gender and obesity. Current antihypertension treatment includes calcium channel blockers and beta blockers. The current treatment provides moderate improvement. Hypertensive end-organ damage includes angina and CAD/MI.   Coronary Artery Disease  Presents for follow-up visit. Symptoms  include chest pain ( he continues to have intermittent chest pain considered to be stable angina, he is followed with cardiology) and chest pressure. Pertinent negatives include no chest tightness, dizziness, leg swelling, palpitations, shortness of breath or weight gain. Risk factors include hyperlipidemia and obesity. The symptoms have been stable. Compliance with diet is good. Compliance with medications is good.   Atrial Fibrillation  Presents for follow-up visit. Symptoms include chest pain ( he continues to have intermittent chest pain considered to be stable angina, he is followed with cardiology). Symptoms are negative for dizziness, palpitations, shortness of breath and weakness. The symptoms have been stable. Past medical history includes atrial fibrillation, CAD and hyperlipidemia.   Hyperlipidemia  This is a chronic problem. The current episode started more than 1 year ago. The problem is controlled. Recent lipid tests were reviewed and are normal. Exacerbating diseases include diabetes and obesity. Associated symptoms include chest pain ( he continues to have intermittent chest pain considered to be stable angina, he is followed with cardiology). Pertinent negatives include no myalgias or shortness of breath. Current antihyperlipidemic treatment includes statins. Risk factors for coronary artery disease include diabetes mellitus, dyslipidemia, family history, hypertension, male sex and obesity.       Review of Systems   Constitutional: Negative for activity change, fatigue, fever, unexpected weight gain and unexpected weight loss.   HENT: Negative for congestion, ear pain, hearing loss, rhinorrhea, sinus pressure, sore throat, swollen glands, trouble swallowing and voice change.    Eyes: Negative for visual disturbance.   Respiratory: Negative for apnea, chest tightness, shortness of breath and wheezing.    Cardiovascular: Positive for chest pain ( he continues to have intermittent chest pain considered  "to be stable angina, he is followed with cardiology). Negative for palpitations, orthopnea, leg swelling and PND.   Gastrointestinal: Negative for abdominal pain, blood in stool, constipation, diarrhea, nausea, vomiting and indigestion.   Endocrine: Negative for cold intolerance, heat intolerance, polydipsia and polyuria.   Genitourinary: Negative for dysuria, flank pain, frequency, hematuria, testicular pain, urgency and urinary incontinence.   Musculoskeletal: Negative for arthralgias, joint swelling and myalgias.   Skin: Negative for rash, skin lesions and wound.   Allergic/Immunologic: Negative for environmental allergies and immunocompromised state.   Neurological: Negative for dizziness, syncope, weakness, numbness, headache, memory problem and confusion.   Hematological: Negative for adenopathy. Does not bruise/bleed easily.   Psychiatric/Behavioral: Negative for suicidal ideas and depressed mood. The patient is not nervous/anxious.         Objective   Vital Signs:   /82 (BP Location: Right arm, Patient Position: Sitting, Cuff Size: Adult)   Pulse 80   Temp 97.7 °F (36.5 °C) (Temporal)   Resp 18   Ht 167 cm (65.75\")   Wt 98.1 kg (216 lb 3.2 oz)   SpO2 97% Comment: Room air  BMI 35.16 kg/m²     Physical Exam  Constitutional:       General: He is not in acute distress.     Appearance: Normal appearance. He is well-developed.   HENT:      Head: Normocephalic.      Right Ear: Tympanic membrane and external ear normal.      Left Ear: Tympanic membrane and external ear normal.      Nose: No mucosal edema.      Mouth/Throat:      Mouth: No oral lesions.      Pharynx: No oropharyngeal exudate.   Eyes:      General: No scleral icterus.     Conjunctiva/sclera: Conjunctivae normal.      Pupils: Pupils are equal, round, and reactive to light.   Neck:      Thyroid: No thyromegaly.      Vascular: No JVD.   Cardiovascular:      Rate and Rhythm: Normal rate and regular rhythm.  No extrasystoles are present.     " Pulses:           Carotid pulses are 2+ on the right side and 2+ on the left side.       Femoral pulses are 2+ on the right side and 2+ on the left side.       Dorsalis pedis pulses are 2+ on the right side and 2+ on the left side.      Heart sounds: Normal heart sounds. No murmur heard.    No friction rub. No gallop.   Pulmonary:      Effort: Pulmonary effort is normal.      Breath sounds: Normal breath sounds. No decreased breath sounds.   Chest:   Breasts:      Right: No supraclavicular adenopathy.      Left: No supraclavicular adenopathy.       Abdominal:      General: Bowel sounds are normal. There is no distension.      Palpations: Abdomen is soft. There is no mass.      Tenderness: There is no abdominal tenderness.      Hernia: No hernia is present. There is no hernia in the left inguinal area.   Genitourinary:     Testes: Normal.   Musculoskeletal:         General: Deformity (knees and distal fingers with decreased ROM) present.      Cervical back: Normal range of motion and neck supple. No edema.      Right lower leg: No edema.      Left lower leg: No edema.   Feet:      Right foot:      Protective Sensation: 10 sites tested. 10 sites sensed.      Skin integrity: Skin integrity normal.      Left foot:      Protective Sensation: 10 sites tested. 10 sites sensed.      Skin integrity: Skin integrity normal.   Lymphadenopathy:      Head:      Right side of head: No submandibular adenopathy.      Left side of head: No submandibular adenopathy.      Cervical: No cervical adenopathy.      Upper Body:      Right upper body: No supraclavicular adenopathy.      Left upper body: No supraclavicular adenopathy.   Skin:     General: Skin is warm and dry.      Findings: No ecchymosis, erythema or rash.   Neurological:      Mental Status: He is alert and oriented to person, place, and time. He is not disoriented.      Cranial Nerves: No cranial nerve deficit.      Sensory: No sensory deficit.      Motor: No abnormal muscle  tone.      Coordination: Coordination normal.      Deep Tendon Reflexes: Reflexes are normal and symmetric.   Psychiatric:         Speech: Speech normal.         Thought Content: Thought content normal.         Judgment: Judgment normal.     Diabetic Foot Exam Performed and Monofilament Test Performed     Result Review :Labs  Result Review  Imaging  Med Tab  Media                 Assessment and Plan CC Problem List  Visit Diagnosis  ROS  Review (Popup)  Health Maintenance  Quality  BestPractice  Medications  SmartSets  SnapShot Encounters  Media  Problem List Items Addressed This Visit        High    Benign hypertension    Overview     Controlled, compliant         Relevant Orders    CBC & Differential (Completed)    Comprehensive Metabolic Panel (Completed)    TSH (Completed)    Coronary artery disease involving coronary bypass graft of native heart with angina pectoris (HCC)    Overview     He is having stable angina unchanged  He is s/p cardiac cath showing open vessels but spasm  Followed with [facility] regularly, plan to go to [facility]  s/p CABG x 3, 2015, s/p cardiac cath 07/2020 showing all three grafts to be free of disease but distal and proximal stenosis of LAD , stenosis of LCX  Risk factors modified  Interventional cardiology with angioplasty, 08/11/2021, 10/13/2021 repeated angioplasty. His anatomy is challenging w/o ability to have a perfect fix.    Angina much improved but continues. He continues cardiology follow up         Relevant Medications    ranolazine (RANEXA) 1000 MG 12 hr tablet    clopidogrel (PLAVIX) 75 MG tablet    isosorbide dinitrate (ISORDIL) 30 MG tablet    nitroglycerin (NITRODUR) 0.4 MG/HR patch    nitroglycerin (NITROSTAT) 0.4 MG SL tablet    Type 2 diabetes mellitus with diabetic peripheral angiopathy without gangrene, without long-term current use of insulin ([facility])    Overview     A1c 7.0 08/19/2022 improved.   A1c 7.6 04/20/2022  A1c 7.8 01/19/2022,   A1c 6.6 09/24/2021  improved.   A1c 7.1 02/15/2021   A1c 7.2 10/06/2020   A1c 7.3 11/18/2019     A1c 6.9 5/7/2019   A1c 7.0 02/12/2018    A1c 7.1, 11/1/2018 new onset         Current Assessment & Plan     Diabetes is improving with treatment.   Continue current treatment regimen.  Diabetes will be reassessed in 3 months.         Relevant Medications    empagliflozin (Jardiance) 10 MG tablet tablet    Other Relevant Orders    POCT urinalysis dipstick, manual (Completed)    POC Glycosylated Hemoglobin (Hb A1C) (Completed)    POCT Glucose (Completed)    Microalbumin / Creatinine Urine Ratio - Urine, Clean Catch (Completed)    Occlusion and stenosis of unspecified carotid artery    Overview     50-60% on doppler 2016 repeat 01/2020 CTA showed less than 70% stenosis followed with vascular surgery.   Stable u.s 01/2021         Paroxysmal atrial fibrillation (HCC)    Overview     During heart surgery 2015, no break thru   Sinus rhythm by exam         Relevant Medications    ranolazine (RANEXA) 1000 MG 12 hr tablet    clopidogrel (PLAVIX) 75 MG tablet    isosorbide dinitrate (ISORDIL) 30 MG tablet    nitroglycerin (NITRODUR) 0.4 MG/HR patch    nitroglycerin (NITROSTAT) 0.4 MG SL tablet       Medium    Mixed hyperlipidemia    Overview     Stable, Continue Crestor 40mg he is complaint  LDL at goal 10/2021            Current Assessment & Plan     Lipid abnormalities are improving with treatment.  Pharmacotherapy as ordered.  Lipids will be reassessed in 3 months.         Relevant Orders    Lipid Panel With / Chol / HDL Ratio (Completed)    Sleep apnea    Overview     He is having a lot of difficulty with CPAP, he is encouraged to continue to try, given it should help his angina. He has multiple delivery systems, mask NC etc. He will continue to try             Low    Class 2 severe obesity due to excess calories with serious comorbidity and body mass index (BMI) of 35.0 to 35.9 in adult (HCC)    Overview     Diet exercise and wt loss encouraged  Techniques discussed         Current Assessment & Plan     Patient's (Body mass index is 35.16 kg/m².) indicates that they are obese (BMI >30) with health conditions that include hypertension, coronary heart disease, diabetes mellitus and dyslipidemias . Weight is improving with lifestyle modifications. BMI is is above average; BMI management plan is completed. We discussed low calorie, low carb based diet program, portion control and increasing exercise.          Seasonal allergic rhinitis due to pollen    Overview     Improved  with prn meds which are continued.             Unprioritized    Encounter for annual general medical examination with abnormal findings in adult - Primary    Overview     Diet exercise wt loss, seat belts, sunscreens and general safety. . Ear protection.   Previous lab reviewed, he eas notified of today's lab.  Meds refilled.           Encounter for colorectal cancer screening    Overview     Last colonoscopy 03/04/2019 repeat 2029         Screening PSA (prostate specific antigen)    Overview     Lab Results   Component Value Date    PSA 0.9 02/15/2021    PSA 1.0 02/14/2020    PSA 1.1 08/13/2019              Relevant Orders    PSA Screen (Completed)    S/P CABG (coronary artery bypass graft)      Other Visit Diagnoses     Coronary artery disease involving native coronary artery of native heart without angina pectoris        Relevant Medications    ranolazine (RANEXA) 1000 MG 12 hr tablet    clopidogrel (PLAVIX) 75 MG tablet    isosorbide dinitrate (ISORDIL) 30 MG tablet    nitroglycerin (NITRODUR) 0.4 MG/HR patch    nitroglycerin (NITROSTAT) 0.4 MG SL tablet          Follow Up Instructions Charge Capture  Follow-up Communications  Return in about 3 months (around 11/19/2022).  Patient was given instructions and counseling regarding his condition or for health maintenance advice. Please see specific information pulled into the AVS if appropriate.

## 2022-08-20 LAB
ALBUMIN SERPL-MCNC: 4.5 G/DL (ref 3.8–4.9)
ALBUMIN/CREAT UR: 5 MG/G CREAT (ref 0–29)
ALBUMIN/GLOB SERPL: 2 {RATIO} (ref 1.2–2.2)
ALP SERPL-CCNC: 89 IU/L (ref 44–121)
ALT SERPL-CCNC: 25 IU/L (ref 0–44)
AST SERPL-CCNC: 17 IU/L (ref 0–40)
BASOPHILS # BLD AUTO: 0 X10E3/UL (ref 0–0.2)
BASOPHILS NFR BLD AUTO: 1 %
BILIRUB SERPL-MCNC: 0.3 MG/DL (ref 0–1.2)
BUN SERPL-MCNC: 21 MG/DL (ref 6–24)
BUN/CREAT SERPL: 22 (ref 9–20)
CALCIUM SERPL-MCNC: 9.2 MG/DL (ref 8.7–10.2)
CHLORIDE SERPL-SCNC: 102 MMOL/L (ref 96–106)
CHOLEST SERPL-MCNC: 153 MG/DL (ref 100–199)
CHOLEST/HDLC SERPL: 2.4 RATIO (ref 0–5)
CO2 SERPL-SCNC: 21 MMOL/L (ref 20–29)
CREAT SERPL-MCNC: 0.95 MG/DL (ref 0.76–1.27)
CREAT UR-MCNC: 199.3 MG/DL
EGFRCR-CYS SERPLBLD CKD-EPI 2021: 95 ML/MIN/1.73
EOSINOPHIL # BLD AUTO: 0.1 X10E3/UL (ref 0–0.4)
EOSINOPHIL NFR BLD AUTO: 2 %
ERYTHROCYTE [DISTWIDTH] IN BLOOD BY AUTOMATED COUNT: 13.2 % (ref 11.6–15.4)
GLOBULIN SER CALC-MCNC: 2.2 G/DL (ref 1.5–4.5)
GLUCOSE SERPL-MCNC: 140 MG/DL (ref 65–99)
HCT VFR BLD AUTO: 45.9 % (ref 37.5–51)
HDLC SERPL-MCNC: 65 MG/DL
HGB BLD-MCNC: 14.9 G/DL (ref 13–17.7)
IMM GRANULOCYTES # BLD AUTO: 0 X10E3/UL (ref 0–0.1)
IMM GRANULOCYTES NFR BLD AUTO: 0 %
LDLC SERPL CALC-MCNC: 69 MG/DL (ref 0–99)
LYMPHOCYTES # BLD AUTO: 2.2 X10E3/UL (ref 0.7–3.1)
LYMPHOCYTES NFR BLD AUTO: 32 %
MCH RBC QN AUTO: 28.8 PG (ref 26.6–33)
MCHC RBC AUTO-ENTMCNC: 32.5 G/DL (ref 31.5–35.7)
MCV RBC AUTO: 89 FL (ref 79–97)
MICROALBUMIN UR-MCNC: 10.6 UG/ML
MONOCYTES # BLD AUTO: 0.7 X10E3/UL (ref 0.1–0.9)
MONOCYTES NFR BLD AUTO: 11 %
NEUTROPHILS # BLD AUTO: 3.8 X10E3/UL (ref 1.4–7)
NEUTROPHILS NFR BLD AUTO: 54 %
PLATELET # BLD AUTO: 235 X10E3/UL (ref 150–450)
POTASSIUM SERPL-SCNC: 4.6 MMOL/L (ref 3.5–5.2)
PROT SERPL-MCNC: 6.7 G/DL (ref 6–8.5)
PSA SERPL-MCNC: 0.3 NG/ML (ref 0–4)
RBC # BLD AUTO: 5.17 X10E6/UL (ref 4.14–5.8)
SODIUM SERPL-SCNC: 140 MMOL/L (ref 134–144)
TRIGL SERPL-MCNC: 107 MG/DL (ref 0–149)
TSH SERPL DL<=0.005 MIU/L-ACNC: 1.48 UIU/ML (ref 0.45–4.5)
VLDLC SERPL CALC-MCNC: 19 MG/DL (ref 5–40)
WBC # BLD AUTO: 6.9 X10E3/UL (ref 3.4–10.8)

## 2022-08-28 PROBLEM — Z02.4 ENCOUNTER FOR CDL (COMMERCIAL DRIVING LICENSE) EXAM: Status: RESOLVED | Noted: 2021-03-05 | Resolved: 2022-08-28

## 2022-08-28 PROBLEM — G47.30 SLEEP APNEA: Status: RESOLVED | Noted: 2020-12-05 | Resolved: 2022-08-28

## 2022-08-28 PROBLEM — Z02.89 ENCOUNTER FOR OCCUPATIONAL HEALTH EXAMINATION: Status: RESOLVED | Noted: 2019-07-29 | Resolved: 2022-08-28

## 2022-08-28 NOTE — ASSESSMENT & PLAN NOTE
Patient's (Body mass index is 35.16 kg/m².) indicates that they are obese (BMI >30) with health conditions that include hypertension, coronary heart disease, diabetes mellitus and dyslipidemias . Weight is improving with lifestyle modifications. BMI is is above average; BMI management plan is completed. We discussed low calorie, low carb based diet program, portion control and increasing exercise.

## 2022-09-12 RX ORDER — NITROGLYCERIN 0.4 MG/1
TABLET SUBLINGUAL
Qty: 25 TABLET | Refills: 0 | Status: SHIPPED | OUTPATIENT
Start: 2022-09-12 | End: 2022-10-10

## 2022-09-21 ENCOUNTER — TELEPHONE (OUTPATIENT)
Dept: FAMILY MEDICINE CLINIC | Facility: CLINIC | Age: 57
End: 2022-09-21

## 2022-09-21 NOTE — TELEPHONE ENCOUNTER
Caller: Mendel Melendez EDWINA    Relationship: Self    Best call back number: [phone]    What medications are you currently taking:   Current Outpatient Medications on File Prior to Visit   Medication Sig Dispense Refill   • amLODIPine (NORVASC) 10 MG tablet Take 1 tablet by mouth Daily. 90 tablet 3   • aspirin (aspirin) 81 MG EC tablet Take 81 mg by mouth Daily.     • clopidogrel (PLAVIX) 75 MG tablet Take 1 tablet by mouth Daily. 90 tablet 3   • empagliflozin (Jardiance) 10 MG tablet tablet Take 1 tablet by mouth Daily. 90 tablet 3   • isosorbide dinitrate (ISORDIL) 30 MG tablet Take 1 tablet by mouth Daily. 90 tablet 3   • metFORMIN (Glucophage) 1000 MG tablet Take 1 tablet by mouth 2 (Two) Times a Day With Meals. 180 tablet 3   • metoprolol tartrate (LOPRESSOR) 50 MG tablet TAKE 1 TABLET BY MOUTH EVERY 8 HOURS 90 tablet 3   • montelukast (SINGULAIR) 10 MG tablet TAKE 1 TABLET BY MOUTH ONCE DAILY AT NIGHT 30 tablet 12   • nitroglycerin (NITRODUR) 0.4 MG/HR patch 1 daily 90 patch 3   • nitroglycerin (NITROSTAT) 0.4 MG SL tablet DISSOLVE ONE TABLET UNDER THE TONGUE EVERY 5 MINUTES AS NEEDED FOR CHEST PAIN.  DO NOT EXCEED A TOTAL OF 3 DOSES IN 15 MINUTES 25 tablet 0   • ranolazine (RANEXA) 1000 MG 12 hr tablet Take 1 tablet by mouth 2 (Two) Times a Day. 180 tablet 3   • rosuvastatin (CRESTOR) 40 MG tablet Take 1 tablet by mouth once daily 30 tablet 12     No current facility-administered medications on file prior to visit.        Which medication are you concerned about: isosorbide dinitrate (ISORDIL) 30 MG tablet AND empagliflozin (Jardiance) 10 MG tablet tablet    Who prescribed you this medication: DR NIRU CARRASCO    What are your concerns: PATIENT STATES THAT HE HAD PREVIOUSLY BEEN  TAKING ISOSORBIDE MONO ER, BUT WHEN HE PICKED UP HIS MOST RECENT PRESCRIPTION WAS FOR isosorbide dinitrate (ISORDIL) 30 MG tablet. PATIENT REQUESTS A CALL BACK TO CONFIRM IF THIS WAS CORRECT OR IF IT WAS AN ERROR    PATIENT ALSO  MENTIONED THAT HE WAS PRESCRIBED empagliflozin (Jardiance) 10 MG tablet tablet, BUT AFTER TAKING 2 TABLETS, HE BROKE OUT IN HIVES, AND HIS URINARY OUTPUT SEVERELY DECREASED. REQUESTS A CALL BACK TO DISCUSS CONCERNS ABOUT THE MEDICATION AND SIDE EFFECTS

## 2022-09-23 DIAGNOSIS — I25.709 CORONARY ARTERY DISEASE INVOLVING CORONARY BYPASS GRAFT OF NATIVE HEART WITH ANGINA PECTORIS: Primary | ICD-10-CM

## 2022-09-23 DIAGNOSIS — E11.51 TYPE 2 DIABETES MELLITUS WITH DIABETIC PERIPHERAL ANGIOPATHY WITHOUT GANGRENE, WITHOUT LONG-TERM CURRENT USE OF INSULIN: ICD-10-CM

## 2022-09-23 RX ORDER — DAPAGLIFLOZIN 5 MG/1
5 TABLET, FILM COATED ORAL DAILY
Qty: 30 TABLET | Refills: 12 | Status: SHIPPED | OUTPATIENT
Start: 2022-09-23 | End: 2022-11-30

## 2022-09-23 RX ORDER — ISOSORBIDE MONONITRATE 30 MG/1
30 TABLET, EXTENDED RELEASE ORAL DAILY
Qty: 12 TABLET | Refills: 30 | Status: SHIPPED | OUTPATIENT
Start: 2022-09-23

## 2022-09-23 NOTE — TELEPHONE ENCOUNTER
Called Mendel hartman  message   we have changed rx back to isosorbide mono er and he will d/c jardiance and we have sent yoanxiga

## 2022-10-10 RX ORDER — NITROGLYCERIN 0.4 MG/1
TABLET SUBLINGUAL
Qty: 25 TABLET | Refills: 0 | Status: SHIPPED | OUTPATIENT
Start: 2022-10-10 | End: 2022-10-31

## 2022-10-31 RX ORDER — NITROGLYCERIN 0.4 MG/1
TABLET SUBLINGUAL
Qty: 25 TABLET | Refills: 0 | Status: SHIPPED | OUTPATIENT
Start: 2022-10-31 | End: 2022-11-30 | Stop reason: SDUPTHER

## 2022-11-30 ENCOUNTER — OFFICE VISIT (OUTPATIENT)
Dept: FAMILY MEDICINE CLINIC | Facility: CLINIC | Age: 57
End: 2022-11-30

## 2022-11-30 VITALS
BODY MASS INDEX: 35.07 KG/M2 | DIASTOLIC BLOOD PRESSURE: 84 MMHG | RESPIRATION RATE: 18 BRPM | HEART RATE: 75 BPM | TEMPERATURE: 97.5 F | OXYGEN SATURATION: 96 % | SYSTOLIC BLOOD PRESSURE: 120 MMHG | HEIGHT: 66 IN | WEIGHT: 218.2 LBS

## 2022-11-30 DIAGNOSIS — I48.0 PAROXYSMAL ATRIAL FIBRILLATION: ICD-10-CM

## 2022-11-30 DIAGNOSIS — E78.2 MIXED HYPERLIPIDEMIA: ICD-10-CM

## 2022-11-30 DIAGNOSIS — I10 BENIGN HYPERTENSION: ICD-10-CM

## 2022-11-30 DIAGNOSIS — E66.01 CLASS 2 SEVERE OBESITY DUE TO EXCESS CALORIES WITH SERIOUS COMORBIDITY AND BODY MASS INDEX (BMI) OF 35.0 TO 35.9 IN ADULT: ICD-10-CM

## 2022-11-30 DIAGNOSIS — E11.51 TYPE 2 DIABETES MELLITUS WITH DIABETIC PERIPHERAL ANGIOPATHY WITHOUT GANGRENE, WITHOUT LONG-TERM CURRENT USE OF INSULIN: Primary | ICD-10-CM

## 2022-11-30 DIAGNOSIS — I25.709 CORONARY ARTERY DISEASE INVOLVING CORONARY BYPASS GRAFT OF NATIVE HEART WITH ANGINA PECTORIS: ICD-10-CM

## 2022-11-30 LAB
BILIRUB BLD-MCNC: NEGATIVE MG/DL
CLARITY, POC: CLEAR
COLOR UR: YELLOW
EXPIRATION DATE: NORMAL
GLUCOSE BLDC GLUCOMTR-MCNC: 159 MG/DL (ref 70–130)
GLUCOSE UR STRIP-MCNC: NEGATIVE MG/DL
HBA1C MFR BLD: 6.8 %
KETONES UR QL: NEGATIVE
LEUKOCYTE EST, POC: NEGATIVE
Lab: NORMAL
NITRITE UR-MCNC: NEGATIVE MG/ML
PH UR: 6.5 [PH] (ref 5–8)
PROT UR STRIP-MCNC: NEGATIVE MG/DL
RBC # UR STRIP: NEGATIVE /UL
SP GR UR: 1.03 (ref 1–1.03)
UROBILINOGEN UR QL: NORMAL

## 2022-11-30 PROCEDURE — 83036 HEMOGLOBIN GLYCOSYLATED A1C: CPT | Performed by: FAMILY MEDICINE

## 2022-11-30 PROCEDURE — 81002 URINALYSIS NONAUTO W/O SCOPE: CPT | Performed by: FAMILY MEDICINE

## 2022-11-30 PROCEDURE — 82962 GLUCOSE BLOOD TEST: CPT | Performed by: FAMILY MEDICINE

## 2022-11-30 PROCEDURE — 99214 OFFICE O/P EST MOD 30 MIN: CPT | Performed by: FAMILY MEDICINE

## 2022-11-30 RX ORDER — NITROGLYCERIN 0.4 MG/1
0.4 TABLET SUBLINGUAL
Qty: 25 TABLET | Refills: 0 | Status: SHIPPED | OUTPATIENT
Start: 2022-11-30 | End: 2023-01-11

## 2022-11-30 RX ORDER — NITROGLYCERIN 80 MG/1
PATCH TRANSDERMAL
Qty: 90 PATCH | Refills: 3 | Status: SHIPPED | OUTPATIENT
Start: 2022-11-30

## 2022-11-30 NOTE — PROGRESS NOTES
Chief Complaint  Diabetes, Hypertension, Atrial Fibrillation, Hyperlipidemia, and Coronary Artery Disease    Subjective     CC  Problem List  Visit Diagnosis   Encounters  Notes  Medications  Labs  Result Review Imaging  Media    Mendel Melendez presents to [facility] for   History of Present Illness  Mendel was seen by Dr. Ocasio-cardiology on 11/17/2022 for follow up on atrial fib and chronic chest pain. He is scheduled to follow up again 02/23/2023.  Diabetes  He presents for his follow-up diabetic visit. He has type 2 diabetes mellitus. Pertinent negatives for hypoglycemia include no confusion, dizziness, headaches, nervousness/anxiousness or sweats. Associated symptoms include chest pain ( he continues to have intermittent chest pain considered to be stable angina, he is followed with cardiology). Pertinent negatives for diabetes include no fatigue, no polydipsia, no polyuria, no weakness and no weight loss. Risk factors for coronary artery disease include diabetes mellitus, dyslipidemia, family history, hypertension, male sex and obesity. Current diabetic treatment includes oral agent (monotherapy). He is following a diabetic diet. Meal planning includes avoidance of concentrated sweets. (Does not check blood sugar at home) An ACE inhibitor/angiotensin II receptor blocker is not being taken. He does not see a podiatrist.Eye exam is current (10/2021 [facility]).   Hypertension  This is a chronic problem. The current episode started more than 1 year ago. The problem is controlled. Associated symptoms include chest pain ( he continues to have intermittent chest pain considered to be stable angina, he is followed with cardiology). Pertinent negatives include no headaches, orthopnea, palpitations, peripheral edema, PND, shortness of breath or sweats. Risk factors for coronary artery disease include diabetes mellitus, dyslipidemia, family history, male gender and obesity.  Current antihypertension treatment includes calcium channel blockers and beta blockers. The current treatment provides moderate improvement. Hypertensive end-organ damage includes angina and CAD/MI.   Atrial Fibrillation  Presents for follow-up visit. Symptoms include chest pain ( he continues to have intermittent chest pain considered to be stable angina, he is followed with cardiology). Symptoms are negative for dizziness, palpitations, shortness of breath and weakness. The symptoms have been stable. Past medical history includes atrial fibrillation, CAD and hyperlipidemia.   Hyperlipidemia  This is a chronic problem. The current episode started more than 1 year ago. The problem is controlled. Recent lipid tests were reviewed and are normal. Exacerbating diseases include diabetes and obesity. Associated symptoms include chest pain ( he continues to have intermittent chest pain considered to be stable angina, he is followed with cardiology). Pertinent negatives include no myalgias or shortness of breath. Current antihyperlipidemic treatment includes statins. The current treatment provides moderate improvement of lipids. Risk factors for coronary artery disease include diabetes mellitus, dyslipidemia, family history, hypertension, male sex and obesity.   Coronary Artery Disease  Presents for follow-up visit. Symptoms include chest pain ( he continues to have intermittent chest pain considered to be stable angina, he is followed with cardiology) and chest pressure. Pertinent negatives include no chest tightness, dizziness, leg swelling, palpitations, shortness of breath or weight gain. Risk factors include hyperlipidemia and obesity. The symptoms have been stable. Compliance with diet is good. Compliance with medications is good.       Review of Systems   Constitutional: Negative for fatigue, unexpected weight gain and unexpected weight loss.   Respiratory: Negative for chest tightness and shortness of breath.   "  Cardiovascular: Positive for chest pain ( he continues to have intermittent chest pain considered to be stable angina, he is followed with cardiology). Negative for palpitations, orthopnea, leg swelling and PND.   Gastrointestinal: Negative for abdominal pain, constipation and diarrhea.   Endocrine: Negative for cold intolerance, heat intolerance, polydipsia and polyuria.   Musculoskeletal: Negative for arthralgias and myalgias.   Neurological: Negative for dizziness, weakness, numbness and confusion.   Hematological: Negative for adenopathy. Does not bruise/bleed easily.   Psychiatric/Behavioral: The patient is not nervous/anxious.         Objective   Vital Signs:   /84 (BP Location: Right arm, Patient Position: Sitting, Cuff Size: Adult)   Pulse 75   Temp 97.5 °F (36.4 °C) (Temporal)   Resp 18   Ht 167 cm (65.75\")   Wt 99 kg (218 lb 3.2 oz)   SpO2 96% Comment: Room air  BMI 35.49 kg/m²     Physical Exam  Constitutional:       General: He is not in acute distress.     Appearance: He is well-developed.   Eyes:      Conjunctiva/sclera: Conjunctivae normal.   Neck:      Thyroid: No thyromegaly.      Vascular: No JVD.   Cardiovascular:      Rate and Rhythm: Normal rate and regular rhythm.      Pulses:           Carotid pulses are 2+ on the right side and 2+ on the left side.       Dorsalis pedis pulses are 2+ on the right side and 2+ on the left side.      Heart sounds: Normal heart sounds. No murmur heard.    No friction rub. No gallop.      Comments: Negative Duy's  Pulmonary:      Effort: Pulmonary effort is normal. No respiratory distress.      Breath sounds: Normal breath sounds. No wheezing or rales.   Musculoskeletal:      Cervical back: Neck supple.      Right lower leg: No edema.      Left lower leg: No edema.   Lymphadenopathy:      Cervical: No cervical adenopathy.   Skin:     General: Skin is warm.      Findings: No erythema or rash.   Neurological:      Mental Status: He is alert and " oriented to person, place, and time.      Coordination: Coordination normal.        Result Review :Labs  Result Review  Imaging  Med Tab  Media                 Assessment and Plan CC Problem List  Visit Diagnosis  ROS  Review (Popup)  Health Maintenance  Quality  BestPractice  Medications  SmartSets  SnapShot Encounters  Media  Problem List Items Addressed This Visit        High    Benign hypertension    Overview     Controlled, compliant         Current Assessment & Plan     Hypertension is improving with treatment.  Continue current treatment regimen.  Dietary sodium restriction.  Weight loss.  Regular aerobic exercise.  Blood pressure will be reassessed in 3 months.         Coronary artery disease involving coronary bypass graft of native heart with angina pectoris ([facility])    Overview     He is having stable angina unchanged  He is s/p cardiac cath showing open vessels but spasm  Followed with [facility] regularly, plan to go to [facility]  s/p CABG x 3, 2015, s/p cardiac cath 07/2020 showing all three grafts to be free of disease but distal and proximal stenosis of LAD , stenosis of LCX  Risk factors modified  Interventional cardiology with angioplasty, 08/11/2021, 10/13/2021 repeated angioplasty. His anatomy is challenging w/o ability to have a perfect fix.    Angina much improved but continues and unchanged. He continues cardiology follow up         Relevant Medications    nitroglycerin (NITRODUR) 0.4 MG/HR patch    nitroglycerin (NITROSTAT) 0.4 MG SL tablet    Type 2 diabetes mellitus with diabetic peripheral angiopathy without gangrene, without long-term current use of insulin ([facility]) - Primary    Overview     A1c 6.8 11/28/2022  A1c 7.0 08/19/2022 improved.   A1c 7.6 04/20/2022  A1c 7.8 01/19/2022,   A1c 6.6 09/24/2021 improved.   A1c 7.1 02/15/2021   A1c 7.2 10/06/2020   A1c 7.3 11/18/2019     A1c 6.9 5/7/2019   A1c 7.0 02/12/2018    A1c 7.1, 11/1/2018 new onset         Current Assessment & Plan      Diabetes is improving with treatment.   Continue current treatment regimen.  Reminded to bring in blood sugar diary at next visit.  Dietary recommendations for ADA diet.  Regular aerobic exercise.  Diabetes will be reassessed in 3 months.         Paroxysmal atrial fibrillation (HCC)    Overview     During heart surgery 2015, no break thru   Sinus rhythm by exam.         Relevant Medications    nitroglycerin (NITRODUR) 0.4 MG/HR patch    nitroglycerin (NITROSTAT) 0.4 MG SL tablet       Medium    Mixed hyperlipidemia    Overview     Stable, Continue Crestor 40mg he is complaint  LDL at goal 10/2021            Current Assessment & Plan     Lipid abnormalities are improving with treatment.  Nutritional counseling was provided. and Pharmacotherapy as ordered.  Lipids will be reassessed in 3 months.            Low    Class 2 severe obesity due to excess calories with serious comorbidity and body mass index (BMI) of 35.0 to 35.9 in adult (HCC)    Overview     Diet exercise and wt loss encouraged Techniques discussed         Current Assessment & Plan     Patient's (Body mass index is 35.49 kg/m².) indicates that they are obese (BMI >30) with health conditions that include hypertension, coronary heart disease, diabetes mellitus and dyslipidemias . Weight is unchanged. BMI is is above average; BMI management plan is completed. We discussed low calorie, low carb based diet program, portion control and increasing exercise.             Follow Up Instructions Charge Capture  Follow-up Communications  No follow-ups on file.  Patient was given instructions and counseling regarding his condition or for health maintenance advice. Please see specific information pulled into the AVS if appropriate.

## 2023-01-11 DIAGNOSIS — I25.709 CORONARY ARTERY DISEASE INVOLVING CORONARY BYPASS GRAFT OF NATIVE HEART WITH ANGINA PECTORIS: ICD-10-CM

## 2023-01-11 RX ORDER — NITROGLYCERIN 0.4 MG/1
TABLET SUBLINGUAL
Qty: 25 TABLET | Refills: 0 | Status: SHIPPED | OUTPATIENT
Start: 2023-01-11 | End: 2023-02-01 | Stop reason: SDUPTHER

## 2023-02-01 ENCOUNTER — OFFICE VISIT (OUTPATIENT)
Dept: FAMILY MEDICINE CLINIC | Facility: CLINIC | Age: 58
End: 2023-02-01
Payer: COMMERCIAL

## 2023-02-01 VITALS
BODY MASS INDEX: 37.19 KG/M2 | OXYGEN SATURATION: 97 % | SYSTOLIC BLOOD PRESSURE: 123 MMHG | TEMPERATURE: 97.5 F | DIASTOLIC BLOOD PRESSURE: 79 MMHG | HEART RATE: 77 BPM | RESPIRATION RATE: 16 BRPM | WEIGHT: 223.2 LBS | HEIGHT: 65 IN

## 2023-02-01 DIAGNOSIS — J01.00 ACUTE NON-RECURRENT MAXILLARY SINUSITIS: Primary | ICD-10-CM

## 2023-02-01 DIAGNOSIS — J30.1 SEASONAL ALLERGIC RHINITIS DUE TO POLLEN: ICD-10-CM

## 2023-02-01 DIAGNOSIS — E66.01 CLASS 2 SEVERE OBESITY DUE TO EXCESS CALORIES WITH SERIOUS COMORBIDITY AND BODY MASS INDEX (BMI) OF 37.0 TO 37.9 IN ADULT: ICD-10-CM

## 2023-02-01 DIAGNOSIS — E11.51 TYPE 2 DIABETES MELLITUS WITH DIABETIC PERIPHERAL ANGIOPATHY WITHOUT GANGRENE, WITHOUT LONG-TERM CURRENT USE OF INSULIN: ICD-10-CM

## 2023-02-01 DIAGNOSIS — I25.709 CORONARY ARTERY DISEASE INVOLVING CORONARY BYPASS GRAFT OF NATIVE HEART WITH ANGINA PECTORIS: ICD-10-CM

## 2023-02-01 DIAGNOSIS — I10 BENIGN HYPERTENSION: ICD-10-CM

## 2023-02-01 PROCEDURE — 99214 OFFICE O/P EST MOD 30 MIN: CPT | Performed by: FAMILY MEDICINE

## 2023-02-01 RX ORDER — NITROGLYCERIN 0.4 MG/1
0.4 TABLET SUBLINGUAL
Qty: 30 TABLET | Refills: 0 | Status: SHIPPED | OUTPATIENT
Start: 2023-02-01 | End: 2023-02-24

## 2023-02-01 RX ORDER — DOXYCYCLINE 100 MG/1
100 CAPSULE ORAL 2 TIMES DAILY
Qty: 20 CAPSULE | Refills: 1 | Status: SHIPPED | OUTPATIENT
Start: 2023-02-01 | End: 2023-02-17

## 2023-02-01 NOTE — PROGRESS NOTES
Chief Complaint  URI and Hypertension    Subjective     CC  Problem List  Visit Diagnosis   Encounters  Notes  Medications  Labs  Result Review Imaging  Media    Mendel Melendez presents to [facility] for   History of Present Illness  Patient C/O major sinus pressure and congestion. Patient states this has been going on for about 3 weeks. Patient states he blows his nose a few times a day and its thick, bloody and yellowish.   Diabetes  He presents for his follow-up diabetic visit. He has type 2 diabetes mellitus. Pertinent negatives for hypoglycemia include no confusion, headaches, nervousness/anxiousness or sweats. Pertinent negatives for diabetes include no blurred vision, no fatigue, no polydipsia, no polyuria and no weight loss. Risk factors for coronary artery disease include diabetes mellitus, dyslipidemia, family history, hypertension, male sex and obesity. Current diabetic treatment includes oral agent (monotherapy). He is following a diabetic diet. Meal planning includes avoidance of concentrated sweets. (Does not check blood sugar at home) An ACE inhibitor/angiotensin II receptor blocker is not being taken. He does not see a podiatrist.Eye exam is current.   Hypertension  This is a chronic problem. The current episode started more than 1 year ago. The problem is controlled. Pertinent negatives include no anxiety, blurred vision, headaches, malaise/fatigue, neck pain, orthopnea, peripheral edema, PND or sweats. Risk factors for coronary artery disease include diabetes mellitus, dyslipidemia, family history, male gender and obesity. Current antihypertension treatment includes calcium channel blockers and beta blockers. The current treatment provides moderate improvement. Hypertensive end-organ damage includes angina and CAD/MI.   Coronary Artery Disease  Presents for follow-up visit. Symptoms include chest pressure. Pertinent negatives include no chest tightness,  "leg swelling or weight gain. The symptoms have been stable. Compliance with diet is good. Compliance with medications is good.   URI   This is a recurrent problem. The current episode started more than 1 month ago. The problem has been unchanged. There has been no fever. Associated symptoms include congestion. Pertinent negatives include no abdominal pain, coughing, diarrhea, dysuria, ear pain, headaches, joint pain, joint swelling, nausea, neck pain, plugged ear sensation, rash, rhinorrhea, sinus pain, sneezing, sore throat, swollen glands, vomiting or wheezing. Treatments tried: Patient takes Singular.  The treatment provided no relief.       Review of Systems   Constitutional: Negative for fatigue, malaise/fatigue, unexpected weight gain and unexpected weight loss.   HENT: Positive for congestion and sinus pressure. Negative for ear pain, rhinorrhea, sneezing, sore throat and swollen glands.    Eyes: Negative for blurred vision.   Respiratory: Negative for cough, chest tightness and wheezing.    Cardiovascular: Negative for orthopnea, leg swelling and PND.   Gastrointestinal: Negative for abdominal pain, diarrhea, nausea and vomiting.   Endocrine: Negative for polydipsia and polyuria.   Genitourinary: Negative for dysuria.   Musculoskeletal: Negative for joint pain and neck pain.   Skin: Negative for rash.   Neurological: Negative for confusion.   Psychiatric/Behavioral: The patient is not nervous/anxious.         Objective   Vital Signs:   /79 (BP Location: Right arm, Patient Position: Sitting, Cuff Size: Adult)   Pulse 77   Temp 97.5 °F (36.4 °C) (Infrared)   Resp 16   Ht 165.1 cm (65\")   Wt 101 kg (223 lb 3.2 oz)   SpO2 97% Comment: room air  BMI 37.14 kg/m²     Physical Exam  Constitutional:       General: He is not in acute distress.  HENT:      Nose:      Right Sinus: Maxillary sinus tenderness present.      Left Sinus: Maxillary sinus tenderness present.      Mouth/Throat:      Pharynx: No " oropharyngeal exudate or posterior oropharyngeal erythema (moderate post nasal secretions. ).   Cardiovascular:      Rate and Rhythm: Normal rate and regular rhythm.      Heart sounds: No murmur heard.  Pulmonary:      Effort: Pulmonary effort is normal.      Breath sounds: Normal breath sounds. No wheezing.   Musculoskeletal:      Cervical back: Neck supple.      Right lower leg: No edema.      Left lower leg: No edema.   Lymphadenopathy:      Cervical: No cervical adenopathy.   Skin:     Findings: No rash.   Neurological:      Mental Status: He is alert.        Result Review :Labs  Result Review  Imaging  Med Tab  Media                 Assessment and Plan CC Problem List  Visit Diagnosis  ROS  Review (Popup)  Health Maintenance  Quality  BestPractice  Medications  SmartSets  SnapShot Encounters  Media  Problem List Items Addressed This Visit        High    Benign hypertension    Overview     Controlled, compliant, continue meds.          Coronary artery disease involving coronary bypass graft of native heart with angina pectoris (HCC)    Overview     He is having stable angina unchanged NITRo refilled.   He is s/p cardiac cath showing open vessels but spasm  Followed with [facility] regularly, plan to go to [facility]  s/p CABG x 3, 2015, s/p cardiac cath 07/2020 showing all three grafts to be free of disease but distal and proximal stenosis of LAD , stenosis of LCX  Risk factors modified  Interventional cardiology with angioplasty, 08/11/2021, 10/13/2021 repeated angioplasty. His anatomy is challenging w/o ability to have a perfect fix.    Angina much improved but continues and unchanged. He continues cardiology follow up         Relevant Medications    nitroglycerin (NITROSTAT) 0.4 MG SL tablet    Type 2 diabetes mellitus with diabetic peripheral angiopathy without gangrene, without long-term current use of insulin (HCC)    Overview     A1c 6.8 11/28/2022 he reports BS of about 140 continue meds  A1c 7.0  08/19/2022 improved.   A1c 7.6 04/20/2022  A1c 7.8 01/19/2022,   A1c 6.6 09/24/2021 improved.   A1c 7.1 02/15/2021   A1c 7.2 10/06/2020   A1c 7.3 11/18/2019     A1c 6.9 5/7/2019   A1c 7.0 02/12/2018    A1c 7.1, 11/1/2018 new onset            Low    Class 2 severe obesity due to excess calories with serious comorbidity and body mass index (BMI) of 37.0 to 37.9 in adult (HCC)    Overview     Diet exercise and wt loss encouraged Techniques discussed         Current Assessment & Plan     Patient's (Body mass index is 37.14 kg/m².) indicates that they are obese (BMI >30) with health conditions that include hypertension, coronary heart disease, diabetes mellitus and dyslipidemias . Weight is improving with treatment. BMI  is above average; BMI management plan is completed. We discussed low calorie, low carb based diet program, portion control and increasing exercise.          Seasonal allergic rhinitis due to pollen    Overview     Resume max meds. He works out [employer]. Spring allergy season soon.         Other Visit Diagnoses     Acute non-recurrent maxillary sinusitis    -  Primary    Abx fluids saline flushes, follow up if no improvement    Relevant Medications    doxycycline (MONODOX) 100 MG capsule          Follow Up Instructions Charge Capture  Follow-up Communications  No follow-ups on file.  Patient was given instructions and counseling regarding his condition or for health maintenance advice. Please see specific information pulled into the AVS if appropriate.

## 2023-02-01 NOTE — ASSESSMENT & PLAN NOTE
Patient's (Body mass index is 37.14 kg/m².) indicates that they are obese (BMI >30) with health conditions that include hypertension, coronary heart disease, diabetes mellitus and dyslipidemias . Weight is improving with treatment. BMI  is above average; BMI management plan is completed. We discussed low calorie, low carb based diet program, portion control and increasing exercise.

## 2023-02-17 ENCOUNTER — CLINICAL SUPPORT (OUTPATIENT)
Dept: FAMILY MEDICINE CLINIC | Facility: CLINIC | Age: 58
End: 2023-02-17

## 2023-02-17 VITALS
BODY MASS INDEX: 37.09 KG/M2 | RESPIRATION RATE: 18 BRPM | HEART RATE: 71 BPM | OXYGEN SATURATION: 97 % | TEMPERATURE: 97.5 F | SYSTOLIC BLOOD PRESSURE: 134 MMHG | WEIGHT: 222.6 LBS | DIASTOLIC BLOOD PRESSURE: 86 MMHG | HEIGHT: 65 IN

## 2023-02-17 DIAGNOSIS — Z02.4 ENCOUNTER FOR CDL (COMMERCIAL DRIVING LICENSE) EXAM: Primary | ICD-10-CM

## 2023-02-17 LAB
BILIRUB BLD-MCNC: NEGATIVE MG/DL
CLARITY, POC: CLEAR
COLOR UR: YELLOW
GLUCOSE UR STRIP-MCNC: NEGATIVE MG/DL
KETONES UR QL: NEGATIVE
LEUKOCYTE EST, POC: NEGATIVE
NITRITE UR-MCNC: NEGATIVE MG/ML
PH UR: 5 [PH] (ref 5–8)
PROT UR STRIP-MCNC: ABNORMAL MG/DL
RBC # UR STRIP: NEGATIVE /UL
SP GR UR: 1.03 (ref 1–1.03)
UROBILINOGEN UR QL: NORMAL

## 2023-02-17 PROCEDURE — DOTPHY: Performed by: FAMILY MEDICINE

## 2023-02-17 PROCEDURE — 81002 URINALYSIS NONAUTO W/O SCOPE: CPT | Performed by: FAMILY MEDICINE

## 2023-02-17 NOTE — PROGRESS NOTES
Medical Examination    Subjective   Mendel Melendez is a 57 y.o. male who presents today for a  fitness determination physical exam. The patient reports no problems.  The following portions of the patient's history were reviewed and updated as appropriate: allergies, current medications, past family history, past medical history, past social history, past surgical history and problem list.  Review of Systems  A comprehensive review of systems was negative.    Objective    Vision:  Vision Screening    Right eye Left eye Both eyes   Without correction      With correction 20/15 20/15 20/25       Applicant can recognize and distinguish among traffic control signals and devices showing standard red, green, and sera colors.  Applicant has peripheral vision to 90 degrees in each eye.         Monocular Vision?: No      Hearing:  Applicant can distinguish forced whisper at a distance of 5 feet with both ears.         Physical Exam  Vitals reviewed.   Constitutional:       Appearance: He is well-developed.   HENT:      Head: Normocephalic and atraumatic.      Right Ear: External ear normal.      Left Ear: External ear normal.      Nose: Nose normal.   Eyes:      Conjunctiva/sclera: Conjunctivae normal.      Pupils: Pupils are equal, round, and reactive to light.   Cardiovascular:      Rate and Rhythm: Normal rate and regular rhythm.      Heart sounds: Normal heart sounds.   Pulmonary:      Effort: Pulmonary effort is normal. No respiratory distress.      Breath sounds: Normal breath sounds.   Abdominal:      General: Abdomen is flat. Bowel sounds are normal.      Palpations: Abdomen is soft.   Musculoskeletal:         General: No signs of injury. Normal range of motion.      Cervical back: Normal range of motion and neck supple.   Skin:     General: Skin is warm and dry.      Findings: No rash.   Neurological:      Mental Status: He is alert and oriented to person, place, and time.    Psychiatric:         Behavior: Behavior normal.         Thought Content: Thought content normal.         Judgment: Judgment normal.          Labs:  Lab Results   Component Value Date    SPECGRAV 1.030 02/17/2023    BILIRUBINUR Negative 02/17/2023    GLUCOSEU Negative 04/15/2019       Assessment & Plan   Healthy male exam.   Meets standards, but periodic monitoring required due to CAD.   qualified only for 1 year.     Medical examiners certificate completed and printed.  Return as needed.

## 2023-02-23 DIAGNOSIS — I25.709 CORONARY ARTERY DISEASE INVOLVING CORONARY BYPASS GRAFT OF NATIVE HEART WITH ANGINA PECTORIS: ICD-10-CM

## 2023-02-24 RX ORDER — NITROGLYCERIN 0.4 MG/1
TABLET SUBLINGUAL
Qty: 25 TABLET | Refills: 0 | Status: SHIPPED | OUTPATIENT
Start: 2023-02-24 | End: 2023-03-17

## 2023-03-03 ENCOUNTER — OFFICE VISIT (OUTPATIENT)
Dept: FAMILY MEDICINE CLINIC | Facility: CLINIC | Age: 58
End: 2023-03-03
Payer: COMMERCIAL

## 2023-03-03 VITALS
BODY MASS INDEX: 37.45 KG/M2 | TEMPERATURE: 97.1 F | OXYGEN SATURATION: 97 % | DIASTOLIC BLOOD PRESSURE: 80 MMHG | HEIGHT: 65 IN | SYSTOLIC BLOOD PRESSURE: 118 MMHG | RESPIRATION RATE: 18 BRPM | HEART RATE: 78 BPM | WEIGHT: 224.8 LBS

## 2023-03-03 DIAGNOSIS — I10 BENIGN HYPERTENSION: ICD-10-CM

## 2023-03-03 DIAGNOSIS — E11.51 TYPE 2 DIABETES MELLITUS WITH DIABETIC PERIPHERAL ANGIOPATHY WITHOUT GANGRENE, WITHOUT LONG-TERM CURRENT USE OF INSULIN: Primary | ICD-10-CM

## 2023-03-03 DIAGNOSIS — E78.2 MIXED HYPERLIPIDEMIA: ICD-10-CM

## 2023-03-03 DIAGNOSIS — I65.29 OCCLUSION AND STENOSIS OF UNSPECIFIED CAROTID ARTERY: ICD-10-CM

## 2023-03-03 DIAGNOSIS — I48.0 PAROXYSMAL ATRIAL FIBRILLATION: ICD-10-CM

## 2023-03-03 DIAGNOSIS — I25.709 CORONARY ARTERY DISEASE INVOLVING CORONARY BYPASS GRAFT OF NATIVE HEART WITH ANGINA PECTORIS: ICD-10-CM

## 2023-03-03 DIAGNOSIS — E66.01 CLASS 2 SEVERE OBESITY DUE TO EXCESS CALORIES WITH SERIOUS COMORBIDITY AND BODY MASS INDEX (BMI) OF 37.0 TO 37.9 IN ADULT: ICD-10-CM

## 2023-03-03 LAB
BILIRUB BLD-MCNC: NEGATIVE MG/DL
CLARITY, POC: CLEAR
COLOR UR: YELLOW
EXPIRATION DATE: NORMAL
GLUCOSE BLDC GLUCOMTR-MCNC: 148 MG/DL (ref 70–130)
GLUCOSE UR STRIP-MCNC: NEGATIVE MG/DL
HBA1C MFR BLD: 7.2 %
KETONES UR QL: NEGATIVE
LEUKOCYTE EST, POC: NEGATIVE
Lab: NORMAL
NITRITE UR-MCNC: NEGATIVE MG/ML
PH UR: 5 [PH] (ref 5–8)
PROT UR STRIP-MCNC: NEGATIVE MG/DL
RBC # UR STRIP: NEGATIVE /UL
SP GR UR: 1.03 (ref 1–1.03)
UROBILINOGEN UR QL: NORMAL

## 2023-03-03 PROCEDURE — 83036 HEMOGLOBIN GLYCOSYLATED A1C: CPT | Performed by: FAMILY MEDICINE

## 2023-03-03 PROCEDURE — 81002 URINALYSIS NONAUTO W/O SCOPE: CPT | Performed by: FAMILY MEDICINE

## 2023-03-03 PROCEDURE — 99214 OFFICE O/P EST MOD 30 MIN: CPT | Performed by: FAMILY MEDICINE

## 2023-03-03 PROCEDURE — 82962 GLUCOSE BLOOD TEST: CPT | Performed by: FAMILY MEDICINE

## 2023-03-03 NOTE — PROGRESS NOTES
Chief Complaint  Diabetes, Hypertension, Coronary Artery Disease, Hyperlipidemia, and Atrial Fibrillation    Subjective     CC  Problem List  Visit Diagnosis   Encounters  Notes  Medications  Labs  Result Review Imaging  Media    Mendel Melendez presents to [facility] for   Diabetes  He presents for his follow-up diabetic visit. He has type 2 diabetes mellitus. No MedicAlert identification noted. His disease course has been stable. There are no hypoglycemic associated symptoms. Pertinent negatives for hypoglycemia include no confusion, dizziness or headaches. Pertinent negatives for diabetes include no blurred vision, no chest pain, no fatigue, no polydipsia, no polyphagia, no polyuria and no weight loss. There are no hypoglycemic complications. Symptoms are stable. There are no diabetic complications. Risk factors for coronary artery disease include dyslipidemia, diabetes mellitus, hypertension, male sex, family history and obesity. Current diabetic treatment includes oral agent (monotherapy). He is compliant with treatment most of the time. His weight is fluctuating minimally. He is following a diabetic and generally healthy diet. Meal planning includes avoidance of concentrated sweets. He has not had a previous visit with a dietitian. He participates in exercise daily. His home blood glucose trend is fluctuating minimally. His overall blood glucose range is 130-140 mg/dl. An ACE inhibitor/angiotensin II receptor blocker is not being taken. He does not see a podiatrist.Eye exam is current ([facility]).   Hypertension  This is a chronic problem. The current episode started more than 1 year ago. The problem has been waxing and waning since onset. The problem is controlled. Pertinent negatives include no blurred vision, chest pain, headaches, orthopnea, palpitations, PND or shortness of breath. Risk factors for coronary artery disease include diabetes mellitus, dyslipidemia,  male gender, obesity and family history. Current antihypertension treatment includes calcium channel blockers and beta blockers. The current treatment provides mild improvement. There are no compliance problems.    Coronary Artery Disease  Presents for follow-up visit. Pertinent negatives include no chest pain, dizziness, leg swelling, palpitations, shortness of breath or weight gain. Risk factors include hyperlipidemia and obesity. The symptoms have been stable. Compliance with diet is good. Compliance with exercise is good. Compliance with medications is good.   Hyperlipidemia  This is a chronic problem. The current episode started more than 1 year ago. Exacerbating diseases include diabetes and obesity. He has no history of hypothyroidism. There are no known factors aggravating his hyperlipidemia. Pertinent negatives include no chest pain or shortness of breath. Current antihyperlipidemic treatment includes statins. The current treatment provides mild improvement of lipids. There are no compliance problems.  Risk factors for coronary artery disease include dyslipidemia, diabetes mellitus, hypertension, obesity, male sex and family history.   Atrial Fibrillation  Presents for follow-up visit. Symptoms are negative for chest pain, dizziness, palpitations and shortness of breath. The symptoms have been stable. Past medical history includes atrial fibrillation, CAD and hyperlipidemia. There are no medication compliance problems.       Review of Systems   Constitutional: Negative for fatigue, fever, unexpected weight gain and unexpected weight loss.   Eyes: Negative for blurred vision.   Respiratory: Negative for shortness of breath.    Cardiovascular: Negative for chest pain, palpitations, orthopnea, leg swelling and PND.   Gastrointestinal: Negative for abdominal pain, constipation, diarrhea, nausea and vomiting.   Endocrine: Negative for cold intolerance, heat intolerance, polydipsia, polyphagia and polyuria.  "  Genitourinary: Negative for dysuria.   Skin: Negative for rash.   Neurological: Negative for dizziness and confusion.   Hematological: Negative for adenopathy. Does not bruise/bleed easily.        Objective   Vital Signs:   /80 (BP Location: Right arm, Patient Position: Sitting, Cuff Size: Large Adult)   Pulse 78   Temp 97.1 °F (36.2 °C) (Temporal)   Resp 18   Ht 165.1 cm (65\")   Wt 102 kg (224 lb 12.8 oz)   SpO2 97% Comment: room air  BMI 37.41 kg/m²     Physical Exam  Constitutional:       General: He is not in acute distress.     Appearance: He is well-developed. He is obese.   Eyes:      Conjunctiva/sclera: Conjunctivae normal.   Neck:      Thyroid: No thyromegaly.      Vascular: No JVD.   Cardiovascular:      Rate and Rhythm: Normal rate and regular rhythm.      Pulses:           Carotid pulses are 2+ on the right side and 2+ on the left side.       Dorsalis pedis pulses are 2+ on the right side and 2+ on the left side.      Heart sounds: Normal heart sounds. No murmur heard.    No friction rub. No gallop.      Comments: Negative Duy's  Pulmonary:      Effort: Pulmonary effort is normal. No respiratory distress.      Breath sounds: Normal breath sounds. No wheezing or rales.   Musculoskeletal:      Cervical back: Neck supple.      Right lower leg: No edema.      Left lower leg: No edema.   Lymphadenopathy:      Cervical: No cervical adenopathy.   Skin:     General: Skin is warm.      Findings: No erythema or rash.   Neurological:      Mental Status: He is alert and oriented to person, place, and time.      Coordination: Coordination normal.        Result Review :Labs  Result Review  Imaging  Med Tab  Media                 Assessment and Plan CC Problem List  Visit Diagnosis  ROS  Review (Popup)  Health Maintenance  Quality  BestPractice  Medications  SmartSets  SnapShot Encounters  Media  Problem List Items Addressed This Visit        High    Benign hypertension    Overview     " Controlled, compliant, continue meds.          Current Assessment & Plan     Hypertension is improving with treatment.  Continue current treatment regimen.  Dietary sodium restriction.  Weight loss.  Regular aerobic exercise.  Blood pressure will be reassessed in 3 months.         Coronary artery disease involving coronary bypass graft of native heart with angina pectoris ([facility])    Overview     He is having stable angina unchanged NITRo refilled.   He is s/p cardiac cath showing open vessels but spasm  Followed with [facility] regularly, plan to go [facility]  s/p CABG x 3, 2015, s/p cardiac cath 07/2020 showing all three grafts to be free of disease but distal and proximal stenosis of LAD , stenosis of LCX  Risk factors modified  Interventional cardiology with angioplasty, 08/11/2021, 10/13/2021 repeated angioplasty. His anatomy is challenging w/o ability to have a perfect fix.    Angina much improved but continues and unchanged. He continues cardiology follow up         Type 2 diabetes mellitus with diabetic peripheral angiopathy without gangrene, without long-term current use of insulin ([facility]) - Primary    Overview     A1c 7.2 03/03/2023  A1c 6.8 11/28/2022   A1c 7.0 08/19/2022 improved.   A1c 7.6 04/20/2022  A1c 7.8 01/19/2022,   A1c 6.6 09/24/2021 improved.   A1c 7.1 02/15/2021   A1c 7.2 10/06/2020   A1c 7.3 11/18/2019     A1c 6.9 5/7/2019   A1c 7.0 02/12/2018    A1c 7.1, 11/1/2018 new onset         Current Assessment & Plan     Diabetes is worsening.   Continue current treatment regimen.  Diabetes will be reassessed in 3 months.  He will improved diet and exercise we discussed trulicity addition if no improvement at next visit.          Relevant Orders    POCT urinalysis dipstick, manual (Completed)    POCT Glucose (Completed)    POC Glycosylated Hemoglobin (Hb A1C) (Completed)    Occlusion and stenosis of unspecified carotid artery    Overview     70 % stenois 06/2022 repeat now followed with vascular  50-60% on  doppler 2016 repeat 01/2020 CTA showed            Paroxysmal atrial fibrillation (HCC)    Overview     During heart surgery 2015, no break thru   Sinus rhythm by exam.            Medium    Mixed hyperlipidemia    Overview     Stable, Continue Crestor 40mg he is complaint  LDL at goal 10/2021            Current Assessment & Plan     Lipid abnormalities are improving with treatment.  Pharmacotherapy as ordered.  Lipids will be reassessed in 3 months.            Low    Class 2 severe obesity due to excess calories with serious comorbidity and body mass index (BMI) of 37.0 to 37.9 in adult (HCC)    Overview     Diet exercise and wt loss encouraged Techniques discussed         Current Assessment & Plan     Patient's (Body mass index is 37.41 kg/m².) indicates that they are obese (BMI >30) with health conditions that include hypertension, diabetes mellitus and dyslipidemias . Weight is unchanged. BMI  is above average; BMI management plan is completed. We discussed low calorie, low carb based diet program, portion control and increasing exercise.             Follow Up Instructions Charge Capture  Follow-up Communications  Return in about 3 months (around 6/3/2023).  Patient was given instructions and counseling regarding his condition or for health maintenance advice. Please see specific information pulled into the AVS if appropriate.

## 2023-03-04 NOTE — ASSESSMENT & PLAN NOTE
Diabetes is worsening.   Continue current treatment regimen.  Diabetes will be reassessed in 3 months.  He will improved diet and exercise we discussed trulicity addition if no improvement at next visit.

## 2023-03-04 NOTE — ASSESSMENT & PLAN NOTE
Patient's (Body mass index is 37.41 kg/m².) indicates that they are obese (BMI >30) with health conditions that include hypertension, diabetes mellitus and dyslipidemias . Weight is unchanged. BMI  is above average; BMI management plan is completed. We discussed low calorie, low carb based diet program, portion control and increasing exercise.

## 2023-03-16 DIAGNOSIS — I25.709 CORONARY ARTERY DISEASE INVOLVING CORONARY BYPASS GRAFT OF NATIVE HEART WITH ANGINA PECTORIS: ICD-10-CM

## 2023-03-17 RX ORDER — NITROGLYCERIN 0.4 MG/1
TABLET SUBLINGUAL
Qty: 25 TABLET | Refills: 0 | Status: SHIPPED | OUTPATIENT
Start: 2023-03-17

## 2023-03-23 DIAGNOSIS — E78.2 MIXED HYPERLIPIDEMIA: ICD-10-CM

## 2023-03-24 RX ORDER — ROSUVASTATIN CALCIUM 40 MG/1
TABLET, COATED ORAL
Qty: 30 TABLET | Refills: 0 | Status: SHIPPED | OUTPATIENT
Start: 2023-03-24 | End: 2023-03-27 | Stop reason: SDUPTHER

## 2023-03-27 DIAGNOSIS — E78.2 MIXED HYPERLIPIDEMIA: ICD-10-CM

## 2023-03-27 RX ORDER — ROSUVASTATIN CALCIUM 40 MG/1
40 TABLET, COATED ORAL DAILY
Qty: 90 TABLET | Refills: 3 | Status: SHIPPED | OUTPATIENT
Start: 2023-03-27 | End: 2024-03-21

## 2023-04-07 DIAGNOSIS — I25.709 CORONARY ARTERY DISEASE INVOLVING CORONARY BYPASS GRAFT OF NATIVE HEART WITH ANGINA PECTORIS: ICD-10-CM

## 2023-04-10 RX ORDER — NITROGLYCERIN 0.4 MG/1
TABLET SUBLINGUAL
Qty: 25 TABLET | Refills: 0 | Status: SHIPPED | OUTPATIENT
Start: 2023-04-10

## 2023-04-23 DIAGNOSIS — E11.51 TYPE 2 DIABETES MELLITUS WITH DIABETIC PERIPHERAL ANGIOPATHY WITHOUT GANGRENE, WITHOUT LONG-TERM CURRENT USE OF INSULIN: ICD-10-CM

## 2023-04-25 DIAGNOSIS — E11.51 TYPE 2 DIABETES MELLITUS WITH DIABETIC PERIPHERAL ANGIOPATHY WITHOUT GANGRENE, WITHOUT LONG-TERM CURRENT USE OF INSULIN: ICD-10-CM

## 2023-04-25 DIAGNOSIS — I10 BENIGN HYPERTENSION: ICD-10-CM

## 2023-04-28 DIAGNOSIS — I25.709 CORONARY ARTERY DISEASE INVOLVING CORONARY BYPASS GRAFT OF NATIVE HEART WITH ANGINA PECTORIS: ICD-10-CM

## 2023-05-01 RX ORDER — NITROGLYCERIN 0.4 MG/1
TABLET SUBLINGUAL
Qty: 25 TABLET | Refills: 3 | Status: SHIPPED | OUTPATIENT
Start: 2023-05-01

## 2023-05-04 ENCOUNTER — TRANSCRIBE ORDERS (OUTPATIENT)
Dept: ADMINISTRATIVE | Facility: HOSPITAL | Age: 58
End: 2023-05-04
Payer: COMMERCIAL

## 2023-05-04 DIAGNOSIS — R09.89 BRUIT: Primary | ICD-10-CM

## 2023-06-13 RX ORDER — DOXYCYCLINE 100 MG/1
1 CAPSULE ORAL EVERY 12 HOURS SCHEDULED
COMMUNITY
Start: 2023-03-17 | End: 2023-06-16

## 2023-06-13 NOTE — PROGRESS NOTES
Chief Complaint  Diabetes, Hypertension, Hyperlipidemia, Obesity, Coronary Artery Disease, and Atrial Fibrillation    Subjective     CC  Problem List  Visit Diagnosis   Encounters  Notes  Medications  Labs  Result Review Imaging  Media    Mendel Melendez presents to [facility] for   Diabetes  He presents for his follow-up diabetic visit. He has type 2 diabetes mellitus. No MedicAlert identification noted. The initial diagnosis of diabetes was made 8 years ago. His disease course has been improving. There are no hypoglycemic associated symptoms. Pertinent negatives for hypoglycemia include no headaches, hunger, mood changes, nervousness/anxiousness, pallor, sleepiness or sweats. Pertinent negatives for diabetes include no blurred vision, no chest pain, no fatigue, no foot paresthesias, no foot ulcerations, no polydipsia, no polyuria, no visual change, no weakness and no weight loss. There are no hypoglycemic complications. Symptoms are stable. There are no diabetic complications. Risk factors for coronary artery disease include dyslipidemia, diabetes mellitus, hypertension, male sex, family history and obesity. Current diabetic treatment includes oral agent (monotherapy). He is compliant with treatment most of the time. His weight is fluctuating minimally. He is following a diabetic and generally healthy diet. Meal planning includes avoidance of concentrated sweets. He has not had a previous visit with a dietitian. He participates in exercise daily. His home blood glucose trend is fluctuating minimally. His overall blood glucose range is 130-140 mg/dl. An ACE inhibitor/angiotensin II receptor blocker is not being taken. He does not see a podiatrist.Eye exam is current ([facility]).   Hypertension  This is a chronic problem. The current episode started more than 1 year ago. The problem has been waxing and waning since onset. Pertinent negatives include no anxiety, blurred vision,  chest pain, headaches, malaise/fatigue, orthopnea, palpitations, peripheral edema, PND, shortness of breath or sweats. Risk factors for coronary artery disease include diabetes mellitus, dyslipidemia, male gender, obesity and family history. Current antihypertension treatment includes calcium channel blockers and beta blockers. The current treatment provides mild improvement. There are no compliance problems.  There is no history of chronic renal disease.   Coronary Artery Disease  Presents for follow-up visit. Pertinent negatives include no chest pain, chest pressure, chest tightness, leg swelling, muscle weakness, palpitations, shortness of breath or weight gain. Risk factors include hyperlipidemia and obesity. Risk factors do not include hypertension. The symptoms have been stable. Compliance with diet is good. Compliance with exercise is good. Compliance with medications is good.   Hyperlipidemia  This is a chronic problem. The current episode started more than 1 year ago. Recent lipid tests were reviewed and are variable. Exacerbating diseases include diabetes and obesity. He has no history of chronic renal disease, hypothyroidism, liver disease or nephrotic syndrome. There are no known factors aggravating his hyperlipidemia. Pertinent negatives include no chest pain, focal sensory loss, focal weakness, leg pain, myalgias or shortness of breath. Current antihyperlipidemic treatment includes statins. The current treatment provides mild improvement of lipids. There are no compliance problems.  Risk factors for coronary artery disease include dyslipidemia, diabetes mellitus, hypertension, obesity, male sex and family history.   Atrial Fibrillation  Presents for follow-up visit. Symptoms are negative for an AICD problem, bradycardia, chest pain, hypertension, hypotension, pacemaker problem, palpitations, shortness of breath, syncope, tachycardia and weakness. The symptoms have been stable. Past medical history  "includes atrial fibrillation, CAD and hyperlipidemia. There are no medication compliance problems.     Review of Systems   Constitutional:  Negative for fatigue, malaise/fatigue, unexpected weight gain and unexpected weight loss.   Eyes:  Negative for blurred vision.   Respiratory:  Negative for chest tightness and shortness of breath.    Cardiovascular:  Negative for chest pain, palpitations, orthopnea, leg swelling, syncope and PND.   Gastrointestinal:  Negative for abdominal pain, constipation, diarrhea, nausea and vomiting.   Endocrine: Negative for cold intolerance, heat intolerance, polydipsia and polyuria.   Musculoskeletal:  Negative for myalgias and muscle weakness.   Skin:  Negative for pallor.   Neurological:  Negative for focal weakness, weakness and numbness.   Psychiatric/Behavioral:  The patient is not nervous/anxious.       Objective   Vital Signs:   /78 (BP Location: Left arm, Patient Position: Sitting, Cuff Size: Adult)   Pulse 73   Temp 97.5 °F (36.4 °C) (Infrared)   Resp 17   Ht 165.1 cm (65\")   Wt 99 kg (218 lb 3.2 oz)   SpO2 96% Comment: room air  BMI 36.31 kg/m²     Physical Exam  Constitutional:       General: He is not in acute distress.     Appearance: He is well-developed. He is obese.   Eyes:      Conjunctiva/sclera: Conjunctivae normal.   Neck:      Thyroid: No thyromegaly.      Vascular: No JVD.   Cardiovascular:      Rate and Rhythm: Normal rate and regular rhythm.      Pulses:           Carotid pulses are 2+ on the right side and 2+ on the left side.       Dorsalis pedis pulses are 2+ on the right side and 2+ on the left side.      Heart sounds: Normal heart sounds. No murmur heard.    No friction rub. No gallop.      Comments: Negative Duy's  Pulmonary:      Effort: Pulmonary effort is normal. No respiratory distress.      Breath sounds: Normal breath sounds. No wheezing or rales.   Musculoskeletal:      Cervical back: Neck supple.      Right lower leg: No edema.      " Left lower leg: No edema.   Lymphadenopathy:      Cervical: No cervical adenopathy.   Skin:     General: Skin is warm.      Findings: No erythema or rash.   Neurological:      Mental Status: He is alert and oriented to person, place, and time.      Coordination: Coordination normal.      Result Review :Labs  Result Review  Imaging  Med Tab  Media                 Assessment and Plan CC Problem List  Visit Diagnosis  ROS  Review (Popup)  Health Maintenance  Quality  BestPractice  Medications  SmartSets  SnapShot Encounters  Media  Problem List Items Addressed This Visit          High    Benign hypertension    Overview     Controlled, compliant, continue meds.          Current Assessment & Plan     Hypertension is improving with treatment.  Continue current treatment regimen.  Blood pressure will be reassessed in 3 months.         Coronary artery disease involving coronary bypass graft of native heart with angina pectoris    Overview     He is having stable angina unchanged coontrolled w/ prn NITRo  He is s/p cardiac cath showing open vessels but spasm  Followed with [facility] regularly, plan to go [facility]  s/p CABG x 3, 2015, s/p cardiac cath 07/2020 showing all three grafts to be free of disease but distal and proximal stenosis of LAD , stenosis of LCX  Risk factors modified  Interventional cardiology with angioplasty, 08/11/2021, 10/13/2021 repeated angioplasty. His anatomy is challenging w/o ability to have a perfect fix.    Angina much improved but continues and unchanged. He continues cardiology follow up  He is considering new cardiologist given [facility] exit.   Risk factors modified         Type 2 diabetes mellitus with diabetic peripheral angiopathy without gangrene, without long-term current use of insulin - Primary    Overview     A1c 7.2 03/03/2023  A1c 6.8 11/28/2022   A1c 7.0 08/19/2022 improved.   A1c 7.6 04/20/2022  A1c 7.8 01/19/2022,   A1c 6.6 09/24/2021 improved.   A1c 7.1 02/15/2021   A1c  7.2 10/06/2020   A1c 7.3 11/18/2019     A1c 6.9 5/7/2019   A1c 7.0 02/12/2018    A1c 7.1, 11/1/2018 new onset         Current Assessment & Plan     Diabetes is improving with treatment.   Medication changes per orders.  Diabetes will be reassessed in 3 months.         Relevant Orders    POCT Glucose (Completed)    POC Glycosylated Hemoglobin (Hb A1C) (Completed)    POCT urinalysis dipstick, manual (Completed)    Occlusion and stenosis of unspecified carotid artery    Overview     70 %  LCA stenois 06/2022 repeat now followed with vascular 60% right  50-60% on doppler 2016 repeat 01/2020 CTA showed   Repeat Echo pending, pros and cons of potential surgery discussed and understood.               Medium    Mixed hyperlipidemia    Overview     Stable, Continue Crestor 40mg he is complaint  LDL at goal 10/2021            Current Assessment & Plan     Lipid abnormalities are improving with treatment.  Pharmacotherapy as ordered.  Lipids will be reassessed in 3 months.            Low    Class 2 severe obesity due to excess calories with serious comorbidity and body mass index (BMI) of 36.0 to 36.9 in adult    Overview     Diet exercise and wt loss encouraged Techniques discussed         Current Assessment & Plan     Patient's (Body mass index is 36.31 kg/m².) indicates that they are obese (BMI >30) with health conditions that include hypertension, coronary heart disease, diabetes mellitus, and dyslipidemias . Weight is improving with treatment. BMI  is above average; BMI management plan is completed. We discussed portion control and increasing exercise.             Follow Up Instructions Charge Capture  Follow-up [employer]  Return in about 3 months (around 9/16/2023).  Patient was given instructions and counseling regarding his condition or for health maintenance advice. Please see specific information pulled into the AVS if appropriate.

## 2023-06-16 ENCOUNTER — OFFICE VISIT (OUTPATIENT)
Dept: FAMILY MEDICINE CLINIC | Facility: CLINIC | Age: 58
End: 2023-06-16
Payer: COMMERCIAL

## 2023-06-16 VITALS
DIASTOLIC BLOOD PRESSURE: 78 MMHG | HEART RATE: 73 BPM | TEMPERATURE: 97.5 F | HEIGHT: 65 IN | OXYGEN SATURATION: 96 % | SYSTOLIC BLOOD PRESSURE: 118 MMHG | WEIGHT: 218.2 LBS | BODY MASS INDEX: 36.35 KG/M2 | RESPIRATION RATE: 17 BRPM

## 2023-06-16 DIAGNOSIS — I10 BENIGN HYPERTENSION: ICD-10-CM

## 2023-06-16 DIAGNOSIS — E66.01 CLASS 2 SEVERE OBESITY DUE TO EXCESS CALORIES WITH SERIOUS COMORBIDITY AND BODY MASS INDEX (BMI) OF 36.0 TO 36.9 IN ADULT: ICD-10-CM

## 2023-06-16 DIAGNOSIS — I65.29 OCCLUSION AND STENOSIS OF UNSPECIFIED CAROTID ARTERY: ICD-10-CM

## 2023-06-16 DIAGNOSIS — E78.2 MIXED HYPERLIPIDEMIA: ICD-10-CM

## 2023-06-16 DIAGNOSIS — I25.709 CORONARY ARTERY DISEASE INVOLVING CORONARY BYPASS GRAFT OF NATIVE HEART WITH ANGINA PECTORIS: ICD-10-CM

## 2023-06-16 DIAGNOSIS — E11.51 TYPE 2 DIABETES MELLITUS WITH DIABETIC PERIPHERAL ANGIOPATHY WITHOUT GANGRENE, WITHOUT LONG-TERM CURRENT USE OF INSULIN: Primary | ICD-10-CM

## 2023-06-16 LAB
BILIRUB BLD-MCNC: NEGATIVE MG/DL
CLARITY, POC: CLEAR
COLOR UR: YELLOW
EXPIRATION DATE: NORMAL
GLUCOSE BLDC GLUCOMTR-MCNC: 136 MG/DL (ref 70–130)
GLUCOSE UR STRIP-MCNC: NEGATIVE MG/DL
HBA1C MFR BLD: 6.9 %
KETONES UR QL: NEGATIVE
LEUKOCYTE EST, POC: NEGATIVE
Lab: NORMAL
NITRITE UR-MCNC: NEGATIVE MG/ML
PH UR: 5 [PH] (ref 5–8)
PROT UR STRIP-MCNC: NEGATIVE MG/DL
RBC # UR STRIP: NEGATIVE /UL
SP GR UR: 1.01 (ref 1–1.03)
UROBILINOGEN UR QL: NORMAL

## 2023-06-16 NOTE — ASSESSMENT & PLAN NOTE
Patient's (Body mass index is 36.31 kg/m².) indicates that they are obese (BMI >30) with health conditions that include hypertension, coronary heart disease, diabetes mellitus, and dyslipidemias . Weight is improving with treatment. BMI  is above average; BMI management plan is completed. We discussed portion control and increasing exercise.

## 2023-06-16 NOTE — ASSESSMENT & PLAN NOTE
Diabetes is improving with treatment.   Medication changes per orders.  Diabetes will be reassessed in 3 months.   GENERAL SURGICAL CONSULTATION    I was requested by Misha Alonzo MD to consult on this pt to evaluate them for abdominal pain associated with ventral hernia repair    HPI:  This is a 71 y.o. female here today known to me as a did a ventral hernia repair with a ventreo mesh 3/13/2018.  The patient notes some firmness of the subcutaneous tissues overlying the midline incision.  The rest of her abdomen is soft she is not experience any nausea or vomiting.  She also complains of numbness and coldness in her hands and feet.  This is been taking place over the last 2 weeks and she is wondering if there is a connection between these 2 issues.    Allergies:Codeine and Penicillins    Past Medical History:   Diagnosis Date     Depression      GERD (gastroesophageal reflux disease)      Hyperlipidemia      Hypertension      Hypothyroidism     h/o grave's disease     Leiomyoma of uterus      PONV (postoperative nausea and vomiting)      Seasonal allergies      Ventral hernia        Past Surgical History:   Procedure Laterality Date     APPENDECTOMY       CHOLECYSTECTOMY       COLON SURGERY  2009    partial colon resection     EYE SURGERY      for proptosis     HEMIARTHROPLASTY SHOULDER FRACTURE Right      HERNIA REPAIR       HUMERUS FRACTURE SURGERY       HYSTERECTOMY Bilateral 3/13/2018    Procedure: EXPLORATORY LAPAROTOMY TOTAL ABDOMINAL HYSTERECTOMY, BILATERAL SALPINGO OOPHORECTOMY PELVIC WASHINGS LYSIS OF ADHESIONS;  Surgeon: Sheri Zamarripa MD;  Location: [facility];  Service:      THYROID SURGERY      ablation     TUBAL LIGATION       VENTRAL HERNIA REPAIR N/A 3/13/2018    Procedure: VENTRAL HERNIA REPAIR;  Surgeon: Josue Love MD;  Location: [facility];  Service:        CURRENT MEDS:  Current Outpatient Medications   Medication Sig Dispense Refill     aspirin 81 MG EC tablet Take 81 mg by mouth daily.       betamethasone valerate (VALISONE) 0.1 % cream Apply 1 application topically 2 (two)  times a day as needed.        calcium carbonate (OS-VADIM) 600 mg calcium (1,500 mg) tablet Take 600 mg by mouth daily.        cholecalciferol, vitamin D3, 1,000 unit tablet Take 1,000 Units by mouth daily.        levothyroxine (SYNTHROID, LEVOTHROID) 150 MCG tablet Take 137 mcg by mouth see administration instructions. Takes 6 days a week (off on Sunday)             lisinopril (PRINIVIL,ZESTRIL) 10 MG tablet Take 10 mg by mouth daily.        naproxen sodium (ALEVE) 220 MG tablet Take 220 mg by mouth 2 (two) times a day as needed.        OMEGA-3/DHA/EPA/FISH OIL (FISH OIL-OMEGA-3 FATTY ACIDS) 300-1,000 mg capsule Take 2 g by mouth daily.       omeprazole (PRILOSEC) 40 MG capsule Take 40 mg by mouth daily.        peg 400-propylene glycol (SYSTANE, PROPYLENE GLYCOL,) 0.4-0.3 % Drop Administer 1 drop to both eyes 4 (four) times a day as needed.       pravastatin (PRAVACHOL) 40 MG tablet Take 40 mg by mouth at bedtime.        No current facility-administered medications for this visit.        Family History   Problem Relation Age of Onset     Cancer Father 76        brain     Family history is not pertinent to this patients Chief Complaint.     reports that she quit smoking about 19 years ago. she has never used smokeless tobacco. She reports that she drinks alcohol. She reports that she does not use drugs.    Review of Systems -   10 point Review of systems is negative except for; as mentioned above in HPI and PMHx    /75 (Patient Site: Right Arm, Patient Position: Sitting, Cuff Size: Adult Regular)   Pulse 80   SpO2 97%   There is no height or weight on file to calculate BMI.    EXAM:  GENERAL: Well developed female  HEENT: EOMI, Anicteric Sclera, Moist Mucous Membranes,  In Mouth the pt does not have redness or bleeding gums  CARDIOVASCULAR: RRR w/out murmur   CHEST/LUNG: Clear to Auscultation  ABDOMEN:  Non tender to palpation, +BS, abdomen is soft.  She does have a region at the superior aspect of her midline  scar that is more firm through the subcutaneous tissues than the rest of her abdomen.  Is not particular tender to palpation.  MUSCULOSKELETAL:  No deformities with good range of motion in all extremities, hands and feet feel warm and are moving normally.  NEURO: She is ambulatory with good strength in both legs.  HEME/LYMPH: No Cervical Adenopathy or tenderness.     IMAGES:  None    Assessment/Plan:  Pleasant 71-year-old patient known to me after doing a ventral hernia repair and I reassured her that her ventral hernia repair is doing what I would expect.  She does have some firmness through the subtends tissues that I suspect is scar tissue growing into where her previous hernia sac had been.  I explained to her how the mesh is incorporated into the abdominal wall and that can make the abdominal wall feel more firm in that region.  I do not have an answer as to why she has cold and numbness in her hands and feet.  As this is been going on for the last 2 weeks I did suggest a potential for a vascular spasm type disease like Raynaud's since we have had such extreme cold weather during the period of time that she is been experiencing the symptoms in her hands and feet.  I cannot draw a connection between the ventral hernia repair and issues with her hands and feet.  I reassured the patient she is doing fine from the standpoint of her ventral hernia repair and I would simply pay attention to her symptoms over the course of the next month.  If symptoms are getting worse then I would evaluate her with a CT scan.  If symptoms are stable or improving then encouraged her not to be too concerned about these developments.      Josue Love MD  [facility]  [phone]

## 2023-07-31 ENCOUNTER — OFFICE VISIT (OUTPATIENT)
Dept: FAMILY MEDICINE CLINIC | Facility: CLINIC | Age: 58
End: 2023-07-31
Payer: COMMERCIAL

## 2023-07-31 VITALS
SYSTOLIC BLOOD PRESSURE: 128 MMHG | RESPIRATION RATE: 18 BRPM | HEART RATE: 105 BPM | HEIGHT: 65 IN | BODY MASS INDEX: 36.19 KG/M2 | TEMPERATURE: 97.5 F | WEIGHT: 217.2 LBS | OXYGEN SATURATION: 98 % | DIASTOLIC BLOOD PRESSURE: 72 MMHG

## 2023-07-31 DIAGNOSIS — I25.709 CORONARY ARTERY DISEASE INVOLVING CORONARY BYPASS GRAFT OF NATIVE HEART WITH ANGINA PECTORIS: ICD-10-CM

## 2023-07-31 DIAGNOSIS — Z91.89 HISTORY OF HEAT EXHAUSTION: Primary | ICD-10-CM

## 2023-07-31 DIAGNOSIS — R35.0 URINARY FREQUENCY: ICD-10-CM

## 2023-07-31 DIAGNOSIS — E11.51 TYPE 2 DIABETES MELLITUS WITH DIABETIC PERIPHERAL ANGIOPATHY WITHOUT GANGRENE, WITHOUT LONG-TERM CURRENT USE OF INSULIN: ICD-10-CM

## 2023-07-31 DIAGNOSIS — I10 BENIGN HYPERTENSION: ICD-10-CM

## 2023-07-31 LAB
BILIRUB BLD-MCNC: NEGATIVE MG/DL
CLARITY, POC: ABNORMAL
COLOR UR: YELLOW
GLUCOSE UR STRIP-MCNC: NEGATIVE MG/DL
KETONES UR QL: ABNORMAL
LEUKOCYTE EST, POC: NEGATIVE
NITRITE UR-MCNC: NEGATIVE MG/ML
PH UR: 5 [PH] (ref 5–8)
PROT UR STRIP-MCNC: ABNORMAL MG/DL
RBC # UR STRIP: NEGATIVE /UL
SP GR UR: 1.01 (ref 1–1.03)
UROBILINOGEN UR QL: NORMAL

## 2023-07-31 RX ORDER — NITROGLYCERIN 0.4 MG/1
0.4 TABLET SUBLINGUAL
Qty: 100 TABLET | Refills: 12 | Status: SHIPPED | OUTPATIENT
Start: 2023-07-31

## 2023-08-06 NOTE — ASSESSMENT & PLAN NOTE
Patient's (Body mass index is 36.14 kg/mý.) indicates that they are obese (BMI >30) with health conditions that include hypertension, coronary heart disease, diabetes mellitus, and dyslipidemias . Weight is unchanged. BMI  is above average; BMI management plan is completed. We discussed low calorie, low carb based diet program, portion control, and increasing exercise.

## 2023-08-12 DIAGNOSIS — I10 BENIGN HYPERTENSION: ICD-10-CM

## 2023-08-14 RX ORDER — AMLODIPINE BESYLATE 10 MG/1
TABLET ORAL
Qty: 90 TABLET | Refills: 3 | Status: SHIPPED | OUTPATIENT
Start: 2023-08-14

## 2023-08-26 DIAGNOSIS — J30.1 SEASONAL ALLERGIC RHINITIS DUE TO POLLEN: ICD-10-CM

## 2023-08-28 RX ORDER — MONTELUKAST SODIUM 10 MG/1
TABLET ORAL
Qty: 30 TABLET | Refills: 12 | Status: SHIPPED | OUTPATIENT
Start: 2023-08-28

## 2023-09-20 ENCOUNTER — TELEPHONE (OUTPATIENT)
Dept: FAMILY MEDICINE CLINIC | Facility: CLINIC | Age: 58
End: 2023-09-20
Payer: COMMERCIAL

## 2023-09-20 NOTE — PROGRESS NOTES
Chief Complaint  Annual Exam, Allergies, Hypertension, Hyperlipidemia, Atrial Fibrillation, Diabetes, and Obesity    Subjective     CC  Problem List  Visit Diagnosis   Encounters  Notes  Medications  Labs  Result Review Imaging  Media    Mendel Melendez presents to [facility] for an annual exam. The patient is here: for coordination of medical care to discuss health maintenance and disease prevention to follow up on multiple medical problems. Overall has: moderate activity with work/home activities, exercises 2 - 3 times per week, good appetite, feels well with minor complaints, good energy level, and is sleeping poorly. Nutrition: eating a variety of foods. Last tetanus shot was  2018 .     History of Present Illness   Mendel is here today for his annual exam. He has no complaints at this time.   Diabetes  He presents for his follow-up diabetic visit. He has type 2 diabetes mellitus. No MedicAlert identification noted. The initial diagnosis of diabetes was made 8 years ago. His disease course has been stable. There are no hypoglycemic associated symptoms. Pertinent negatives for hypoglycemia include no confusion or nervousness/anxiousness. Associated symptoms include chest pain (mild intermittent and chronic with max Rx and intervention, sxs are stable.). Pertinent negatives for diabetes include no fatigue, no polydipsia, no polyuria, no weakness and no weight loss. There are no hypoglycemic complications. Symptoms are stable. Risk factors for coronary artery disease include dyslipidemia, diabetes mellitus, hypertension, male sex, family history and obesity. Current diabetic treatment includes oral agent (monotherapy). He is compliant with treatment most of the time. His weight is fluctuating minimally. He is following a diabetic and generally healthy diet. Meal planning includes avoidance of concentrated sweets. He has not had a previous visit with a dietitian. He  participates in exercise daily. His home blood glucose trend is fluctuating minimally. His overall blood glucose range is 130-140 mg/dl. (Does not check sugar on the regular.) An ACE inhibitor/angiotensin II receptor blocker is not being taken. He does not see a podiatrist.Eye exam is current ([facility]).   Hypertension  This is a chronic problem. The current episode started more than 1 year ago. The problem has been waxing and waning since onset. Associated symptoms include chest pain (mild intermittent and chronic with max Rx and intervention, sxs are stable.). Pertinent negatives include no palpitations, peripheral edema or shortness of breath. Risk factors for coronary artery disease include diabetes mellitus, dyslipidemia, male gender, obesity and family history. Current antihypertension treatment includes calcium channel blockers and beta blockers. The current treatment provides mild improvement. There are no compliance problems.    Coronary Artery Disease  Presents for follow-up visit. Symptoms include chest pain (mild intermittent and chronic with max Rx and intervention, sxs are stable.). Pertinent negatives include no leg swelling, palpitations, shortness of breath or weight gain. The symptoms have been stable. Compliance with diet is good. Compliance with exercise is good. Compliance with medications is good.   Atrial Fibrillation  Presents for follow-up visit. Symptoms include chest pain (mild intermittent and chronic with max Rx and intervention, sxs are stable.). Symptoms are negative for an AICD problem, bradycardia, hypotension, pacemaker problem, palpitations, shortness of breath, tachycardia and weakness. The symptoms have been stable. Past medical history includes atrial fibrillation and CAD. There are no medication compliance problems.     Review of Systems   Constitutional:  Negative for activity change, fatigue, fever, unexpected weight gain and unexpected weight loss.   HENT:  Negative for  "congestion, ear pain, hearing loss, rhinorrhea, sinus pressure, sore throat, swollen glands, trouble swallowing and voice change.    Eyes:  Negative for visual disturbance.   Respiratory:  Negative for apnea, shortness of breath and wheezing.    Cardiovascular:  Positive for chest pain (mild intermittent and chronic with max Rx and intervention, sxs are stable.). Negative for palpitations and leg swelling.   Gastrointestinal:  Negative for abdominal pain, blood in stool, constipation, diarrhea, nausea, vomiting and indigestion.   Endocrine: Negative for cold intolerance, heat intolerance, polydipsia and polyuria.   Genitourinary:  Negative for dysuria, flank pain, frequency, hematuria, testicular pain, urgency and urinary incontinence.   Musculoskeletal:  Negative for arthralgias, joint swelling and myalgias.   Skin:  Negative for rash, skin lesions and wound.   Allergic/Immunologic: Negative for environmental allergies and immunocompromised state.   Neurological:  Negative for syncope, weakness, numbness, headache, memory problem and confusion.   Hematological:  Negative for adenopathy. Does not bruise/bleed easily.   Psychiatric/Behavioral:  Negative for suicidal ideas and depressed mood. The patient is not nervous/anxious.       Objective   Vital Signs:   /76 (BP Location: Left arm, Patient Position: Sitting, Cuff Size: Adult)   Pulse 77   Temp 97.3 °F (36.3 °C) (Infrared)   Resp 18   Ht 165.1 cm (65\")   Wt 98.1 kg (216 lb 3.2 oz)   SpO2 98% Comment: room air  BMI 35.98 kg/m²     Physical Exam  Feet:      Right foot:      Protective Sensation: 10 sites tested.  10 sites sensed.      Left foot:      Protective Sensation: 10 sites tested.  10 sites sensed.      Recent Lab Results:  Lab Results   Component Value Date    HGBA1C 7.3 09/22/2023      Result Review :Labs  Result Review  Imaging  Med Tab  Media                 Assessment and Plan CC Problem List  Visit Diagnosis  ROS  Review (Popup)  " Health Maintenance  Quality  BestPractice  Medications  SmartSets  SnapShot Encounters  Media  Problem List Items Addressed This Visit          High    Benign hypertension    Overview     Controlled, compliant, continue meds         Current Assessment & Plan     Hypertension is improving with treatment.  Continue current treatment regimen.  Blood pressure will be reassessed in 3 months.         Relevant Orders    CBC & Differential    Comprehensive Metabolic Panel    TSH    Bruit of left carotid artery    Overview     50-70% 06/2023 stable   60% stenosis by Carotid CTA 2019  He is in vascular surgery follow up  N5o sxs  Continue Crestor and follow.         Coronary artery disease involving coronary bypass graft of native heart with angina pectoris    Overview     He is having stable angina unchanged coontrolled w/ prn NITRo  He is s/p cardiac cath showing open vessels but spasm  Followed with [facility] regularly, plan to go to [facility]  s/p CABG x 3, 2015, s/p cardiac cath 07/2020 showing all three grafts to be free of disease but distal and proximal stenosis of LAD , stenosis of LCX  Risk factors modified  Interventional cardiology with angioplasty, 08/11/2021, 10/13/2021 repeated angioplasty. His anatomy is challenging w/o ability to have a perfect fix.    Angina much improved but continues and unchanged. He continues cardiology follow up  He is considering new cardiologist given [facility] exit.   Risk factors modified         Relevant Medications    isosorbide mononitrate (IMDUR) 30 MG 24 hr tablet    Type 2 diabetes mellitus with diabetic peripheral angiopathy without gangrene, without long-term current use of insulin    Overview     A1c 7.2 09/22/2023 improve diet and exercise.   A1c 6.9 06/16/2023 improved, he reports no hypo or   A1c 6.8 11/28/2022   A1c 6.6 09/24/2021 improved.   A1c 7.2 10/06/2020   A1c 7.3 11/18/2019     A1c 6.9 5/7/2019   A1c 7.0 02/12/2018    A1c 7.1, 11/1/2018 new onset         Current  Assessment & Plan     Diabetes is worsening.   Medication changes per orders.  Diabetes will be reassessed in 3 months.         Relevant Medications    Dulaglutide (Trulicity) 0.75 MG/0.5ML solution pen-injector    Other Relevant Orders    POC Glycosylated Hemoglobin (Hb A1C) (Completed)    POCT Glucose (Completed)    Microalbumin / Creatinine Urine Ratio - Urine, Clean Catch    Occlusion and stenosis of unspecified carotid artery    Overview     70 %  LCA stenois 06/2023 repeat now followed with vascular 60% right  50-60% on doppler 2016   Repeat Echo pending, pros and cons of potential surgery discussed and understood.            Paroxysmal atrial fibrillation    Overview     During heart surgery 2015, no break thru.   Sinus rhythm by exam.         Relevant Medications    isosorbide mononitrate (IMDUR) 30 MG 24 hr tablet       Medium    Mixed hyperlipidemia    Overview     Stable, Continue Crestor 40mg he is complaint  LDL at goal 10/2021            Current Assessment & Plan     Lipid abnormalities are improving with treatment.  Nutritional counseling was provided.  Lipids will be reassessed in 3 months.         Relevant Medications    rosuvastatin (CRESTOR) 40 MG tablet    Other Relevant Orders    Lipid Panel With / Chol / HDL Ratio    Sleep apnea    Overview     He is having a lot of difficulty with CPAP, he is encouraged to continue to try, given it should help his angina. He has multiple delivery systems, mask NC etc. He will continue to try             Low    Class 2 severe obesity due to excess calories with serious comorbidity and body mass index (BMI) of 36.0 to 36.9 in adult    Overview     Diet exercise and wt loss encouraged Techniques discussed         Current Assessment & Plan     Patient's (Body mass index is 35.98 kg/m².) indicates that they are obese (BMI >30) with health conditions that include hypertension, coronary heart disease, diabetes mellitus, and dyslipidemias . Weight is improving with  treatment. BMI  is above average; BMI management plan is completed. We discussed low calorie, low carb based diet program, portion control, and increasing exercise.          Seasonal allergic rhinitis due to pollen    Overview     Resume max meds. He works out doors. Spring allergy season soon            Unprioritized    Encounter for annual general medical examination with abnormal findings in adult - Primary    Overview     Diet exercise wt loss, seat belts, sunscreens and general safety. . Ear protection.   Previous lab reviewed, he eas notified of today's lab.         Relevant Orders    POCT urinalysis dipstick, manual (Completed)    Encounter for colorectal cancer screening    Overview     Last colonoscopy 03/04/2019 repeat 2029         Screening PSA (prostate specific antigen)    Overview     Lab Results   Component Value Date    PSA 0.9 02/15/2021    PSA 1.0 02/14/2020    PSA 1.1 08/13/2019            S/P CABG (coronary artery bypass graft)     Other Visit Diagnoses       Need for pneumococcal 20-valent conjugate vaccination        Relevant Orders    Pneumococcal Conjugate Vaccine 20-Valent All (Completed)    Need for influenza vaccination        Relevant Orders    Fluzone (or Fluarix & Flulaval for VFC) >6mos (Completed)         Site care done- cleaned with alcohol swab, procedure tolerated well, dressing applied. Venipuncture was obtained after 1 time(s). 3 tubes were drawn. Needle gauge used was 22.      Follow Up Instructions Charge Capture  Follow-up Communications  Return in about 3 months (around 12/22/2023).  Patient was given instructions and counseling regarding his condition or for health maintenance advice. Please see specific information pulled into the AVS if appropriate.

## 2023-09-20 NOTE — TELEPHONE ENCOUNTER
Caller: EZEQUIEL JOAQUIN    Relationship to patient: Emergency Contact    Best call back number: [phone]    Patient is needing:     WOULD LIKE TO KNOW IF [facility] IS STILL ACCEPTED BY DOCTOR LU[facility], OR THEY NEED TO FIND A NEW PROVIDER.     HAS AN APPOINTMENT ON 9.22.23

## 2023-09-22 ENCOUNTER — OFFICE VISIT (OUTPATIENT)
Dept: FAMILY MEDICINE CLINIC | Facility: CLINIC | Age: 58
End: 2023-09-22
Payer: COMMERCIAL

## 2023-09-22 VITALS
BODY MASS INDEX: 36.02 KG/M2 | WEIGHT: 216.2 LBS | TEMPERATURE: 97.3 F | HEART RATE: 77 BPM | HEIGHT: 65 IN | RESPIRATION RATE: 18 BRPM | OXYGEN SATURATION: 98 % | DIASTOLIC BLOOD PRESSURE: 76 MMHG | SYSTOLIC BLOOD PRESSURE: 122 MMHG

## 2023-09-22 DIAGNOSIS — Z23 NEED FOR PNEUMOCOCCAL 20-VALENT CONJUGATE VACCINATION: ICD-10-CM

## 2023-09-22 DIAGNOSIS — Z23 NEED FOR INFLUENZA VACCINATION: ICD-10-CM

## 2023-09-22 DIAGNOSIS — Z00.01 ENCOUNTER FOR ANNUAL GENERAL MEDICAL EXAMINATION WITH ABNORMAL FINDINGS IN ADULT: Primary | ICD-10-CM

## 2023-09-22 DIAGNOSIS — I25.709 CORONARY ARTERY DISEASE INVOLVING CORONARY BYPASS GRAFT OF NATIVE HEART WITH ANGINA PECTORIS: ICD-10-CM

## 2023-09-22 DIAGNOSIS — Z12.11 ENCOUNTER FOR COLORECTAL CANCER SCREENING: ICD-10-CM

## 2023-09-22 DIAGNOSIS — Z12.12 ENCOUNTER FOR COLORECTAL CANCER SCREENING: ICD-10-CM

## 2023-09-22 DIAGNOSIS — J30.1 SEASONAL ALLERGIC RHINITIS DUE TO POLLEN: ICD-10-CM

## 2023-09-22 DIAGNOSIS — R09.89 BRUIT OF LEFT CAROTID ARTERY: ICD-10-CM

## 2023-09-22 DIAGNOSIS — E11.51 TYPE 2 DIABETES MELLITUS WITH DIABETIC PERIPHERAL ANGIOPATHY WITHOUT GANGRENE, WITHOUT LONG-TERM CURRENT USE OF INSULIN: ICD-10-CM

## 2023-09-22 DIAGNOSIS — I48.0 PAROXYSMAL ATRIAL FIBRILLATION: ICD-10-CM

## 2023-09-22 DIAGNOSIS — Z95.1 S/P CABG (CORONARY ARTERY BYPASS GRAFT): ICD-10-CM

## 2023-09-22 DIAGNOSIS — E78.2 MIXED HYPERLIPIDEMIA: ICD-10-CM

## 2023-09-22 DIAGNOSIS — I65.29 OCCLUSION AND STENOSIS OF UNSPECIFIED CAROTID ARTERY: ICD-10-CM

## 2023-09-22 DIAGNOSIS — G47.33 OBSTRUCTIVE SLEEP APNEA SYNDROME: ICD-10-CM

## 2023-09-22 DIAGNOSIS — I10 BENIGN HYPERTENSION: ICD-10-CM

## 2023-09-22 DIAGNOSIS — E66.01 CLASS 2 SEVERE OBESITY DUE TO EXCESS CALORIES WITH SERIOUS COMORBIDITY AND BODY MASS INDEX (BMI) OF 36.0 TO 36.9 IN ADULT: ICD-10-CM

## 2023-09-22 DIAGNOSIS — Z12.5 SCREENING PSA (PROSTATE SPECIFIC ANTIGEN): ICD-10-CM

## 2023-09-22 LAB
BILIRUB BLD-MCNC: NEGATIVE MG/DL
CLARITY, POC: CLEAR
COLOR UR: YELLOW
EXPIRATION DATE: NORMAL
GLUCOSE BLDC GLUCOMTR-MCNC: 170 MG/DL (ref 70–130)
GLUCOSE UR STRIP-MCNC: NEGATIVE MG/DL
HBA1C MFR BLD: 7.3 %
KETONES UR QL: NEGATIVE
LEUKOCYTE EST, POC: NEGATIVE
Lab: NORMAL
NITRITE UR-MCNC: NEGATIVE MG/ML
PH UR: 5 [PH] (ref 5–8)
PROT UR STRIP-MCNC: NEGATIVE MG/DL
RBC # UR STRIP: NEGATIVE /UL
SP GR UR: 1.02 (ref 1–1.03)
UROBILINOGEN UR QL: NORMAL

## 2023-09-22 RX ORDER — DULAGLUTIDE 0.75 MG/.5ML
0.75 INJECTION, SOLUTION SUBCUTANEOUS WEEKLY
Qty: 6 ML | Refills: 3 | Status: SHIPPED | OUTPATIENT
Start: 2023-09-22

## 2023-09-22 RX ORDER — ISOSORBIDE MONONITRATE 30 MG/1
30 TABLET, EXTENDED RELEASE ORAL DAILY
Qty: 90 TABLET | Refills: 3 | Status: SHIPPED | OUTPATIENT
Start: 2023-09-22

## 2023-09-22 RX ORDER — ROSUVASTATIN CALCIUM 40 MG/1
40 TABLET, COATED ORAL DAILY
Qty: 90 TABLET | Refills: 3 | Status: SHIPPED | OUTPATIENT
Start: 2023-09-22 | End: 2024-09-16

## 2023-09-22 NOTE — ASSESSMENT & PLAN NOTE
Patient's (Body mass index is 35.98 kg/m².) indicates that they are obese (BMI >30) with health conditions that include hypertension, coronary heart disease, diabetes mellitus, and dyslipidemias . Weight is improving with treatment. BMI  is above average; BMI management plan is completed. We discussed low calorie, low carb based diet program, portion control, and increasing exercise.

## 2023-09-23 LAB
ALBUMIN SERPL-MCNC: 4.5 G/DL (ref 3.8–4.9)
ALBUMIN/CREAT UR: 8 MG/G CREAT (ref 0–29)
ALBUMIN/GLOB SERPL: 1.8 {RATIO} (ref 1.2–2.2)
ALP SERPL-CCNC: 86 IU/L (ref 44–121)
ALT SERPL-CCNC: 29 IU/L (ref 0–44)
AST SERPL-CCNC: 21 IU/L (ref 0–40)
BASOPHILS # BLD AUTO: 0 X10E3/UL (ref 0–0.2)
BASOPHILS NFR BLD AUTO: 1 %
BILIRUB SERPL-MCNC: 0.3 MG/DL (ref 0–1.2)
BUN SERPL-MCNC: 18 MG/DL (ref 6–24)
BUN/CREAT SERPL: 19 (ref 9–20)
CALCIUM SERPL-MCNC: 9.6 MG/DL (ref 8.7–10.2)
CHLORIDE SERPL-SCNC: 103 MMOL/L (ref 96–106)
CHOLEST SERPL-MCNC: 148 MG/DL (ref 100–199)
CHOLEST/HDLC SERPL: 2.2 RATIO (ref 0–5)
CO2 SERPL-SCNC: 23 MMOL/L (ref 20–29)
CREAT SERPL-MCNC: 0.96 MG/DL (ref 0.76–1.27)
CREAT UR-MCNC: 147.3 MG/DL
EGFRCR SERPLBLD CKD-EPI 2021: 93 ML/MIN/1.73
EOSINOPHIL # BLD AUTO: 0.2 X10E3/UL (ref 0–0.4)
EOSINOPHIL NFR BLD AUTO: 2 %
ERYTHROCYTE [DISTWIDTH] IN BLOOD BY AUTOMATED COUNT: 12.9 % (ref 11.6–15.4)
GLOBULIN SER CALC-MCNC: 2.5 G/DL (ref 1.5–4.5)
GLUCOSE SERPL-MCNC: 166 MG/DL (ref 70–99)
HCT VFR BLD AUTO: 47.2 % (ref 37.5–51)
HDLC SERPL-MCNC: 68 MG/DL
HGB BLD-MCNC: 15.7 G/DL (ref 13–17.7)
IMM GRANULOCYTES # BLD AUTO: 0 X10E3/UL (ref 0–0.1)
IMM GRANULOCYTES NFR BLD AUTO: 0 %
LDLC SERPL CALC-MCNC: 64 MG/DL (ref 0–99)
LYMPHOCYTES # BLD AUTO: 1.3 X10E3/UL (ref 0.7–3.1)
LYMPHOCYTES NFR BLD AUTO: 18 %
MCH RBC QN AUTO: 28.7 PG (ref 26.6–33)
MCHC RBC AUTO-ENTMCNC: 33.3 G/DL (ref 31.5–35.7)
MCV RBC AUTO: 86 FL (ref 79–97)
MICROALBUMIN UR-MCNC: 11.3 UG/ML
MONOCYTES # BLD AUTO: 0.7 X10E3/UL (ref 0.1–0.9)
MONOCYTES NFR BLD AUTO: 10 %
NEUTROPHILS # BLD AUTO: 5 X10E3/UL (ref 1.4–7)
NEUTROPHILS NFR BLD AUTO: 69 %
PLATELET # BLD AUTO: 264 X10E3/UL (ref 150–450)
POTASSIUM SERPL-SCNC: 4.7 MMOL/L (ref 3.5–5.2)
PROT SERPL-MCNC: 7 G/DL (ref 6–8.5)
RBC # BLD AUTO: 5.47 X10E6/UL (ref 4.14–5.8)
SODIUM SERPL-SCNC: 141 MMOL/L (ref 134–144)
TRIGL SERPL-MCNC: 85 MG/DL (ref 0–149)
TSH SERPL DL<=0.005 MIU/L-ACNC: 1.2 UIU/ML (ref 0.45–4.5)
VLDLC SERPL CALC-MCNC: 16 MG/DL (ref 5–40)
WBC # BLD AUTO: 7.2 X10E3/UL (ref 3.4–10.8)

## 2023-09-25 LAB — SPECIMEN STATUS: NORMAL

## 2023-09-26 LAB — PSA SERPL-MCNC: 1.1 NG/ML (ref 0–4)

## 2023-09-26 RX ORDER — CLOPIDOGREL BISULFATE 75 MG/1
TABLET ORAL
Qty: 90 TABLET | Refills: 3 | Status: SHIPPED | OUTPATIENT
Start: 2023-09-26

## 2023-10-17 ENCOUNTER — TRANSCRIBE ORDERS (OUTPATIENT)
Dept: ADMINISTRATIVE | Facility: HOSPITAL | Age: 58
End: 2023-10-17
Payer: COMMERCIAL

## 2023-10-17 DIAGNOSIS — R09.89 BRUIT: Primary | ICD-10-CM

## 2023-11-02 NOTE — PROGRESS NOTES
Date of Office Visit: 2023  Encounter Provider: Dr. Gurjit Guerra  Place of Service: [facility] CARDIOLOGY [place]  Patient Name: Mendel Melendez  :1965  Mariajose Hernandez MD    Chief Complaint   Patient presents with    Atrial Fibrillation    Coronary Artery Disease    Hypertension    Hyperlipidemia    Diabetes    Consult     History of Present Illness:    I am pleased to see Mr. Melendez in my office today as a new consultation.    As you know, patient is 58-year-old white gentleman whose past medical history is significant for hypertension, hyperlipidemia, diabetes mellitus, CAD, coronary artery stenting, CABG, who came today to establish his cardiac care with me.    In , patient was diagnosed with three-vessel coronary artery disease and underwent CABG.  However patient developed  of PDA branch of RCA.  Patient underwent successful stenting of this vessel at [facility].  Patient did well.  In 2022, patient underwent repeat cardiac catheterization at [facility] and was noted to have 100% occlusion of LAD, LCx.  SVG graft to diagonal was patent.  LIMA to LAD was patent.  RCA patent stent which was patent.  EF was 55 to 60%.    Patient reports occasional symptom of chest pain.  Patient use nitroglycerin few times in a week.  Patient denies any orthopnea, PND, syncope or presyncope.  No leg edema noted.    EKG showed sinus rhythm.  Poor progression of R wave noted.    Patient had recent cardiac catheterization and showed patent stent in RCA and SVG graft to diagonal and LIMA to LAD was patent.  Patient has severe native coronary artery disease.  Patient would have angina pectoris on and off.  Patient is noted to have coronary artery spasm.  I would recommend to start vitamin E 400 mg daily.  I would also increase Imdur to 30 mg twice daily.  If needed I would consider Cardizem in future.            Past Medical History:   Diagnosis Date    Bruit of left carotid  artery     Coronary artery disease     Hyperlipidemia     Hypertension     Nonspecific elevation of levels of transaminase or lactic acid dehydrogenase (LDH)     Paroxysmal atrial fibrillation     Seasonal allergic rhinitis due to pollen     Sleep apnea     Type 2 diabetes mellitus with other circulatory complications          Past Surgical History:   Procedure Laterality Date    CARDIAC CATHETERIZATION  04/09/2015    BHF Left main distal 60-70% LCX 80-90% RCA proximal 90% and mid 100% with left to right collaterals.    CARDIAC CATHETERIZATION N/A 07/10/2020    Procedure: Left Heart Cath with angiogram;  Surgeon: Job Griffin MD;  Location: [facility] CATH INVASIVE LOCATION;  Service: Cardiovascular;  Laterality: N/A;    CARDIAC CATHETERIZATION N/A 07/10/2020    Procedure: Saphenous Vein Graft;  Surgeon: Job Griffin MD;  Location: [facility] CATH INVASIVE LOCATION;  Service: Cardiovascular;  Laterality: N/A;  svg x 2  lima    CARDIAC CATHETERIZATION N/A 07/10/2020    Procedure: Left ventriculography;  Surgeon: Job Griffin MD;  Location: [facility] CATH INVASIVE LOCATION;  Service: Cardiovascular;  Laterality: N/A;    CARDIAC CATHETERIZATION  06/2022    [place]    CORONARY ARTERY BYPASS GRAFT  04/13/2015    Four, left main and three vessel cononary artery disease. Dr. Franklyn Smith    CORONARY STENT PLACEMENT      VASECTOMY  1998           Current Outpatient Medications:     amLODIPine (NORVASC) 10 MG tablet, Take 1 tablet by mouth once daily, Disp: 90 tablet, Rfl: 3    aspirin (aspirin) 81 MG EC tablet, Take 1 tablet by mouth Daily., Disp: , Rfl:     clopidogrel (PLAVIX) 75 MG tablet, Take 1 tablet by mouth once daily, Disp: 90 tablet, Rfl: 3    Dulaglutide (Trulicity) 0.75 MG/0.5ML solution pen-injector, Inject 0.75 mg under the skin into the appropriate area as directed 1 (One) Time Per Week., Disp: 6 mL, Rfl: 3    isosorbide mononitrate (IMDUR) 30 MG 24 hr tablet, Take 2 tablets by mouth Daily., Disp: 180 tablet, Rfl:  1    metFORMIN (GLUCOPHAGE) 1000 MG tablet, Take 1 tablet by mouth 2 (Two) Times a Day With Meals., Disp: 180 tablet, Rfl: 3    metoprolol tartrate (LOPRESSOR) 50 MG tablet, TAKE 1 TABLET BY MOUTH EVERY 8 HOURS, Disp: 90 tablet, Rfl: 3    montelukast (SINGULAIR) 10 MG tablet, TAKE 1 TABLET BY MOUTH ONCE DAILY AT NIGHT, Disp: 30 tablet, Rfl: 12    nitroglycerin (NITRODUR) 0.4 MG/HR patch, 1 daily, Disp: 90 patch, Rfl: 3    nitroglycerin (NITROSTAT) 0.4 MG SL tablet, Place 1 tablet under the tongue Every 5 (Five) Minutes As Needed for Chest Pain. do not exceed a total of 3 doses in 15 minutes, Disp: 100 tablet, Rfl: 12    rosuvastatin (CRESTOR) 40 MG tablet, Take 1 tablet by mouth Daily for 360 days., Disp: 90 tablet, Rfl: 3    vitamin E (Natural Vitamin E) 400 UNIT capsule, Take 1 capsule by mouth Daily., Disp: 90 capsule, Rfl: 0      Social History     Socioeconomic History    Marital status:    Tobacco Use    Smoking status: Never    Smokeless tobacco: Never   Vaping Use    Vaping Use: Never used   Substance and Sexual Activity    Alcohol use: No    Drug use: No    Sexual activity: Defer         Review of Systems   Constitutional: Negative for chills and fever.   HENT:  Negative for ear discharge and nosebleeds.    Eyes:  Negative for discharge and redness.   Cardiovascular:  Positive for chest pain. Negative for orthopnea, palpitations, paroxysmal nocturnal dyspnea and syncope.   Respiratory:  Positive for shortness of breath. Negative for cough and wheezing.    Endocrine: Negative for heat intolerance.   Skin:  Negative for rash.   Musculoskeletal:  Negative for arthritis and myalgias.   Gastrointestinal:  Negative for abdominal pain, melena, nausea and vomiting.   Genitourinary:  Negative for dysuria and hematuria.   Neurological:  Negative for dizziness, light-headedness, numbness and tremors.   Psychiatric/Behavioral:  Negative for depression. The patient is not nervous/anxious.   "      Procedures      ECG 12 Lead    Date/Time: 11/3/2023 2:33 PM  Performed by: Gurjit Guerra MD    Authorized by: Gurjit Guerra MD  Comparison: compared with previous ECG   Similar to previous ECG  Rhythm: sinus rhythm    Clinical impression: normal ECG          ECG 12 Lead    (Results Pending)           Objective:    /79 (BP Location: Right arm, Patient Position: Sitting, Cuff Size: Large Adult)   Pulse 99   Resp 18   Ht 165.1 cm (65\")   Wt 96.2 kg (212 lb)   SpO2 95%   BMI 35.28 kg/m²         Constitutional:       Appearance: Well-developed.   Eyes:      General: No scleral icterus.        Right eye: No discharge.   HENT:      Head: Normocephalic and atraumatic.   Neck:      Thyroid: No thyromegaly.      Lymphadenopathy: No cervical adenopathy.   Pulmonary:      Effort: Pulmonary effort is normal. No respiratory distress.      Breath sounds: Normal breath sounds. No wheezing. No rales.   Cardiovascular:      Normal rate. Regular rhythm.      No gallop.    Edema:     Peripheral edema absent.   Abdominal:      Tenderness: There is no abdominal tenderness.   Skin:     Findings: No erythema or rash.   Neurological:      Mental Status: Alert and oriented to person, place, and time.             Assessment:       Diagnosis Plan   1. Benign hypertension  ECG 12 Lead    vitamin E (Natural Vitamin E) 400 UNIT capsule      2. Type 2 diabetes mellitus with diabetic peripheral angiopathy without gangrene, without long-term current use of insulin  ECG 12 Lead    vitamin E (Natural Vitamin E) 400 UNIT capsule      3. Coronary artery disease involving coronary bypass graft of native heart with angina pectoris  ECG 12 Lead    isosorbide mononitrate (IMDUR) 30 MG 24 hr tablet    vitamin E (Natural Vitamin E) 400 UNIT capsule      4. Mixed hyperlipidemia  ECG 12 Lead    vitamin E (Natural Vitamin E) 400 UNIT capsule      5. Paroxysmal atrial fibrillation  ECG 12 Lead    vitamin E (Natural Vitamin E) 400 UNIT capsule    "            Plan:       MDM:    1.  CAD/CABG/coronary artery stenting    Patient is advised to continue aspirin and Plavix.    2.  Angina pectoris:    Patient may have coronary artery spasm.  Start vitamin D.  Increase Imdur 30 mg twice daily    3.  Hypertension:    Blood pressure is controlled    4.  Hyperlipidemia:    Patient is on Crestor.  Recent LDL is 64 which is desirable

## 2023-11-03 ENCOUNTER — OFFICE VISIT (OUTPATIENT)
Dept: CARDIOLOGY | Facility: CLINIC | Age: 58
End: 2023-11-03
Payer: COMMERCIAL

## 2023-11-03 VITALS
HEART RATE: 99 BPM | SYSTOLIC BLOOD PRESSURE: 115 MMHG | BODY MASS INDEX: 35.32 KG/M2 | OXYGEN SATURATION: 95 % | RESPIRATION RATE: 18 BRPM | WEIGHT: 212 LBS | HEIGHT: 65 IN | DIASTOLIC BLOOD PRESSURE: 79 MMHG

## 2023-11-03 DIAGNOSIS — I10 BENIGN HYPERTENSION: Primary | ICD-10-CM

## 2023-11-03 DIAGNOSIS — I25.709 CORONARY ARTERY DISEASE INVOLVING CORONARY BYPASS GRAFT OF NATIVE HEART WITH ANGINA PECTORIS: ICD-10-CM

## 2023-11-03 DIAGNOSIS — E78.2 MIXED HYPERLIPIDEMIA: ICD-10-CM

## 2023-11-03 DIAGNOSIS — I48.0 PAROXYSMAL ATRIAL FIBRILLATION: ICD-10-CM

## 2023-11-03 DIAGNOSIS — E11.51 TYPE 2 DIABETES MELLITUS WITH DIABETIC PERIPHERAL ANGIOPATHY WITHOUT GANGRENE, WITHOUT LONG-TERM CURRENT USE OF INSULIN: ICD-10-CM

## 2023-11-03 PROCEDURE — 93000 ELECTROCARDIOGRAM COMPLETE: CPT | Performed by: INTERNAL MEDICINE

## 2023-11-03 PROCEDURE — 99214 OFFICE O/P EST MOD 30 MIN: CPT | Performed by: INTERNAL MEDICINE

## 2023-11-03 RX ORDER — ISOSORBIDE MONONITRATE 30 MG/1
60 TABLET, EXTENDED RELEASE ORAL DAILY
Qty: 180 TABLET | Refills: 1 | Status: SHIPPED | OUTPATIENT
Start: 2023-11-03

## 2023-11-03 RX ORDER — VITAMIN E 268 MG
400 CAPSULE ORAL DAILY
Qty: 90 CAPSULE | Refills: 0 | Status: SHIPPED | OUTPATIENT
Start: 2023-11-03

## 2023-11-07 ENCOUNTER — PATIENT ROUNDING (BHMG ONLY) (OUTPATIENT)
Dept: CARDIOLOGY | Facility: CLINIC | Age: 58
End: 2023-11-07
Payer: COMMERCIAL

## 2023-11-07 NOTE — PROGRESS NOTES
A My-Chart message has been sent to the patient [place] [facility] [place]

## 2023-12-01 DIAGNOSIS — I10 BENIGN HYPERTENSION: Primary | ICD-10-CM

## 2023-12-01 RX ORDER — METOPROLOL TARTRATE 50 MG/1
50 TABLET, FILM COATED ORAL 2 TIMES DAILY
Qty: 90 TABLET | Refills: 3 | Status: SHIPPED | OUTPATIENT
Start: 2023-12-01

## 2023-12-09 DIAGNOSIS — I25.709 CORONARY ARTERY DISEASE INVOLVING CORONARY BYPASS GRAFT OF NATIVE HEART WITH ANGINA PECTORIS: ICD-10-CM

## 2023-12-11 RX ORDER — NITROGLYCERIN 80 MG/1
PATCH TRANSDERMAL
Qty: 90 PATCH | Refills: 0 | Status: SHIPPED | OUTPATIENT
Start: 2023-12-11 | End: 2023-12-12 | Stop reason: SDUPTHER

## 2023-12-12 DIAGNOSIS — I25.709 CORONARY ARTERY DISEASE INVOLVING CORONARY BYPASS GRAFT OF NATIVE HEART WITH ANGINA PECTORIS: ICD-10-CM

## 2023-12-12 RX ORDER — NITROGLYCERIN 80 MG/1
PATCH TRANSDERMAL
Qty: 90 PATCH | Refills: 3 | Status: SHIPPED | OUTPATIENT
Start: 2023-12-12

## 2023-12-27 NOTE — PROGRESS NOTES
Chief Complaint  Diabetes, Hypertension, Coronary Artery Disease, Hyperlipidemia, and Atrial Fibrillation    Subjective     CC  Problem List  Visit Diagnosis   Encounters  Notes  Medications  Labs  Result Review Imaging  Media    Mendel Melendez presents to [facility] for   History of Present Illness     Diabetes  He presents for his follow-up diabetic visit. He has type 2 diabetes mellitus. No MedicAlert identification noted. The initial diagnosis of diabetes was made 8 years ago. His disease course has been stable. There are no hypoglycemic associated symptoms. Pertinent negatives for hypoglycemia include no headaches or nervousness/anxiousness. Associated symptoms include chest pain (mild intermittent and chronic with max Rx and intervention, sxs are stable.). Pertinent negatives for diabetes include no blurred vision, no fatigue, no polydipsia, no polyuria, no visual change, no weakness and no weight loss. There are no hypoglycemic complications. Symptoms are stable. Risk factors for coronary artery disease include dyslipidemia, diabetes mellitus, hypertension, male sex, family history and obesity. Current diabetic treatment includes oral agent (monotherapy). He is compliant with treatment most of the time. His weight is fluctuating minimally. He is following a diabetic and generally healthy diet. Meal planning includes avoidance of concentrated sweets. He has not had a previous visit with a dietitian. He participates in exercise daily. His home blood glucose trend is fluctuating minimally. His overall blood glucose range is 130-140 mg/dl. (Does not check sugar on the regular.) An ACE inhibitor/angiotensin II receptor blocker is not being taken. He does not see a podiatrist.Eye exam is not current ([facility]).   Hypertension  This is a chronic problem. The current episode started more than 1 year ago. The problem has been waxing and waning since onset. Associated symptoms  include chest pain (mild intermittent and chronic with max Rx and intervention, sxs are stable.). Pertinent negatives include no blurred vision, headaches, orthopnea, palpitations, peripheral edema, PND or shortness of breath. Risk factors for coronary artery disease include diabetes mellitus, dyslipidemia, male gender, obesity and family history. Current antihypertension treatment includes calcium channel blockers and beta blockers. The current treatment provides mild improvement. There are no compliance problems.    Coronary Artery Disease  Presents for follow-up visit. Symptoms include chest pain (mild intermittent and chronic with max Rx and intervention, sxs are stable.). Pertinent negatives include no leg swelling, palpitations, shortness of breath or weight gain. Risk factors include hyperlipidemia and obesity. The symptoms have been stable. Compliance with diet is good. Compliance with exercise is good. Compliance with medications is good.   Atrial Fibrillation  Presents for follow-up visit. Symptoms include chest pain (mild intermittent and chronic with max Rx and intervention, sxs are stable.). Symptoms are negative for an AICD problem, bradycardia, hypotension, pacemaker problem, palpitations, shortness of breath, tachycardia and weakness. The symptoms have been stable. Past medical history includes atrial fibrillation, CAD and hyperlipidemia. There are no medication compliance problems.   Hyperlipidemia  This is a chronic problem. The current episode started more than 1 year ago. Exacerbating diseases include diabetes and obesity. He has no history of hypothyroidism. Associated symptoms include chest pain (mild intermittent and chronic with max Rx and intervention, sxs are stable.). Pertinent negatives include no leg pain or shortness of breath. Current antihyperlipidemic treatment includes statins. The current treatment provides mild improvement of lipids. There are no compliance problems.  Risk factors  "for coronary artery disease include dyslipidemia, hypertension, male sex, diabetes mellitus, obesity and family history.       Review of Systems   Constitutional:  Negative for fatigue, unexpected weight gain and unexpected weight loss.   Eyes:  Negative for blurred vision.   Respiratory:  Negative for shortness of breath.    Cardiovascular:  Positive for chest pain (mild intermittent and chronic with max Rx and intervention, sxs are stable.). Negative for palpitations, orthopnea, leg swelling and PND.   Gastrointestinal:  Negative for abdominal pain, constipation, diarrhea, nausea and vomiting.   Endocrine: Negative for cold intolerance, heat intolerance, polydipsia and polyuria.   Genitourinary:  Negative for dysuria.   Skin:  Negative for rash.   Neurological:  Negative for weakness and numbness.   Psychiatric/Behavioral:  The patient is not nervous/anxious.         Objective   Vital Signs:   /78 (BP Location: Right arm, Patient Position: Sitting, Cuff Size: Large Adult)   Pulse 77   Temp 97.5 °F (36.4 °C) (Temporal)   Resp 16   Ht 165.1 cm (65\")   Wt 97.6 kg (215 lb 3.2 oz)   SpO2 98% Comment: room air  BMI 35.81 kg/m²     Physical Exam  Constitutional:       General: He is not in acute distress.  Cardiovascular:      Rate and Rhythm: Normal rate and regular rhythm.      Heart sounds: No murmur heard.  Pulmonary:      Effort: Pulmonary effort is normal.      Breath sounds: Normal breath sounds. No wheezing.   Musculoskeletal:      Cervical back: Neck supple.      Right lower leg: No edema.      Left lower leg: No edema.   Lymphadenopathy:      Cervical: No cervical adenopathy.   Skin:     Findings: No rash.   Neurological:      Mental Status: He is alert.        Result Review :Labs  Result Review  Imaging  Med Tab  Media                 Assessment and Plan CC Problem List  Visit Diagnosis  ROS  Review (Popup)  Health Maintenance  Quality  BestPractice  Medications  SmartSets  SnapShot " Encounters  Media  Problem List Items Addressed This Visit          High    Benign hypertension    Overview     Controlled, compliant, continue meds         Current Assessment & Plan     Hypertension is improving with treatment.  Continue current treatment regimen.  Dietary sodium restriction.  Weight loss.  Regular aerobic exercise.  Blood pressure will be reassessed in 3 months.         Coronary artery disease involving coronary bypass graft of native heart with angina pectoris    Overview     He is having stable angina unchanged coontrolled w/ prn NITRo  He is s/p cardiac cath showing open vessels but spasm  Followed with [facility] regularly, plan to go to [facility]  s/p CABG x 3, 2015, s/p cardiac cath 07/2020 showing all three grafts to be free of disease but distal and proximal stenosis of LAD , stenosis of LCX  Risk factors modified  Interventional cardiology with angioplasty, 08/11/2021, 10/13/2021 repeated angioplasty. His anatomy is challenging w/o ability to have a perfect fix.    Angina much improved but continues and unchanged. He continues cardiology follow up  He is considering new cardiologist given [facility] exit.   Risk factors modified.         Relevant Medications    isosorbide mononitrate (IMDUR) 30 MG 24 hr tablet    Type 2 diabetes mellitus with diabetic peripheral angiopathy without gangrene, without long-term current use of insulin - Primary    Overview     A1c 7.2 09/22/2023 improve diet and exercise.   A1c 6.9 06/16/2023 improved, he reports no hypo or   A1c 6.8 11/28/2022   A1c 6.6 09/24/2021 improved.   A1c 7.2 10/06/2020   A1c 7.3 11/18/2019     A1c 6.9 5/7/2019   A1c 7.0 02/12/2018    A1c 7.1, 11/1/2018 new onset         Current Assessment & Plan     Diabetes is worsening.   Continue current treatment regimen.  Diabetes will be reassessed in 3 months.         Relevant Medications    Dulaglutide (Trulicity) 1.5 MG/0.5ML solution pen-injector    Other Relevant Orders    POCT urinalysis dipstick,  manual (Completed)    POC Glycosylated Hemoglobin (Hb A1C) (Completed)    POCT Glucose (Completed)       Medium    Mixed hyperlipidemia    Overview     Stable, Continue Crestor 40mg he is complaint  LDL at goal             Current Assessment & Plan     Lipid abnormalities are unchanged.  Pharmacotherapy as ordered.  Lipids will be reassessed in 3 months.            Low    Class 2 severe obesity due to excess calories with serious comorbidity and body mass index (BMI) of 36.0 to 36.9 in adult    Overview     Diet exercise and wt loss encouraged Techniques discussed         Current Assessment & Plan     Patient's (Body mass index is 35.81 kg/m².) indicates that they are obese (BMI >30) with health conditions that include obstructive sleep apnea, hypertension, coronary heart disease, diabetes mellitus, and dyslipidemias . Weight is unchanged. BMI  is above average; BMI management plan is completed. We discussed low calorie, low carb based diet program, portion control, and increasing exercise.             Follow Up Instructions Charge Capture  Follow-up Communications  No follow-ups on file.  Patient was given instructions and counseling regarding his condition or for health maintenance advice. Please see specific information pulled into the AVS if appropriate.

## 2023-12-29 ENCOUNTER — OFFICE VISIT (OUTPATIENT)
Dept: FAMILY MEDICINE CLINIC | Facility: CLINIC | Age: 58
End: 2023-12-29
Payer: COMMERCIAL

## 2023-12-29 VITALS
RESPIRATION RATE: 16 BRPM | OXYGEN SATURATION: 98 % | SYSTOLIC BLOOD PRESSURE: 122 MMHG | WEIGHT: 215.2 LBS | TEMPERATURE: 97.5 F | BODY MASS INDEX: 35.85 KG/M2 | HEART RATE: 77 BPM | HEIGHT: 65 IN | DIASTOLIC BLOOD PRESSURE: 78 MMHG

## 2023-12-29 DIAGNOSIS — E66.01 CLASS 2 SEVERE OBESITY DUE TO EXCESS CALORIES WITH SERIOUS COMORBIDITY AND BODY MASS INDEX (BMI) OF 36.0 TO 36.9 IN ADULT: ICD-10-CM

## 2023-12-29 DIAGNOSIS — E11.51 TYPE 2 DIABETES MELLITUS WITH DIABETIC PERIPHERAL ANGIOPATHY WITHOUT GANGRENE, WITHOUT LONG-TERM CURRENT USE OF INSULIN: Primary | ICD-10-CM

## 2023-12-29 DIAGNOSIS — I10 BENIGN HYPERTENSION: ICD-10-CM

## 2023-12-29 DIAGNOSIS — I25.709 CORONARY ARTERY DISEASE INVOLVING CORONARY BYPASS GRAFT OF NATIVE HEART WITH ANGINA PECTORIS: ICD-10-CM

## 2023-12-29 DIAGNOSIS — E78.2 MIXED HYPERLIPIDEMIA: ICD-10-CM

## 2023-12-29 LAB
BILIRUB BLD-MCNC: NEGATIVE MG/DL
CLARITY, POC: CLEAR
COLOR UR: YELLOW
EXPIRATION DATE: ABNORMAL
GLUCOSE BLDC GLUCOMTR-MCNC: 135 MG/DL (ref 70–130)
GLUCOSE UR STRIP-MCNC: NEGATIVE MG/DL
HBA1C MFR BLD: 7.7 % (ref 4.5–5.7)
KETONES UR QL: NEGATIVE
LEUKOCYTE EST, POC: NEGATIVE
Lab: ABNORMAL
NITRITE UR-MCNC: NEGATIVE MG/ML
PH UR: 5 [PH] (ref 5–8)
PROT UR STRIP-MCNC: NEGATIVE MG/DL
RBC # UR STRIP: NEGATIVE /UL
SP GR UR: 1.02 (ref 1–1.03)
UROBILINOGEN UR QL: NORMAL

## 2023-12-29 RX ORDER — DULAGLUTIDE 1.5 MG/.5ML
1 INJECTION, SOLUTION SUBCUTANEOUS WEEKLY
Qty: 12 ML | Refills: 3 | Status: SHIPPED | OUTPATIENT
Start: 2023-12-29

## 2023-12-29 RX ORDER — ISOSORBIDE MONONITRATE 30 MG/1
60 TABLET, EXTENDED RELEASE ORAL 2 TIMES DAILY
Qty: 180 TABLET | Refills: 1 | Status: SHIPPED | OUTPATIENT
Start: 2023-12-29

## 2024-01-15 NOTE — ASSESSMENT & PLAN NOTE
Patient's (Body mass index is 35.81 kg/m².) indicates that they are obese (BMI >30) with health conditions that include obstructive sleep apnea, hypertension, coronary heart disease, diabetes mellitus, and dyslipidemias . Weight is unchanged. BMI  is above average; BMI management plan is completed. We discussed low calorie, low carb based diet program, portion control, and increasing exercise.

## 2024-02-13 ENCOUNTER — CLINICAL SUPPORT (OUTPATIENT)
Dept: FAMILY MEDICINE CLINIC | Facility: CLINIC | Age: 59
End: 2024-02-13

## 2024-02-13 VITALS
HEIGHT: 65 IN | OXYGEN SATURATION: 98 % | RESPIRATION RATE: 20 BRPM | WEIGHT: 221 LBS | HEART RATE: 74 BPM | SYSTOLIC BLOOD PRESSURE: 130 MMHG | DIASTOLIC BLOOD PRESSURE: 80 MMHG | BODY MASS INDEX: 36.82 KG/M2

## 2024-02-13 DIAGNOSIS — Z02.4 ENCOUNTER FOR CDL (COMMERCIAL DRIVING LICENSE) EXAM: Primary | ICD-10-CM

## 2024-02-13 LAB
BILIRUB BLD-MCNC: NEGATIVE MG/DL
CLARITY, POC: CLEAR
COLOR UR: YELLOW
GLUCOSE UR STRIP-MCNC: NEGATIVE MG/DL
KETONES UR QL: NEGATIVE
LEUKOCYTE EST, POC: NEGATIVE
NITRITE UR-MCNC: NEGATIVE MG/ML
PH UR: 7 [PH] (ref 5–8)
PROT UR STRIP-MCNC: NEGATIVE MG/DL
RBC # UR STRIP: NEGATIVE /UL
SP GR UR: 1.02 (ref 1–1.03)
UROBILINOGEN UR QL: NORMAL

## 2024-02-13 PROCEDURE — 81002 URINALYSIS NONAUTO W/O SCOPE: CPT | Performed by: FAMILY MEDICINE

## 2024-02-13 PROCEDURE — DOTPHY: Performed by: FAMILY MEDICINE

## 2024-03-07 NOTE — PROGRESS NOTES
Date of Office Visit: 2024  Encounter Provider: Dr. Gurjit Guerra  Place of Service: [facility] CARDIOLOGY [place]  Patient Name: Mendel Melendez  :1965  Mariajose Hernandez MD    Chief Complaint   Patient presents with    Atrial Fibrillation    Coronary Artery Disease    Hypertension    Hyperlipidemia    Follow-up     History of Present Illness    I am pleased to see Mr. Melendez in my office today as a follow-up..    As you know, patient is 58-year-old white gentleman whose past medical history is significant for hypertension, hyperlipidemia, diabetes mellitus, CAD, coronary artery stenting, CABG, who came today for follow-up.    In , patient was diagnosed with three-vessel coronary artery disease and underwent CABG.  However patient developed  of PDA branch of RCA.  Patient underwent successful stenting of this vessel at [facility].  Patient did well.  In 2022, patient underwent repeat cardiac catheterization at [facility] and was noted to have 100% occlusion of LAD, LCx.  SVG graft to diagonal was patent.  LIMA to LAD was patent.  RCA patent stent which was patent.  EF was 55 to 60%.    Patient came today and he continues to have occasional exertional stable angina.  Patient denies any symptom of palpitation.  No leg edema.  No orthopnea PND no syncope or presyncope.    EKG showed normal sinus rhythm no ST changes.    I would increase metoprolol 100 mg twice daily.  I would add Ranexa.  Continue current treatment if patient continues to have chest pain I would consider cardiac catheterization.      Past Medical History:   Diagnosis Date    Bruit of left carotid artery     Coronary artery disease     Hyperlipidemia     Hypertension     Nonspecific elevation of levels of transaminase or lactic acid dehydrogenase (LDH)     Paroxysmal atrial fibrillation     Seasonal allergic rhinitis due to pollen     Sleep apnea     Type 2 diabetes mellitus with other circulatory  complications          Past Surgical History:   Procedure Laterality Date    CARDIAC CATHETERIZATION  04/09/2015    BHF Left main distal 60-70% LCX 80-90% RCA proximal 90% and mid 100% with left to right collaterals.    CARDIAC CATHETERIZATION N/A 07/10/2020    Procedure: Left Heart Cath with angiogram;  Surgeon: Job Griffin MD;  Location: [facility] CATH INVASIVE LOCATION;  Service: Cardiovascular;  Laterality: N/A;    CARDIAC CATHETERIZATION N/A 07/10/2020    Procedure: Saphenous Vein Graft;  Surgeon: Job Griffin MD;  Location: [facility] CATH INVASIVE LOCATION;  Service: Cardiovascular;  Laterality: N/A;  svg x 2  lima    CARDIAC CATHETERIZATION N/A 07/10/2020    Procedure: Left ventriculography;  Surgeon: Job Griffin MD;  Location: [facility] CATH INVASIVE LOCATION;  Service: Cardiovascular;  Laterality: N/A;    CARDIAC CATHETERIZATION  06/2022    [place]    CORONARY ARTERY BYPASS GRAFT  04/13/2015    Four, left main and three vessel cononary artery disease. Dr. Franklyn Smith    CORONARY STENT PLACEMENT      VASECTOMY  1998           Current Outpatient Medications:     amLODIPine (NORVASC) 10 MG tablet, Take 1 tablet by mouth once daily, Disp: 90 tablet, Rfl: 3    aspirin (aspirin) 81 MG EC tablet, Take 1 tablet by mouth Daily., Disp: , Rfl:     clopidogrel (PLAVIX) 75 MG tablet, Take 1 tablet by mouth once daily, Disp: 90 tablet, Rfl: 3    Dulaglutide (Trulicity) 1.5 MG/0.5ML solution pen-injector, Inject 1.5 mg under the skin into the appropriate area as directed 1 (One) Time Per Week., Disp: 12 mL, Rfl: 3    isosorbide mononitrate (IMDUR) 30 MG 24 hr tablet, Take 2 tablets by mouth 2 (Two) Times a Day., Disp: 180 tablet, Rfl: 1    metFORMIN (GLUCOPHAGE) 1000 MG tablet, Take 1 tablet by mouth 2 (Two) Times a Day With Meals., Disp: 180 tablet, Rfl: 3    metoprolol tartrate (LOPRESSOR) 100 MG tablet, Take 0.5 tablets by mouth 2 (Two) Times a Day., Disp: 180 tablet, Rfl: 1    nitroglycerin (NITRODUR) 0.4 MG/HR patch,  APPLY 1 PATCH TOPICALLY ONCE DAILY, Disp: 90 patch, Rfl: 3    nitroglycerin (NITROSTAT) 0.4 MG SL tablet, Place 1 tablet under the tongue Every 5 (Five) Minutes As Needed for Chest Pain. do not exceed a total of 3 doses in 15 minutes, Disp: 100 tablet, Rfl: 12    rosuvastatin (CRESTOR) 40 MG tablet, Take 1 tablet by mouth Daily for 360 days., Disp: 90 tablet, Rfl: 3    vitamin E (Natural Vitamin E) 400 UNIT capsule, Take 1 capsule by mouth Daily., Disp: 90 capsule, Rfl: 0    ranolazine (Ranexa) 500 MG 12 hr tablet, Take 1 tablet by mouth 2 (Two) Times a Day., Disp: 90 tablet, Rfl: 1      Social History     Socioeconomic History    Marital status:    Tobacco Use    Smoking status: Never    Smokeless tobacco: Never   Vaping Use    Vaping status: Never Used   Substance and Sexual Activity    Alcohol use: No    Drug use: No    Sexual activity: Defer         Review of Systems   Constitutional: Negative for chills and fever.   HENT:  Negative for ear discharge and nosebleeds.    Eyes:  Negative for discharge and redness.   Cardiovascular:  Positive for chest pain. Negative for orthopnea, palpitations, paroxysmal nocturnal dyspnea and syncope.   Respiratory:  Positive for shortness of breath. Negative for cough and wheezing.    Endocrine: Negative for heat intolerance.   Skin:  Negative for rash.   Musculoskeletal:  Negative for arthritis and myalgias.   Gastrointestinal:  Negative for abdominal pain, melena, nausea and vomiting.   Genitourinary:  Negative for dysuria and hematuria.   Neurological:  Negative for dizziness, light-headedness, numbness and tremors.   Psychiatric/Behavioral:  Negative for depression. The patient is not nervous/anxious.        Procedures      ECG 12 Lead    Date/Time: 3/8/2024 4:15 PM  Performed by: Gurjit Guerra MD    Authorized by: Gurjit Guerra MD  Comparison: compared with previous ECG   Similar to previous ECG  Rhythm: sinus rhythm    Clinical impression: normal ECG          ECG 12  "Lead    (Results Pending)           Objective:    /75 (BP Location: Right arm, Patient Position: Sitting, Cuff Size: Large Adult)   Pulse 87   Resp 16   Ht 165.1 cm (65\")   Wt 99.8 kg (220 lb)   SpO2 97%   BMI 36.61 kg/m²         Constitutional:       Appearance: Well-developed.   Eyes:      General: No scleral icterus.        Right eye: No discharge.   HENT:      Head: Normocephalic and atraumatic.   Neck:      Thyroid: No thyromegaly.      Lymphadenopathy: No cervical adenopathy.   Pulmonary:      Effort: Pulmonary effort is normal. No respiratory distress.      Breath sounds: Normal breath sounds. No wheezing. No rales.   Cardiovascular:      Normal rate. Regular rhythm.      No gallop.    Edema:     Peripheral edema absent.   Abdominal:      Tenderness: There is no abdominal tenderness.   Skin:     Findings: No erythema or rash.   Neurological:      Mental Status: Alert and oriented to person, place, and time.             Assessment:       Diagnosis Plan   1. Benign hypertension  ECG 12 Lead    metoprolol tartrate (LOPRESSOR) 100 MG tablet    ranolazine (Ranexa) 500 MG 12 hr tablet      2. Type 2 diabetes mellitus with diabetic peripheral angiopathy without gangrene, without long-term current use of insulin  ECG 12 Lead    ranolazine (Ranexa) 500 MG 12 hr tablet      3. Mixed hyperlipidemia  ECG 12 Lead    ranolazine (Ranexa) 500 MG 12 hr tablet      4. Angina pectoris                 Plan:       MDM:    1.  CAD/CABG    Patient is on aspirin and Plavix.    2.  Angina pectoris:    I would add Ranexa and increase Toprol-XL    3.  Hypertension:    Blood pressure is controlled current treatment would be continued    4.  Diabetes mellitus type 2:    Patient is on metformin.  Diet modification discussed.  "

## 2024-03-08 ENCOUNTER — OFFICE VISIT (OUTPATIENT)
Dept: CARDIOLOGY | Facility: CLINIC | Age: 59
End: 2024-03-08
Payer: COMMERCIAL

## 2024-03-08 VITALS
OXYGEN SATURATION: 97 % | HEART RATE: 87 BPM | DIASTOLIC BLOOD PRESSURE: 75 MMHG | RESPIRATION RATE: 16 BRPM | HEIGHT: 65 IN | WEIGHT: 220 LBS | BODY MASS INDEX: 36.65 KG/M2 | SYSTOLIC BLOOD PRESSURE: 125 MMHG

## 2024-03-08 DIAGNOSIS — I20.9 ANGINA PECTORIS: ICD-10-CM

## 2024-03-08 DIAGNOSIS — I10 BENIGN HYPERTENSION: Primary | ICD-10-CM

## 2024-03-08 DIAGNOSIS — E11.51 TYPE 2 DIABETES MELLITUS WITH DIABETIC PERIPHERAL ANGIOPATHY WITHOUT GANGRENE, WITHOUT LONG-TERM CURRENT USE OF INSULIN: ICD-10-CM

## 2024-03-08 DIAGNOSIS — E78.2 MIXED HYPERLIPIDEMIA: ICD-10-CM

## 2024-03-08 PROCEDURE — 93000 ELECTROCARDIOGRAM COMPLETE: CPT | Performed by: INTERNAL MEDICINE

## 2024-03-08 PROCEDURE — 99214 OFFICE O/P EST MOD 30 MIN: CPT | Performed by: INTERNAL MEDICINE

## 2024-03-08 RX ORDER — RANOLAZINE 500 MG/1
500 TABLET, EXTENDED RELEASE ORAL 2 TIMES DAILY
Qty: 90 TABLET | Refills: 1 | Status: SHIPPED | OUTPATIENT
Start: 2024-03-08

## 2024-03-08 RX ORDER — METOPROLOL TARTRATE 100 MG/1
50 TABLET ORAL 2 TIMES DAILY
Qty: 180 TABLET | Refills: 1 | Status: SHIPPED | OUTPATIENT
Start: 2024-03-08

## 2024-04-01 NOTE — PROGRESS NOTES
Chief Complaint  Diabetes, Hypertension, Coronary Artery Disease, Atrial Fibrillation, and Hyperlipidemia    Subjective     CC  Problem List  Visit Diagnosis   Encounters  Notes  Medications  Labs  Result Review Imaging  Media    Mendel Melendez presents to [facility] for   History of Present Illness     Diabetes  He presents for his follow-up diabetic visit. He has type 2 diabetes mellitus. No MedicAlert identification noted. The initial diagnosis of diabetes was made 8 years ago. His disease course has been stable. There are no hypoglycemic associated symptoms. Pertinent negatives for hypoglycemia include no headaches or nervousness/anxiousness. Pertinent negatives for diabetes include no blurred vision, no chest pain (mild intermittent and chronic with max Rx and intervention, sxs are stable.), no fatigue, no foot paresthesias, no foot ulcerations, no polydipsia, no polyuria, no visual change, no weakness and no weight loss. There are no hypoglycemic complications. Symptoms are stable. There are no diabetic complications. Risk factors for coronary artery disease include dyslipidemia, diabetes mellitus, hypertension, male sex, family history and obesity. Current diabetic treatment includes oral agent (monotherapy) (once a week trulicity). He is compliant with treatment most of the time. His weight is fluctuating minimally. He is following a diabetic and generally healthy diet. Meal planning includes avoidance of concentrated sweets. He has not had a previous visit with a dietitian. He participates in exercise daily. His home blood glucose trend is fluctuating minimally. His overall blood glucose range is 140-180 mg/dl. (Does not check sugar on the regular.) An ACE inhibitor/angiotensin II receptor blocker is not being taken. He does not see a podiatrist.Eye exam is not current ([facility]).   Hypertension  This is a chronic problem. The current episode started more than 1  year ago. The problem has been waxing and waning since onset. The problem is controlled. Pertinent negatives include no blurred vision, chest pain (mild intermittent and chronic with max Rx and intervention, sxs are stable.), headaches, malaise/fatigue, neck pain, orthopnea, palpitations, peripheral edema, PND or shortness of breath. There are no associated agents to hypertension. Risk factors for coronary artery disease include diabetes mellitus, dyslipidemia, male gender, obesity and family history. Current antihypertension treatment includes calcium channel blockers and beta blockers. The current treatment provides mild improvement. There are no compliance problems.    Coronary Artery Disease  Presents for follow-up visit. Pertinent negatives include no chest pain (mild intermittent and chronic with max Rx and intervention, sxs are stable.), leg swelling, palpitations, shortness of breath or weight gain. Risk factors include hyperlipidemia and obesity. The symptoms have been stable. Compliance with diet is good. Compliance with exercise is good. Compliance with medications is good.   Atrial Fibrillation  Presents for follow-up visit. Symptoms are negative for an AICD problem, bradycardia, chest pain (mild intermittent and chronic with max Rx and intervention, sxs are stable.), hypotension, pacemaker problem, palpitations, shortness of breath, tachycardia and weakness. The symptoms have been stable. Past medical history includes atrial fibrillation, CAD and hyperlipidemia. There are no medication compliance problems.   Hyperlipidemia  This is a chronic problem. The current episode started more than 1 year ago. Exacerbating diseases include diabetes and obesity. He has no history of hypothyroidism. Pertinent negatives include no chest pain (mild intermittent and chronic with max Rx and intervention, sxs are stable.), leg pain or shortness of breath. Current antihyperlipidemic treatment includes statins. The current  "treatment provides mild improvement of lipids. There are no compliance problems.  Risk factors for coronary artery disease include dyslipidemia, hypertension, male sex, diabetes mellitus, obesity and family history.       Review of Systems   Constitutional:  Negative for activity change, appetite change, fatigue, malaise/fatigue, unexpected weight gain and unexpected weight loss.   Eyes:  Negative for blurred vision.   Respiratory:  Negative for shortness of breath.    Cardiovascular:  Negative for chest pain (mild intermittent and chronic with max Rx and intervention, sxs are stable.), palpitations, orthopnea, leg swelling and PND.   Gastrointestinal:  Negative for abdominal pain, constipation, diarrhea, nausea and vomiting.   Endocrine: Negative for cold intolerance, heat intolerance, polydipsia and polyuria.   Genitourinary:  Negative for dysuria and frequency.   Musculoskeletal:  Negative for neck pain.   Neurological:  Negative for weakness and numbness.   Psychiatric/Behavioral:  The patient is not nervous/anxious.         Objective   Vital Signs:   /78 (BP Location: Right arm, Patient Position: Sitting, Cuff Size: Large Adult)   Pulse 65   Temp 96.8 °F (36 °C) (Temporal)   Resp 18   Ht 165.1 cm (65\")   Wt 99.3 kg (219 lb)   SpO2 98%   BMI 36.44 kg/m²     Physical Exam  Constitutional:       General: He is not in acute distress.  Cardiovascular:      Rate and Rhythm: Normal rate and regular rhythm.      Heart sounds: No murmur heard.  Pulmonary:      Effort: Pulmonary effort is normal.      Breath sounds: Normal breath sounds. No wheezing.   Musculoskeletal:      Cervical back: Neck supple.      Right lower leg: No edema.      Left lower leg: No edema.   Lymphadenopathy:      Cervical: No cervical adenopathy.   Skin:     Findings: No rash.   Neurological:      Mental Status: He is alert.        Result Review :Labs  Result Review  Imaging  Med Tab  Media                 Assessment and Plan CC " Problem List  Visit Diagnosis  ROS  Review (Popup)  [facility]  Quality  BestPractice  Medications  SmartSets  SnapShot Encounters  Media  Problem List Items Addressed This Visit          High    Benign hypertension    Overview     Controlled, compliant, continue meds         Current Assessment & Plan     Hypertension is stable and controlled  Continue current treatment regimen.  Blood pressure will be reassessed in 3 months.         Relevant Medications    metoprolol tartrate (LOPRESSOR) 100 MG tablet    Coronary artery disease involving coronary bypass graft of native heart with angina pectoris    Overview     He is having stable angina unchanged coontrolled w/ prn NITRo  He is s/p cardiac cath showing open vessels but spasm  Followed with [facility] regularly, plan to go to [facility]  s/p CABG x 3, 2015, s/p cardiac cath 07/2020 showing all three grafts to be free of disease but distal and proximal stenosis of LAD , stenosis of LCX  Risk factors modified  Interventional cardiology with angioplasty, 08/11/2021, 10/13/2021 repeated angioplasty. His anatomy is challenging w/o ability to have a perfect fix.    Angina much improved but continues and unchanged. He continues cardiology follow up  Followed with [facility]  Risk factors modified.         Relevant Medications    metoprolol tartrate (LOPRESSOR) 100 MG tablet    isosorbide mononitrate (IMDUR) 60 MG 24 hr tablet    Type 2 diabetes mellitus with diabetic peripheral angiopathy without gangrene, without long-term current use of insulin - Primary    Overview     A1c 7.2 04/08/2024 stable  A1c 7.2 09/22/2023 improve diet and exercise.   A1c 6.9 06/16/2023 improved, he reports no hypo or   A1c 6.8 11/28/2022   A1c 6.6 09/24/2021 improved.   A1c 7.2 10/06/2020   A1c 7.3 11/18/2019     A1c 6.9 5/7/2019   A1c 7.0 02/12/2018    A1c 7.1, 11/1/2018 new onset         Current Assessment & Plan     Diabetes is stable.   Continue current treatment regimen.  Diabetes will be  reassessed in 3 months         Relevant Orders    POC Glycosylated Hemoglobin (Hb A1C) (Completed)    POCT urinalysis dipstick, manual (Completed)    Occlusion and stenosis of unspecified carotid artery    Overview     70 %  LCA stenois 06/2023 repeat now followed with vascular 60% right  50-60% on doppler 2016   Repeat Echo pending, pros and cons of potential surgery discussed and understood.            Paroxysmal atrial fibrillation    Overview     During heart surgery 2015, no break thru.   Sinus rhythm by exam         Relevant Medications    metoprolol tartrate (LOPRESSOR) 100 MG tablet    isosorbide mononitrate (IMDUR) 60 MG 24 hr tablet       Medium    Mixed hyperlipidemia    Overview     Stable, Continue Crestor 40mg he is complaint  LDL at goal                Low    Class 2 severe obesity due to excess calories with serious comorbidity and body mass index (BMI) of 36.0 to 36.9 in adult    Overview     Diet exercise and wt loss encouraged Techniques discussed         Current Assessment & Plan     Patient's (Body mass index is 36.44 kg/m².) indicates that they are obese (BMI >30) with health conditions that include hypertension, coronary heart disease, diabetes mellitus, and dyslipidemias . Weight is unchanged. BMI  is above average; BMI management plan is completed. We discussed portion control and increasing exercise.           Other Visit Diagnoses       Urinary frequency        Relevant Orders    Urinalysis With Microscopic - Urine, Clean Catch (Completed)            Follow Up Instructions Charge Capture  Follow-up Communications  Return in about 3 months (around 7/5/2024).  Patient was given instructions and counseling regarding his condition or for health maintenance advice. Please see specific information pulled into the AVS if appropriate.

## 2024-04-05 ENCOUNTER — OFFICE VISIT (OUTPATIENT)
Dept: FAMILY MEDICINE CLINIC | Facility: CLINIC | Age: 59
End: 2024-04-05
Payer: COMMERCIAL

## 2024-04-05 VITALS
HEIGHT: 65 IN | WEIGHT: 219 LBS | HEART RATE: 65 BPM | BODY MASS INDEX: 36.49 KG/M2 | DIASTOLIC BLOOD PRESSURE: 78 MMHG | OXYGEN SATURATION: 98 % | RESPIRATION RATE: 18 BRPM | TEMPERATURE: 96.8 F | SYSTOLIC BLOOD PRESSURE: 126 MMHG

## 2024-04-05 DIAGNOSIS — I48.0 PAROXYSMAL ATRIAL FIBRILLATION: ICD-10-CM

## 2024-04-05 DIAGNOSIS — I65.29 OCCLUSION AND STENOSIS OF UNSPECIFIED CAROTID ARTERY: ICD-10-CM

## 2024-04-05 DIAGNOSIS — I10 BENIGN HYPERTENSION: ICD-10-CM

## 2024-04-05 DIAGNOSIS — E66.01 CLASS 2 SEVERE OBESITY DUE TO EXCESS CALORIES WITH SERIOUS COMORBIDITY AND BODY MASS INDEX (BMI) OF 36.0 TO 36.9 IN ADULT: ICD-10-CM

## 2024-04-05 DIAGNOSIS — E78.2 MIXED HYPERLIPIDEMIA: ICD-10-CM

## 2024-04-05 DIAGNOSIS — E11.51 TYPE 2 DIABETES MELLITUS WITH DIABETIC PERIPHERAL ANGIOPATHY WITHOUT GANGRENE, WITHOUT LONG-TERM CURRENT USE OF INSULIN: Primary | ICD-10-CM

## 2024-04-05 DIAGNOSIS — R35.0 URINARY FREQUENCY: ICD-10-CM

## 2024-04-05 DIAGNOSIS — I25.709 CORONARY ARTERY DISEASE INVOLVING CORONARY BYPASS GRAFT OF NATIVE HEART WITH ANGINA PECTORIS: ICD-10-CM

## 2024-04-05 LAB
BILIRUB BLD-MCNC: NEGATIVE MG/DL
CLARITY, POC: CLEAR
COLOR UR: YELLOW
EXPIRATION DATE: ABNORMAL
GLUCOSE UR STRIP-MCNC: NEGATIVE MG/DL
HBA1C MFR BLD: 7.2 % (ref 4.5–5.7)
KETONES UR QL: NEGATIVE
LEUKOCYTE EST, POC: NEGATIVE
Lab: ABNORMAL
NITRITE UR-MCNC: NEGATIVE MG/ML
PH UR: 5 [PH] (ref 5–8)
PROT UR STRIP-MCNC: ABNORMAL MG/DL
RBC # UR STRIP: ABNORMAL /UL
SP GR UR: 1.02 (ref 1–1.03)
UROBILINOGEN UR QL: NORMAL

## 2024-04-05 RX ORDER — ISOSORBIDE MONONITRATE 60 MG/1
60 TABLET, EXTENDED RELEASE ORAL 2 TIMES DAILY
Qty: 180 TABLET | Refills: 3 | Status: SHIPPED | OUTPATIENT
Start: 2024-04-05

## 2024-04-05 RX ORDER — DULAGLUTIDE 1.5 MG/.5ML
1 INJECTION, SOLUTION SUBCUTANEOUS WEEKLY
Qty: 12 ML | Refills: 3 | Status: SHIPPED | OUTPATIENT
Start: 2024-04-05 | End: 2024-04-09

## 2024-04-05 RX ORDER — METOPROLOL TARTRATE 100 MG/1
100 TABLET ORAL 2 TIMES DAILY
Qty: 180 TABLET | Refills: 3 | Status: SHIPPED | OUTPATIENT
Start: 2024-04-05

## 2024-04-06 LAB
APPEARANCE UR: CLEAR
BACTERIA #/AREA URNS HPF: NORMAL /[HPF]
BILIRUB UR QL STRIP: NEGATIVE
CASTS URNS QL MICRO: NORMAL /LPF
COLOR UR: YELLOW
EPI CELLS #/AREA URNS HPF: NORMAL /HPF (ref 0–10)
GLUCOSE UR QL STRIP: NEGATIVE
HGB UR QL STRIP: NEGATIVE
KETONES UR QL STRIP: NEGATIVE
LEUKOCYTE ESTERASE UR QL STRIP: NEGATIVE
MICRO URNS: NORMAL
MICRO URNS: NORMAL
NITRITE UR QL STRIP: NEGATIVE
PH UR STRIP: 5.5 [PH] (ref 5–7.5)
PROT UR QL STRIP: NEGATIVE
RBC #/AREA URNS HPF: NORMAL /HPF (ref 0–2)
SP GR UR STRIP: 1.02 (ref 1–1.03)
UROBILINOGEN UR STRIP-MCNC: 0.2 MG/DL (ref 0.2–1)
WBC #/AREA URNS HPF: NORMAL /HPF (ref 0–5)

## 2024-04-08 NOTE — PROGRESS NOTES
[employer] message was sent   Good morning we have your urine microscopic back and per Dr. Hernandez   Your urine microscopic urine has come back normal. This is good as this is the most accurate way to see if there is blood in your urine and it has come back negative.

## 2024-04-09 ENCOUNTER — TELEPHONE (OUTPATIENT)
Dept: FAMILY MEDICINE CLINIC | Facility: CLINIC | Age: 59
End: 2024-04-09
Payer: COMMERCIAL

## 2024-04-09 DIAGNOSIS — E11.51 TYPE 2 DIABETES MELLITUS WITH DIABETIC PERIPHERAL ANGIOPATHY WITHOUT GANGRENE, WITHOUT LONG-TERM CURRENT USE OF INSULIN: Primary | ICD-10-CM

## 2024-04-09 RX ORDER — SEMAGLUTIDE 1.34 MG/ML
0.5 INJECTION, SOLUTION SUBCUTANEOUS WEEKLY
Qty: 7 ML | Refills: 3 | Status: SHIPPED | OUTPATIENT
Start: 2024-04-09

## 2024-04-09 NOTE — TELEPHONE ENCOUNTER
Called and lMOM for his wife to let her know that we have sent the medication over for him to the pharmacy as the other was not available. I advised if we need to do a PA we will do this as soon as we get it ad try and get it approved.

## 2024-04-09 NOTE — TELEPHONE ENCOUNTER
Gisell called and said that she was told by Dr. Hernandez to call and remind her to prescribe Mendel Ozempic.  Please send to [facility] in [place] and contact her at .  Thanks Melissa

## 2024-04-20 NOTE — ASSESSMENT & PLAN NOTE
Patient's (Body mass index is 36.44 kg/m².) indicates that they are obese (BMI >30) with health conditions that include hypertension, coronary heart disease, diabetes mellitus, and dyslipidemias . Weight is unchanged. BMI  is above average; BMI management plan is completed. We discussed portion control and increasing exercise.

## 2024-05-29 DIAGNOSIS — E78.2 MIXED HYPERLIPIDEMIA: ICD-10-CM

## 2024-05-29 DIAGNOSIS — I10 BENIGN HYPERTENSION: ICD-10-CM

## 2024-05-29 DIAGNOSIS — E11.51 TYPE 2 DIABETES MELLITUS WITH DIABETIC PERIPHERAL ANGIOPATHY WITHOUT GANGRENE, WITHOUT LONG-TERM CURRENT USE OF INSULIN: ICD-10-CM

## 2024-05-30 RX ORDER — RANOLAZINE 500 MG/1
500 TABLET, EXTENDED RELEASE ORAL 2 TIMES DAILY
Qty: 90 TABLET | Refills: 0 | Status: SHIPPED | OUTPATIENT
Start: 2024-05-30

## 2024-07-01 NOTE — PROGRESS NOTES
Chief Complaint  Diabetes, Hypertension, Coronary Artery Disease, Atrial Fibrillation, and Hyperlipidemia    Subjective     CC  Problem List  Visit Diagnosis   Encounters  Notes  Medications  Labs  Result Review Imaging  Media    Mendel Melendez presents to [facility] for   History of Present Illness     Diabetes  He presents for his follow-up diabetic visit. He has type 2 diabetes mellitus. No MedicAlert identification noted. The initial diagnosis of diabetes was made 8 years ago. His disease course has been stable. There are no hypoglycemic associated symptoms. Pertinent negatives for hypoglycemia include no dizziness, headaches, nervousness/anxiousness or sweats. Pertinent negatives for diabetes include no chest pain (mild intermittent and chronic with max Rx and intervention, sxs are stable.), no fatigue, no foot paresthesias, no foot ulcerations, no polydipsia, no polyuria, no visual change, no weakness and no weight loss. There are no hypoglycemic complications. Symptoms are stable. There are no diabetic complications. Risk factors for coronary artery disease include dyslipidemia, diabetes mellitus, hypertension, male sex, family history and obesity. Current diabetic treatment includes oral agent (monotherapy) (once a week trulicity). He is compliant with treatment most of the time. His weight is stable. He is following a diabetic and generally healthy diet. Meal planning includes avoidance of concentrated sweets. He has not had a previous visit with a dietitian. He participates in exercise daily. His home blood glucose trend is fluctuating minimally. His breakfast blood glucose is taken between 7-8 am. His breakfast blood glucose range is generally 140-180 mg/dl. (172 this am ) An ACE inhibitor/angiotensin II receptor blocker is not being taken. He does not see a podiatrist.Eye exam is not current ([facility]).   Hypertension  This is a chronic problem. The current  episode started more than 1 year ago. The problem has been waxing and waning since onset. The problem is controlled. Pertinent negatives include no anxiety, chest pain (mild intermittent and chronic with max Rx and intervention, sxs are stable.), headaches, malaise/fatigue, neck pain, orthopnea, palpitations, peripheral edema, PND, shortness of breath or sweats. There are no associated agents to hypertension. Risk factors for coronary artery disease include diabetes mellitus, dyslipidemia, male gender, obesity and family history. Current antihypertension treatment includes calcium channel blockers and beta blockers. The current treatment provides mild improvement. There are no compliance problems.  There is no history of chronic renal disease.   Coronary Artery Disease  Presents for follow-up visit. Pertinent negatives include no chest pain (mild intermittent and chronic with max Rx and intervention, sxs are stable.), chest pressure, chest tightness, dizziness, leg swelling, muscle weakness, palpitations, shortness of breath or weight gain. Risk factors include hyperlipidemia, hypertension and obesity. The symptoms have been stable. Compliance with diet is good. Compliance with exercise is good. Compliance with medications is good.   Atrial Fibrillation  Presents for follow-up visit. Symptoms include hypertension. Symptoms are negative for an AICD problem, bradycardia, chest pain (mild intermittent and chronic with max Rx and intervention, sxs are stable.), dizziness, hypotension, pacemaker problem, palpitations, shortness of breath, tachycardia and weakness. The symptoms have been stable. Past medical history includes atrial fibrillation, CAD and hyperlipidemia. There are no medication compliance problems.   Hyperlipidemia  This is a chronic problem. The current episode started more than 1 year ago. The problem is controlled. Exacerbating diseases include diabetes and obesity. He has no history of chronic renal  "disease, hypothyroidism, liver disease or nephrotic syndrome. Factors aggravating his hyperlipidemia include fatty foods. Pertinent negatives include no chest pain (mild intermittent and chronic with max Rx and intervention, sxs are stable.), focal sensory loss, focal weakness, leg pain, myalgias or shortness of breath. Current antihyperlipidemic treatment includes statins. The current treatment provides mild improvement of lipids. There are no compliance problems.  Risk factors for coronary artery disease include dyslipidemia, hypertension, male sex, diabetes mellitus, obesity and family history.       Review of Systems   Constitutional:  Negative for activity change, fatigue, malaise/fatigue, unexpected weight gain and unexpected weight loss.   Eyes:  Negative for visual disturbance.   Respiratory:  Negative for chest tightness and shortness of breath.    Cardiovascular:  Negative for chest pain (mild intermittent and chronic with max Rx and intervention, sxs are stable.), palpitations, orthopnea, leg swelling and PND.   Gastrointestinal:  Negative for abdominal pain, constipation, diarrhea, nausea and vomiting.   Endocrine: Negative for cold intolerance, heat intolerance, polydipsia and polyuria.   Genitourinary:  Negative for dysuria and frequency.   Musculoskeletal:  Negative for myalgias, muscle weakness and neck pain.   Neurological:  Negative for dizziness, focal weakness, weakness and numbness.   Psychiatric/Behavioral:  The patient is not nervous/anxious.         Objective   Vital Signs:   /100 (BP Location: Right arm, Patient Position: Sitting, Cuff Size: Adult)   Pulse 87   Temp 98 °F (36.7 °C) (Temporal)   Resp 18   Ht 165.1 cm (65\")   Wt 98 kg (216 lb)   SpO2 96%   BMI 35.94 kg/m²     Physical Exam  Constitutional:       General: He is not in acute distress.  Cardiovascular:      Rate and Rhythm: Normal rate and regular rhythm.      Heart sounds: No murmur heard.  Pulmonary:      Effort: " Pulmonary effort is normal.      Breath sounds: Normal breath sounds. No wheezing.   Musculoskeletal:      Cervical back: Neck supple.      Right lower leg: No edema.      Left lower leg: No edema.   Lymphadenopathy:      Cervical: No cervical adenopathy.   Skin:     Findings: No rash.   Neurological:      Mental Status: He is alert.        Result Review :Labs  Result Review  Imaging  Med Tab  Media                 Assessment and Plan CC Problem List  Visit Diagnosis  ROS  Review (Popup)  Health Maintenance  Quality  BestPractice  Medications  SmartSets  SnapShot Encounters  Media  Problem List Items Addressed This Visit          High    Benign hypertension    Overview     Controlled, compliant, continue meds         Current Assessment & Plan     Hypertension is stable and controlled  Continue current treatment regimen.  Blood pressure will be reassessed in 3 months.         Coronary artery disease involving coronary bypass graft of native heart with angina pectoris    Overview     He is having stable angina unchanged coontrolled w/ prn NITRo  He is s/p cardiac cath showing open vessels but spasm  Followed with [facility] regularly, plan to go to [facility]  s/p CABG x 3, 2015, s/p cardiac cath 07/2020 showing all three grafts to be free of disease but distal and proximal stenosis of LAD , stenosis of LCX  Risk factors modified  Interventional cardiology with angioplasty, 08/11/2021, 10/13/2021 repeated angioplasty. His anatomy is challenging w/o ability to have a perfect fix.    Angina much improved but continues and unchanged. He continues cardiology follow up  Followed with [facility]  Risk factors modified.         Type 2 diabetes mellitus with diabetic peripheral angiopathy without gangrene, without long-term current use of insulin - Primary    Overview     A1c 7.1 07/05/2024 stable, marginal add GLP1 and follow  A1c 7.2 04/08/2024 stable  A1c 7.2 09/22/2023 improve diet and exercise.   A1c 6.9 06/16/2023 improved,  he reports no hypo or   A1c 6.8 11/28/2022   A1c 6.6 09/24/2021 improved.   A1c 7.2 10/06/2020   A1c 7.3 11/18/2019     A1c 6.9 5/7/2019   A1c 7.0 02/12/2018    A1c 7.1, 11/1/2018 new onset         Current Assessment & Plan     Diabetes is stable.   Medication changes per orders.  Diabetes will be reassessed in 3 months         Relevant Orders    POC Glycosylated Hemoglobin (Hb A1C) (Completed)    POCT urinalysis dipstick, manual (Completed)    Paroxysmal atrial fibrillation    Overview     During heart surgery 2015, no break thru.   Sinus rhythm by exam,            Medium    Mixed hyperlipidemia    Overview     Stable, Continue Crestor 40mg he is complaint  LDL at goal             Current Assessment & Plan      Lipid abnormalities are improving with treatment    Plan:  Continue same medication/s without change.      Discussed medication dosage, use, side effects, and goals of treatment in detail.    Counseled patient on lifestyle modifications to help control hyperlipidemia.     Patient Treatment Goals:   LDL goal is less than 70    Followup in 3 months.            Low    Class 2 severe obesity due to excess calories with serious comorbidity and body mass index (BMI) of 36.0 to 36.9 in adult    Overview     Diet exercise and wt loss encouraged Techniques discussed         Current Assessment & Plan     Patient's (Body mass index is 35.94 kg/m².) indicates that they are obese (BMI >30) with health conditions that include hypertension, diabetes mellitus, and dyslipidemias . Weight is improving with lifestyle modifications. BMI  is above average; BMI management plan is completed. We discussed portion control and increasing exercise.           Other Visit Diagnoses       Urinary frequency        Relevant Orders    Urinalysis With Microscopic - Urine, Clean Catch (Completed)            Follow Up Instructions Charge Capture  Follow-up Communications  Return in about 3 months (around 10/5/2024).  Patient was given  instructions and counseling regarding his condition or for health maintenance advice. Please see specific information pulled into the AVS if appropriate.

## 2024-07-05 ENCOUNTER — OFFICE VISIT (OUTPATIENT)
Dept: FAMILY MEDICINE CLINIC | Facility: CLINIC | Age: 59
End: 2024-07-05
Payer: COMMERCIAL

## 2024-07-05 VITALS
HEIGHT: 65 IN | WEIGHT: 216 LBS | OXYGEN SATURATION: 96 % | DIASTOLIC BLOOD PRESSURE: 100 MMHG | TEMPERATURE: 98 F | RESPIRATION RATE: 18 BRPM | SYSTOLIC BLOOD PRESSURE: 148 MMHG | BODY MASS INDEX: 35.99 KG/M2 | HEART RATE: 87 BPM

## 2024-07-05 DIAGNOSIS — I25.709 CORONARY ARTERY DISEASE INVOLVING CORONARY BYPASS GRAFT OF NATIVE HEART WITH ANGINA PECTORIS: ICD-10-CM

## 2024-07-05 DIAGNOSIS — E11.51 TYPE 2 DIABETES MELLITUS WITH DIABETIC PERIPHERAL ANGIOPATHY WITHOUT GANGRENE, WITHOUT LONG-TERM CURRENT USE OF INSULIN: Primary | ICD-10-CM

## 2024-07-05 DIAGNOSIS — E66.01 CLASS 2 SEVERE OBESITY DUE TO EXCESS CALORIES WITH SERIOUS COMORBIDITY AND BODY MASS INDEX (BMI) OF 36.0 TO 36.9 IN ADULT: ICD-10-CM

## 2024-07-05 DIAGNOSIS — I10 BENIGN HYPERTENSION: ICD-10-CM

## 2024-07-05 DIAGNOSIS — R35.0 URINARY FREQUENCY: ICD-10-CM

## 2024-07-05 DIAGNOSIS — E78.2 MIXED HYPERLIPIDEMIA: ICD-10-CM

## 2024-07-05 DIAGNOSIS — I48.0 PAROXYSMAL ATRIAL FIBRILLATION: ICD-10-CM

## 2024-07-05 LAB
BILIRUB BLD-MCNC: NEGATIVE MG/DL
CLARITY, POC: CLEAR
COLOR UR: YELLOW
EXPIRATION DATE: ABNORMAL
GLUCOSE UR STRIP-MCNC: NEGATIVE MG/DL
HBA1C MFR BLD: 7.1 % (ref 4.5–5.7)
KETONES UR QL: NEGATIVE
LEUKOCYTE EST, POC: NEGATIVE
Lab: ABNORMAL
NITRITE UR-MCNC: NEGATIVE MG/ML
PH UR: 5 [PH] (ref 5–8)
PROT UR STRIP-MCNC: ABNORMAL MG/DL
RBC # UR STRIP: ABNORMAL /UL
SP GR UR: 1.02 (ref 1–1.03)
UROBILINOGEN UR QL: NORMAL

## 2024-07-05 PROCEDURE — 83036 HEMOGLOBIN GLYCOSYLATED A1C: CPT | Performed by: FAMILY MEDICINE

## 2024-07-05 PROCEDURE — 81002 URINALYSIS NONAUTO W/O SCOPE: CPT | Performed by: FAMILY MEDICINE

## 2024-07-05 PROCEDURE — 99214 OFFICE O/P EST MOD 30 MIN: CPT | Performed by: FAMILY MEDICINE

## 2024-07-05 RX ORDER — SEMAGLUTIDE 1.34 MG/ML
1 INJECTION, SOLUTION SUBCUTANEOUS WEEKLY
Qty: 7 ML | Refills: 3 | Status: SHIPPED | OUTPATIENT
Start: 2024-07-05 | End: 2024-07-10 | Stop reason: DRUGHIGH

## 2024-07-06 LAB
APPEARANCE UR: CLEAR
BACTERIA #/AREA URNS HPF: ABNORMAL /[HPF]
BILIRUB UR QL STRIP: NEGATIVE
CASTS URNS QL MICRO: ABNORMAL /LPF
COLOR UR: YELLOW
EPI CELLS #/AREA URNS HPF: ABNORMAL /HPF (ref 0–10)
GLUCOSE UR QL STRIP: NEGATIVE
HGB UR QL STRIP: ABNORMAL
KETONES UR QL STRIP: NEGATIVE
LEUKOCYTE ESTERASE UR QL STRIP: ABNORMAL
MICRO URNS: ABNORMAL
NITRITE UR QL STRIP: NEGATIVE
PH UR STRIP: 5.5 [PH] (ref 5–7.5)
PROT UR QL STRIP: ABNORMAL
RBC #/AREA URNS HPF: >30 /HPF (ref 0–2)
SP GR UR STRIP: 1.03 (ref 1–1.03)
UROBILINOGEN UR STRIP-MCNC: 0.2 MG/DL (ref 0.2–1)
WBC #/AREA URNS HPF: ABNORMAL /HPF (ref 0–5)

## 2024-07-08 NOTE — PROGRESS NOTES
One[employer] message was sent   Good afternoon we have your urine microscopic has come back and per Dr. Hernandez   There was some blood seen in the urine. This is the most accurate way to see if there is blood in the urine and there was. This could be due to everyday physical activity and or do to being dehydrated from how hot it has been. We need to check this again in 1 month. You can do this by us placing you on the nurse line and you coming in and just leaving a urine and we will send it off for a microscopic exam . Be sure to be hydrated as well

## 2024-07-09 PROBLEM — I65.23 BILATERAL CAROTID ARTERY STENOSIS: Status: ACTIVE | Noted: 2024-07-09

## 2024-07-09 PROBLEM — R09.89 BRUIT OF LEFT CAROTID ARTERY: Status: RESOLVED | Noted: 2019-07-29 | Resolved: 2024-07-09

## 2024-07-09 NOTE — PROGRESS NOTES
[facility] VASCULAR [facility]GERY    Chief Complaint  Carotid Artery Disease (12M recall with CTD scans)    Subjective          History of Present Illness  Mendel Melendez is a 59 y.o. male with carotid stenosis. He presents to the office today for follow up. He is followed by Dr. Espinosa. He has not had any previous vascular interventions performed. He denies any hospitalizations or new diagnosis since we last saw him. He denies any symptoms of TIA/CVA, including amaurosis fugax, slurred speech, or unilateral paresthesias or paralysis. He is maintained on aspirin, Plavix, and a statin. He reports compliance with his medications. He is a lifelong non-smoker.    Review of Systems   Neurological:  Negative for facial asymmetry, speech difficulty and weakness.        Allergies: Lumason [sulfur hexaflouride lipid a microspheres], Niacin, and Jardiance [empagliflozin]    Prior to Admission medications    Medication Sig Start Date End Date Taking? Authorizing Provider   amLODIPine (NORVASC) 10 MG tablet Take 1 tablet by mouth once daily 8/14/23   Mariajose Hernandez MD   aspirin (aspirin) 81 MG EC tablet Take 1 tablet by mouth Daily. 5/7/19   ProviderMargaret MD   clopidogrel (PLAVIX) 75 MG tablet Take 1 tablet by mouth once daily 9/26/23   Mariajose Hernandez MD   isosorbide mononitrate (IMDUR) 60 MG 24 hr tablet Take 1 tablet by mouth 2 (Two) Times a Day. 4/5/24   Mariajose Hernandez MD   metFORMIN (GLUCOPHAGE) 1000 MG tablet Take 1 tablet by mouth 2 (Two) Times a Day With Meals. 4/25/23   Mariajose Hernandez MD   metoprolol tartrate (LOPRESSOR) 100 MG tablet Take 1 tablet by mouth 2 (Two) Times a Day. 4/5/24   Mariajose Hernandez MD   nitroglycerin (NITRODUR) 0.4 MG/HR patch APPLY 1 PATCH TOPICALLY ONCE DAILY 12/12/23   Mariajose Hernandez MD   nitroglycerin (NITROSTAT) 0.4 MG SL tablet Place 1 tablet under the tongue Every 5 (Five) Minutes As Needed for Chest Pain. do not exceed a total of 3 doses in 15 minutes  "7/31/23   Mariajose Hernandez MD   ranolazine (RANEXA) 500 MG 12 hr tablet Take 1 tablet by mouth twice daily 5/30/24   Gurjit Guerra MD   rosuvastatin (CRESTOR) 40 MG tablet Take 1 tablet by mouth Daily for 360 days. 9/22/23 9/16/24  Mariajose Hernandez MD   Semaglutide,0.25 or 0.5MG/DOS, (Ozempic, 0.25 or 0.5 MG/DOSE,) 2 MG/1.5ML solution pen-injector Inject 1 mg under the skin into the appropriate area as directed 1 (One) Time Per Week. 7/5/24   Mariajose Hernandez MD   vitamin E (Natural Vitamin E) 400 UNIT capsule Take 1 capsule by mouth Daily. 11/3/23   Gurjit Guerra MD         Objective   Vital Signs:  /80 (BP Location: Right arm, Patient Position: Sitting)   Ht 165.1 cm (65\")   Wt 95.3 kg (210 lb)   BMI 34.95 kg/m²   Estimated body mass index is 34.95 kg/m² as calculated from the following:    Height as of this encounter: 165.1 cm (65\").    Weight as of this encounter: 95.3 kg (210 lb).       Physical Exam  Vitals reviewed.   Constitutional:       General: He is not in acute distress.     Appearance: He is not ill-appearing.   Cardiovascular:      Rate and Rhythm: Normal rate.      Pulses:           Carotid pulses are  on the left side with bruit.       Radial pulses are 2+ on the right side and 2+ on the left side.   Pulmonary:      Effort: Pulmonary effort is normal. No respiratory distress.   Skin:     General: Skin is warm and dry.   Neurological:      General: No focal deficit present.      Mental Status: He is alert and oriented to person, place, and time.      GCS: GCS eye subscore is 4. GCS verbal subscore is 5. GCS motor subscore is 6.        Result Review :    The following data was reviewed by: EMBER Cornejo on 07/11/2024:    Duplex carotid ultrasound CAR (07/11/2024 09:36)   Right: Less than 50% stenosis,  cm/s, ICA/CCA ratio 1.42  Left: Over 70% stenosis,  cm/s, EDV 61.1 cm/s, ICA/CCA ratio 6.61    Progress Notes by Savannah Hernandez MA (07/05/2024 08:30)   Progress Notes " by Mariajose Hernandez MD (04/05/2024 08:00)      Assessment and Plan     Diagnoses and all orders for this visit:    1. Bilateral carotid artery stenosis (Primary)  -     CT Angiogram Head With Contrast; Future  -     CT Angiogram Neck With & Without Contrast; Future    2. Mixed hyperlipidemia    3. Benign hypertension    4. Obesity (BMI 30-39.9)    Class 2 Severe Obesity (BMI >=35 and <=39.9). Information on healthy weight added to patient's after visit summary.         Mendel Melendez is a 59 y.o. male with carotid stenosis. He denies symptoms of TIA/CVA. His most recent carotid duplex shows less than 50% stenosis in the right ICA and over 70% stenosis in the left ICA with a peak systolic velocity of 433 cm/s, ICA/CCA ratio 6.61.  Surgery is warranted when stenosis reaches or exceeds 70% or patient becomes symptomatic.  We reviewed the signs and symptoms of stroke, he was instructed to seek emergency medical care if he experiences any of these. He verbalizes understanding. I will order a CTA of the head and neck to further evaluate his carotid vasculature and have him follow up with the surgeon once this has been obtained. He is maintained on appropriate antiplatelet, statin, and antihypertensive medications which we recommend he continue. He was instructed to call our office if he had any questions or concerns.     Follow Up     Return in about 1 month (around 8/11/2024) for CTA head and neck.    Patient was given instructions and counseling regarding his condition or for health maintenance advice. Please see specific information pulled into the AVS if appropriate.     EMBER Cornejo

## 2024-07-10 RX ORDER — SEMAGLUTIDE 1.34 MG/ML
1 INJECTION, SOLUTION SUBCUTANEOUS WEEKLY
Qty: 10 ML | Refills: 3 | Status: SHIPPED | OUTPATIENT
Start: 2024-07-10 | End: 2024-07-11

## 2024-07-11 ENCOUNTER — OFFICE VISIT (OUTPATIENT)
Age: 59
End: 2024-07-11
Payer: COMMERCIAL

## 2024-07-11 ENCOUNTER — HOSPITAL ENCOUNTER (OUTPATIENT)
Dept: CARDIOLOGY | Facility: HOSPITAL | Age: 59
Discharge: HOME | End: 2024-07-11
Admitting: SURGERY
Payer: COMMERCIAL

## 2024-07-11 VITALS
DIASTOLIC BLOOD PRESSURE: 80 MMHG | HEIGHT: 65 IN | SYSTOLIC BLOOD PRESSURE: 128 MMHG | BODY MASS INDEX: 34.99 KG/M2 | WEIGHT: 210 LBS

## 2024-07-11 DIAGNOSIS — I65.23 BILATERAL CAROTID ARTERY STENOSIS: Primary | ICD-10-CM

## 2024-07-11 DIAGNOSIS — E66.9 OBESITY (BMI 30-39.9): ICD-10-CM

## 2024-07-11 DIAGNOSIS — E78.2 MIXED HYPERLIPIDEMIA: ICD-10-CM

## 2024-07-11 DIAGNOSIS — R09.89 BRUIT: ICD-10-CM

## 2024-07-11 DIAGNOSIS — I10 BENIGN HYPERTENSION: ICD-10-CM

## 2024-07-11 LAB
BH CV XLRA MEAS LEFT DIST CCA EDV: 24.1 CM/SEC
BH CV XLRA MEAS LEFT DIST CCA PSV: 65 CM/SEC
BH CV XLRA MEAS LEFT DIST ICA EDV: -38.3 CM/SEC
BH CV XLRA MEAS LEFT DIST ICA PSV: -87.5 CM/SEC
BH CV XLRA MEAS LEFT ICA/CCA RATIO: 6.61
BH CV XLRA MEAS LEFT PROX CCA EDV: 20.3 CM/SEC
BH CV XLRA MEAS LEFT PROX CCA PSV: 82.3 CM/SEC
BH CV XLRA MEAS LEFT PROX ECA EDV: -18.4 CM/SEC
BH CV XLRA MEAS LEFT PROX ECA PSV: -148 CM/SEC
BH CV XLRA MEAS LEFT PROX ICA EDV: -61.1 CM/SEC
BH CV XLRA MEAS LEFT PROX ICA PSV: -430 CM/SEC
BH CV XLRA MEAS LEFT PROX SCLA PSV: 125 CM/SEC
BH CV XLRA MEAS LEFT VERTEBRAL A EDV: -12.9 CM/SEC
BH CV XLRA MEAS LEFT VERTEBRAL A PSV: -40.8 CM/SEC
BH CV XLRA MEAS RIGHT DIST CCA EDV: -16.2 CM/SEC
BH CV XLRA MEAS RIGHT DIST CCA PSV: -78.1 CM/SEC
BH CV XLRA MEAS RIGHT DIST ICA EDV: -27.2 CM/SEC
BH CV XLRA MEAS RIGHT DIST ICA PSV: -76 CM/SEC
BH CV XLRA MEAS RIGHT ICA/CCA RATIO: 1.42
BH CV XLRA MEAS RIGHT MID ICA EDV: -48.3 CM/SEC
BH CV XLRA MEAS RIGHT MID ICA PSV: -111 CM/SEC
BH CV XLRA MEAS RIGHT PROX CCA EDV: 22.4 CM/SEC
BH CV XLRA MEAS RIGHT PROX CCA PSV: 107 CM/SEC
BH CV XLRA MEAS RIGHT PROX ECA EDV: -19.1 CM/SEC
BH CV XLRA MEAS RIGHT PROX ECA PSV: -139 CM/SEC
BH CV XLRA MEAS RIGHT PROX ICA EDV: -43.4 CM/SEC
BH CV XLRA MEAS RIGHT PROX ICA PSV: -97.7 CM/SEC
BH CV XLRA MEAS RIGHT PROX SCLA PSV: -74.1 CM/SEC
BH CV XLRA MEAS RIGHT VERTEBRAL A EDV: -19.4 CM/SEC
BH CV XLRA MEAS RIGHT VERTEBRAL A PSV: -43.9 CM/SEC
LEFT ARM BP: NORMAL MMHG
RIGHT ARM BP: NORMAL MMHG

## 2024-07-11 PROCEDURE — 93880 EXTRACRANIAL BILAT STUDY: CPT

## 2024-07-11 RX ORDER — SEMAGLUTIDE 0.68 MG/ML
1 INJECTION, SOLUTION SUBCUTANEOUS
COMMUNITY
Start: 2024-04-11

## 2024-07-20 DIAGNOSIS — E11.51 TYPE 2 DIABETES MELLITUS WITH DIABETIC PERIPHERAL ANGIOPATHY WITHOUT GANGRENE, WITHOUT LONG-TERM CURRENT USE OF INSULIN: ICD-10-CM

## 2024-07-20 DIAGNOSIS — I10 BENIGN HYPERTENSION: ICD-10-CM

## 2024-07-20 DIAGNOSIS — E78.2 MIXED HYPERLIPIDEMIA: ICD-10-CM

## 2024-07-22 RX ORDER — RANOLAZINE 500 MG/1
500 TABLET, EXTENDED RELEASE ORAL 2 TIMES DAILY
Qty: 90 TABLET | Refills: 0 | Status: SHIPPED | OUTPATIENT
Start: 2024-07-22

## 2024-07-22 NOTE — ASSESSMENT & PLAN NOTE
Patient's (Body mass index is 35.94 kg/m².) indicates that they are obese (BMI >30) with health conditions that include hypertension, diabetes mellitus, and dyslipidemias . Weight is improving with lifestyle modifications. BMI  is above average; BMI management plan is completed. We discussed portion control and increasing exercise.

## 2024-08-12 ENCOUNTER — HOSPITAL ENCOUNTER (OUTPATIENT)
Dept: CT IMAGING | Facility: HOSPITAL | Age: 59
Discharge: HOME | End: 2024-08-12
Admitting: NURSE PRACTITIONER
Payer: COMMERCIAL

## 2024-08-12 ENCOUNTER — APPOINTMENT (OUTPATIENT)
Dept: CT IMAGING | Facility: HOSPITAL | Age: 59
End: 2024-08-12
Payer: COMMERCIAL

## 2024-08-12 DIAGNOSIS — I65.23 BILATERAL CAROTID ARTERY STENOSIS: ICD-10-CM

## 2024-08-12 LAB
CREAT BLDA-MCNC: 0.9 MG/DL (ref 0.6–1.3)
EGFRCR SERPLBLD CKD-EPI 2021: 99.6 ML/MIN/1.73

## 2024-08-12 PROCEDURE — 82565 ASSAY OF CREATININE: CPT

## 2024-08-12 PROCEDURE — 25510000001 IOPAMIDOL PER 1 ML: Performed by: NURSE PRACTITIONER

## 2024-08-12 PROCEDURE — 70496 CT ANGIOGRAPHY HEAD: CPT

## 2024-08-12 PROCEDURE — 70498 CT ANGIOGRAPHY NECK: CPT

## 2024-08-12 RX ADMIN — IOPAMIDOL 100 ML: 755 INJECTION, SOLUTION INTRAVENOUS at 09:59

## 2024-08-13 DIAGNOSIS — I10 BENIGN HYPERTENSION: ICD-10-CM

## 2024-08-14 RX ORDER — AMLODIPINE BESYLATE 10 MG/1
TABLET ORAL
Qty: 90 TABLET | Refills: 3 | Status: SHIPPED | OUTPATIENT
Start: 2024-08-14

## 2024-08-15 PROBLEM — I77.9 CAROTID ARTERY DISEASE: Status: ACTIVE | Noted: 2024-08-15

## 2024-08-17 DIAGNOSIS — E11.51 TYPE 2 DIABETES MELLITUS WITH DIABETIC PERIPHERAL ANGIOPATHY WITHOUT GANGRENE, WITHOUT LONG-TERM CURRENT USE OF INSULIN: ICD-10-CM

## 2024-08-19 DIAGNOSIS — E11.51 TYPE 2 DIABETES MELLITUS WITH DIABETIC PERIPHERAL ANGIOPATHY WITHOUT GANGRENE, WITHOUT LONG-TERM CURRENT USE OF INSULIN: ICD-10-CM

## 2024-08-22 ENCOUNTER — OFFICE VISIT (OUTPATIENT)
Age: 59
End: 2024-08-22
Payer: COMMERCIAL

## 2024-08-22 VITALS
SYSTOLIC BLOOD PRESSURE: 124 MMHG | DIASTOLIC BLOOD PRESSURE: 79 MMHG | WEIGHT: 210 LBS | HEIGHT: 65 IN | BODY MASS INDEX: 34.99 KG/M2

## 2024-08-22 DIAGNOSIS — I65.23 BILATERAL CAROTID ARTERY STENOSIS: Primary | ICD-10-CM

## 2024-08-22 RX ORDER — ONDANSETRON 2 MG/ML
4 INJECTION INTRAMUSCULAR; INTRAVENOUS EVERY 6 HOURS PRN
OUTPATIENT
Start: 2024-08-22

## 2024-08-22 RX ORDER — SODIUM CHLORIDE 0.9 % (FLUSH) 0.9 %
10 SYRINGE (ML) INJECTION EVERY 12 HOURS SCHEDULED
OUTPATIENT
Start: 2024-08-22

## 2024-08-22 RX ORDER — SODIUM CHLORIDE 0.9 % (FLUSH) 0.9 %
10 SYRINGE (ML) INJECTION AS NEEDED
OUTPATIENT
Start: 2024-08-22

## 2024-08-22 RX ORDER — SODIUM CHLORIDE 9 MG/ML
40 INJECTION, SOLUTION INTRAVENOUS AS NEEDED
OUTPATIENT
Start: 2024-08-22

## 2024-08-22 NOTE — PROGRESS NOTES
Chief Complaint  Carotid Artery Disease (Follow up with CTA Head and Neck 8/12/24)    Subjective        Mendel Melendez presents to [facility] VASCULAR SURGERY  History of Present Illness  59 y.o. male who has been followed in our office for carotid stenosis by Dr. Espinosa but who is new to me today.  He is asymptomatic with no signs or symptoms of stroke or TIA, specifically no amaurosis fugax, no focal motor deficits, no aphasia, and no facial droop.  He was seen by [facility] for follow up with a carotid duplex, which showed progression of the left ICA stenosis to >70% by duplex, with a PSV of 430 cm/s, EDV of 61 cm/s, and ICA/CCA ratio of 6.6.  He had a CTA of the head and neck returns today to review those results.  He had chest tightness with his CTA but no itching or rash.  He is taking aspirin and plavix daily, along with a statin for medical management.       Past Medical History:   Diagnosis Date    Bruit of left carotid artery     CAD (coronary artery disease)     unspecified    Coronary artery disease     Hyperlipidemia     Hypertension     Nonspecific elevation of levels of transaminase or lactic acid dehydrogenase (LDH)     Occlusion and stenosis of bilateral carotid arteries 02/05/2020    Other specified diabetes mellitus without complications     Paroxysmal atrial fibrillation     Seasonal allergic rhinitis due to pollen     Sleep apnea     Sleep apnea, unspecified     Type 2 diabetes mellitus with other circulatory complications        Past Surgical History:   Procedure Laterality Date    CARDIAC CATHETERIZATION  04/09/2015    [facility] Left main distal 60-70% LCX 80-90% RCA proximal 90% and mid 100% with left to right collaterals.    CARDIAC CATHETERIZATION N/A 07/10/2020    Procedure: Left Heart Cath with angiogram;  Surgeon: Job Griffin MD;  Location: [facility] CATH INVASIVE LOCATION;  Service: Cardiovascular;  Laterality: N/A;    CARDIAC CATHETERIZATION N/A 07/10/2020    Procedure:  Saphenous Vein Graft;  Surgeon: Job Griffin MD;  Location: [facility] CATH INVASIVE LOCATION;  Service: Cardiovascular;  Laterality: N/A;  svg x 2  lima    CARDIAC CATHETERIZATION N/A 07/10/2020    Procedure: Left ventriculography;  Surgeon: Job Griffin MD;  Location: [facility] CATH INVASIVE LOCATION;  Service: Cardiovascular;  Laterality: N/A;    CARDIAC CATHETERIZATION  2022    [place]    CARDIAC CATHETERIZATION  2016    CARDIAC SURGERY      COLONOSCOPY      2 years back.    CORONARY ARTERY BYPASS GRAFT  2015    Four, left main and three vessel cononary artery disease. Dr. Franklyn Smith    CORONARY ARTERY BYPASS GRAFT  2016    CORONARY STENT PLACEMENT      VASECTOMY         Family History   Problem Relation Age of Onset    Heart disease Mother         Heart Problems is Positive in Mother  due to heart stopped at age 62.    Heart attack Father 64    Heart disease Father     Heart disease Maternal Aunt     Heart disease Maternal Uncle     Breast cancer Paternal Aunt     Heart disease Paternal Aunt     Ovarian cancer Paternal Aunt     Heart disease Paternal Uncle          Social History     Tobacco Use    Smoking status: Never    Smokeless tobacco: Never    Tobacco comments:     Patient does not smoke.   Vaping Use    Vaping status: Never Used   Substance Use Topics    Alcohol use: No    Drug use: No       Allergies: Lumason [sulfur hexaflouride lipid a microspheres], Niacin, and Jardiance [empagliflozin]    Prior to Admission medications    Medication Sig Start Date End Date Taking? Authorizing Provider   amLODIPine (NORVASC) 10 MG tablet Take 1 tablet by mouth once daily 24  Yes Mariajose Hernandez MD   aspirin (aspirin) 81 MG EC tablet Take 1 tablet by mouth Daily. 19  Yes ProviderMargaret MD   clopidogrel (PLAVIX) 75 MG tablet Take 1 tablet by mouth once daily 23  Yes Mariajose Hernandez MD   isosorbide mononitrate (IMDUR) 60 MG 24 hr tablet Take 1 tablet by mouth 2 (Two) Times a  Day. 24  Yes Mariajose Hernandez MD   metFORMIN (GLUCOPHAGE) 1000 MG tablet Take 1 tablet by mouth 2 (Two) Times a Day With Meals. 24  Yes Mariajose Hernandez MD   metoprolol tartrate (LOPRESSOR) 100 MG tablet Take 1 tablet by mouth 2 (Two) Times a Day. 24  Yes Mariajose Hernandez MD   nitroglycerin (NITRODUR) 0.4 MG/HR patch APPLY 1 PATCH TOPICALLY ONCE DAILY 23  Yes Mariajose Hernandez MD   nitroglycerin (NITROSTAT) 0.4 MG SL tablet Place 1 tablet under the tongue Every 5 (Five) Minutes As Needed for Chest Pain. do not exceed a total of 3 doses in 15 minutes 23  Yes Mariajose Hernandez MD   Ozempic, 0.25 or 0.5 MG/DOSE, 2 MG/3ML solution pen-injector 1 mg. 24  Yes Margaret Eli MD   ranolazine (RANEXA) 500 MG 12 hr tablet Take 1 tablet by mouth twice daily 24  Yes Gurjit Guerra MD   rosuvastatin (CRESTOR) 40 MG tablet Take 1 tablet by mouth Daily for 360 days. 23 Yes Mariajose Hernandez MD   vitamin E (Natural Vitamin E) 400 UNIT capsule Take 1 capsule by mouth Daily. 11/3/23  Yes Gurjit Guerra MD   amLODIPine (NORVASC) 5 MG tablet Take 1 tablet by mouth Daily.  Patient not taking: Reported on 2024    Margaret Eli MD   hydrOXYzine pamoate (VISTARIL) 25 MG capsule Take 1 capsule by mouth As Needed for Itching.  Patient not taking: Reported on 2024    Margaret Eli MD   isosorbide mononitrate (ISMO,MONOKET) 20 MG tablet Take 1 tablet by mouth 2 (Two) Times a Day.  Patient not taking: Reported on 2024    Margaret Eli MD   metoprolol tartrate (LOPRESSOR) 50 MG tablet Take 1 tablet by mouth 3 times a day.  Patient not taking: Reported on 2024    Margaret Eli MD   montelukast (SINGULAIR) 10 MG tablet Take 1 tablet by mouth Every Night.  (si tablet once per day )  Patient not taking: Reported on 2024    Provider, Margaret, MD         Objective   Vital Signs:  /79 (BP Location: Right arm, Patient Position:  "Sitting)   Ht 165.1 cm (65\")   Wt 95.3 kg (210 lb)   BMI 34.95 kg/m²   Estimated body mass index is 34.95 kg/m² as calculated from the following:    Height as of this encounter: 165.1 cm (65\").    Weight as of this encounter: 95.3 kg (210 lb).               Physical Exam  Vitals reviewed.   Constitutional:       General: He is not in acute distress.     Appearance: Normal appearance.   HENT:      Head: Normocephalic and atraumatic.      Right Ear: External ear normal.      Left Ear: External ear normal.      Nose: Nose normal.      Mouth/Throat:      Mouth: Mucous membranes are moist.      Pharynx: Oropharynx is clear.   Eyes:      Extraocular Movements: Extraocular movements intact.      Conjunctiva/sclera: Conjunctivae normal.      Pupils: Pupils are equal, round, and reactive to light.   Cardiovascular:      Rate and Rhythm: Normal rate and regular rhythm.      Pulses:           Carotid pulses are 2+ on the right side and 2+ on the left side.       Radial pulses are 2+ on the right side and 2+ on the left side.        Dorsalis pedis pulses are 2+ on the right side and 2+ on the left side.        Posterior tibial pulses are 2+ on the right side and 2+ on the left side.   Pulmonary:      Comments: Non labored respirations on room air, equal chest rise  Abdominal:      General: Abdomen is flat. There is no distension.      Palpations: Abdomen is soft.   Musculoskeletal:      Cervical back: Normal range of motion. No rigidity.      Right lower leg: No edema.      Left lower leg: No edema.   Skin:     General: Skin is warm and dry.      Capillary Refill: Capillary refill takes less than 2 seconds.   Neurological:      General: No focal deficit present.      Mental Status: He is alert and oriented to person, place, and time.   Psychiatric:         Mood and Affect: Mood normal.         Behavior: Behavior normal.        Result Review :            Last Echo       Last Stress  Results for orders placed during the " hospital encounter of 07/01/20    Stress Test With Myocardial Perfusion One Day    Interpretation Summary  · Left ventricular ejection fraction is normal (Calculated EF = 56%).  · Myocardial perfusion imaging indicates a large-sized, severe area of ischemia located in the apex and inferior wall.  · Impressions are consistent with a high risk study.                Assessment and Plan     Diagnoses and all orders for this visit:    1. Bilateral carotid artery stenosis (Primary)  -     Case Request; Standing  -     Follow Anesthesia Guidelines / Protocol; Standing  -     Verify NPO Status; Standing  -     Clip Operative Site; Standing  -     Patient to Void Prior to Transfer to OR; Standing  -     No Hand or Wrist IV Placement, Place IV in AC; Standing  -     Verify / Perform Chlorhexidine Skin Prep; Standing  -     Provide Patient With Instructions on NPO Status; Future  -     Provide Chlorhexidine Skin Prep Wipes and Instructions; Future  -     Type & Screen; Standing  -     Insert Peripheral IV x2; Standing  -     Saline Lock & Maintain IV Access; Standing  -     sodium chloride 0.9 % flush 10 mL  -     sodium chloride 0.9 % flush 10 mL  -     sodium chloride 0.9 % infusion 40 mL  -     Place Sequential Compression Device; Standing  -     Maintain Sequential Compression Device; Standing  -     Instructions for Nursing; Future  -     Instruct Patient to Hold Medications (Specify); Future  -     CBC & Differential; Standing  -     Basic Metabolic Panel; Standing  -     ceFAZolin (ANCEF) 2,000 mg in sodium chloride 0.9 % 100 mL IVPB  -     ondansetron (ZOFRAN) injection 4 mg  -     Case Request  -     Ambulatory Referral to Cardiology    Mendel Melendez is a 59 year old male who has been followed in our practice for carotid stenosis but this is my first time meeting him.  He is asymptomatic.  His 1 year follow up carotid duplex on 7/11/24 showed a progression to >70% stenosis of the left ICA, so a CTA of the head and neck  was obtained on 8/12/24.  I personally and independently reviewed these images and the radiology report.  On my review, I measured a focal 80-85% stenosis of the origin of the left ICA.  The radiology report only reports 50% stenosis on the left, which does not align with my interpretation. I discussed this with the patient.  I have recommended we proceed with a left carotid endarterectomy.  This is an elective major surgery with risk factors, namely heart attack, stroke, nerve damage, bleeding, and infection.  I have recommended a cardiology evaluation pre operatively. He is to continue his aspirin, plavix, and statin for medical management.  We will get him scheduled for a carotid endarterectomy at his convenience.           Follow Up     No follow-ups on file.  Patient was given instructions and counseling regarding his condition or for health maintenance advice. Please see specific information pulled into the AVS if appropriate.

## 2024-09-13 ENCOUNTER — OFFICE VISIT (OUTPATIENT)
Dept: CARDIOLOGY | Facility: CLINIC | Age: 59
End: 2024-09-13
Payer: COMMERCIAL

## 2024-09-13 VITALS
DIASTOLIC BLOOD PRESSURE: 64 MMHG | HEART RATE: 89 BPM | BODY MASS INDEX: 34.99 KG/M2 | WEIGHT: 210 LBS | OXYGEN SATURATION: 94 % | SYSTOLIC BLOOD PRESSURE: 121 MMHG | RESPIRATION RATE: 18 BRPM | HEIGHT: 65 IN

## 2024-09-13 DIAGNOSIS — I25.709 CORONARY ARTERY DISEASE INVOLVING CORONARY BYPASS GRAFT OF NATIVE HEART WITH ANGINA PECTORIS: ICD-10-CM

## 2024-09-13 DIAGNOSIS — I65.22 CAROTID ARTERY STENOSIS, SYMPTOMATIC, LEFT: ICD-10-CM

## 2024-09-13 DIAGNOSIS — I77.9 LEFT-SIDED CAROTID ARTERY DISEASE, UNSPECIFIED TYPE: ICD-10-CM

## 2024-09-13 DIAGNOSIS — Z01.810 PRE-OPERATIVE CARDIOVASCULAR EXAMINATION: Primary | ICD-10-CM

## 2024-09-13 DIAGNOSIS — Z01.810 PREOP CARDIOVASCULAR EXAM: ICD-10-CM

## 2024-09-13 DIAGNOSIS — Z95.1 S/P CABG (CORONARY ARTERY BYPASS GRAFT): ICD-10-CM

## 2024-09-13 DIAGNOSIS — I10 BENIGN HYPERTENSION: ICD-10-CM

## 2024-09-13 PROCEDURE — 99214 OFFICE O/P EST MOD 30 MIN: CPT | Performed by: NURSE PRACTITIONER

## 2024-09-13 PROCEDURE — 93000 ELECTROCARDIOGRAM COMPLETE: CPT | Performed by: NURSE PRACTITIONER

## 2024-09-13 NOTE — PROGRESS NOTES
Cardiology Office Follow Up Visit      Primary Care Provider:  Mariajose Hernandez MD    Reason for f/u:     Coronary artery disease  Preoperative cardiovascular risk assessment  Left carotid artery stenosis  Chronic stable angina      Subjective     CC:    Reports some episodes of chest pain with exertion that are typically relieved with rest or nitroglycerin    History of Present Illness       Mendel Melendez is a 59 y.o. male.  Patient is a 59-year-old male who is routinely seen by Dr. Guerra.  He is known to have coronary artery disease requiring previous PCI and CABG.  He has hypertension, dyslipidemia, diabetes.    In 2015 the patient was diagnosed with multivessel coronary artery disease and underwent CABG.  He developed a  of the PDA branch of the RCA and underwent successful stenting of this vessel in [facility].  In June 2022 the patient underwent repeat cardiac catheterization at [facility] and was noted to have 100% occlusion of the LAD, left circumflex.  Vein graft to the diagonal was patent, LIMA to the LAD was patent, RCA had a patent stent.  EF was 55 to 60%.    Patient's last visit included addition of Ranexa due to some exertional angina symptoms.    Patient does report that he has to take nitro glycerin on occasion typically when he has exerted himself.  His pain is typically resolved with 1 nitroglycerin.  He does report the frequency has improved with the addition of Ranexa at last visit.    Patient was recently seen by vascular surgery and found to have left carotid artery stenosis.  He is scheduled for left carotid endarterectomy in member of 2024.              ASSESSMENT/PLAN:      Diagnoses and all orders for this visit:    1. Pre-operative cardiovascular examination (Primary)  -     Stress Test With Myocardial Perfusion (1 Day); Future  -     Adult Transthoracic Echo Complete W/ Color, Spectral and Contrast if Necessary Per Protocol; Future    2. Carotid artery stenosis,  symptomatic, left  -     Stress Test With Myocardial Perfusion (1 Day); Future  -     Adult Transthoracic Echo Complete W/ Color, Spectral and Contrast if Necessary Per Protocol; Future    3. Preop cardiovascular exam    4. Left-sided carotid artery disease, unspecified type    5. Coronary artery disease involving coronary bypass graft of native heart with angina pectoris    6. Benign hypertension    7. S/P CABG (coronary artery bypass graft)            MEDICAL DECISION MAKING:      Patient is having chronic stable angina.  It is improved in frequency and severity since addition of Ranexa.    EKG today shows sinus rhythm with no ST or T wave segment abnormalities.    Patient's last ischemic evaluation was catheterization in July 2022.  At that time he was noted to have patent LIMA to the LAD, patent vein graft to the diagonal and RCA had a patent stent.  EF was 55 to 60%.    Prior to clearing patient surgery I would like to proceed with a stress Myoview to rule out ischemia and echocardiogram to reassess LV function.  The patient is in agreement to this plan.  We will plan on reviewing this study and making further recommendations.  If his symptoms worsen I have asked him to contact the office sooner.      Past Medical History:   Diagnosis Date    Bruit of left carotid artery     CAD (coronary artery disease)     unspecified    Coronary artery disease     Hyperlipidemia     Hypertension     Nonspecific elevation of levels of transaminase or lactic acid dehydrogenase (LDH)     Occlusion and stenosis of bilateral carotid arteries 02/05/2020    Other specified diabetes mellitus without complications     Paroxysmal atrial fibrillation     Seasonal allergic rhinitis due to pollen     Sleep apnea     Sleep apnea, unspecified     Type 2 diabetes mellitus with other circulatory complications        Past Surgical History:   Procedure Laterality Date    CARDIAC CATHETERIZATION  04/09/2015    [facility] Left main distal 60-70% LCX  80-90% RCA proximal 90% and mid 100% with left to right collaterals.    CARDIAC CATHETERIZATION N/A 07/10/2020    Procedure: Left Heart Cath with angiogram;  Surgeon: Job Griffin MD;  Location: [facility] CATH INVASIVE LOCATION;  Service: Cardiovascular;  Laterality: N/A;    CARDIAC CATHETERIZATION N/A 07/10/2020    Procedure: Saphenous Vein Graft;  Surgeon: Job Griffin MD;  Location: [facility] CATH INVASIVE LOCATION;  Service: Cardiovascular;  Laterality: N/A;  svg x 2  lima    CARDIAC CATHETERIZATION N/A 07/10/2020    Procedure: Left ventriculography;  Surgeon: Job Griffin MD;  Location: [facility] CATH INVASIVE LOCATION;  Service: Cardiovascular;  Laterality: N/A;    CARDIAC CATHETERIZATION  06/2022    [place]    CARDIAC CATHETERIZATION  2016    CARDIAC SURGERY      COLONOSCOPY      2 years back.    CORONARY ARTERY BYPASS GRAFT  04/13/2015    Four, left main and three vessel cononary artery disease. Dr. Franklyn Smith    CORONARY ARTERY BYPASS GRAFT  2016    CORONARY STENT PLACEMENT      VASECTOMY  1998         Current Outpatient Medications:     amLODIPine (NORVASC) 10 MG tablet, Take 1 tablet by mouth once daily, Disp: 90 tablet, Rfl: 3    aspirin (aspirin) 81 MG EC tablet, Take 1 tablet by mouth Daily., Disp: , Rfl:     clopidogrel (PLAVIX) 75 MG tablet, Take 1 tablet by mouth once daily, Disp: 90 tablet, Rfl: 3    isosorbide mononitrate (IMDUR) 60 MG 24 hr tablet, Take 1 tablet by mouth 2 (Two) Times a Day., Disp: 180 tablet, Rfl: 3    metFORMIN (GLUCOPHAGE) 1000 MG tablet, Take 1 tablet by mouth 2 (Two) Times a Day With Meals., Disp: 180 tablet, Rfl: 3    metoprolol tartrate (LOPRESSOR) 100 MG tablet, Take 1 tablet by mouth 2 (Two) Times a Day., Disp: 180 tablet, Rfl: 3    nitroglycerin (NITRODUR) 0.4 MG/HR patch, APPLY 1 PATCH TOPICALLY ONCE DAILY, Disp: 90 patch, Rfl: 3    nitroglycerin (NITROSTAT) 0.4 MG SL tablet, Place 1 tablet under the tongue Every 5 (Five) Minutes As Needed for Chest Pain. do not exceed a  "total of 3 doses in 15 minutes, Disp: 100 tablet, Rfl: 12    Ozempic, 0.25 or 0.5 MG/DOSE, 2 MG/3ML solution pen-injector, 1 mg., Disp: , Rfl:     ranolazine (RANEXA) 500 MG 12 hr tablet, Take 1 tablet by mouth twice daily, Disp: 90 tablet, Rfl: 0    rosuvastatin (CRESTOR) 40 MG tablet, Take 1 tablet by mouth Daily for 360 days., Disp: 90 tablet, Rfl: 3    vitamin E (Natural Vitamin E) 400 UNIT capsule, Take 1 capsule by mouth Daily., Disp: 90 capsule, Rfl: 0    Social History     Socioeconomic History    Marital status:    Tobacco Use    Smoking status: Never    Smokeless tobacco: Never    Tobacco comments:     Patient does not smoke.   Vaping Use    Vaping status: Never Used   Substance and Sexual Activity    Alcohol use: No    Drug use: No    Sexual activity: Defer       Family History   Problem Relation Age of Onset    Heart disease Mother         Heart Problems is Positive in Mother  due to heart stopped at age 62.    Heart attack Father 64    Heart disease Father     Heart disease Maternal Aunt     Heart disease Maternal Uncle     Breast cancer Paternal Aunt     Heart disease Paternal Aunt     Ovarian cancer Paternal Aunt     Heart disease Paternal Uncle        The following portions of the patient's history were reviewed and updated as appropriate: allergies, current medications, past family history, past medical history, past social history, past surgical history and problem list.    Review of Systems   Cardiovascular:  Positive for chest pain.   All other systems reviewed and are negative.      Pertinent items are noted in HPI, all other systems reviewed and negative    /64 (BP Location: Right arm, Patient Position: Sitting, Cuff Size: Large Adult)   Pulse 89   Resp 18   Ht 165 cm (64.96\")   Wt 95.3 kg (210 lb)   SpO2 94%   BMI 34.99 kg/m² .  Objective     Vitals reviewed.   Constitutional:       General: Not in acute distress.     Appearance: Normal appearance. Well-developed. "   Eyes:      Pupils: Pupils are equal, round, and reactive to light.   HENT:      Head: Normocephalic and atraumatic.   Neck:      Vascular: No JVD.   Pulmonary:      Effort: Pulmonary effort is normal.      Breath sounds: Normal breath sounds.   Cardiovascular:      Normal rate. Regular rhythm.   Pulses:     Intact distal pulses.   Edema:     Peripheral edema absent.   Abdominal:      General: There is no distension.      Palpations: Abdomen is soft.      Tenderness: There is no abdominal tenderness.   Musculoskeletal: Normal range of motion.      Cervical back: Normal range of motion and neck supple. Skin:     General: Skin is warm and dry.   Neurological:      Mental Status: Alert and oriented to person, place, and time.             ECG 12 Lead    Date/Time: 9/13/2024 2:44 PM  Performed by: Albina Marr APRN    Authorized by: Albina Marr APRN  Comparison: compared with previous ECG   Rhythm: sinus rhythm  Rate: normal  BPM: 89  Conduction: conduction normal  ST Segments: ST segments normal  T Waves: T waves normal  QRS axis: normal  Other: no other findings    Clinical impression: normal ECG          EKG ordered by and reviewed by me in office

## 2024-09-21 DIAGNOSIS — I25.709 CORONARY ARTERY DISEASE INVOLVING CORONARY BYPASS GRAFT OF NATIVE HEART WITH ANGINA PECTORIS: ICD-10-CM

## 2024-09-23 ENCOUNTER — APPOINTMENT (OUTPATIENT)
Dept: CT IMAGING | Facility: HOSPITAL | Age: 59
End: 2024-09-23
Payer: COMMERCIAL

## 2024-09-23 ENCOUNTER — HOSPITAL ENCOUNTER (OUTPATIENT)
Facility: HOSPITAL | Age: 59
Discharge: HOME | End: 2024-09-24
Attending: EMERGENCY MEDICINE | Admitting: UROLOGY
Payer: COMMERCIAL

## 2024-09-23 DIAGNOSIS — N13.1 HYDRONEPHROSIS DUE TO OBSTRUCTION OF URETER: ICD-10-CM

## 2024-09-23 DIAGNOSIS — N20.0 KIDNEY STONE: ICD-10-CM

## 2024-09-23 DIAGNOSIS — N17.9 ACUTE KIDNEY INJURY: ICD-10-CM

## 2024-09-23 DIAGNOSIS — N13.2 HYDRONEPHROSIS WITH URINARY OBSTRUCTION DUE TO URETERAL CALCULUS: ICD-10-CM

## 2024-09-23 DIAGNOSIS — N23 RENAL COLIC ON RIGHT SIDE: ICD-10-CM

## 2024-09-23 DIAGNOSIS — N20.1 CALCULUS OF URETER: Primary | ICD-10-CM

## 2024-09-23 LAB
ALBUMIN SERPL-MCNC: 4.6 G/DL (ref 3.5–5.2)
ALBUMIN/GLOB SERPL: 1.8 G/DL
ALP SERPL-CCNC: 66 U/L (ref 39–117)
ALT SERPL W P-5'-P-CCNC: 19 U/L (ref 1–41)
ANION GAP SERPL CALCULATED.3IONS-SCNC: 12.9 MMOL/L (ref 5–15)
AST SERPL-CCNC: 23 U/L (ref 1–40)
BASOPHILS # BLD AUTO: 0.05 10*3/MM3 (ref 0–0.2)
BASOPHILS NFR BLD AUTO: 0.4 % (ref 0–1.5)
BILIRUB SERPL-MCNC: 0.3 MG/DL (ref 0–1.2)
BILIRUB UR QL STRIP: NEGATIVE
BUN SERPL-MCNC: 22 MG/DL (ref 6–20)
BUN/CREAT SERPL: 17.2 (ref 7–25)
CALCIUM SPEC-SCNC: 9.6 MG/DL (ref 8.6–10.5)
CHLORIDE SERPL-SCNC: 107 MMOL/L (ref 98–107)
CLARITY UR: CLEAR
CO2 SERPL-SCNC: 22.1 MMOL/L (ref 22–29)
COLOR UR: YELLOW
CREAT SERPL-MCNC: 1.28 MG/DL (ref 0.76–1.27)
DEPRECATED RDW RBC AUTO: 43.1 FL (ref 37–54)
EGFRCR SERPLBLD CKD-EPI 2021: 65.3 ML/MIN/1.73
EOSINOPHIL # BLD AUTO: 0.27 10*3/MM3 (ref 0–0.4)
EOSINOPHIL NFR BLD AUTO: 1.9 % (ref 0.3–6.2)
ERYTHROCYTE [DISTWIDTH] IN BLOOD BY AUTOMATED COUNT: 13.2 % (ref 12.3–15.4)
GLOBULIN UR ELPH-MCNC: 2.6 GM/DL
GLUCOSE SERPL-MCNC: 100 MG/DL (ref 65–99)
GLUCOSE UR STRIP-MCNC: NEGATIVE MG/DL
HCT VFR BLD AUTO: 44.8 % (ref 37.5–51)
HGB BLD-MCNC: 14.5 G/DL (ref 13–17.7)
HGB UR QL STRIP.AUTO: NEGATIVE
IMM GRANULOCYTES # BLD AUTO: 0.05 10*3/MM3 (ref 0–0.05)
IMM GRANULOCYTES NFR BLD AUTO: 0.4 % (ref 0–0.5)
KETONES UR QL STRIP: ABNORMAL
LEUKOCYTE ESTERASE UR QL STRIP.AUTO: NEGATIVE
LIPASE SERPL-CCNC: 33 U/L (ref 13–60)
LYMPHOCYTES # BLD AUTO: 2.03 10*3/MM3 (ref 0.7–3.1)
LYMPHOCYTES NFR BLD AUTO: 14.4 % (ref 19.6–45.3)
MCH RBC QN AUTO: 28.8 PG (ref 26.6–33)
MCHC RBC AUTO-ENTMCNC: 32.4 G/DL (ref 31.5–35.7)
MCV RBC AUTO: 88.9 FL (ref 79–97)
MONOCYTES # BLD AUTO: 1.49 10*3/MM3 (ref 0.1–0.9)
MONOCYTES NFR BLD AUTO: 10.6 % (ref 5–12)
NEUTROPHILS NFR BLD AUTO: 10.2 10*3/MM3 (ref 1.7–7)
NEUTROPHILS NFR BLD AUTO: 72.3 % (ref 42.7–76)
NITRITE UR QL STRIP: NEGATIVE
NRBC BLD AUTO-RTO: 0 /100 WBC (ref 0–0.2)
PH UR STRIP.AUTO: <=5 [PH] (ref 5–8)
PLATELET # BLD AUTO: 205 10*3/MM3 (ref 140–450)
PMV BLD AUTO: 9.6 FL (ref 6–12)
POTASSIUM SERPL-SCNC: 4.4 MMOL/L (ref 3.5–5.2)
PROT SERPL-MCNC: 7.2 G/DL (ref 6–8.5)
PROT UR QL STRIP: NEGATIVE
RBC # BLD AUTO: 5.04 10*6/MM3 (ref 4.14–5.8)
SODIUM SERPL-SCNC: 142 MMOL/L (ref 136–145)
SP GR UR STRIP: 1.02 (ref 1–1.03)
UROBILINOGEN UR QL STRIP: ABNORMAL
WBC NRBC COR # BLD AUTO: 14.09 10*3/MM3 (ref 3.4–10.8)

## 2024-09-23 PROCEDURE — 25010000002 ONDANSETRON PER 1 MG: Performed by: EMERGENCY MEDICINE

## 2024-09-23 PROCEDURE — 25010000002 CEFTRIAXONE PER 250 MG: Performed by: EMERGENCY MEDICINE

## 2024-09-23 PROCEDURE — 96376 TX/PRO/DX INJ SAME DRUG ADON: CPT

## 2024-09-23 PROCEDURE — G0378 HOSPITAL OBSERVATION PER HR: HCPCS

## 2024-09-23 PROCEDURE — 81003 URINALYSIS AUTO W/O SCOPE: CPT | Performed by: EMERGENCY MEDICINE

## 2024-09-23 PROCEDURE — 25810000003 SODIUM CHLORIDE 0.9 % SOLUTION: Performed by: EMERGENCY MEDICINE

## 2024-09-23 PROCEDURE — 83690 ASSAY OF LIPASE: CPT | Performed by: EMERGENCY MEDICINE

## 2024-09-23 PROCEDURE — 25010000002 HYDROMORPHONE 1 MG/ML SOLUTION: Performed by: EMERGENCY MEDICINE

## 2024-09-23 PROCEDURE — 99285 EMERGENCY DEPT VISIT HI MDM: CPT

## 2024-09-23 PROCEDURE — 96375 TX/PRO/DX INJ NEW DRUG ADDON: CPT

## 2024-09-23 PROCEDURE — 80053 COMPREHEN METABOLIC PANEL: CPT | Performed by: EMERGENCY MEDICINE

## 2024-09-23 PROCEDURE — 96365 THER/PROPH/DIAG IV INF INIT: CPT

## 2024-09-23 PROCEDURE — 85025 COMPLETE CBC W/AUTO DIFF WBC: CPT | Performed by: EMERGENCY MEDICINE

## 2024-09-23 PROCEDURE — 25010000002 KETOROLAC TROMETHAMINE PER 15 MG: Performed by: EMERGENCY MEDICINE

## 2024-09-23 PROCEDURE — 74176 CT ABD & PELVIS W/O CONTRAST: CPT

## 2024-09-23 RX ORDER — KETOROLAC TROMETHAMINE 10 MG/1
10 TABLET, FILM COATED ORAL EVERY 6 HOURS PRN
COMMUNITY
Start: 2024-09-21 | End: 2024-09-26

## 2024-09-23 RX ORDER — KETOROLAC TROMETHAMINE 30 MG/ML
15 INJECTION, SOLUTION INTRAMUSCULAR; INTRAVENOUS ONCE
Status: COMPLETED | OUTPATIENT
Start: 2024-09-23 | End: 2024-09-23

## 2024-09-23 RX ORDER — NITROGLYCERIN 0.4 MG/1
0.4 TABLET SUBLINGUAL
Qty: 100 TABLET | Refills: 0 | Status: SHIPPED | OUTPATIENT
Start: 2024-09-23

## 2024-09-23 RX ORDER — CLOPIDOGREL BISULFATE 75 MG/1
TABLET ORAL
Qty: 90 TABLET | Refills: 3 | Status: SHIPPED | OUTPATIENT
Start: 2024-09-23

## 2024-09-23 RX ORDER — TAMSULOSIN HYDROCHLORIDE 0.4 MG/1
0.4 CAPSULE ORAL DAILY
Status: DISCONTINUED | OUTPATIENT
Start: 2024-09-24 | End: 2024-09-24 | Stop reason: HOSPADM

## 2024-09-23 RX ORDER — ROSUVASTATIN CALCIUM 40 MG/1
40 TABLET, COATED ORAL NIGHTLY
COMMUNITY

## 2024-09-23 RX ORDER — SODIUM CHLORIDE 0.9 % (FLUSH) 0.9 %
10 SYRINGE (ML) INJECTION AS NEEDED
Status: DISCONTINUED | OUTPATIENT
Start: 2024-09-23 | End: 2024-09-24 | Stop reason: HOSPADM

## 2024-09-23 RX ORDER — ONDANSETRON 2 MG/ML
4 INJECTION INTRAMUSCULAR; INTRAVENOUS ONCE
Status: COMPLETED | OUTPATIENT
Start: 2024-09-23 | End: 2024-09-23

## 2024-09-23 RX ORDER — FLOMAX 0.4 MG/1
1 CAPSULE ORAL DAILY
COMMUNITY
Start: 2024-09-21 | End: 2024-10-05

## 2024-09-23 RX ORDER — ONDANSETRON 4 MG/1
4 TABLET, ORALLY DISINTEGRATING ORAL EVERY 8 HOURS PRN
COMMUNITY
Start: 2024-09-21 | End: 2024-09-28

## 2024-09-23 RX ADMIN — KETOROLAC TROMETHAMINE 15 MG: 30 INJECTION, SOLUTION INTRAMUSCULAR at 18:11

## 2024-09-23 RX ADMIN — HYDROMORPHONE HYDROCHLORIDE 0.5 MG: 1 INJECTION, SOLUTION INTRAMUSCULAR; INTRAVENOUS; SUBCUTANEOUS at 18:11

## 2024-09-23 RX ADMIN — ONDANSETRON 4 MG: 2 INJECTION INTRAMUSCULAR; INTRAVENOUS at 18:11

## 2024-09-23 RX ADMIN — SODIUM CHLORIDE 1000 ML: 9 INJECTION, SOLUTION INTRAVENOUS at 18:11

## 2024-09-23 RX ADMIN — CEFTRIAXONE 2000 MG: 2 INJECTION, POWDER, FOR SOLUTION INTRAMUSCULAR; INTRAVENOUS at 23:17

## 2024-09-23 RX ADMIN — HYDROMORPHONE HYDROCHLORIDE 0.5 MG: 1 INJECTION, SOLUTION INTRAMUSCULAR; INTRAVENOUS; SUBCUTANEOUS at 21:55

## 2024-09-23 RX ADMIN — ONDANSETRON 4 MG: 2 INJECTION, SOLUTION INTRAMUSCULAR; INTRAVENOUS at 21:55

## 2024-09-24 ENCOUNTER — ANESTHESIA EVENT (OUTPATIENT)
Dept: PERIOP | Facility: HOSPITAL | Age: 59
End: 2024-09-24
Payer: COMMERCIAL

## 2024-09-24 ENCOUNTER — ANESTHESIA (OUTPATIENT)
Dept: PERIOP | Facility: HOSPITAL | Age: 59
End: 2024-09-24
Payer: COMMERCIAL

## 2024-09-24 ENCOUNTER — READMISSION MANAGEMENT (OUTPATIENT)
Dept: CALL CENTER | Facility: HOSPITAL | Age: 59
End: 2024-09-24
Payer: COMMERCIAL

## 2024-09-24 ENCOUNTER — APPOINTMENT (OUTPATIENT)
Dept: GENERAL RADIOLOGY | Facility: HOSPITAL | Age: 59
End: 2024-09-24
Payer: COMMERCIAL

## 2024-09-24 VITALS
BODY MASS INDEX: 36.03 KG/M2 | RESPIRATION RATE: 14 BRPM | WEIGHT: 216.27 LBS | OXYGEN SATURATION: 92 % | DIASTOLIC BLOOD PRESSURE: 71 MMHG | SYSTOLIC BLOOD PRESSURE: 130 MMHG | HEIGHT: 65 IN | TEMPERATURE: 98.1 F | HEART RATE: 74 BPM

## 2024-09-24 LAB
ANION GAP SERPL CALCULATED.3IONS-SCNC: 10.3 MMOL/L (ref 5–15)
BASOPHILS # BLD AUTO: 0.04 10*3/MM3 (ref 0–0.2)
BASOPHILS NFR BLD AUTO: 0.5 % (ref 0–1.5)
BUN SERPL-MCNC: 18 MG/DL (ref 6–20)
BUN/CREAT SERPL: 14.6 (ref 7–25)
CALCIUM SPEC-SCNC: 8.7 MG/DL (ref 8.6–10.5)
CHLORIDE SERPL-SCNC: 107 MMOL/L (ref 98–107)
CO2 SERPL-SCNC: 23.7 MMOL/L (ref 22–29)
CREAT SERPL-MCNC: 1.23 MG/DL (ref 0.76–1.27)
DEPRECATED RDW RBC AUTO: 42.9 FL (ref 37–54)
EGFRCR SERPLBLD CKD-EPI 2021: 68.5 ML/MIN/1.73
EOSINOPHIL # BLD AUTO: 0.2 10*3/MM3 (ref 0–0.4)
EOSINOPHIL NFR BLD AUTO: 2.4 % (ref 0.3–6.2)
ERYTHROCYTE [DISTWIDTH] IN BLOOD BY AUTOMATED COUNT: 13.2 % (ref 12.3–15.4)
GLUCOSE BLDC GLUCOMTR-MCNC: 115 MG/DL (ref 70–105)
GLUCOSE BLDC GLUCOMTR-MCNC: 133 MG/DL (ref 70–105)
GLUCOSE BLDC GLUCOMTR-MCNC: 159 MG/DL (ref 70–105)
GLUCOSE SERPL-MCNC: 139 MG/DL (ref 65–99)
HCT VFR BLD AUTO: 43.9 % (ref 37.5–51)
HGB BLD-MCNC: 14.1 G/DL (ref 13–17.7)
IMM GRANULOCYTES # BLD AUTO: 0.02 10*3/MM3 (ref 0–0.05)
IMM GRANULOCYTES NFR BLD AUTO: 0.2 % (ref 0–0.5)
LYMPHOCYTES # BLD AUTO: 1.7 10*3/MM3 (ref 0.7–3.1)
LYMPHOCYTES NFR BLD AUTO: 20 % (ref 19.6–45.3)
MCH RBC QN AUTO: 28.4 PG (ref 26.6–33)
MCHC RBC AUTO-ENTMCNC: 32.1 G/DL (ref 31.5–35.7)
MCV RBC AUTO: 88.3 FL (ref 79–97)
MONOCYTES # BLD AUTO: 1.2 10*3/MM3 (ref 0.1–0.9)
MONOCYTES NFR BLD AUTO: 14.1 % (ref 5–12)
NEUTROPHILS NFR BLD AUTO: 5.34 10*3/MM3 (ref 1.7–7)
NEUTROPHILS NFR BLD AUTO: 62.8 % (ref 42.7–76)
NRBC BLD AUTO-RTO: 0 /100 WBC (ref 0–0.2)
PLATELET # BLD AUTO: 192 10*3/MM3 (ref 140–450)
PMV BLD AUTO: 9.8 FL (ref 6–12)
POTASSIUM SERPL-SCNC: 3.8 MMOL/L (ref 3.5–5.2)
RBC # BLD AUTO: 4.97 10*6/MM3 (ref 4.14–5.8)
SODIUM SERPL-SCNC: 141 MMOL/L (ref 136–145)
WBC NRBC COR # BLD AUTO: 8.5 10*3/MM3 (ref 3.4–10.8)

## 2024-09-24 PROCEDURE — 25010000002 PROPOFOL 1000 MG/100ML EMULSION: Performed by: NURSE ANESTHETIST, CERTIFIED REGISTERED

## 2024-09-24 PROCEDURE — 25010000002 MIDAZOLAM PER 1 MG: Performed by: NURSE ANESTHETIST, CERTIFIED REGISTERED

## 2024-09-24 PROCEDURE — 25010000002 ONDANSETRON PER 1 MG: Performed by: UROLOGY

## 2024-09-24 PROCEDURE — 25010000002 KETOROLAC TROMETHAMINE PER 15 MG: Performed by: UROLOGY

## 2024-09-24 PROCEDURE — 82948 REAGENT STRIP/BLOOD GLUCOSE: CPT

## 2024-09-24 PROCEDURE — 80048 BASIC METABOLIC PNL TOTAL CA: CPT | Performed by: EMERGENCY MEDICINE

## 2024-09-24 PROCEDURE — 82948 REAGENT STRIP/BLOOD GLUCOSE: CPT | Performed by: NURSE PRACTITIONER

## 2024-09-24 PROCEDURE — 25010000002 ONDANSETRON PER 1 MG: Performed by: EMERGENCY MEDICINE

## 2024-09-24 PROCEDURE — 96376 TX/PRO/DX INJ SAME DRUG ADON: CPT

## 2024-09-24 PROCEDURE — 25010000002 HYDROMORPHONE 1 MG/ML SOLUTION: Performed by: UROLOGY

## 2024-09-24 PROCEDURE — 76000 FLUOROSCOPY <1 HR PHYS/QHP: CPT

## 2024-09-24 PROCEDURE — 25010000002 HYDROMORPHONE 1 MG/ML SOLUTION: Performed by: EMERGENCY MEDICINE

## 2024-09-24 PROCEDURE — G0378 HOSPITAL OBSERVATION PER HR: HCPCS

## 2024-09-24 PROCEDURE — 25010000002 KETOROLAC TROMETHAMINE PER 15 MG: Performed by: NURSE PRACTITIONER

## 2024-09-24 PROCEDURE — 96361 HYDRATE IV INFUSION ADD-ON: CPT

## 2024-09-24 PROCEDURE — 85025 COMPLETE CBC W/AUTO DIFF WBC: CPT | Performed by: EMERGENCY MEDICINE

## 2024-09-24 PROCEDURE — 25010000002 CEFAZOLIN PER 500 MG: Performed by: UROLOGY

## 2024-09-24 PROCEDURE — 25010000002 FENTANYL CITRATE (PF) 100 MCG/2ML SOLUTION: Performed by: NURSE ANESTHETIST, CERTIFIED REGISTERED

## 2024-09-24 PROCEDURE — C2617 STENT, NON-COR, TEM W/O DEL: HCPCS | Performed by: UROLOGY

## 2024-09-24 PROCEDURE — 25810000003 LACTATED RINGERS PER 1000 ML: Performed by: NURSE ANESTHETIST, CERTIFIED REGISTERED

## 2024-09-24 PROCEDURE — 25010000002 DEXAMETHASONE PER 1 MG: Performed by: NURSE ANESTHETIST, CERTIFIED REGISTERED

## 2024-09-24 DEVICE — URETERAL STENT
Type: IMPLANTABLE DEVICE | Site: URETER | Status: FUNCTIONAL
Brand: PERCUFLEX™ PLUS

## 2024-09-24 RX ORDER — CEPHALEXIN 500 MG/1
500 CAPSULE ORAL 2 TIMES DAILY
Qty: 10 CAPSULE | Refills: 0 | Status: SHIPPED | OUTPATIENT
Start: 2024-09-24 | End: 2024-09-29

## 2024-09-24 RX ORDER — DEXTROSE MONOHYDRATE 25 G/50ML
25 INJECTION, SOLUTION INTRAVENOUS
Status: DISCONTINUED | OUTPATIENT
Start: 2024-09-24 | End: 2024-09-24 | Stop reason: HOSPADM

## 2024-09-24 RX ORDER — IBUPROFEN 600 MG/1
1 TABLET ORAL
Status: DISCONTINUED | OUTPATIENT
Start: 2024-09-24 | End: 2024-09-24 | Stop reason: HOSPADM

## 2024-09-24 RX ORDER — ONDANSETRON 2 MG/ML
4 INJECTION INTRAMUSCULAR; INTRAVENOUS ONCE AS NEEDED
Status: DISCONTINUED | OUTPATIENT
Start: 2024-09-24 | End: 2024-09-24 | Stop reason: HOSPADM

## 2024-09-24 RX ORDER — PROCHLORPERAZINE EDISYLATE 5 MG/ML
5 INJECTION INTRAMUSCULAR; INTRAVENOUS EVERY 6 HOURS PRN
Status: DISCONTINUED | OUTPATIENT
Start: 2024-09-24 | End: 2024-09-24 | Stop reason: HOSPADM

## 2024-09-24 RX ORDER — DEXAMETHASONE SODIUM PHOSPHATE 4 MG/ML
INJECTION, SOLUTION INTRA-ARTICULAR; INTRALESIONAL; INTRAMUSCULAR; INTRAVENOUS; SOFT TISSUE AS NEEDED
Status: DISCONTINUED | OUTPATIENT
Start: 2024-09-24 | End: 2024-09-24 | Stop reason: SURG

## 2024-09-24 RX ORDER — OXYCODONE HYDROCHLORIDE 5 MG/1
5 TABLET ORAL ONCE AS NEEDED
Status: DISCONTINUED | OUTPATIENT
Start: 2024-09-24 | End: 2024-09-24 | Stop reason: HOSPADM

## 2024-09-24 RX ORDER — TAMSULOSIN HYDROCHLORIDE 0.4 MG/1
1 CAPSULE ORAL DAILY
Qty: 30 CAPSULE | Refills: 0 | Status: SHIPPED | OUTPATIENT
Start: 2024-09-24

## 2024-09-24 RX ORDER — FLUMAZENIL 0.1 MG/ML
0.2 INJECTION INTRAVENOUS AS NEEDED
Status: DISCONTINUED | OUTPATIENT
Start: 2024-09-24 | End: 2024-09-24 | Stop reason: HOSPADM

## 2024-09-24 RX ORDER — MEPERIDINE HYDROCHLORIDE 25 MG/ML
12.5 INJECTION INTRAMUSCULAR; INTRAVENOUS; SUBCUTANEOUS
Status: DISCONTINUED | OUTPATIENT
Start: 2024-09-24 | End: 2024-09-24 | Stop reason: HOSPADM

## 2024-09-24 RX ORDER — SODIUM CHLORIDE, SODIUM LACTATE, POTASSIUM CHLORIDE, CALCIUM CHLORIDE 600; 310; 30; 20 MG/100ML; MG/100ML; MG/100ML; MG/100ML
INJECTION, SOLUTION INTRAVENOUS CONTINUOUS PRN
Status: DISCONTINUED | OUTPATIENT
Start: 2024-09-24 | End: 2024-09-24 | Stop reason: SURG

## 2024-09-24 RX ORDER — ONDANSETRON 4 MG/1
4 TABLET, ORALLY DISINTEGRATING ORAL EVERY 8 HOURS PRN
Qty: 15 TABLET | Refills: 1 | Status: SHIPPED | OUTPATIENT
Start: 2024-09-24

## 2024-09-24 RX ORDER — PROPOFOL 10 MG/ML
INJECTION, EMULSION INTRAVENOUS AS NEEDED
Status: DISCONTINUED | OUTPATIENT
Start: 2024-09-24 | End: 2024-09-24 | Stop reason: SURG

## 2024-09-24 RX ORDER — FENTANYL CITRATE 50 UG/ML
INJECTION, SOLUTION INTRAMUSCULAR; INTRAVENOUS AS NEEDED
Status: DISCONTINUED | OUTPATIENT
Start: 2024-09-24 | End: 2024-09-24 | Stop reason: SURG

## 2024-09-24 RX ORDER — EPHEDRINE SULFATE 5 MG/ML
5 INJECTION INTRAVENOUS ONCE AS NEEDED
Status: DISCONTINUED | OUTPATIENT
Start: 2024-09-24 | End: 2024-09-24 | Stop reason: HOSPADM

## 2024-09-24 RX ORDER — HYDROCODONE BITARTRATE AND ACETAMINOPHEN 7.5; 325 MG/1; MG/1
1 TABLET ORAL EVERY 6 HOURS PRN
Status: DISCONTINUED | OUTPATIENT
Start: 2024-09-24 | End: 2024-09-24 | Stop reason: HOSPADM

## 2024-09-24 RX ORDER — NALOXONE HCL 0.4 MG/ML
0.4 VIAL (ML) INJECTION AS NEEDED
Status: DISCONTINUED | OUTPATIENT
Start: 2024-09-24 | End: 2024-09-24 | Stop reason: HOSPADM

## 2024-09-24 RX ORDER — ISOSORBIDE MONONITRATE 60 MG/1
60 TABLET, EXTENDED RELEASE ORAL
Status: DISCONTINUED | OUTPATIENT
Start: 2024-09-24 | End: 2024-09-24 | Stop reason: HOSPADM

## 2024-09-24 RX ORDER — OXYCODONE AND ACETAMINOPHEN 5; 325 MG/1; MG/1
1-2 TABLET ORAL EVERY 6 HOURS PRN
Qty: 15 TABLET | Refills: 0 | Status: SHIPPED | OUTPATIENT
Start: 2024-09-24

## 2024-09-24 RX ORDER — ASPIRIN 81 MG/1
81 TABLET ORAL DAILY
Status: DISCONTINUED | OUTPATIENT
Start: 2024-09-24 | End: 2024-09-24 | Stop reason: HOSPADM

## 2024-09-24 RX ORDER — AMLODIPINE BESYLATE 5 MG/1
10 TABLET ORAL DAILY
Status: DISCONTINUED | OUTPATIENT
Start: 2024-09-24 | End: 2024-09-24 | Stop reason: HOSPADM

## 2024-09-24 RX ORDER — SODIUM CHLORIDE 450 MG/100ML
100 INJECTION, SOLUTION INTRAVENOUS CONTINUOUS
Status: DISCONTINUED | OUTPATIENT
Start: 2024-09-24 | End: 2024-09-24

## 2024-09-24 RX ORDER — ONDANSETRON 2 MG/ML
4 INJECTION INTRAMUSCULAR; INTRAVENOUS EVERY 6 HOURS PRN
Status: DISCONTINUED | OUTPATIENT
Start: 2024-09-24 | End: 2024-09-24 | Stop reason: HOSPADM

## 2024-09-24 RX ORDER — DIPHENHYDRAMINE HYDROCHLORIDE 50 MG/ML
12.5 INJECTION INTRAMUSCULAR; INTRAVENOUS
Status: DISCONTINUED | OUTPATIENT
Start: 2024-09-24 | End: 2024-09-24 | Stop reason: HOSPADM

## 2024-09-24 RX ORDER — SODIUM CHLORIDE 9 MG/ML
40 INJECTION, SOLUTION INTRAVENOUS AS NEEDED
Status: DISCONTINUED | OUTPATIENT
Start: 2024-09-24 | End: 2024-09-24 | Stop reason: HOSPADM

## 2024-09-24 RX ORDER — RANOLAZINE 500 MG/1
500 TABLET, EXTENDED RELEASE ORAL 2 TIMES DAILY
Status: DISCONTINUED | OUTPATIENT
Start: 2024-09-24 | End: 2024-09-24 | Stop reason: HOSPADM

## 2024-09-24 RX ORDER — NICOTINE POLACRILEX 4 MG
15 LOZENGE BUCCAL
Status: DISCONTINUED | OUTPATIENT
Start: 2024-09-24 | End: 2024-09-24 | Stop reason: HOSPADM

## 2024-09-24 RX ORDER — INSULIN LISPRO 100 [IU]/ML
2-9 INJECTION, SOLUTION INTRAVENOUS; SUBCUTANEOUS
Status: DISCONTINUED | OUTPATIENT
Start: 2024-09-24 | End: 2024-09-24 | Stop reason: HOSPADM

## 2024-09-24 RX ORDER — CLOPIDOGREL BISULFATE 75 MG/1
75 TABLET ORAL DAILY
Status: DISCONTINUED | OUTPATIENT
Start: 2024-09-24 | End: 2024-09-24 | Stop reason: HOSPADM

## 2024-09-24 RX ORDER — ROSUVASTATIN CALCIUM 10 MG/1
40 TABLET, COATED ORAL NIGHTLY
Status: DISCONTINUED | OUTPATIENT
Start: 2024-09-24 | End: 2024-09-24 | Stop reason: HOSPADM

## 2024-09-24 RX ORDER — LIDOCAINE HYDROCHLORIDE 20 MG/ML
INJECTION, SOLUTION EPIDURAL; INFILTRATION; INTRACAUDAL; PERINEURAL AS NEEDED
Status: DISCONTINUED | OUTPATIENT
Start: 2024-09-24 | End: 2024-09-24 | Stop reason: SURG

## 2024-09-24 RX ORDER — SODIUM CHLORIDE 0.9 % (FLUSH) 0.9 %
10 SYRINGE (ML) INJECTION AS NEEDED
Status: DISCONTINUED | OUTPATIENT
Start: 2024-09-24 | End: 2024-09-24 | Stop reason: HOSPADM

## 2024-09-24 RX ORDER — METOPROLOL TARTRATE 50 MG
100 TABLET ORAL 2 TIMES DAILY
Status: DISCONTINUED | OUTPATIENT
Start: 2024-09-24 | End: 2024-09-24 | Stop reason: HOSPADM

## 2024-09-24 RX ORDER — SODIUM CHLORIDE 0.9 % (FLUSH) 0.9 %
10 SYRINGE (ML) INJECTION EVERY 12 HOURS SCHEDULED
Status: DISCONTINUED | OUTPATIENT
Start: 2024-09-24 | End: 2024-09-24 | Stop reason: HOSPADM

## 2024-09-24 RX ORDER — IPRATROPIUM BROMIDE AND ALBUTEROL SULFATE 2.5; .5 MG/3ML; MG/3ML
3 SOLUTION RESPIRATORY (INHALATION) ONCE AS NEEDED
Status: DISCONTINUED | OUTPATIENT
Start: 2024-09-24 | End: 2024-09-24 | Stop reason: HOSPADM

## 2024-09-24 RX ORDER — LABETALOL HYDROCHLORIDE 5 MG/ML
5 INJECTION, SOLUTION INTRAVENOUS
Status: DISCONTINUED | OUTPATIENT
Start: 2024-09-24 | End: 2024-09-24 | Stop reason: HOSPADM

## 2024-09-24 RX ORDER — MIDAZOLAM HYDROCHLORIDE 1 MG/ML
INJECTION INTRAMUSCULAR; INTRAVENOUS AS NEEDED
Status: DISCONTINUED | OUTPATIENT
Start: 2024-09-24 | End: 2024-09-24 | Stop reason: SURG

## 2024-09-24 RX ORDER — PHENAZOPYRIDINE HYDROCHLORIDE 100 MG/1
100 TABLET, FILM COATED ORAL 3 TIMES DAILY PRN
Status: DISCONTINUED | OUTPATIENT
Start: 2024-09-24 | End: 2024-09-24 | Stop reason: HOSPADM

## 2024-09-24 RX ORDER — DIPHENHYDRAMINE HYDROCHLORIDE 50 MG/ML
12.5 INJECTION INTRAMUSCULAR; INTRAVENOUS ONCE AS NEEDED
Status: DISCONTINUED | OUTPATIENT
Start: 2024-09-24 | End: 2024-09-24 | Stop reason: HOSPADM

## 2024-09-24 RX ORDER — OXYCODONE HYDROCHLORIDE 5 MG/1
10 TABLET ORAL EVERY 4 HOURS PRN
Status: DISCONTINUED | OUTPATIENT
Start: 2024-09-24 | End: 2024-09-24 | Stop reason: HOSPADM

## 2024-09-24 RX ORDER — PHENAZOPYRIDINE HYDROCHLORIDE 100 MG/1
100 TABLET, FILM COATED ORAL 3 TIMES DAILY PRN
Qty: 15 TABLET | Refills: 0 | Status: SHIPPED | OUTPATIENT
Start: 2024-09-24

## 2024-09-24 RX ORDER — FENTANYL CITRATE 50 UG/ML
50 INJECTION, SOLUTION INTRAMUSCULAR; INTRAVENOUS
Status: DISCONTINUED | OUTPATIENT
Start: 2024-09-24 | End: 2024-09-24 | Stop reason: HOSPADM

## 2024-09-24 RX ORDER — KETOROLAC TROMETHAMINE 30 MG/ML
15 INJECTION, SOLUTION INTRAMUSCULAR; INTRAVENOUS EVERY 6 HOURS PRN
Status: DISCONTINUED | OUTPATIENT
Start: 2024-09-24 | End: 2024-09-24 | Stop reason: HOSPADM

## 2024-09-24 RX ORDER — DOCUSATE SODIUM 100 MG/1
100 CAPSULE, LIQUID FILLED ORAL 2 TIMES DAILY PRN
Qty: 30 CAPSULE | Refills: 1 | Status: SHIPPED | OUTPATIENT
Start: 2024-09-24

## 2024-09-24 RX ORDER — HYDRALAZINE HYDROCHLORIDE 20 MG/ML
5 INJECTION INTRAMUSCULAR; INTRAVENOUS
Status: DISCONTINUED | OUTPATIENT
Start: 2024-09-24 | End: 2024-09-24 | Stop reason: HOSPADM

## 2024-09-24 RX ADMIN — AMLODIPINE BESYLATE 10 MG: 5 TABLET ORAL at 08:38

## 2024-09-24 RX ADMIN — Medication 10 ML: at 07:39

## 2024-09-24 RX ADMIN — DEXAMETHASONE SODIUM PHOSPHATE 4 MG: 4 INJECTION, SOLUTION INTRAMUSCULAR; INTRAVENOUS at 15:35

## 2024-09-24 RX ADMIN — SODIUM CHLORIDE 100 ML/HR: 4.5 INJECTION, SOLUTION INTRAVENOUS at 00:49

## 2024-09-24 RX ADMIN — MIDAZOLAM 2 MG: 1 INJECTION INTRAMUSCULAR; INTRAVENOUS at 15:26

## 2024-09-24 RX ADMIN — KETOROLAC TROMETHAMINE 15 MG: 30 INJECTION, SOLUTION INTRAMUSCULAR at 07:39

## 2024-09-24 RX ADMIN — HYDROMORPHONE HYDROCHLORIDE 0.5 MG: 1 INJECTION, SOLUTION INTRAMUSCULAR; INTRAVENOUS; SUBCUTANEOUS at 07:39

## 2024-09-24 RX ADMIN — FENTANYL CITRATE 50 MCG: 50 INJECTION, SOLUTION INTRAMUSCULAR; INTRAVENOUS at 15:33

## 2024-09-24 RX ADMIN — HYDROMORPHONE HYDROCHLORIDE 0.5 MG: 1 INJECTION, SOLUTION INTRAMUSCULAR; INTRAVENOUS; SUBCUTANEOUS at 05:30

## 2024-09-24 RX ADMIN — LIDOCAINE HYDROCHLORIDE 60 MG: 20 INJECTION, SOLUTION EPIDURAL; INFILTRATION; INTRACAUDAL; PERINEURAL at 15:28

## 2024-09-24 RX ADMIN — ISOSORBIDE MONONITRATE 60 MG: 60 TABLET, EXTENDED RELEASE ORAL at 08:39

## 2024-09-24 RX ADMIN — SODIUM CHLORIDE, SODIUM LACTATE, POTASSIUM CHLORIDE, AND CALCIUM CHLORIDE: .6; .31; .03; .02 INJECTION, SOLUTION INTRAVENOUS at 15:24

## 2024-09-24 RX ADMIN — RANOLAZINE 500 MG: 500 TABLET, EXTENDED RELEASE ORAL at 08:39

## 2024-09-24 RX ADMIN — TAMSULOSIN HYDROCHLORIDE 0.4 MG: 0.4 CAPSULE ORAL at 08:39

## 2024-09-24 RX ADMIN — ONDANSETRON 4 MG: 2 INJECTION INTRAMUSCULAR; INTRAVENOUS at 15:35

## 2024-09-24 RX ADMIN — METOPROLOL TARTRATE 100 MG: 50 TABLET, FILM COATED ORAL at 08:39

## 2024-09-24 RX ADMIN — SODIUM CHLORIDE 100 ML/HR: 4.5 INJECTION, SOLUTION INTRAVENOUS at 10:41

## 2024-09-24 RX ADMIN — Medication 10 ML: at 00:50

## 2024-09-24 RX ADMIN — SODIUM CHLORIDE 2000 MG: 900 INJECTION INTRAVENOUS at 15:22

## 2024-09-24 RX ADMIN — PROPOFOL INJECTABLE EMULSION 150 MG: 10 INJECTION, EMULSION INTRAVENOUS at 15:28

## 2024-09-24 RX ADMIN — FENTANYL CITRATE 50 MCG: 50 INJECTION, SOLUTION INTRAMUSCULAR; INTRAVENOUS at 15:26

## 2024-09-24 RX ADMIN — ONDANSETRON 4 MG: 2 INJECTION INTRAMUSCULAR; INTRAVENOUS at 07:39

## 2024-09-24 RX ADMIN — Medication 10 ML: at 08:39

## 2024-09-24 RX ADMIN — HYDROMORPHONE HYDROCHLORIDE 0.5 MG: 1 INJECTION, SOLUTION INTRAMUSCULAR; INTRAVENOUS; SUBCUTANEOUS at 03:14

## 2024-09-25 ENCOUNTER — TRANSITIONAL CARE MANAGEMENT TELEPHONE ENCOUNTER (OUTPATIENT)
Dept: CALL CENTER | Facility: HOSPITAL | Age: 59
End: 2024-09-25
Payer: COMMERCIAL

## 2024-09-25 ENCOUNTER — TELEPHONE (OUTPATIENT)
Dept: FAMILY MEDICINE CLINIC | Facility: CLINIC | Age: 59
End: 2024-09-25
Payer: COMMERCIAL

## 2024-09-25 DIAGNOSIS — R60.0 LOCALIZED EDEMA: Primary | ICD-10-CM

## 2024-09-26 ENCOUNTER — TRANSITIONAL CARE MANAGEMENT TELEPHONE ENCOUNTER (OUTPATIENT)
Dept: CALL CENTER | Facility: HOSPITAL | Age: 59
End: 2024-09-26
Payer: COMMERCIAL

## 2024-09-26 RX ORDER — FUROSEMIDE 20 MG
20 TABLET ORAL DAILY
Qty: 90 TABLET | Refills: 3 | Status: SHIPPED | OUTPATIENT
Start: 2024-09-26

## 2024-09-26 RX ORDER — POTASSIUM CHLORIDE 750 MG/1
10 TABLET, EXTENDED RELEASE ORAL DAILY
Qty: 90 TABLET | Refills: 3 | Status: SHIPPED | OUTPATIENT
Start: 2024-09-26

## 2024-09-27 ENCOUNTER — OFFICE VISIT (OUTPATIENT)
Dept: FAMILY MEDICINE CLINIC | Facility: CLINIC | Age: 59
End: 2024-09-27
Payer: COMMERCIAL

## 2024-09-27 VITALS
TEMPERATURE: 97.8 F | HEIGHT: 65 IN | RESPIRATION RATE: 18 BRPM | BODY MASS INDEX: 35.02 KG/M2 | OXYGEN SATURATION: 95 % | WEIGHT: 210.2 LBS | HEART RATE: 94 BPM | SYSTOLIC BLOOD PRESSURE: 138 MMHG | DIASTOLIC BLOOD PRESSURE: 88 MMHG

## 2024-09-27 DIAGNOSIS — N13.1 HYDRONEPHROSIS DUE TO OBSTRUCTION OF URETER: Primary | ICD-10-CM

## 2024-09-27 DIAGNOSIS — I10 BENIGN HYPERTENSION: ICD-10-CM

## 2024-09-27 DIAGNOSIS — E11.51 TYPE 2 DIABETES MELLITUS WITH DIABETIC PERIPHERAL ANGIOPATHY WITHOUT GANGRENE, WITHOUT LONG-TERM CURRENT USE OF INSULIN: ICD-10-CM

## 2024-09-27 DIAGNOSIS — E66.01 CLASS 2 SEVERE OBESITY DUE TO EXCESS CALORIES WITH SERIOUS COMORBIDITY AND BODY MASS INDEX (BMI) OF 36.0 TO 36.9 IN ADULT: ICD-10-CM

## 2024-09-27 DIAGNOSIS — I65.29 OCCLUSION AND STENOSIS OF UNSPECIFIED CAROTID ARTERY: ICD-10-CM

## 2024-09-27 DIAGNOSIS — I25.709 CORONARY ARTERY DISEASE INVOLVING CORONARY BYPASS GRAFT OF NATIVE HEART WITH ANGINA PECTORIS: ICD-10-CM

## 2024-09-27 DIAGNOSIS — E66.812 CLASS 2 SEVERE OBESITY DUE TO EXCESS CALORIES WITH SERIOUS COMORBIDITY AND BODY MASS INDEX (BMI) OF 36.0 TO 36.9 IN ADULT: ICD-10-CM

## 2024-09-27 DIAGNOSIS — E78.2 MIXED HYPERLIPIDEMIA: ICD-10-CM

## 2024-09-27 DIAGNOSIS — I48.0 PAROXYSMAL ATRIAL FIBRILLATION: ICD-10-CM

## 2024-09-27 DIAGNOSIS — N20.0 KIDNEY STONE: ICD-10-CM

## 2024-09-27 LAB
BILIRUB BLD-MCNC: NEGATIVE MG/DL
CLARITY, POC: ABNORMAL
COLOR UR: ABNORMAL
GLUCOSE UR STRIP-MCNC: ABNORMAL MG/DL
KETONES UR QL: NEGATIVE
LEUKOCYTE EST, POC: NEGATIVE
NITRITE UR-MCNC: NEGATIVE MG/ML
PH UR: 5 [PH] (ref 5–8)
PROT UR STRIP-MCNC: ABNORMAL MG/DL
RBC # UR STRIP: ABNORMAL /UL
SP GR UR: 1.02 (ref 1–1.03)
UROBILINOGEN UR QL: ABNORMAL

## 2024-09-27 PROCEDURE — 99495 TRANSJ CARE MGMT MOD F2F 14D: CPT | Performed by: FAMILY MEDICINE

## 2024-09-27 PROCEDURE — 81002 URINALYSIS NONAUTO W/O SCOPE: CPT | Performed by: FAMILY MEDICINE

## 2024-09-27 NOTE — PROGRESS NOTES
Chief Complaint  Hospital Follow Up Visit, Hypertension, Atrial Fibrillation, and Coronary Artery Disease    Subjective     CC  Problem List  Visit Diagnosis   Encounters  Notes  Medications  Labs  Result Review Imaging  Media    Mendel Melendez presents to [facility] for   History of Present Illness  Mendel was seen at [facility] . He was admitted on 09/23/2024  for Flank pain. He was originally seen at [facility] on 9/21/2024 for kidney stones and was discharged on ketoralac for pain only. He reported severe pain. He reported severe pain with diaphoresis and nausea. Urology was consulted and patient underwent cystoscopy ureteroscopy stone basket extraction and stent insertion. He was discharged on 09/24/2024. Discharge diagnosis was hydronephrosis due to obstruction of ureter. Labs that were performed during the encounter included: CBC-WBC 8.50, HGB 14.1, Platelets 192. CMP- BUN 22 Creatinine 1.28. Diagnostic studies that were performed included: CT Scan Abdomen/Pelvis without contrast.The previously described 0.5 cm obstructing right distal ureteral calculus has distally migrated to the level of the UVJ. Stable severe right hydronephrosis and extensive right perinephric fat stranding. Currently Mendel receives care at home. Complications from the hospital stay include none. The patient stated that they do not need help with their daily life and activities. The patient stated that they do have emotional support at home.    Diabetes  He presents for his follow-up diabetic visit. He has type 2 diabetes mellitus. No MedicAlert identification noted. The initial diagnosis of diabetes was made 8 years ago. His disease course has been stable. There are no hypoglycemic associated symptoms. Pertinent negatives for hypoglycemia include no dizziness, headaches, nervousness/anxiousness or sweats. Pertinent negatives for diabetes include no blurred vision, no chest pain (mild  intermittent and chronic with max Rx and intervention, sxs are stable.), no fatigue, no foot paresthesias, no foot ulcerations, no polydipsia, no polyuria, no visual change, no weakness and no weight loss. There are no hypoglycemic complications. Pertinent negatives for hypoglycemia complications include no hospitalization. Symptoms are stable. There are no diabetic complications. Pertinent negatives for diabetic complications include no PVD. Risk factors for coronary artery disease include dyslipidemia, diabetes mellitus, hypertension, male sex, family history and obesity. Current diabetic treatment includes oral agent (monotherapy) (once a week trulicity). He is compliant with treatment most of the time. His weight is stable. He is following a diabetic and generally healthy diet. Meal planning includes avoidance of concentrated sweets. He has not had a previous visit with a dietitian. He participates in exercise daily. His home blood glucose trend is fluctuating minimally. His breakfast blood glucose is taken between 7-8 am. His breakfast blood glucose range is generally 140-180 mg/dl. An ACE inhibitor/angiotensin II receptor blocker is not being taken. He does not see a podiatrist.Eye exam is not current ([facility]).   Hypertension  This is a chronic problem. The current episode started more than 1 year ago. The problem has been waxing and waning since onset. The problem is controlled. Pertinent negatives include no anxiety, blurred vision, chest pain (mild intermittent and chronic with max Rx and intervention, sxs are stable.), headaches, malaise/fatigue, orthopnea, palpitations, peripheral edema, shortness of breath or sweats. There are no associated agents to hypertension. Risk factors for coronary artery disease include diabetes mellitus, dyslipidemia, male gender, obesity and family history. Past treatments include nothing. Current antihypertension treatment includes calcium channel blockers and beta blockers.  The current treatment provides moderate improvement. There are no compliance problems.  Hypertensive end-organ damage includes kidney disease. There is no history of PVD. There is no history of chronic renal disease.   Coronary Artery Disease  Presents for follow-up visit. Pertinent negatives include no chest pain (mild intermittent and chronic with max Rx and intervention, sxs are stable.), dizziness, leg swelling, muscle weakness, palpitations, shortness of breath or weight gain. Risk factors include hyperlipidemia, hypertension and obesity. The symptoms have been stable. Compliance with diet is good. Compliance with exercise is good. Compliance with medications is good.   Atrial Fibrillation  Presents for follow-up visit. Symptoms include hypertension. Symptoms are negative for chest pain (mild intermittent and chronic with max Rx and intervention, sxs are stable.), dizziness, hypotension, pacemaker problem, palpitations, shortness of breath, tachycardia and weakness. The symptoms have been stable. Past medical history includes atrial fibrillation, CAD and hyperlipidemia. There are no medication compliance problems.   Hyperlipidemia  This is a chronic problem. The current episode started more than 1 year ago. The problem is controlled. Exacerbating diseases include diabetes and obesity. He has no history of chronic renal disease, hypothyroidism, liver disease or nephrotic syndrome. Factors aggravating his hyperlipidemia include fatty foods. Pertinent negatives include no chest pain (mild intermittent and chronic with max Rx and intervention, sxs are stable.), focal sensory loss, focal weakness, leg pain, myalgias or shortness of breath. Current antihyperlipidemic treatment includes statins. The current treatment provides mild improvement of lipids. There are no compliance problems.  Risk factors for coronary artery disease include dyslipidemia, hypertension, male sex, diabetes mellitus, obesity and family  "history.   Flank Pain  This is a recurrent problem. The current episode started in the past 7 days. The problem occurs constantly. The problem has been improved since onset. The quality of the pain is described as cramping and stabbing. The symptoms are aggravated by standing and sitting. Pertinent negatives include no chest pain (mild intermittent and chronic with max Rx and intervention, sxs are stable.), dysuria (minmal), fever, headaches, leg pain, weakness or weight loss.       Review of Systems   Constitutional:  Negative for fatigue, fever, malaise/fatigue, unexpected weight gain and unexpected weight loss.   Eyes:  Negative for blurred vision.   Respiratory:  Negative for shortness of breath.    Cardiovascular:  Negative for chest pain (mild intermittent and chronic with max Rx and intervention, sxs are stable.), palpitations, orthopnea and leg swelling.   Endocrine: Negative for cold intolerance, heat intolerance, polydipsia and polyuria.   Genitourinary:  Positive for flank pain (right). Negative for dysuria (minmal).   Musculoskeletal:  Negative for myalgias and muscle weakness.   Skin:  Negative for rash and bruise.   Neurological:  Negative for dizziness, focal weakness and weakness.   Hematological:  Negative for adenopathy. Does not bruise/bleed easily.   Psychiatric/Behavioral:  The patient is not nervous/anxious.         Objective   Vital Signs:   /88 (BP Location: Right arm, Patient Position: Sitting, Cuff Size: Large Adult)   Pulse 94   Temp 97.8 °F (36.6 °C) (Temporal)   Resp 18   Ht 165.1 cm (65\")   Wt 95.3 kg (210 lb 3.2 oz)   SpO2 95%   BMI 34.98 kg/m²     Physical Exam  Constitutional:       General: He is not in acute distress.  Cardiovascular:      Rate and Rhythm: Normal rate.      Heart sounds: No murmur heard.  Pulmonary:      Effort: Pulmonary effort is normal.      Breath sounds: Normal breath sounds. No wheezing.   Abdominal:      General: Abdomen is flat.      " Tenderness: There is no abdominal tenderness. There is no right CVA tenderness, left CVA tenderness or rebound.   Musculoskeletal:      Cervical back: Neck supple.      Right lower leg: No edema.      Left lower leg: No edema.   Lymphadenopathy:      Cervical: No cervical adenopathy.   Skin:     Findings: No rash.   Neurological:      Mental Status: He is alert.        Result Review :Labs  Result Review  Imaging  Med Tab  Media                 Assessment and Plan CC Problem List  Visit Diagnosis  ROS  Review (Popup)  Health Maintenance  Quality  BestPractice  Medications  SmartSets  SnapShot Encounters  Media  Problem List Items Addressed This Visit          High    Benign hypertension    Overview     Controlled, compliant, continue meds         Current Assessment & Plan     Hypertension is stable and controlled  Continue current treatment regimen.  Blood pressure will be reassessed in 3 months.         Coronary artery disease involving coronary bypass graft of native heart with angina pectoris    Overview     He is having stable angina unchanged coontrolled w/ prn NITRo  He is s/p cardiac cath showing open vessels but spasm  Followed with [facility] regularly, plan to go to [facility]  s/p CABG x 3, 2015, s/p cardiac cath 07/2020 showing all three grafts to be free of disease but distal and proximal stenosis of LAD , stenosis of LCX  Risk factors modified  Interventional cardiology with angioplasty, 08/11/2021, 10/13/2021 repeated angioplasty. His anatomy is challenging w/o ability to have a perfect fix.    Angina much improved but continues. He continues cardiology follow up  Followed with [facility]  Risk factors modified.         Type 2 diabetes mellitus with diabetic peripheral angiopathy without gangrene, without long-term current use of insulin    Overview     A1c 7.1 07/05/2024 stable, marginal added GLP1 tolerating continue  A1c 7.2 04/08/2024 stable  A1c 7.2 09/22/2023 improve diet and exercise.   A1c 6.9  06/16/2023 improved, he reports no hypo or   A1c 6.8 11/28/2022   A1c 6.6 09/24/2021 improved.   A1c 7.2 10/06/2020   A1c 7.3 11/18/2019     A1c 6.9 5/7/2019   A1c 7.0 02/12/2018    A1c 7.1, 11/1/2018 new onset         Occlusion and stenosis of unspecified carotid artery    Overview     80 %  LCA stenois 08/2024 followed with vascular endarterectomy 11/06/24  60% right  50-60% on doppler 2016   Repeat Echo pending, pros and cons of potential surgery discussed and understood.            Paroxysmal atrial fibrillation    Overview     During heart surgery 2015, no break thru.since.    Sinus rhythm by exam,            Medium    Mixed hyperlipidemia    Overview     Stable, Continue Crestor 40mg he is complaint  LDL at goal.               Low    Class 2 severe obesity due to excess calories with serious comorbidity and body mass index (BMI) of 36.0 to 36.9 in adult    Overview     Diet exercise and wt loss encouraged Techniques discussed            Unprioritized    Hydronephrosis due to obstruction of ureter - Primary    Relevant Orders    POCT urinalysis dipstick, manual (Completed)    Kidney stone    Overview     Multiple bilaterally. With hx of obstruction. Right ureter stent in place,  apt for removal. He is doing well.  He willcontinue to push fluids.             Follow Up Instructions Charge Capture  Follow-up Communications  Return in about 3 months (around 12/27/2024), or if symptoms worsen or fail to improve.  Patient was given instructions and counseling regarding his condition or for health maintenance advice. Please see specific information pulled into the AVS if appropriate.

## 2024-10-01 ENCOUNTER — TELEPHONE (OUTPATIENT)
Dept: CARDIOLOGY | Facility: CLINIC | Age: 59
End: 2024-10-01
Payer: COMMERCIAL

## 2024-10-01 NOTE — TELEPHONE ENCOUNTER
I can't say patient is not cleared I'm not sure if he's cleared or not we are waiting on stress and echo results

## 2024-10-01 NOTE — TELEPHONE ENCOUNTER
[facility] ask that we fax over a cardiac clearance saying patient is not cleared for surgery that the one we faxed saying waiting on Echo and stress is not good enough fax to  [phone] asap

## 2024-10-03 NOTE — TELEPHONE ENCOUNTER
Caller: Mendel Melendez    Relationship to patient: Self    Best call back number: [phone]     Patient is needing: PT HAD A KIDNEY STONE REMOVED ABOUT A WEEK AGO AND THEY PUT A STENT IN - TOMORROW PT IS MEANT TO HAVE THE STENT REMOVED AND MORE STONES REMOVED - PT WAS TOLD HE WAS UNABLE TO PROCEED AS HE WAS NOT CLEARED BY CARDIOLOGY DUE TO NEEDING A STRESS TEST - PT STATES THE STENT IS UNCOMFORTABLE SO IF HE NEEDS TO GET STRESS TEST, CAN IT BE MOVED SOONER - IF HE IS ABLE TO PROCEED WITH SURGERY, CAN SOMEONE REACH OUT TO GUERA AT [facility] TO LET HER KNOW -    [phone]  GUERA

## 2024-10-19 DIAGNOSIS — E78.2 MIXED HYPERLIPIDEMIA: ICD-10-CM

## 2024-10-19 DIAGNOSIS — I10 BENIGN HYPERTENSION: ICD-10-CM

## 2024-10-19 DIAGNOSIS — E11.51 TYPE 2 DIABETES MELLITUS WITH DIABETIC PERIPHERAL ANGIOPATHY WITHOUT GANGRENE, WITHOUT LONG-TERM CURRENT USE OF INSULIN: ICD-10-CM

## 2024-10-21 ENCOUNTER — HOSPITAL ENCOUNTER (OUTPATIENT)
Dept: NUCLEAR MEDICINE | Facility: HOSPITAL | Age: 59
Discharge: HOME | End: 2024-10-21
Payer: COMMERCIAL

## 2024-10-21 DIAGNOSIS — Z01.810 PRE-OPERATIVE CARDIOVASCULAR EXAMINATION: ICD-10-CM

## 2024-10-21 DIAGNOSIS — I65.22 CAROTID ARTERY STENOSIS, SYMPTOMATIC, LEFT: ICD-10-CM

## 2024-10-21 LAB
BH CV NUCLEAR PRIOR STUDY: 3
BH CV REST NUCLEAR ISOTOPE DOSE: 11 MCI
BH CV STRESS BP STAGE 1: NORMAL
BH CV STRESS BP STAGE 2: NORMAL
BH CV STRESS BP STAGE 3: NORMAL
BH CV STRESS COMMENTS STAGE 1: NORMAL
BH CV STRESS COMMENTS STAGE 2: NORMAL
BH CV STRESS DOSE REGADENOSON STAGE 1: 0.4
BH CV STRESS DURATION MIN STAGE 1: 0
BH CV STRESS DURATION MIN STAGE 2: 4
BH CV STRESS DURATION SEC STAGE 1: 10
BH CV STRESS DURATION SEC STAGE 2: 0
BH CV STRESS HR STAGE 1: 98
BH CV STRESS HR STAGE 2: 102
BH CV STRESS HR STAGE 3: 115
BH CV STRESS NUCLEAR ISOTOPE DOSE: 32 MCI
BH CV STRESS PROTOCOL 1: NORMAL
BH CV STRESS RECOVERY BP: NORMAL MMHG
BH CV STRESS RECOVERY HR: 108 BPM
BH CV STRESS STAGE 1: 1
BH CV STRESS STAGE 2: 2
BH CV STRESS STAGE 3: 3
LV EF NUC BP: 73 %
MAXIMAL PREDICTED HEART RATE: 163 BPM
PERCENT MAX PREDICTED HR: 70.55 %
STRESS BASELINE BP: NORMAL MMHG
STRESS BASELINE HR: 84 BPM
STRESS PERCENT HR: 83 %
STRESS POST PEAK BP: NORMAL MMHG
STRESS POST PEAK HR: 115 BPM
STRESS TARGET HR: 139 BPM

## 2024-10-21 PROCEDURE — 78452 HT MUSCLE IMAGE SPECT MULT: CPT | Performed by: INTERNAL MEDICINE

## 2024-10-21 PROCEDURE — 0 TECHNETIUM SESTAMIBI: Performed by: NURSE PRACTITIONER

## 2024-10-21 PROCEDURE — 25010000002 REGADENOSON 0.4 MG/5ML SOLUTION: Performed by: NURSE PRACTITIONER

## 2024-10-21 PROCEDURE — A9500 TC99M SESTAMIBI: HCPCS | Performed by: NURSE PRACTITIONER

## 2024-10-21 PROCEDURE — 93017 CV STRESS TEST TRACING ONLY: CPT

## 2024-10-21 PROCEDURE — 93016 CV STRESS TEST SUPVJ ONLY: CPT | Performed by: INTERNAL MEDICINE

## 2024-10-21 PROCEDURE — 78452 HT MUSCLE IMAGE SPECT MULT: CPT

## 2024-10-21 PROCEDURE — 93018 CV STRESS TEST I&R ONLY: CPT | Performed by: INTERNAL MEDICINE

## 2024-10-21 RX ORDER — REGADENOSON 0.08 MG/ML
0.4 INJECTION, SOLUTION INTRAVENOUS
Status: COMPLETED | OUTPATIENT
Start: 2024-10-21 | End: 2024-10-21

## 2024-10-21 RX ORDER — RANOLAZINE 500 MG/1
500 TABLET, EXTENDED RELEASE ORAL 2 TIMES DAILY
Qty: 90 TABLET | Refills: 0 | Status: SHIPPED | OUTPATIENT
Start: 2024-10-21

## 2024-10-21 RX ADMIN — TECHNETIUM TC 99M SESTAMIBI 1 DOSE: 1 INJECTION INTRAVENOUS at 09:45

## 2024-10-21 RX ADMIN — TECHNETIUM TC 99M SESTAMIBI 1 DOSE: 1 INJECTION INTRAVENOUS at 08:25

## 2024-10-21 RX ADMIN — REGADENOSON 0.4 MG: 0.08 INJECTION, SOLUTION INTRAVENOUS at 09:45

## 2024-10-22 ENCOUNTER — TELEPHONE (OUTPATIENT)
Dept: CARDIOLOGY | Facility: CLINIC | Age: 59
End: 2024-10-22

## 2024-10-22 NOTE — TELEPHONE ENCOUNTER
Caller: Mendel Melendez    Relationship: Self    Best call back number: [phone]    What is the best time to reach you: ANY     Who are you requesting to speak with (clinical staff, provider,  specific staff member): CLINICAL    Do you know the name of the person who called: NO    What was the call regarding: STRESS TEST RESULTS FOR CARDIAC CLEARANCE. IF PT IS NOT THERE WHEN CALL IS  RETURNED CAN SPEAK WITH WIFE. SHE IS ON HIS BH VERBAL     Is it okay if the provider responds through MyChart:

## 2024-10-23 NOTE — TELEPHONE ENCOUNTER
ALICE WITH [facility] IS CALLING IN REGARDS TO CLEARANCE. PT IS SCHEDULED FOR SURGERY 11.6.24, ALICE SAID TO FAX A FORM OVER TO [facility] FAX #- [phone].

## 2024-10-25 NOTE — PAT
Pt states has chest pain once a day.  Pain is relieved with Nitrostat.  Cardiologist aware.  Pt has been cleared for surgery on 11/6/24.

## 2024-10-28 ENCOUNTER — LAB (OUTPATIENT)
Dept: LAB | Facility: HOSPITAL | Age: 59
End: 2024-10-28
Payer: COMMERCIAL

## 2024-10-28 LAB
ABO GROUP BLD: NORMAL
ANION GAP SERPL CALCULATED.3IONS-SCNC: 9.1 MMOL/L (ref 5–15)
BASOPHILS # BLD AUTO: 0.08 10*3/MM3 (ref 0–0.2)
BASOPHILS NFR BLD AUTO: 1.4 % (ref 0–1.5)
BLD GP AB SCN SERPL QL: NEGATIVE
BUN SERPL-MCNC: 18 MG/DL (ref 6–20)
BUN/CREAT SERPL: 17.8 (ref 7–25)
CALCIUM SPEC-SCNC: 9 MG/DL (ref 8.6–10.5)
CHLORIDE SERPL-SCNC: 104 MMOL/L (ref 98–107)
CO2 SERPL-SCNC: 24.9 MMOL/L (ref 22–29)
CREAT SERPL-MCNC: 1.01 MG/DL (ref 0.76–1.27)
DEPRECATED RDW RBC AUTO: 43.4 FL (ref 37–54)
EGFRCR SERPLBLD CKD-EPI 2021: 86.7 ML/MIN/1.73
EOSINOPHIL # BLD AUTO: 0.3 10*3/MM3 (ref 0–0.4)
EOSINOPHIL NFR BLD AUTO: 5.2 % (ref 0.3–6.2)
ERYTHROCYTE [DISTWIDTH] IN BLOOD BY AUTOMATED COUNT: 13.1 % (ref 12.3–15.4)
GLUCOSE SERPL-MCNC: 195 MG/DL (ref 65–99)
HBA1C MFR BLD: 6.7 % (ref 4.8–5.6)
HCT VFR BLD AUTO: 44.9 % (ref 37.5–51)
HGB BLD-MCNC: 14.1 G/DL (ref 13–17.7)
IMM GRANULOCYTES # BLD AUTO: 0.04 10*3/MM3 (ref 0–0.05)
IMM GRANULOCYTES NFR BLD AUTO: 0.7 % (ref 0–0.5)
LYMPHOCYTES # BLD AUTO: 1.38 10*3/MM3 (ref 0.7–3.1)
LYMPHOCYTES NFR BLD AUTO: 23.8 % (ref 19.6–45.3)
MCH RBC QN AUTO: 28.4 PG (ref 26.6–33)
MCHC RBC AUTO-ENTMCNC: 31.4 G/DL (ref 31.5–35.7)
MCV RBC AUTO: 90.5 FL (ref 79–97)
MONOCYTES # BLD AUTO: 0.64 10*3/MM3 (ref 0.1–0.9)
MONOCYTES NFR BLD AUTO: 11.1 % (ref 5–12)
NEUTROPHILS NFR BLD AUTO: 3.35 10*3/MM3 (ref 1.7–7)
NEUTROPHILS NFR BLD AUTO: 57.8 % (ref 42.7–76)
NRBC BLD AUTO-RTO: 0 /100 WBC (ref 0–0.2)
PLATELET # BLD AUTO: 240 10*3/MM3 (ref 140–450)
PMV BLD AUTO: 10.3 FL (ref 6–12)
POTASSIUM SERPL-SCNC: 4.4 MMOL/L (ref 3.5–5.2)
RBC # BLD AUTO: 4.96 10*6/MM3 (ref 4.14–5.8)
RH BLD: POSITIVE
SODIUM SERPL-SCNC: 138 MMOL/L (ref 136–145)
T&S EXPIRATION DATE: NORMAL
WBC NRBC COR # BLD AUTO: 5.79 10*3/MM3 (ref 3.4–10.8)

## 2024-10-28 PROCEDURE — 85025 COMPLETE CBC W/AUTO DIFF WBC: CPT | Performed by: STUDENT IN AN ORGANIZED HEALTH CARE EDUCATION/TRAINING PROGRAM

## 2024-10-28 PROCEDURE — 86900 BLOOD TYPING SEROLOGIC ABO: CPT

## 2024-10-28 PROCEDURE — 86901 BLOOD TYPING SEROLOGIC RH(D): CPT

## 2024-10-28 PROCEDURE — 83036 HEMOGLOBIN GLYCOSYLATED A1C: CPT

## 2024-10-28 PROCEDURE — 80048 BASIC METABOLIC PNL TOTAL CA: CPT

## 2024-10-28 PROCEDURE — 36415 COLL VENOUS BLD VENIPUNCTURE: CPT | Performed by: STUDENT IN AN ORGANIZED HEALTH CARE EDUCATION/TRAINING PROGRAM

## 2024-10-28 PROCEDURE — 86850 RBC ANTIBODY SCREEN: CPT

## 2024-11-06 ENCOUNTER — ANESTHESIA EVENT (OUTPATIENT)
Dept: PERIOP | Facility: HOSPITAL | Age: 59
End: 2024-11-06
Payer: COMMERCIAL

## 2024-11-06 ENCOUNTER — APPOINTMENT (OUTPATIENT)
Dept: CARDIOLOGY | Facility: HOSPITAL | Age: 59
End: 2024-11-06
Payer: COMMERCIAL

## 2024-11-06 ENCOUNTER — HOSPITAL ENCOUNTER (INPATIENT)
Facility: HOSPITAL | Age: 59
LOS: 2 days | Discharge: HOME | End: 2024-11-08
Attending: STUDENT IN AN ORGANIZED HEALTH CARE EDUCATION/TRAINING PROGRAM | Admitting: EMERGENCY MEDICINE
Payer: COMMERCIAL

## 2024-11-06 ENCOUNTER — ANESTHESIA (OUTPATIENT)
Dept: PERIOP | Facility: HOSPITAL | Age: 59
End: 2024-11-06
Payer: COMMERCIAL

## 2024-11-06 DIAGNOSIS — I65.23 BILATERAL CAROTID ARTERY STENOSIS: ICD-10-CM

## 2024-11-06 DIAGNOSIS — I25.709 CORONARY ARTERY DISEASE INVOLVING CORONARY BYPASS GRAFT OF NATIVE HEART WITH ANGINA PECTORIS: ICD-10-CM

## 2024-11-06 PROBLEM — I65.29 CAROTID STENOSIS, ASYMPTOMATIC: Status: ACTIVE | Noted: 2024-11-06

## 2024-11-06 LAB
BH CV XLRA MEAS LEFT DIST ICA EDV: 27 CM/SEC
BH CV XLRA MEAS LEFT DIST ICA PSV: 73 CM/SEC
BH CV XLRA MEAS LEFT ICA/CCA RATIO: 2.09
BH CV XLRA MEAS LEFT PROX CCA PSV: 35 CM/SEC
BH CV XLRA MEAS LEFT PROX ECA PSV: 19 CM/SEC
BH CV XLRA MEAS LEFT PROX ICA EDV: 15 CM/SEC
BH CV XLRA MEAS LEFT PROX ICA PSV: 42 CM/SEC
GLUCOSE BLDC GLUCOMTR-MCNC: 162 MG/DL (ref 70–105)
GLUCOSE BLDC GLUCOMTR-MCNC: 195 MG/DL (ref 70–105)

## 2024-11-06 PROCEDURE — 25010000002 DEXAMETHASONE PER 1 MG

## 2024-11-06 PROCEDURE — 25010000002 ONDANSETRON PER 1 MG

## 2024-11-06 PROCEDURE — 25010000002 HYDROMORPHONE 1 MG/ML SOLUTION

## 2024-11-06 PROCEDURE — 63710000001 INSULIN LISPRO (HUMAN) PER 5 UNITS

## 2024-11-06 PROCEDURE — 25810000003 SODIUM CHLORIDE 0.9 % SOLUTION 250 ML FLEX CONT: Performed by: STUDENT IN AN ORGANIZED HEALTH CARE EDUCATION/TRAINING PROGRAM

## 2024-11-06 PROCEDURE — 85347 COAGULATION TIME ACTIVATED: CPT

## 2024-11-06 PROCEDURE — 93882 EXTRACRANIAL UNI/LTD STUDY: CPT

## 2024-11-06 PROCEDURE — S0260 H&P FOR SURGERY: HCPCS | Performed by: STUDENT IN AN ORGANIZED HEALTH CARE EDUCATION/TRAINING PROGRAM

## 2024-11-06 PROCEDURE — 25810000003 LACTATED RINGERS PER 1000 ML: Performed by: STUDENT IN AN ORGANIZED HEALTH CARE EDUCATION/TRAINING PROGRAM

## 2024-11-06 PROCEDURE — 25010000002 SUGAMMADEX 200 MG/2ML SOLUTION

## 2024-11-06 PROCEDURE — 25810000003 SODIUM CHLORIDE 0.9 % SOLUTION 250 ML FLEX CONT

## 2024-11-06 PROCEDURE — 35301 RECHANNELING OF ARTERY: CPT | Performed by: STUDENT IN AN ORGANIZED HEALTH CARE EDUCATION/TRAINING PROGRAM

## 2024-11-06 PROCEDURE — 25810000003 SODIUM CHLORIDE PER 500 ML: Performed by: STUDENT IN AN ORGANIZED HEALTH CARE EDUCATION/TRAINING PROGRAM

## 2024-11-06 PROCEDURE — 25010000002 LIDOCAINE PF 2% 2 % SOLUTION

## 2024-11-06 PROCEDURE — 25010000002 HYDRALAZINE PER 20 MG: Performed by: NURSE PRACTITIONER

## 2024-11-06 PROCEDURE — 25010000002 PROPOFOL 10 MG/ML EMULSION

## 2024-11-06 PROCEDURE — 35301 RECHANNELING OF ARTERY: CPT | Performed by: SPECIALIST/TECHNOLOGIST, OTHER

## 2024-11-06 PROCEDURE — 03UL0KZ SUPPLEMENT LEFT INTERNAL CAROTID ARTERY WITH NONAUTOLOGOUS TISSUE SUBSTITUTE, OPEN APPROACH: ICD-10-PCS | Performed by: STUDENT IN AN ORGANIZED HEALTH CARE EDUCATION/TRAINING PROGRAM

## 2024-11-06 PROCEDURE — 25010000002 CEFAZOLIN PER 500 MG: Performed by: STUDENT IN AN ORGANIZED HEALTH CARE EDUCATION/TRAINING PROGRAM

## 2024-11-06 PROCEDURE — 82948 REAGENT STRIP/BLOOD GLUCOSE: CPT

## 2024-11-06 PROCEDURE — 25010000002 HYDROMORPHONE 1 MG/ML SOLUTION: Performed by: STUDENT IN AN ORGANIZED HEALTH CARE EDUCATION/TRAINING PROGRAM

## 2024-11-06 PROCEDURE — 03CL0ZZ EXTIRPATION OF MATTER FROM LEFT INTERNAL CAROTID ARTERY, OPEN APPROACH: ICD-10-PCS | Performed by: STUDENT IN AN ORGANIZED HEALTH CARE EDUCATION/TRAINING PROGRAM

## 2024-11-06 PROCEDURE — 25810000003 SODIUM CHLORIDE 0.9 % SOLUTION

## 2024-11-06 PROCEDURE — 25810000003 SODIUM CHLORIDE 0.9 % SOLUTION: Performed by: STUDENT IN AN ORGANIZED HEALTH CARE EDUCATION/TRAINING PROGRAM

## 2024-11-06 PROCEDURE — 25010000002 NICARDIPINE 2.5 MG/ML SOLUTION

## 2024-11-06 PROCEDURE — 25010000002 BUPIVACAINE (PF) 0.25 % SOLUTION: Performed by: STUDENT IN AN ORGANIZED HEALTH CARE EDUCATION/TRAINING PROGRAM

## 2024-11-06 PROCEDURE — 25010000002 NICARDIPINE 2.5 MG/ML SOLUTION 10 ML VIAL: Performed by: STUDENT IN AN ORGANIZED HEALTH CARE EDUCATION/TRAINING PROGRAM

## 2024-11-06 PROCEDURE — 25010000002 LABETALOL 5 MG/ML SOLUTION

## 2024-11-06 PROCEDURE — 25010000002 MIDAZOLAM PER 1 MG

## 2024-11-06 PROCEDURE — 25010000002 PHENYLEPHRINE 10 MG/ML SOLUTION 5 ML VIAL

## 2024-11-06 PROCEDURE — 25010000002 PROTAMINE SULFATE PER 10 MG

## 2024-11-06 PROCEDURE — 25010000002 HEPARIN (PORCINE) PER 1000 UNITS: Performed by: STUDENT IN AN ORGANIZED HEALTH CARE EDUCATION/TRAINING PROGRAM

## 2024-11-06 PROCEDURE — 25010000002 HEPARIN (PORCINE) PER 1000 UNITS

## 2024-11-06 PROCEDURE — 25010000002 PHENYLEPHRINE 10 MG/ML SOLUTION

## 2024-11-06 PROCEDURE — 25010000002 FENTANYL CITRATE (PF) 50 MCG/ML SOLUTION

## 2024-11-06 PROCEDURE — C1768 GRAFT, VASCULAR: HCPCS | Performed by: STUDENT IN AN ORGANIZED HEALTH CARE EDUCATION/TRAINING PROGRAM

## 2024-11-06 PROCEDURE — 25010000002 HYDRALAZINE PER 20 MG

## 2024-11-06 PROCEDURE — 25010000002 NICARDIPINE 2.5 MG/ML SOLUTION 10 ML VIAL

## 2024-11-06 DEVICE — ABSORBABLE HEMOSTAT (OXIDIZED REGENERATED CELLULOSE, U.S.P.)
Type: IMPLANTABLE DEVICE | Site: CAROTID | Status: FUNCTIONAL
Brand: SURGICEL FIBRILLAR

## 2024-11-06 DEVICE — PTCH VASC VASCUGUARD TPR/END 0.8X8CM STRL: Type: IMPLANTABLE DEVICE | Site: CAROTID | Status: FUNCTIONAL

## 2024-11-06 DEVICE — LIGACLIP MCA MULTIPLE CLIP APPLIERS, 20 SMALL CLIPS
Type: IMPLANTABLE DEVICE | Site: CAROTID | Status: FUNCTIONAL
Brand: LIGACLIP

## 2024-11-06 DEVICE — LIGACLIP MCA MULTIPLE CLIP APPLIERS, 20 MEDIUM CLIPS
Type: IMPLANTABLE DEVICE | Site: CAROTID | Status: FUNCTIONAL
Brand: LIGACLIP

## 2024-11-06 RX ORDER — TAMSULOSIN HYDROCHLORIDE 0.4 MG/1
0.4 CAPSULE ORAL DAILY
Status: DISCONTINUED | OUTPATIENT
Start: 2024-11-06 | End: 2024-11-08 | Stop reason: HOSPADM

## 2024-11-06 RX ORDER — SODIUM CHLORIDE 0.9 % (FLUSH) 0.9 %
10 SYRINGE (ML) INJECTION EVERY 12 HOURS SCHEDULED
Status: DISCONTINUED | OUTPATIENT
Start: 2024-11-06 | End: 2024-11-06 | Stop reason: HOSPADM

## 2024-11-06 RX ORDER — MIDAZOLAM HYDROCHLORIDE 1 MG/ML
INJECTION, SOLUTION INTRAMUSCULAR; INTRAVENOUS AS NEEDED
Status: DISCONTINUED | OUTPATIENT
Start: 2024-11-06 | End: 2024-11-06 | Stop reason: SURG

## 2024-11-06 RX ORDER — SODIUM CHLORIDE 9 MG/ML
40 INJECTION, SOLUTION INTRAVENOUS AS NEEDED
Status: DISCONTINUED | OUTPATIENT
Start: 2024-11-06 | End: 2024-11-06 | Stop reason: HOSPADM

## 2024-11-06 RX ORDER — ONDANSETRON 2 MG/ML
4 INJECTION INTRAMUSCULAR; INTRAVENOUS EVERY 6 HOURS PRN
Status: DISCONTINUED | OUTPATIENT
Start: 2024-11-06 | End: 2024-11-08 | Stop reason: HOSPADM

## 2024-11-06 RX ORDER — ONDANSETRON 4 MG/1
4 TABLET, ORALLY DISINTEGRATING ORAL EVERY 6 HOURS PRN
Status: DISCONTINUED | OUTPATIENT
Start: 2024-11-06 | End: 2024-11-08 | Stop reason: HOSPADM

## 2024-11-06 RX ORDER — NALOXONE HCL 0.4 MG/ML
0.4 VIAL (ML) INJECTION AS NEEDED
Status: DISCONTINUED | OUTPATIENT
Start: 2024-11-06 | End: 2024-11-06 | Stop reason: HOSPADM

## 2024-11-06 RX ORDER — FLUMAZENIL 0.1 MG/ML
0.2 INJECTION INTRAVENOUS AS NEEDED
Status: DISCONTINUED | OUTPATIENT
Start: 2024-11-06 | End: 2024-11-06 | Stop reason: HOSPADM

## 2024-11-06 RX ORDER — HEPARIN SODIUM 1000 [USP'U]/ML
INJECTION, SOLUTION INTRAVENOUS; SUBCUTANEOUS
Status: DISPENSED
Start: 2024-11-06 | End: 2024-11-06

## 2024-11-06 RX ORDER — PROPOFOL 10 MG/ML
VIAL (ML) INTRAVENOUS AS NEEDED
Status: DISCONTINUED | OUTPATIENT
Start: 2024-11-06 | End: 2024-11-06 | Stop reason: SURG

## 2024-11-06 RX ORDER — LABETALOL HYDROCHLORIDE 5 MG/ML
5 INJECTION, SOLUTION INTRAVENOUS
Status: DISCONTINUED | OUTPATIENT
Start: 2024-11-06 | End: 2024-11-06 | Stop reason: HOSPADM

## 2024-11-06 RX ORDER — CEFAZOLIN SODIUM 1 G/3ML
INJECTION, POWDER, FOR SOLUTION INTRAMUSCULAR; INTRAVENOUS AS NEEDED
Status: DISCONTINUED | OUTPATIENT
Start: 2024-11-06 | End: 2024-11-06 | Stop reason: HOSPADM

## 2024-11-06 RX ORDER — SODIUM CHLORIDE 9 MG/ML
100 INJECTION, SOLUTION INTRAVENOUS CONTINUOUS
Status: DISPENSED | OUTPATIENT
Start: 2024-11-06 | End: 2024-11-07

## 2024-11-06 RX ORDER — ACETAMINOPHEN 500 MG
1000 TABLET ORAL EVERY 8 HOURS SCHEDULED
Status: DISCONTINUED | OUTPATIENT
Start: 2024-11-06 | End: 2024-11-08 | Stop reason: HOSPADM

## 2024-11-06 RX ORDER — PROTAMINE SULFATE 10 MG/ML
INJECTION, SOLUTION INTRAVENOUS AS NEEDED
Status: DISCONTINUED | OUTPATIENT
Start: 2024-11-06 | End: 2024-11-06 | Stop reason: SURG

## 2024-11-06 RX ORDER — BUPIVACAINE HYDROCHLORIDE 2.5 MG/ML
INJECTION, SOLUTION EPIDURAL; INFILTRATION; INTRACAUDAL AS NEEDED
Status: DISCONTINUED | OUTPATIENT
Start: 2024-11-06 | End: 2024-11-06 | Stop reason: HOSPADM

## 2024-11-06 RX ORDER — FENTANYL CITRATE 50 UG/ML
INJECTION, SOLUTION INTRAMUSCULAR; INTRAVENOUS AS NEEDED
Status: DISCONTINUED | OUTPATIENT
Start: 2024-11-06 | End: 2024-11-06 | Stop reason: SURG

## 2024-11-06 RX ORDER — ROSUVASTATIN CALCIUM 10 MG/1
40 TABLET, COATED ORAL NIGHTLY
Status: DISCONTINUED | OUTPATIENT
Start: 2024-11-06 | End: 2024-11-08 | Stop reason: HOSPADM

## 2024-11-06 RX ORDER — LIDOCAINE HYDROCHLORIDE 20 MG/ML
INJECTION, SOLUTION EPIDURAL; INFILTRATION; INTRACAUDAL; PERINEURAL AS NEEDED
Status: DISCONTINUED | OUTPATIENT
Start: 2024-11-06 | End: 2024-11-06 | Stop reason: SURG

## 2024-11-06 RX ORDER — SODIUM CHLORIDE 0.9 % (FLUSH) 0.9 %
10 SYRINGE (ML) INJECTION AS NEEDED
Status: DISCONTINUED | OUTPATIENT
Start: 2024-11-06 | End: 2024-11-06 | Stop reason: HOSPADM

## 2024-11-06 RX ORDER — BUPIVACAINE HYDROCHLORIDE 2.5 MG/ML
INJECTION, SOLUTION EPIDURAL; INFILTRATION; INTRACAUDAL
Status: DISPENSED
Start: 2024-11-06 | End: 2024-11-06

## 2024-11-06 RX ORDER — DOCUSATE SODIUM 100 MG/1
100 CAPSULE, LIQUID FILLED ORAL 2 TIMES DAILY PRN
Status: DISCONTINUED | OUTPATIENT
Start: 2024-11-06 | End: 2024-11-08 | Stop reason: HOSPADM

## 2024-11-06 RX ORDER — SODIUM CHLORIDE 0.9 % (FLUSH) 0.9 %
10 SYRINGE (ML) INJECTION AS NEEDED
Status: DISCONTINUED | OUTPATIENT
Start: 2024-11-06 | End: 2024-11-08 | Stop reason: HOSPADM

## 2024-11-06 RX ORDER — INSULIN LISPRO 100 [IU]/ML
2-9 INJECTION, SOLUTION INTRAVENOUS; SUBCUTANEOUS
Status: DISCONTINUED | OUTPATIENT
Start: 2024-11-06 | End: 2024-11-08 | Stop reason: HOSPADM

## 2024-11-06 RX ORDER — METOPROLOL TARTRATE 25 MG/1
100 TABLET, FILM COATED ORAL 2 TIMES DAILY
Status: DISCONTINUED | OUTPATIENT
Start: 2024-11-06 | End: 2024-11-08 | Stop reason: HOSPADM

## 2024-11-06 RX ORDER — DIPHENHYDRAMINE HYDROCHLORIDE 50 MG/ML
12.5 INJECTION INTRAMUSCULAR; INTRAVENOUS
Status: DISCONTINUED | OUTPATIENT
Start: 2024-11-06 | End: 2024-11-06 | Stop reason: HOSPADM

## 2024-11-06 RX ORDER — HEPARIN SODIUM 1000 [USP'U]/ML
INJECTION, SOLUTION INTRAVENOUS; SUBCUTANEOUS AS NEEDED
Status: DISCONTINUED | OUTPATIENT
Start: 2024-11-06 | End: 2024-11-06 | Stop reason: HOSPADM

## 2024-11-06 RX ORDER — HEPARIN SODIUM 1000 [USP'U]/ML
INJECTION, SOLUTION INTRAVENOUS; SUBCUTANEOUS AS NEEDED
Status: DISCONTINUED | OUTPATIENT
Start: 2024-11-06 | End: 2024-11-06 | Stop reason: SURG

## 2024-11-06 RX ORDER — CLOPIDOGREL BISULFATE 75 MG/1
75 TABLET ORAL DAILY
Status: DISCONTINUED | OUTPATIENT
Start: 2024-11-07 | End: 2024-11-08 | Stop reason: HOSPADM

## 2024-11-06 RX ORDER — DEXTROSE MONOHYDRATE 25 G/50ML
25 INJECTION, SOLUTION INTRAVENOUS
Status: DISCONTINUED | OUTPATIENT
Start: 2024-11-06 | End: 2024-11-08 | Stop reason: HOSPADM

## 2024-11-06 RX ORDER — RANOLAZINE 500 MG/1
500 TABLET, EXTENDED RELEASE ORAL 2 TIMES DAILY
Status: DISCONTINUED | OUTPATIENT
Start: 2024-11-06 | End: 2024-11-08 | Stop reason: HOSPADM

## 2024-11-06 RX ORDER — ROCURONIUM BROMIDE 10 MG/ML
INJECTION, SOLUTION INTRAVENOUS AS NEEDED
Status: DISCONTINUED | OUTPATIENT
Start: 2024-11-06 | End: 2024-11-06 | Stop reason: SURG

## 2024-11-06 RX ORDER — NITROGLYCERIN 0.4 MG/1
0.4 TABLET SUBLINGUAL
Status: DISCONTINUED | OUTPATIENT
Start: 2024-11-06 | End: 2024-11-06 | Stop reason: SDUPTHER

## 2024-11-06 RX ORDER — HYDRALAZINE HYDROCHLORIDE 20 MG/ML
5 INJECTION INTRAMUSCULAR; INTRAVENOUS
Status: DISCONTINUED | OUTPATIENT
Start: 2024-11-06 | End: 2024-11-06 | Stop reason: HOSPADM

## 2024-11-06 RX ORDER — DEXMEDETOMIDINE HYDROCHLORIDE 100 UG/ML
INJECTION, SOLUTION INTRAVENOUS AS NEEDED
Status: DISCONTINUED | OUTPATIENT
Start: 2024-11-06 | End: 2024-11-06 | Stop reason: SURG

## 2024-11-06 RX ORDER — PHENYLEPHRINE HYDROCHLORIDE 10 MG/ML
INJECTION INTRAVENOUS AS NEEDED
Status: DISCONTINUED | OUTPATIENT
Start: 2024-11-06 | End: 2024-11-06 | Stop reason: SURG

## 2024-11-06 RX ORDER — SODIUM CHLORIDE, SODIUM LACTATE, POTASSIUM CHLORIDE, CALCIUM CHLORIDE 600; 310; 30; 20 MG/100ML; MG/100ML; MG/100ML; MG/100ML
1000 INJECTION, SOLUTION INTRAVENOUS ONCE
Status: COMPLETED | OUTPATIENT
Start: 2024-11-06 | End: 2024-11-06

## 2024-11-06 RX ORDER — AMLODIPINE BESYLATE 5 MG/1
10 TABLET ORAL DAILY
Status: DISCONTINUED | OUTPATIENT
Start: 2024-11-07 | End: 2024-11-08 | Stop reason: HOSPADM

## 2024-11-06 RX ORDER — SODIUM CHLORIDE 9 MG/ML
40 INJECTION, SOLUTION INTRAVENOUS AS NEEDED
Status: DISCONTINUED | OUTPATIENT
Start: 2024-11-06 | End: 2024-11-08 | Stop reason: HOSPADM

## 2024-11-06 RX ORDER — ONDANSETRON 2 MG/ML
INJECTION INTRAMUSCULAR; INTRAVENOUS AS NEEDED
Status: DISCONTINUED | OUTPATIENT
Start: 2024-11-06 | End: 2024-11-06 | Stop reason: SURG

## 2024-11-06 RX ORDER — HYDRALAZINE HYDROCHLORIDE 20 MG/ML
20 INJECTION INTRAMUSCULAR; INTRAVENOUS EVERY 4 HOURS PRN
Status: DISCONTINUED | OUTPATIENT
Start: 2024-11-06 | End: 2024-11-08 | Stop reason: HOSPADM

## 2024-11-06 RX ORDER — LABETALOL HYDROCHLORIDE 5 MG/ML
10 INJECTION, SOLUTION INTRAVENOUS EVERY 4 HOURS PRN
Status: DISCONTINUED | OUTPATIENT
Start: 2024-11-06 | End: 2024-11-08 | Stop reason: HOSPADM

## 2024-11-06 RX ORDER — OXYCODONE HYDROCHLORIDE 5 MG/1
10 TABLET ORAL EVERY 4 HOURS PRN
Status: DISCONTINUED | OUTPATIENT
Start: 2024-11-06 | End: 2024-11-08 | Stop reason: HOSPADM

## 2024-11-06 RX ORDER — NICOTINE POLACRILEX 4 MG
15 LOZENGE BUCCAL
Status: DISCONTINUED | OUTPATIENT
Start: 2024-11-06 | End: 2024-11-08 | Stop reason: HOSPADM

## 2024-11-06 RX ORDER — EPHEDRINE SULFATE 5 MG/ML
5 INJECTION INTRAVENOUS ONCE AS NEEDED
Status: DISCONTINUED | OUTPATIENT
Start: 2024-11-06 | End: 2024-11-06 | Stop reason: HOSPADM

## 2024-11-06 RX ORDER — ISOSORBIDE MONONITRATE 60 MG/1
60 TABLET, EXTENDED RELEASE ORAL
Status: DISCONTINUED | OUTPATIENT
Start: 2024-11-06 | End: 2024-11-08

## 2024-11-06 RX ORDER — OXYCODONE HYDROCHLORIDE 5 MG/1
5 TABLET ORAL ONCE AS NEEDED
Status: DISCONTINUED | OUTPATIENT
Start: 2024-11-06 | End: 2024-11-06 | Stop reason: HOSPADM

## 2024-11-06 RX ORDER — IPRATROPIUM BROMIDE AND ALBUTEROL SULFATE 2.5; .5 MG/3ML; MG/3ML
3 SOLUTION RESPIRATORY (INHALATION) ONCE AS NEEDED
Status: DISCONTINUED | OUTPATIENT
Start: 2024-11-06 | End: 2024-11-06 | Stop reason: HOSPADM

## 2024-11-06 RX ORDER — DIPHENHYDRAMINE HYDROCHLORIDE 50 MG/ML
12.5 INJECTION INTRAMUSCULAR; INTRAVENOUS ONCE AS NEEDED
Status: DISCONTINUED | OUTPATIENT
Start: 2024-11-06 | End: 2024-11-06 | Stop reason: HOSPADM

## 2024-11-06 RX ORDER — SODIUM CHLORIDE 0.9 % (FLUSH) 0.9 %
10 SYRINGE (ML) INJECTION EVERY 12 HOURS SCHEDULED
Status: DISCONTINUED | OUTPATIENT
Start: 2024-11-06 | End: 2024-11-08 | Stop reason: HOSPADM

## 2024-11-06 RX ORDER — LIDOCAINE HYDROCHLORIDE 10 MG/ML
0.5 INJECTION, SOLUTION EPIDURAL; INFILTRATION; INTRACAUDAL; PERINEURAL ONCE AS NEEDED
Status: DISCONTINUED | OUTPATIENT
Start: 2024-11-06 | End: 2024-11-06 | Stop reason: HOSPADM

## 2024-11-06 RX ORDER — NICARDIPINE HYDROCHLORIDE 2.5 MG/ML
INJECTION INTRAVENOUS AS NEEDED
Status: DISCONTINUED | OUTPATIENT
Start: 2024-11-06 | End: 2024-11-06 | Stop reason: SURG

## 2024-11-06 RX ORDER — LIDOCAINE HYDROCHLORIDE 10 MG/ML
INJECTION, SOLUTION INFILTRATION; PERINEURAL
Status: DISPENSED
Start: 2024-11-06 | End: 2024-11-06

## 2024-11-06 RX ORDER — OXYCODONE HYDROCHLORIDE 5 MG/1
10 TABLET ORAL EVERY 4 HOURS PRN
Status: DISCONTINUED | OUTPATIENT
Start: 2024-11-06 | End: 2024-11-06 | Stop reason: HOSPADM

## 2024-11-06 RX ORDER — IBUPROFEN 600 MG/1
1 TABLET ORAL
Status: DISCONTINUED | OUTPATIENT
Start: 2024-11-06 | End: 2024-11-08 | Stop reason: HOSPADM

## 2024-11-06 RX ORDER — DEXAMETHASONE SODIUM PHOSPHATE 4 MG/ML
INJECTION, SOLUTION INTRA-ARTICULAR; INTRALESIONAL; INTRAMUSCULAR; INTRAVENOUS; SOFT TISSUE AS NEEDED
Status: DISCONTINUED | OUTPATIENT
Start: 2024-11-06 | End: 2024-11-06 | Stop reason: SURG

## 2024-11-06 RX ORDER — OXYCODONE HYDROCHLORIDE 5 MG/1
5 TABLET ORAL EVERY 4 HOURS PRN
Status: DISCONTINUED | OUTPATIENT
Start: 2024-11-06 | End: 2024-11-08 | Stop reason: HOSPADM

## 2024-11-06 RX ORDER — SODIUM CHLORIDE 9 MG/ML
INJECTION, SOLUTION INTRAVENOUS AS NEEDED
Status: DISCONTINUED | OUTPATIENT
Start: 2024-11-06 | End: 2024-11-06 | Stop reason: HOSPADM

## 2024-11-06 RX ORDER — AMLODIPINE BESYLATE 5 MG/1
10 TABLET ORAL ONCE
Status: DISCONTINUED | OUTPATIENT
Start: 2024-11-06 | End: 2024-11-06

## 2024-11-06 RX ORDER — ASPIRIN 81 MG/1
81 TABLET ORAL DAILY
Status: DISCONTINUED | OUTPATIENT
Start: 2024-11-06 | End: 2024-11-08 | Stop reason: HOSPADM

## 2024-11-06 RX ORDER — SODIUM CHLORIDE, SODIUM LACTATE, POTASSIUM CHLORIDE, CALCIUM CHLORIDE 600; 310; 30; 20 MG/100ML; MG/100ML; MG/100ML; MG/100ML
1000 INJECTION, SOLUTION INTRAVENOUS ONCE
Status: DISCONTINUED | OUTPATIENT
Start: 2024-11-06 | End: 2024-11-06 | Stop reason: HOSPADM

## 2024-11-06 RX ORDER — ONDANSETRON 2 MG/ML
4 INJECTION INTRAMUSCULAR; INTRAVENOUS ONCE AS NEEDED
Status: DISCONTINUED | OUTPATIENT
Start: 2024-11-06 | End: 2024-11-06 | Stop reason: HOSPADM

## 2024-11-06 RX ORDER — PHENAZOPYRIDINE HYDROCHLORIDE 100 MG/1
100 TABLET, FILM COATED ORAL 3 TIMES DAILY PRN
Status: DISCONTINUED | OUTPATIENT
Start: 2024-11-06 | End: 2024-11-08 | Stop reason: HOSPADM

## 2024-11-06 RX ORDER — CEFAZOLIN SODIUM 1 G/3ML
INJECTION, POWDER, FOR SOLUTION INTRAMUSCULAR; INTRAVENOUS
Status: DISPENSED
Start: 2024-11-06 | End: 2024-11-06

## 2024-11-06 RX ORDER — SODIUM CHLORIDE 9 MG/ML
INJECTION, SOLUTION INTRAVENOUS CONTINUOUS PRN
Status: DISCONTINUED | OUTPATIENT
Start: 2024-11-06 | End: 2024-11-06 | Stop reason: SURG

## 2024-11-06 RX ORDER — NITROGLYCERIN 0.4 MG/1
0.4 TABLET SUBLINGUAL
Status: DISCONTINUED | OUTPATIENT
Start: 2024-11-06 | End: 2024-11-08 | Stop reason: HOSPADM

## 2024-11-06 RX ADMIN — HEPARIN SODIUM 3500 UNITS: 1000 INJECTION INTRAVENOUS; SUBCUTANEOUS at 10:05

## 2024-11-06 RX ADMIN — DEXMEDETOMIDINE HYDROCHLORIDE 4 MCG: 100 INJECTION, SOLUTION INTRAVENOUS at 10:51

## 2024-11-06 RX ADMIN — NICARDIPINE HYDROCHLORIDE 15 MG/HR: 25 INJECTION, SOLUTION INTRAVENOUS at 19:37

## 2024-11-06 RX ADMIN — SODIUM CHLORIDE 2000 MG: 900 INJECTION INTRAVENOUS at 08:13

## 2024-11-06 RX ADMIN — METOPROLOL TARTRATE 100 MG: 25 TABLET, FILM COATED ORAL at 22:09

## 2024-11-06 RX ADMIN — SODIUM CHLORIDE, POTASSIUM CHLORIDE, SODIUM LACTATE AND CALCIUM CHLORIDE 1000 ML: 600; 310; 30; 20 INJECTION, SOLUTION INTRAVENOUS at 07:16

## 2024-11-06 RX ADMIN — ISOSORBIDE MONONITRATE 60 MG: 60 TABLET, EXTENDED RELEASE ORAL at 17:05

## 2024-11-06 RX ADMIN — ROSUVASTATIN 40 MG: 10 TABLET, FILM COATED ORAL at 21:13

## 2024-11-06 RX ADMIN — Medication 10 ML: at 21:24

## 2024-11-06 RX ADMIN — FENTANYL CITRATE 50 MCG: 50 INJECTION, SOLUTION INTRAMUSCULAR; INTRAVENOUS at 08:25

## 2024-11-06 RX ADMIN — DEXMEDETOMIDINE HYDROCHLORIDE 4 MCG: 100 INJECTION, SOLUTION INTRAVENOUS at 10:37

## 2024-11-06 RX ADMIN — ACETAMINOPHEN 1000 MG: 500 TABLET, FILM COATED ORAL at 21:13

## 2024-11-06 RX ADMIN — SODIUM CHLORIDE: 9 INJECTION, SOLUTION INTRAVENOUS at 08:17

## 2024-11-06 RX ADMIN — PHENYLEPHRINE HYDROCHLORIDE 100 MCG: 10 INJECTION INTRAVENOUS at 09:10

## 2024-11-06 RX ADMIN — ROCURONIUM BROMIDE 20 MG: 10 INJECTION, SOLUTION INTRAVENOUS at 09:35

## 2024-11-06 RX ADMIN — MIDAZOLAM 2 MG: 1 INJECTION INTRAMUSCULAR; INTRAVENOUS at 08:17

## 2024-11-06 RX ADMIN — SODIUM CHLORIDE: 9 INJECTION, SOLUTION INTRAVENOUS at 10:35

## 2024-11-06 RX ADMIN — HEPARIN SODIUM 10000 UNITS: 1000 INJECTION INTRAVENOUS; SUBCUTANEOUS at 09:11

## 2024-11-06 RX ADMIN — LIDOCAINE HYDROCHLORIDE 80 MG: 20 INJECTION, SOLUTION EPIDURAL; INFILTRATION; INTRACAUDAL; PERINEURAL at 08:25

## 2024-11-06 RX ADMIN — SODIUM CHLORIDE 2000 MG: 900 INJECTION INTRAVENOUS at 16:17

## 2024-11-06 RX ADMIN — RANOLAZINE 500 MG: 500 TABLET, FILM COATED, EXTENDED RELEASE ORAL at 21:13

## 2024-11-06 RX ADMIN — ROCURONIUM BROMIDE 10 MG: 10 INJECTION, SOLUTION INTRAVENOUS at 10:27

## 2024-11-06 RX ADMIN — HYDRALAZINE HYDROCHLORIDE 5 MG: 20 INJECTION INTRAMUSCULAR; INTRAVENOUS at 12:27

## 2024-11-06 RX ADMIN — HYDROMORPHONE HYDROCHLORIDE 0.5 MG: 1 INJECTION, SOLUTION INTRAMUSCULAR; INTRAVENOUS; SUBCUTANEOUS at 12:00

## 2024-11-06 RX ADMIN — ONDANSETRON 4 MG: 2 INJECTION, SOLUTION INTRAMUSCULAR; INTRAVENOUS at 10:58

## 2024-11-06 RX ADMIN — DEXMEDETOMIDINE HYDROCHLORIDE 4 MCG: 100 INJECTION, SOLUTION INTRAVENOUS at 10:41

## 2024-11-06 RX ADMIN — ROCURONIUM BROMIDE 50 MG: 10 INJECTION, SOLUTION INTRAVENOUS at 08:26

## 2024-11-06 RX ADMIN — PHENYLEPHRINE HYDROCHLORIDE 100 MCG: 10 INJECTION INTRAVENOUS at 08:44

## 2024-11-06 RX ADMIN — OXYCODONE 5 MG: 5 TABLET ORAL at 23:22

## 2024-11-06 RX ADMIN — PHENYLEPHRINE HYDROCHLORIDE 100 MCG: 10 INJECTION INTRAVENOUS at 09:35

## 2024-11-06 RX ADMIN — NICARDIPINE HYDROCHLORIDE 15 MG/HR: 25 INJECTION, SOLUTION INTRAVENOUS at 17:15

## 2024-11-06 RX ADMIN — LABETALOL HYDROCHLORIDE 10 MG: 5 INJECTION, SOLUTION INTRAVENOUS at 19:49

## 2024-11-06 RX ADMIN — PROTAMINE SULFATE 50 MG: 10 INJECTION, SOLUTION INTRAVENOUS at 10:35

## 2024-11-06 RX ADMIN — PHENYLEPHRINE HYDROCHLORIDE 50 MCG: 10 INJECTION INTRAVENOUS at 09:15

## 2024-11-06 RX ADMIN — DEXMEDETOMIDINE HYDROCHLORIDE 4 MCG: 100 INJECTION, SOLUTION INTRAVENOUS at 10:56

## 2024-11-06 RX ADMIN — PHENYLEPHRINE HYDROCHLORIDE 100 MCG: 10 INJECTION INTRAVENOUS at 09:54

## 2024-11-06 RX ADMIN — ASPIRIN 81 MG: 81 TABLET, COATED ORAL at 14:21

## 2024-11-06 RX ADMIN — NICARDIPINE HYDROCHLORIDE 15 MG/HR: 25 INJECTION, SOLUTION INTRAVENOUS at 21:23

## 2024-11-06 RX ADMIN — HYDROMORPHONE HYDROCHLORIDE 0.5 MG: 1 INJECTION, SOLUTION INTRAMUSCULAR; INTRAVENOUS; SUBCUTANEOUS at 21:53

## 2024-11-06 RX ADMIN — PHENYLEPHRINE HYDROCHLORIDE 200 MCG: 10 INJECTION INTRAVENOUS at 08:53

## 2024-11-06 RX ADMIN — SODIUM CHLORIDE 2000 MG: 900 INJECTION INTRAVENOUS at 23:51

## 2024-11-06 RX ADMIN — TAMSULOSIN HYDROCHLORIDE 0.4 MG: 0.4 CAPSULE ORAL at 14:21

## 2024-11-06 RX ADMIN — SODIUM CHLORIDE 100 ML/HR: 9 INJECTION, SOLUTION INTRAVENOUS at 14:25

## 2024-11-06 RX ADMIN — PHENYLEPHRINE HYDROCHLORIDE 0.5 MCG/KG/MIN: 10 INJECTION INTRAVENOUS at 09:43

## 2024-11-06 RX ADMIN — PROPOFOL 200 MG: 10 INJECTION, EMULSION INTRAVENOUS at 08:26

## 2024-11-06 RX ADMIN — NICARDIPINE HYDROCHLORIDE 200 MCG: 25 INJECTION INTRAVENOUS at 10:24

## 2024-11-06 RX ADMIN — HYDRALAZINE HYDROCHLORIDE 20 MG: 20 INJECTION INTRAMUSCULAR; INTRAVENOUS at 21:14

## 2024-11-06 RX ADMIN — ACETAMINOPHEN 1000 MG: 500 TABLET, FILM COATED ORAL at 14:21

## 2024-11-06 RX ADMIN — PHENYLEPHRINE HYDROCHLORIDE 200 MCG: 10 INJECTION INTRAVENOUS at 09:43

## 2024-11-06 RX ADMIN — FENTANYL CITRATE 50 MCG: 50 INJECTION, SOLUTION INTRAMUSCULAR; INTRAVENOUS at 08:56

## 2024-11-06 RX ADMIN — NICARDIPINE HYDROCHLORIDE 5 MG/HR: 25 INJECTION, SOLUTION INTRAVENOUS at 14:22

## 2024-11-06 RX ADMIN — HYDROMORPHONE HYDROCHLORIDE 0.5 MG: 1 INJECTION, SOLUTION INTRAMUSCULAR; INTRAVENOUS; SUBCUTANEOUS at 23:55

## 2024-11-06 RX ADMIN — DEXAMETHASONE SODIUM PHOSPHATE 4 MG: 4 INJECTION, SOLUTION INTRAMUSCULAR; INTRAVENOUS at 08:34

## 2024-11-06 RX ADMIN — SUGAMMADEX 200 MG: 100 INJECTION, SOLUTION INTRAVENOUS at 11:05

## 2024-11-06 RX ADMIN — INSULIN LISPRO 2 UNITS: 100 INJECTION, SOLUTION INTRAVENOUS; SUBCUTANEOUS at 21:13

## 2024-11-06 NOTE — ANESTHESIA PREPROCEDURE EVALUATION
Anesthesia Evaluation     NPO Solid Status: > 8 hours  NPO Liquid Status: > 8 hours           Airway   Mallampati: I  TM distance: >3 FB  Neck ROM: full  No difficulty expected  Dental - normal exam     Pulmonary - normal exam   (+) ,sleep apnea on CPAP  Cardiovascular - normal exam    (+) hypertension, CAD, CABG >6 Months, dysrhythmias Atrial Fib, angina, PVD, hyperlipidemia,  carotid artery disease left carotid    ROS comment: ·  Myocardial perfusion imaging indicates a normal myocardial perfusion study with no evidence of ischemia. Impressions are consistent with a low risk study.  ·  Left ventricular ejection fraction is hyperdynamic (Calculated EF > 70%).  ·  There is no prior study available for comparison. Stress and rest imaging compared, there appears to be fixed defect in the high lateral wall as well as decreased counts in the mid basal to apical inferior wall, minimal reversibility, summed difference score of only 2 EF 73% Normal size ventricle with normal regional and global function Intermediate to low risk study by nuclear criteria.  ·  Findings consistent with a normal ECG stress test.      Neuro/Psych  GI/Hepatic/Renal/Endo    (+) obesity, renal disease-, diabetes mellitus type 2    Musculoskeletal     Abdominal  - normal exam    Bowel sounds: normal.   Substance History      OB/GYN          Other                          Anesthesia Plan    ASA 3     general and [place]     intravenous induction     Anesthetic plan, risks, benefits, and alternatives have been provided, discussed and informed consent has been obtained with: patient.  Pre-procedure education provided  Plan discussed with CRNA.        CODE STATUS:

## 2024-11-06 NOTE — ANESTHESIA POSTPROCEDURE EVALUATION
Patient: Mendel Melendez    Procedure Summary       Date: 11/06/24 Room / Location: [facility] OR 03 / [facility] MAIN OR    Anesthesia Start: 0817 Anesthesia Stop: 1141    Procedure: LEFT CAROTID ENDARTERECTOMY (Left: Neck) Diagnosis:       Bilateral carotid artery stenosis      (Bilateral carotid artery stenosis [I65.23])    Surgeons: Janet Nix MD Provider: Chris García MD    Anesthesia Type: general[place]annah ASA Status: 3            Anesthesia Type: general, [place]    Vitals  Vitals Value Taken Time   /88 11/06/24 1148   Temp 97.6 °F (36.4 °C) 11/06/24 1133   Pulse 75 11/06/24 1149   Resp 14 11/06/24 1148   SpO2 96 % 11/06/24 1149   Vitals shown include unfiled device data.        Post Anesthesia Care and Evaluation    Patient location during evaluation: PACU  Patient participation: complete - patient participated  Level of consciousness: awake  Pain scale: See nurse's notes for pain score.  Pain management: adequate    Airway patency: patent  Anesthetic complications: No anesthetic complications  PONV Status: none  Cardiovascular status: acceptable  Respiratory status: acceptable and spontaneous ventilation  Hydration status: acceptable    Comments: Patient seen and examined postoperatively; vital signs stable; SpO2 greater than or equal to 90%; cardiopulmonary status stable; nausea/vomiting adequately controlled; pain adequately controlled; no apparent anesthesia complications; patient discharged from anesthesia care when discharge criteria were met

## 2024-11-06 NOTE — ANESTHESIA PROCEDURE NOTES
Airway  Urgency: elective    Date/Time: 11/6/2024 8:28 AM  Airway not difficult    General Information and Staff    Patient location during procedure: OR  Anesthesiologist: Chris García MD  CRNA/CAA: Susie Melvin CRNA    Indications and Patient Condition  Indications for airway management: airway protection    Preoxygenated: yes  MILS maintained throughout  Mask difficulty assessment: 1 - vent by mask    Final Airway Details  Final airway type: endotracheal airway      Successful airway: ETT  Cuffed: yes   Successful intubation technique: direct laryngoscopy  Endotracheal tube insertion site: oral  Blade: [place]  Blade size: 3  ETT size (mm): 7.0  Cormack-Lehane Classification: grade IIa - partial view of glottis  Placement verified by: chest auscultation   Cuff volume (mL): 7  Measured from: gums  ETT/EBT to gums (cm): 22  Number of attempts at approach: 1  Assessment: lips, teeth, and gum same as pre-op and atraumatic intubation

## 2024-11-06 NOTE — CONSULTS
Critical Care Consult Note   Mendel Melendez : 1965 MRN:[id number] LOS:0 ROOM: [facility]     Reason for admission: Bilateral carotid artery stenosis     Assessment / Plan     Bilateral carotid stenosis, asymptomatic  S/p left carotid endarterectomy with Dr. Nix  MAP goal: 65  SBP goal 100-115mmHg  q1h neurochecks  PRN BP control: labetalol  PRN pain control: dilaudid, oxycodone     Diabetes mellitus Type 2, not insulin-dependent : not well controlled.   Home regimen includes: Metformin, Ozempic  Hold oral agents while in ICU.  Hgb A1C: 6.7  Accu checks ACHS or Every 6 Hours, based on diet, with sliding scale admelog coverage as needed.      Essential hypertension: not well controlled.   Continue amlodipine, Imdur, metoprolol  Titrate medications as needed to reach target -115mmHg     CAD/history of CABG: Continue aspirin, Plavix  Hyperlipidemia: Continue Crestor  Paroxysmal Afib: EKGs this admission show NSR; not on antiarrhythmics at home  BPH: continue Flomax    Level Of Support Discussed With: Patient  Code Status (Patient has no pulse and is not breathing): CPR (Attempt to Resuscitate)  Medical Interventions (Patient has pulse or is breathing): Full Support       Nutrition: Diet: Cardiac; Healthy Heart (2-3 Na+); Fluid Consistency: Thin (IDDSI 0) Patient isn't on Tube Feeding     VTE Prophylaxis:  Pharmacologic & mechanical VTE prophylaxis orders are present.         History of Present illness     Mendel Melendez is a 59 y.o. male with PMH of diabetes type 2, hypertension, CAD, Hx CABG, hyperlipidemia, and paroxysmal A-fib admitted to the hospital for a scheduled left carotid endarterectomy for Bilateral carotid artery stenosis.  He received a 2L LR bolus.     ACP: Full code.     Patient was seen and examined on 24 at 15:06 EST . He denies any pain, reports mild discomfort at operative site. He does express numbness of the tongue.     Past Medical/Surgical/Social/Family History & Allergies      Past Medical History:   Diagnosis Date    Atrial fib/flutter, transient     Bruit of left carotid artery     CAD (coronary artery disease)     unspecified    Coronary artery disease     Hyperlipidemia     Hypertension     Kidney calculus 09/2024    Nonspecific elevation of levels of transaminase or lactic acid dehydrogenase (LDH)     Occlusion and stenosis of bilateral carotid arteries 02/05/2020    Other specified diabetes mellitus without complications     Paroxysmal atrial fibrillation     Seasonal allergic rhinitis due to pollen     Sleep apnea     Sleep apnea, unspecified     Type 2 diabetes mellitus with other circulatory complications     Unstable angina       Past Surgical History:   Procedure Laterality Date    CARDIAC CATHETERIZATION  04/09/2015    BHF Left main distal 60-70% LCX 80-90% RCA proximal 90% and mid 100% with left to right collaterals.    CARDIAC CATHETERIZATION N/A 07/10/2020    Procedure: Left Heart Cath with angiogram;  Surgeon: Job Griffin MD;  Location: [facility] CATH INVASIVE LOCATION;  Service: Cardiovascular;  Laterality: N/A;    CARDIAC CATHETERIZATION N/A 07/10/2020    Procedure: Saphenous Vein Graft;  Surgeon: Job Griffin MD;  Location: [facility] CATH INVASIVE LOCATION;  Service: Cardiovascular;  Laterality: N/A;  svg x 2  lima    CARDIAC CATHETERIZATION N/A 07/10/2020    Procedure: Left ventriculography;  Surgeon: Job Griffin MD;  Location: [facility] CATH INVASIVE LOCATION;  Service: Cardiovascular;  Laterality: N/A;    CARDIAC CATHETERIZATION  06/2022    [place]    CARDIAC CATHETERIZATION  2016    CARDIAC SURGERY      COLONOSCOPY      2 years back.    CORONARY ARTERY BYPASS GRAFT  04/13/2015    Four, left main and three vessel cononary artery disease. Dr. Franklyn Smith    CORONARY ARTERY BYPASS GRAFT  2016    CORONARY STENT PLACEMENT      CYSTOSCOPY, URETEROSCOPY, RETROGRADE PYELOGRAM, STENT INSERTION Right 9/24/2024    Procedure: CYSTOSCOPY URETEROSCOPY STONE BASKET EXTRACTION AND  STENT INSERTION;  Surgeon: Alejandro López MD;  Location: [facility] MAIN OR;  Service: Urology;  Laterality: Right;    VASECTOMY        Social History     Socioeconomic History    Marital status:    Tobacco Use    Smoking status: Never    Smokeless tobacco: Never    Tobacco comments:     Patient does not smoke.   Vaping Use    Vaping status: Never Used   Substance and Sexual Activity    Alcohol use: No    Drug use: No    Sexual activity: Defer      Family History   Problem Relation Age of Onset    Heart disease Mother         Heart Problems is Positive in Mother  due to heart stopped at age 62.    Heart attack Father 64    Heart disease Father     Heart disease Maternal Aunt     Heart disease Maternal Uncle     Breast cancer Paternal Aunt     Heart disease Paternal Aunt     Ovarian cancer Paternal Aunt     Heart disease Paternal Uncle       Allergies   Allergen Reactions    Lumason [Sulfur Hexaflouride Lipid A Microspheres] Anaphylaxis     Paralysis, Redness, Burning skin sensation, syncope    Niacin Swelling     Other reaction(s): Redness of skin.....    Jardiance [Empagliflozin] Hives, Dermatitis and Urinary Retention        Home Medications     Prior to Admission medications    Medication Sig Start Date End Date Taking? Authorizing Provider   amLODIPine (NORVASC) 10 MG tablet Take 1 tablet by mouth once daily 24  Yes Mariajose Hernandez MD   clopidogrel (PLAVIX) 75 MG tablet Take 1 tablet by mouth once daily 24  Yes Mariajose Hernandez MD   isosorbide mononitrate (IMDUR) 60 MG 24 hr tablet Take 1 tablet by mouth 2 (Two) Times a Day. 24  Yes Mariajose Hernandez MD   metFORMIN (GLUCOPHAGE) 1000 MG tablet Take 1 tablet by mouth 2 (Two) Times a Day With Meals. 24  Yes Mariajose Hernandez MD   metoprolol tartrate (LOPRESSOR) 100 MG tablet Take 1 tablet by mouth 2 (Two) Times a Day. 24  Yes Mariajose Hernandez MD   nitroglycerin (NITROSTAT) 0.4 MG SL tablet DISSOLVE ONE TABLET UNDER THE  TONGUE EVERY 5 MINUTES AS NEEDED FOR CHEST PAIN.  DO NOT EXCEED A TOTAL OF 3 DOSES IN 15 MINUTES 9/23/24  Yes Mariajose Hernandez MD   ranolazine (RANEXA) 500 MG 12 hr tablet Take 1 tablet by mouth twice daily 10/21/24  Yes Gurjit Guerra MD   rosuvastatin (CRESTOR) 40 MG tablet Take 1 tablet by mouth Every Night.   Yes Margaret Eli MD   Semaglutide (OZEMPIC, 1 MG/DOSE, SC) Inject  under the skin into the appropriate area as directed.   Yes Margaret Eli MD   aspirin (aspirin) 81 MG EC tablet Take 1 tablet by mouth Daily. 5/7/19   Margaret Eli MD   docusate sodium (Colace) 100 MG capsule Take 1 capsule by mouth 2 (Two) Times a Day As Needed (post op and while taking narcotics). 9/24/24   Alejandro López MD   furosemide (LASIX) 20 MG tablet Take 1 tablet by mouth Daily. Take both the lasix and potassium together. If not taking lasix don't take the potassium 9/26/24   Mariajose Hernandez MD   ondansetron ODT (ZOFRAN-ODT) 4 MG disintegrating tablet Place 1 tablet on the tongue Every 8 (Eight) Hours As Needed for Nausea or Vomiting. 9/24/24   Alejandro López MD   oxyCODONE-acetaminophen (PERCOCET) 5-325 MG per tablet Take 1-2 tablets by mouth Every 6 (Six) Hours As Needed for Severe Pain. 9/24/24   Alejandro López MD   phenazopyridine (Pyridium) 100 MG tablet Take 1 tablet by mouth 3 (Three) Times a Day As Needed (dysuria). 9/24/24   Alejandro López MD   potassium chloride (KLOR-CON M10) 10 MEQ CR tablet Take 1 tablet by mouth Daily. Take both the lasix and potassium together. If not taking lasix don't take the potassium 9/26/24   Mariajose Hernandez MD   tamsulosin (FLOMAX) 0.4 MG capsule 24 hr capsule Take 1 capsule by mouth Daily. 9/24/24   Alejandro López MD        Objective / Physical Exam     Vital signs:  Temp: 97.8 °F (36.6 °C)  BP: (!) 191/83  Heart Rate: 78  Resp: 11  SpO2: 97 %  Weight: 94.5 kg (208 lb 5.4 oz)    Admission Weight: Weight: 92.5 kg (204 lb)    Physical Exam  Vitals and nursing  note reviewed.   Constitutional:       Appearance: Normal appearance.   HENT:      Head: Normocephalic and atraumatic.      Mouth/Throat:      Mouth: Mucous membranes are moist.      Pharynx: Oropharynx is clear.   Eyes:      General: No scleral icterus.     Pupils: Pupils are equal, round, and reactive to light.   Neck:      Comments: Left carotid operative incision  Cardiovascular:      Rate and Rhythm: Normal rate and regular rhythm.      Pulses: Normal pulses.      Heart sounds: Normal heart sounds. No murmur heard.  Pulmonary:      Effort: Pulmonary effort is normal.      Breath sounds: Normal breath sounds.   Abdominal:      General: Bowel sounds are normal.      Palpations: Abdomen is soft.   Skin:     General: Skin is warm and dry.      Capillary Refill: Capillary refill takes less than 2 seconds.   Neurological:      General: No focal deficit present.      Mental Status: He is alert and oriented to person, place, and time.   Psychiatric:         Mood and Affect: Mood normal.         Behavior: Behavior normal.          Labs     Reviewed preoperative labs from 10/28/2024, abnormalities noted. Hgb A1C: 6.7, glucose: 195       Current Medications     Scheduled Meds:  acetaminophen, 1,000 mg, Oral, Q8H  [START ON 11/7/2024] amLODIPine, 10 mg, Oral, Daily  aspirin, 81 mg, Oral, Daily  bupivacaine (PF), , ,   ceFAZolin, , ,   ceFAZolin, 2,000 mg, Intravenous, Q8H  [START ON 11/7/2024] clopidogrel, 75 mg, Oral, Daily  heparin (porcine), , ,   isosorbide mononitrate, 60 mg, Oral, BID - Nitrates  lidocaine, , ,   [START ON 11/7/2024] metFORMIN, 1,000 mg, Oral, BID With Meals  metoprolol tartrate, 100 mg, Oral, BID  ranolazine, 500 mg, Oral, BID  rosuvastatin, 40 mg, Oral, Nightly  sodium chloride, 10 mL, Intravenous, Q12H  tamsulosin, 0.4 mg, Oral, Daily         Continuous Infusions:  niCARdipine, 5-15 mg/hr, Last Rate: 5 mg/hr (11/06/24 1422)  phenylephrine, 0.5-3 mcg/kg/min  sodium chloride, 100 mL/hr, Last Rate:  100 mL/hr (11/06/24 7535)      Plan discussed with RN. Reviewed all other data in the last 24 hours, including but not limited to vitals, labs, microbiology, imaging and pertinent notes from other providers.  Plan also discussed with patient and family at the bedside.     Johnna Escalante, EMBER   Critical Care  11/06/24   15:46 EST

## 2024-11-06 NOTE — PAYOR COMM NOTE
"Clinical for case# [id number]     Inpatient order 11/6/24  Elective inpatient surgery.  -------------  PENDING Inpatient auth number:     PLEASE FAX OR CALL DETERMINATION TO CONTACT BELOW:       THANK YOU,    SCOOTER Sims, RN  Utilization Review  [facility]  Phone: [phone]  Fax: [phone]      NPI: [id number]  Tax ID: [id number]      Gloria Joaquin (59 y.o. Male)       Date of Birth   1965    Social Security Number       Address   [address] [place] [address] [place] [facility]    Home Phone   [phone]    MRN   [id number]       Alevism   None    Marital Status                               Admission Date   11/6/24    Admission Type   Elective    Admitting Provider   Janet Nix MD    Attending Provider   Janet Nix MD    Department, Room/Bed   [facility] MAIN OR, [facility] MAIN OR/MAIN OR       Discharge Date       Discharge Disposition       Discharge Destination                                 Attending Provider: Janet Nix MD    Allergies: Lumason [Sulfur Hexaflouride Lipid A Microspheres], Niacin, Jardiance [Empagliflozin]    Isolation: None   Infection: None   Code Status: CPR    Ht: 165.1 cm (65\")   Wt: 93.5 kg (206 lb 3.2 oz)    Admission Cmt: None   Principal Problem: Bilateral carotid artery stenosis [I65.23]                   Active Insurance as of 11/6/2024       Primary Coverage       Payor Plan Insurance Group Employer/Plan Group    [facility] IND EXCHANGE [facility] IND EXCHANGE [id number]       Payor Plan Address Payor Plan Phone Number Payor Plan Fax Number Effective Dates    [address] [phone]  4/1/2019 - None Entered    [facility] [id number]         Subscriber Name Subscriber Birth Date Member ID       GLORIA JOAQUIN 1965 [id number]                     Emergency Contacts        (Rel.) Home Phone Work Phone Mobile Phone    JOAQUINEZEQUIEL (Spouse) [phone] -- [phone]      "         Facility-Administered Medications as of 11/6/2024   Medication Dose Route Frequency Provider Last Rate Last Admin    acetaminophen (TYLENOL) tablet 1,000 mg  1,000 mg Oral Q8H Janet Nix MD        [START ON 11/7/2024] amLODIPine (NORVASC) tablet 10 mg  10 mg Oral Daily Janet Nix MD        aspirin EC tablet 81 mg  81 mg Oral Daily Janet Nix MD        atropine sulfate injection 0.5 mg  0.5 mg Intravenous Once PRN Susie Melvin CRNA        bupivacaine (PF) (MARCAINE) 0.25 % injection  - ADS Override Pull             ceFAZolin (ANCEF) 1 g injection  - ADS Override Pull             [COMPLETED] ceFAZolin 2000 mg IVPB in 100 mL NS (MBP)  2,000 mg Intravenous Once Janet Nix MD   2,000 mg at 11/06/24 0813    ceFAZolin 2000 mg IVPB in 100 mL NS (MBP)  2,000 mg Intravenous Q8H Janet Nix MD        [START ON 11/7/2024] clopidogrel (PLAVIX) tablet 75 mg  75 mg Oral Daily Janet Nix MD        diphenhydrAMINE (BENADRYL) injection 12.5 mg  12.5 mg Intravenous Once PRN Susie Melvin CRNA        diphenhydrAMINE (BENADRYL) injection 12.5 mg  12.5 mg Intravenous Q15 Min PRN Susie Melvin CRNA        docusate sodium (COLACE) capsule 100 mg  100 mg Oral BID PRN Janet Nix MD        ePHEDrine Sulfate (Pressors) 5 MG/ML injection 5 mg  5 mg Intravenous Once PRN Susie Melvin CRNA        flumazenil (ROMAZICON) injection 0.2 mg  0.2 mg Intravenous PRN Susie Melvin CRNA        heparin (porcine) 1000 UNIT/ML injection  - ADS Override Pull             hydrALAZINE (APRESOLINE) injection 5 mg  5 mg Intravenous Q5 Min PRN Susie Melvin CRNA   5 mg at 11/06/24 1227    HYDROmorphone (DILAUDID) injection 0.5 mg  0.5 mg Intravenous Q15 Min PRN Susie Melvin CRNA   0.5 mg at 11/06/24 1200    HYDROmorphone (DILAUDID) injection 0.5 mg  0.5 mg Intravenous Q2H Janet Matos MD        HYDROmorphone (DILAUDID) injection 1 mg   1 mg Intravenous Q15 Min PRN Susie Melvin CRNA        ipratropium-albuterol (DUO-NEB) nebulizer solution 3 mL  3 mL Nebulization Once PRN Susie Melvin CRNA        isosorbide mononitrate (IMDUR) 24 hr tablet 60 mg  60 mg Oral BID Janet Nix MD        labetalol (NORMODYNE,TRANDATE) injection 5 mg  5 mg Intravenous Q5 Min PRN uSsie Melvin CRNA        lactated ringers infusion 1,000 mL  1,000 mL Intravenous Once Janet Nix MD        [COMPLETED] lactated ringers infusion 1,000 mL  1,000 mL Intravenous Once Janet Nix MD 25 mL/hr at 11/06/24 0716 1,000 mL at 11/06/24 0716    lidocaine (cardiac) (XYLOCAINE) injection 100 mg  100 mg Intravenous Q5 Min PRN Susie Melvin CRNA        lidocaine (XYLOCAINE) 1 % injection  - ADS Override Pull             lidocaine PF 1% (XYLOCAINE) injection 0.5 mL  0.5 mL Intradermal Once PRN Janet Nix MD        [START ON 11/7/2024] metFORMIN (GLUCOPHAGE) tablet 1,000 mg  1,000 mg Oral BID With Meals Janet Nix MD        metoprolol tartrate (LOPRESSOR) tablet 100 mg  100 mg Oral BID Janet Nix MD        naloxone (NARCAN) injection 0.4 mg  0.4 mg Intravenous PRN Susie Melvin CRNA        niCARdipine (CARDENE) 25mg in 250mL NS infusion  5-15 mg/hr Intravenous Titrated Janet Nix MD        nitroglycerin (NITROSTAT) SL tablet 0.4 mg  0.4 mg Sublingual Q5 Min PRN Janet Nix MD        nitroglycerin (NITROSTAT) SL tablet 0.4 mg  0.4 mg Sublingual Q5 Min PRN Janet Nix MD        ondansetron (ZOFRAN) injection 4 mg  4 mg Intravenous Once PRN Susie Melvin CRNA        ondansetron ODT (ZOFRAN-ODT) disintegrating tablet 4 mg  4 mg Oral Q6H PRN Janet Nix MD        Or    ondansetron (ZOFRAN) injection 4 mg  4 mg Intravenous Q6H PRN Janet Nix MD        oxyCODONE (ROXICODONE) immediate release tablet 10 mg  10 mg Oral Q4H PRN Susie Melvin, CRNA         oxyCODONE (ROXICODONE) immediate release tablet 5 mg  5 mg Oral Q4H PRN Janet Nix MD        Or    oxyCODONE (ROXICODONE) immediate release tablet 10 mg  10 mg Oral Q4H PRN Janet Nix MD        oxyCODONE (ROXICODONE) immediate release tablet 5 mg  5 mg Oral Once PRN Susie Melvin CRNA        phenazopyridine (PYRIDIUM) tablet 100 mg  100 mg Oral TID PRN Janet Nix MD        phenylephrine (CATALINO-SYNEPHRINE) 50 mg in sodium chloride 0.9 % 250 mL infusion  0.5-3 mcg/kg/min Intravenous Titrated Janet Nix MD        ranolazine (RANEXA) 12 hr tablet 500 mg  500 mg Oral BID Janet Nix MD        rosuvastatin (CRESTOR) tablet 40 mg  40 mg Oral Nightly Janet Nix MD        sodium chloride 0.9 % flush 10 mL  10 mL Intravenous Q12H Janet Nix MD        sodium chloride 0.9 % flush 10 mL  10 mL Intravenous PRN Janet Nix MD        sodium chloride 0.9 % flush 10 mL  10 mL Intravenous PRN Janet Nix MD        sodium chloride 0.9 % flush 10 mL  10 mL Intravenous PRN Janet Nix MD        sodium chloride 0.9 % flush 10 mL  10 mL Intravenous Q12H Janet Nix MD        sodium chloride 0.9 % flush 10 mL  10 mL Intravenous PRN Janet Nix MD        sodium chloride 0.9 % infusion 40 mL  40 mL Intravenous PRN Janet Nix MD        sodium chloride 0.9 % infusion 40 mL  40 mL Intravenous PRN Janet Nix MD        sodium chloride 0.9 % infusion  100 mL/hr Intravenous Continuous Janet Nix MD        tamsulosin (FLOMAX) 24 hr capsule 0.4 mg  0.4 mg Oral Daily Janet Nix MD            Operative/Procedure Notes (all)        Janet Nix MD at 24 0853  Version 1 of 1         OPERATIVE NOTE  Patient Name: Mendel Melendez  Patient MRN: [id number]  Patient : 1965    Service date: 2024    Surgeon: Janet Nix MD    Assistant: Domonique GRANADOS, Provided critical  assistance in exposure, retraction, and suction that overall decrease blood loss and operative time.    Anesthesia Provider: Anesthesiologist: Chris García MD  CRNA: Susie Melvin CRNA    Pre-operative diagnosis: Asymptomatic left carotid stenosis    Post-operative diagnosis: Asymptomatic left carotid stenosis    Indication(s): This is a 59 y.o. male with Asymptomatic left carotid stenosis    Procedure/description: left CEA with bovine pericardial patch angioplasty    Findings: Severe carotid stenosis    Specimens: None    Implants: bovine pericardial patch     Estimated Blood Loss:  less than 100 mL    Drains: None    Complications: none    Instrument counts: reported as correct at conclusion of procedure    Condition of Patient at Transfer: stable    Procedure in Detail:    After informed consent obtained, the patient was brought to the OR and placed under adequate general anesthesia.  A timeout was performed with all OR staff present and in agreement, which confirmed the laterality and/or site of the procedure and the procedure to be performed prior to procedure start.  Appropriate perioperative antibiotics were administered prior to procedure start.       The left neck was prepped and draped in sterile fashion. A vertical left cervical incision was made and we exposed the common carotid artery from the level of the omohyoid up to the bifurcation. The omohyoid was divided to facilitate exposure of the common carotid artery.  The ansa cervicalis was divided to facilitate exposure of the carotid bifurcation.  The internal and external carotid arteries were exposed up to the level of the digastric muscle. The facial vein was identified and ligated.  The vagus and hypoglossal nerves were identified and preserved.  These were in their normal anatomic positions.   The patient was systemically heparinized to an ACT of >250 and maintained at that level for the remainder of the case. The arteries were occluded  in the usual sequence. A longitudinal arteriotomy was made over the common carotid artery and onto the internal carotid artery. The patient had a focal, severe partially calcified plaque causing a severe,near occlusive stenosis at the origin of the left ICA. The proximal and distal vessels were of good quality. A previously prepared, flushed, and appropriately sized shunt was selected and placed into the CCA and ICA in standard fashion. The endarterectomy was performed with a [place] elevator. Good endpoints were obtained proximally and distally. An eversion endarterectomy was done on the external carotid artery with a nice feathered edge obtained. The remaining bits of debris were removed from the arterial surface and the artery was irrigated with heparinized saline. A distal 6-0 prolene was used to tack a loose plaque at the origin of the ICA.  A 0.8 x 8 cm bovine pericardial patch was cut to the appropriate length and sewn in with running 6-0 Prolene suture. Just prior to completing patch closure, the shunt was removed and the artery was flushed appropriately prior to completion of the patch closure.  Flow was restored in the usual sequence with return of Doppler signals in all vessels. An on table duplex US was obtained that showed normal waveforms and no large debris in the CCA, ICA, and ECA.  Heparin was reversed and hemostasis was obtained. The wound was irrigated and closed in layers with 2-0 vicryl, 3-0 vicryl and 4-0 vicryl.  Skin glue was applied to the skin.   All counts were reported as correct at the conclusion of the procedure.    The patient was awakened from anesthesia and was neurologically intact.  They were taken to the recovery room in satisfactory condition.    Janet Nix MD  Vascular Surgery  O: [phone]  F: [phone]     Electronically signed by Janet Nix MD at 11/06/24 7408

## 2024-11-06 NOTE — H&P
[facility]   PREOPERATIVE HISTORY AND PHYSICAL    Patient Name:Mendel Melendez  : 1965  MRN: [id number]  Primary Care Physician: Mariajose Hernandez MD  Date of admission: 2024    Subjective   Subjective     Chief Complaint: preoperative evaluation    History of Present Illness  Mendel Melendez is a 59 y.o. male who presents for preoperative evaluation. He is scheduled for LEFT CAROTID ENDARTERECTOMY (Left).  No symptoms of stroke or TIA since his last visit.      Review of Systems   All other systems reviewed and are negative.       Personal History     Past Medical History:   Diagnosis Date    Atrial fib/flutter, transient     Bruit of left carotid artery     CAD (coronary artery disease)     unspecified    Coronary artery disease     Hyperlipidemia     Hypertension     Kidney calculus 2024    Nonspecific elevation of levels of transaminase or lactic acid dehydrogenase (LDH)     Occlusion and stenosis of bilateral carotid arteries 2020    Other specified diabetes mellitus without complications     Paroxysmal atrial fibrillation     Seasonal allergic rhinitis due to pollen     Sleep apnea     Sleep apnea, unspecified     Type 2 diabetes mellitus with other circulatory complications     Unstable angina        Past Surgical History:   Procedure Laterality Date    CARDIAC CATHETERIZATION  2015    BHF Left main distal 60-70% LCX 80-90% RCA proximal 90% and mid 100% with left to right collaterals.    CARDIAC CATHETERIZATION N/A 07/10/2020    Procedure: Left Heart Cath with angiogram;  Surgeon: Job Griffin MD;  Location: [facility] CATH INVASIVE LOCATION;  Service: Cardiovascular;  Laterality: N/A;    CARDIAC CATHETERIZATION N/A 07/10/2020    Procedure: Saphenous Vein Graft;  Surgeon: Job Griffin MD;  Location: [facility] CATH INVASIVE LOCATION;  Service: Cardiovascular;  Laterality: N/A;  svg x 2  lima    CARDIAC CATHETERIZATION N/A 07/10/2020    Procedure: Left ventriculography;  Surgeon:  Job Griffin MD;  Location: [facility] CATH INVASIVE LOCATION;  Service: Cardiovascular;  Laterality: N/A;    CARDIAC CATHETERIZATION  06/2022    [place]    CARDIAC CATHETERIZATION  2016    CARDIAC SURGERY      COLONOSCOPY      2 years back.    CORONARY ARTERY BYPASS GRAFT  04/13/2015    Four, left main and three vessel cononary artery disease. Dr. Franklyn Smith    CORONARY ARTERY BYPASS GRAFT  2016    CORONARY STENT PLACEMENT      CYSTOSCOPY, URETEROSCOPY, RETROGRADE PYELOGRAM, STENT INSERTION Right 9/24/2024    Procedure: CYSTOSCOPY URETEROSCOPY STONE BASKET EXTRACTION AND STENT INSERTION;  Surgeon: Alejandro López MD;  Location: [facility] OR;  Service: Urology;  Laterality: Right;    VASECTOMY  1998       Family History: His family history includes Breast cancer in his paternal aunt; Heart attack (age of onset: 64) in his father; Heart disease in his father, maternal aunt, maternal uncle, mother, paternal aunt, and paternal uncle; Ovarian cancer in his paternal aunt.     Social History: He  reports that he has never smoked. He has never used smokeless tobacco. He reports that he does not drink alcohol and does not use drugs.    Home Medications:  Semaglutide, amLODIPine, aspirin, clopidogrel, docusate sodium, furosemide, isosorbide mononitrate, metFORMIN, metoprolol tartrate, nitroglycerin, ondansetron ODT, oxyCODONE-acetaminophen, phenazopyridine, potassium chloride, ranolazine, rosuvastatin, and tamsulosin    Allergies:  He is allergic to lumason [sulfur hexaflouride lipid a microspheres], niacin, and jardiance [empagliflozin].    Objective    Objective     Vitals:    Temp:  [97.9 °F (36.6 °C)] 97.9 °F (36.6 °C)  Heart Rate:  [68] 68  Resp:  [15] 15  BP: (127)/(74) 127/74    Physical Exam  Vitals reviewed.   Constitutional:       General: He is not in acute distress.     Appearance: Normal appearance.   HENT:      Head: Normocephalic and atraumatic.      Right Ear: External ear normal.      Left Ear: External ear  normal.      Nose: Nose normal.      Mouth/Throat:      Mouth: Mucous membranes are moist.      Pharynx: Oropharynx is clear.   Eyes:      Extraocular Movements: Extraocular movements intact.      Conjunctiva/sclera: Conjunctivae normal.      Pupils: Pupils are equal, round, and reactive to light.   Cardiovascular:      Rate and Rhythm: Normal rate and regular rhythm.      Pulses:           Carotid pulses are 2+ on the right side and 2+ on the left side.       Radial pulses are 2+ on the right side and 2+ on the left side.   Pulmonary:      Comments: Non labored respirations on room air, equal chest rise  Abdominal:      General: Abdomen is flat. There is no distension.      Palpations: Abdomen is soft.   Musculoskeletal:      Cervical back: Normal range of motion. No rigidity.      Right lower leg: No edema.      Left lower leg: No edema.   Skin:     General: Skin is warm and dry.      Capillary Refill: Capillary refill takes less than 2 seconds.   Neurological:      General: No focal deficit present.      Mental Status: He is alert and oriented to person, place, and time.   Psychiatric:         Mood and Affect: Mood normal.         Behavior: Behavior normal.         Assessment & Plan   Assessment / Plan     Brief Patient Summary:  Mendel Melendez is a 59 y.o. male who presents for preoperative evaluation.    Pre-Op Diagnosis Codes:      * Bilateral carotid artery stenosis [I65.23]    Active Hospital Problems:  Active Hospital Problems    Diagnosis     **Bilateral carotid artery stenosis      Plan:   Procedure(s):  LEFT CAROTID ENDARTERECTOMY    The risks, benefits, and alternatives of the procedure including but not limited to bleeding, infection, stroke, heart attack, and death and risks of the anesthesia were discussed in detail with the patient and questions were answered. No guarantees were made or implied. Informed consent was obtained.    Janet Nix MD

## 2024-11-06 NOTE — OP NOTE
OPERATIVE NOTE  Patient Name: Mendel Melendez  Patient MRN: [id number]  Patient : 1965    Service date: 2024    Surgeon: Janet Nix MD    Assistant: Domonique GRANADOS, Provided critical assistance in exposure, retraction, and suction that overall decrease blood loss and operative time.    Anesthesia Provider: Anesthesiologist: Chris aGrcía MD  CRNA: Susie Melvin CRNA    Pre-operative diagnosis: Asymptomatic left carotid stenosis    Post-operative diagnosis: Asymptomatic left carotid stenosis    Indication(s): This is a 59 y.o. male with Asymptomatic left carotid stenosis    Procedure/description: left CEA with bovine pericardial patch angioplasty    Findings: Severe carotid stenosis    Specimens: None    Implants: bovine pericardial patch     Estimated Blood Loss:  less than 100 mL    Drains: None    Complications: none    Instrument counts: reported as correct at conclusion of procedure    Condition of Patient at Transfer: stable    Procedure in Detail:    After informed consent obtained, the patient was brought to the OR and placed under adequate general anesthesia.  A timeout was performed with all OR staff present and in agreement, which confirmed the laterality and/or site of the procedure and the procedure to be performed prior to procedure start.  Appropriate perioperative antibiotics were administered prior to procedure start.       The left neck was prepped and draped in sterile fashion. A vertical left cervical incision was made and we exposed the common carotid artery from the level of the omohyoid up to the bifurcation. The omohyoid was divided to facilitate exposure of the common carotid artery.  The ansa cervicalis was divided to facilitate exposure of the carotid bifurcation.  The internal and external carotid arteries were exposed up to the level of the digastric muscle. The facial vein was identified and ligated.  The vagus and hypoglossal nerves were identified and  preserved.  These were in their normal anatomic positions.   The patient was systemically heparinized to an ACT of >250 and maintained at that level for the remainder of the case. The arteries were occluded in the usual sequence. A longitudinal arteriotomy was made over the common carotid artery and onto the internal carotid artery. The patient had a focal, severe partially calcified plaque causing a severe,near occlusive stenosis at the origin of the left ICA. The proximal and distal vessels were of good quality. A previously prepared, flushed, and appropriately sized shunt was selected and placed into the CCA and ICA in standard fashion. The endarterectomy was performed with a Umpire elevator. Good endpoints were obtained proximally and distally. An eversion endarterectomy was done on the external carotid artery with a nice feathered edge obtained. The remaining bits of debris were removed from the arterial surface and the artery was irrigated with heparinized saline. A distal 6-0 prolene was used to tack a loose plaque at the origin of the ICA.  A 0.8 x 8 cm bovine pericardial patch was cut to the appropriate length and sewn in with running 6-0 Prolene suture. Just prior to completing patch closure, the shunt was removed and the artery was flushed appropriately prior to completion of the patch closure.  Flow was restored in the usual sequence with return of Doppler signals in all vessels. An on table duplex US was obtained that showed normal waveforms and no large debris in the CCA, ICA, and ECA.  Heparin was reversed and hemostasis was obtained. The wound was irrigated and closed in layers with 2-0 vicryl, 3-0 vicryl and 4-0 vicryl.  Skin glue was applied to the skin.   All counts were reported as correct at the conclusion of the procedure.    The patient was awakened from anesthesia and was neurologically intact.  They were taken to the recovery room in satisfactory condition.    Janet Nix MD  Vascular  Surgery  O: [phone]  F: [phone]

## 2024-11-07 ENCOUNTER — APPOINTMENT (OUTPATIENT)
Dept: CARDIOLOGY | Facility: HOSPITAL | Age: 59
End: 2024-11-07
Payer: COMMERCIAL

## 2024-11-07 LAB
ACT BLD: 124 SECONDS (ref 89–137)
ACT BLD: 239 SECONDS (ref 89–137)
ACT BLD: 302 SECONDS (ref 89–137)
ANION GAP SERPL CALCULATED.3IONS-SCNC: 8.6 MMOL/L (ref 5–15)
BASOPHILS # BLD AUTO: 0.03 10*3/MM3 (ref 0–0.2)
BASOPHILS NFR BLD AUTO: 0.3 % (ref 0–1.5)
BUN SERPL-MCNC: 12 MG/DL (ref 6–20)
BUN/CREAT SERPL: 12.9 (ref 7–25)
CA-I SERPL ISE-MCNC: 1.07 MMOL/L (ref 1.15–1.3)
CALCIUM SPEC-SCNC: 7.9 MG/DL (ref 8.6–10.5)
CHLORIDE SERPL-SCNC: 106 MMOL/L (ref 98–107)
CO2 SERPL-SCNC: 22.4 MMOL/L (ref 22–29)
CREAT SERPL-MCNC: 0.93 MG/DL (ref 0.76–1.27)
DEPRECATED RDW RBC AUTO: 44 FL (ref 37–54)
EGFRCR SERPLBLD CKD-EPI 2021: 95.8 ML/MIN/1.73
EOSINOPHIL # BLD AUTO: 0.01 10*3/MM3 (ref 0–0.4)
EOSINOPHIL NFR BLD AUTO: 0.1 % (ref 0.3–6.2)
ERYTHROCYTE [DISTWIDTH] IN BLOOD BY AUTOMATED COUNT: 13.5 % (ref 12.3–15.4)
GEN 5 2HR TROPONIN T REFLEX: 9 NG/L
GLUCOSE BLDC GLUCOMTR-MCNC: 148 MG/DL (ref 70–105)
GLUCOSE BLDC GLUCOMTR-MCNC: 153 MG/DL (ref 70–105)
GLUCOSE BLDC GLUCOMTR-MCNC: 183 MG/DL (ref 70–105)
GLUCOSE BLDC GLUCOMTR-MCNC: 192 MG/DL (ref 70–105)
GLUCOSE SERPL-MCNC: 172 MG/DL (ref 65–99)
HCT VFR BLD AUTO: 35 % (ref 37.5–51)
HGB BLD-MCNC: 11.5 G/DL (ref 13–17.7)
IMM GRANULOCYTES # BLD AUTO: 0.07 10*3/MM3 (ref 0–0.05)
IMM GRANULOCYTES NFR BLD AUTO: 0.6 % (ref 0–0.5)
LYMPHOCYTES # BLD AUTO: 0.56 10*3/MM3 (ref 0.7–3.1)
LYMPHOCYTES NFR BLD AUTO: 5 % (ref 19.6–45.3)
MCH RBC QN AUTO: 29.1 PG (ref 26.6–33)
MCHC RBC AUTO-ENTMCNC: 32.9 G/DL (ref 31.5–35.7)
MCV RBC AUTO: 88.6 FL (ref 79–97)
MONOCYTES # BLD AUTO: 0.97 10*3/MM3 (ref 0.1–0.9)
MONOCYTES NFR BLD AUTO: 8.6 % (ref 5–12)
NEUTROPHILS NFR BLD AUTO: 85.4 % (ref 42.7–76)
NEUTROPHILS NFR BLD AUTO: 9.59 10*3/MM3 (ref 1.7–7)
NRBC BLD AUTO-RTO: 0 /100 WBC (ref 0–0.2)
PLATELET # BLD AUTO: 183 10*3/MM3 (ref 140–450)
PMV BLD AUTO: 9.6 FL (ref 6–12)
POTASSIUM SERPL-SCNC: 3.8 MMOL/L (ref 3.5–5.2)
PROCALCITONIN SERPL-MCNC: 0.08 NG/ML (ref 0–0.25)
RBC # BLD AUTO: 3.95 10*6/MM3 (ref 4.14–5.8)
SODIUM SERPL-SCNC: 137 MMOL/L (ref 136–145)
TROPONIN T DELTA: 2 NG/L
TROPONIN T SERPL HS-MCNC: 7 NG/L
WBC NRBC COR # BLD AUTO: 11.23 10*3/MM3 (ref 3.4–10.8)

## 2024-11-07 PROCEDURE — 84484 ASSAY OF TROPONIN QUANT: CPT | Performed by: NURSE PRACTITIONER

## 2024-11-07 PROCEDURE — 25810000003 SODIUM CHLORIDE 0.9 % SOLUTION 250 ML FLEX CONT

## 2024-11-07 PROCEDURE — 93306 TTE W/DOPPLER COMPLETE: CPT | Performed by: INTERNAL MEDICINE

## 2024-11-07 PROCEDURE — 80048 BASIC METABOLIC PNL TOTAL CA: CPT | Performed by: STUDENT IN AN ORGANIZED HEALTH CARE EDUCATION/TRAINING PROGRAM

## 2024-11-07 PROCEDURE — 99024 POSTOP FOLLOW-UP VISIT: CPT | Performed by: NURSE PRACTITIONER

## 2024-11-07 PROCEDURE — 93306 TTE W/DOPPLER COMPLETE: CPT

## 2024-11-07 PROCEDURE — 92610 EVALUATE SWALLOWING FUNCTION: CPT

## 2024-11-07 PROCEDURE — 82330 ASSAY OF CALCIUM: CPT | Performed by: NURSE PRACTITIONER

## 2024-11-07 PROCEDURE — 99222 1ST HOSP IP/OBS MODERATE 55: CPT | Performed by: INTERNAL MEDICINE

## 2024-11-07 PROCEDURE — 82948 REAGENT STRIP/BLOOD GLUCOSE: CPT

## 2024-11-07 PROCEDURE — 84145 PROCALCITONIN (PCT): CPT | Performed by: NURSE PRACTITIONER

## 2024-11-07 PROCEDURE — 63710000001 INSULIN LISPRO (HUMAN) PER 5 UNITS

## 2024-11-07 PROCEDURE — 25010000002 HYDROMORPHONE 1 MG/ML SOLUTION: Performed by: STUDENT IN AN ORGANIZED HEALTH CARE EDUCATION/TRAINING PROGRAM

## 2024-11-07 PROCEDURE — 25010000002 ENOXAPARIN PER 10 MG: Performed by: NURSE PRACTITIONER

## 2024-11-07 PROCEDURE — 25010000002 NICARDIPINE 2.5 MG/ML SOLUTION 10 ML VIAL

## 2024-11-07 PROCEDURE — 25810000003 LACTATED RINGERS SOLUTION: Performed by: EMERGENCY MEDICINE

## 2024-11-07 PROCEDURE — 85025 COMPLETE CBC W/AUTO DIFF WBC: CPT | Performed by: STUDENT IN AN ORGANIZED HEALTH CARE EDUCATION/TRAINING PROGRAM

## 2024-11-07 PROCEDURE — 25010000002 LABETALOL 5 MG/ML SOLUTION

## 2024-11-07 PROCEDURE — 25010000002 ONDANSETRON PER 1 MG: Performed by: STUDENT IN AN ORGANIZED HEALTH CARE EDUCATION/TRAINING PROGRAM

## 2024-11-07 PROCEDURE — 25010000002 HYDRALAZINE PER 20 MG: Performed by: NURSE PRACTITIONER

## 2024-11-07 PROCEDURE — 25010000002 CALCIUM GLUCONATE 2-0.675 GM/100ML-% SOLUTION: Performed by: NURSE PRACTITIONER

## 2024-11-07 PROCEDURE — 25810000003 SODIUM CHLORIDE 0.9 % SOLUTION: Performed by: STUDENT IN AN ORGANIZED HEALTH CARE EDUCATION/TRAINING PROGRAM

## 2024-11-07 RX ORDER — ENOXAPARIN SODIUM 100 MG/ML
40 INJECTION SUBCUTANEOUS EVERY 24 HOURS
Status: DISCONTINUED | OUTPATIENT
Start: 2024-11-07 | End: 2024-11-08 | Stop reason: HOSPADM

## 2024-11-07 RX ORDER — CALCIUM GLUCONATE 20 MG/ML
2000 INJECTION, SOLUTION INTRAVENOUS ONCE
Status: COMPLETED | OUTPATIENT
Start: 2024-11-07 | End: 2024-11-07

## 2024-11-07 RX ADMIN — CLOPIDOGREL BISULFATE 75 MG: 75 TABLET ORAL at 09:48

## 2024-11-07 RX ADMIN — SODIUM CHLORIDE 100 ML/HR: 9 INJECTION, SOLUTION INTRAVENOUS at 01:29

## 2024-11-07 RX ADMIN — NICARDIPINE HYDROCHLORIDE 10 MG/HR: 25 INJECTION, SOLUTION INTRAVENOUS at 01:34

## 2024-11-07 RX ADMIN — HYDRALAZINE HYDROCHLORIDE 20 MG: 20 INJECTION INTRAMUSCULAR; INTRAVENOUS at 05:42

## 2024-11-07 RX ADMIN — NICARDIPINE HYDROCHLORIDE 15 MG/HR: 25 INJECTION, SOLUTION INTRAVENOUS at 00:10

## 2024-11-07 RX ADMIN — ONDANSETRON 4 MG: 2 INJECTION INTRAMUSCULAR; INTRAVENOUS at 02:06

## 2024-11-07 RX ADMIN — NITROGLYCERIN 0.4 MG: 0.4 TABLET SUBLINGUAL at 01:27

## 2024-11-07 RX ADMIN — DOCUSATE SODIUM 100 MG: 100 CAPSULE, LIQUID FILLED ORAL at 20:06

## 2024-11-07 RX ADMIN — NICARDIPINE HYDROCHLORIDE 12.5 MG/HR: 25 INJECTION, SOLUTION INTRAVENOUS at 03:59

## 2024-11-07 RX ADMIN — METOPROLOL TARTRATE 100 MG: 25 TABLET, FILM COATED ORAL at 20:06

## 2024-11-07 RX ADMIN — METFORMIN HYDROCHLORIDE 1000 MG: 500 TABLET ORAL at 17:11

## 2024-11-07 RX ADMIN — Medication 10 ML: at 20:12

## 2024-11-07 RX ADMIN — SODIUM CHLORIDE, POTASSIUM CHLORIDE, SODIUM LACTATE AND CALCIUM CHLORIDE 500 ML: 600; 310; 30; 20 INJECTION, SOLUTION INTRAVENOUS at 08:25

## 2024-11-07 RX ADMIN — INSULIN LISPRO 2 UNITS: 100 INJECTION, SOLUTION INTRAVENOUS; SUBCUTANEOUS at 13:47

## 2024-11-07 RX ADMIN — ISOSORBIDE MONONITRATE 60 MG: 60 TABLET, EXTENDED RELEASE ORAL at 17:11

## 2024-11-07 RX ADMIN — TAMSULOSIN HYDROCHLORIDE 0.4 MG: 0.4 CAPSULE ORAL at 09:49

## 2024-11-07 RX ADMIN — RANOLAZINE 500 MG: 500 TABLET, FILM COATED, EXTENDED RELEASE ORAL at 20:06

## 2024-11-07 RX ADMIN — HYDROMORPHONE HYDROCHLORIDE 0.5 MG: 1 INJECTION, SOLUTION INTRAMUSCULAR; INTRAVENOUS; SUBCUTANEOUS at 02:07

## 2024-11-07 RX ADMIN — NITROGLYCERIN 0.4 MG: 0.4 TABLET SUBLINGUAL at 01:17

## 2024-11-07 RX ADMIN — INSULIN LISPRO 2 UNITS: 100 INJECTION, SOLUTION INTRAVENOUS; SUBCUTANEOUS at 20:06

## 2024-11-07 RX ADMIN — NICARDIPINE HYDROCHLORIDE 15 MG/HR: 25 INJECTION, SOLUTION INTRAVENOUS at 06:06

## 2024-11-07 RX ADMIN — ACETAMINOPHEN 1000 MG: 500 TABLET, FILM COATED ORAL at 21:29

## 2024-11-07 RX ADMIN — ASPIRIN 81 MG: 81 TABLET, COATED ORAL at 09:48

## 2024-11-07 RX ADMIN — ENOXAPARIN SODIUM 40 MG: 100 INJECTION SUBCUTANEOUS at 17:11

## 2024-11-07 RX ADMIN — ROSUVASTATIN 40 MG: 10 TABLET, FILM COATED ORAL at 20:06

## 2024-11-07 RX ADMIN — ACETAMINOPHEN 1000 MG: 500 TABLET, FILM COATED ORAL at 13:47

## 2024-11-07 RX ADMIN — Medication 10 ML: at 09:49

## 2024-11-07 RX ADMIN — Medication 5 MG: at 23:12

## 2024-11-07 RX ADMIN — CALCIUM GLUCONATE 2000 MG: 20 INJECTION, SOLUTION INTRAVENOUS at 09:49

## 2024-11-07 RX ADMIN — ACETAMINOPHEN 1000 MG: 500 TABLET, FILM COATED ORAL at 05:41

## 2024-11-07 RX ADMIN — LABETALOL HYDROCHLORIDE 10 MG: 5 INJECTION, SOLUTION INTRAVENOUS at 02:31

## 2024-11-07 NOTE — CONSULTS
CARDIOLOGY CONSULT NOTE      Referring Provider:     Dr. Fine    Reason for Consultation: Chest pain    Attending: Silvano Colby MD    Chief complaint    Chest pain    Subjective .     History of present illness:  Mendel Melendez is a 59 y.o. male who presents for elective left carotid endarterectomy.    Procedure was completed yesterday.  Postprocedure he developed chest pain.  EKG did not show acute ST or T wave segment abnormalities.  High-sensitivityTroponins were obtained which were 7, 9.    Patient did have elevated blood pressures at the time of his chest pain he was on IV Cardene.  He was given 2 sublingual nitros which relieved the pain.  Cardene has now been weaned off and this morning he is receiving a fluid bolus because his systolic blood pressure dropped in the 90s.  He is chest pain-free at this time patient reports at home prior to admission he has infrequent angina with activity that is relieved with rest but no unstable symptoms or change in symptoms recently.    Patient is a 59-year-old male who is routinely seen by Dr. Guerra.  He is known to have coronary artery disease requiring previous PCI and CABG.  He has hypertension, dyslipidemia, diabetes.     In 2015 the patient was diagnosed with multivessel coronary artery disease and underwent CABG.  He developed a  of the PDA branch of the RCA and underwent successful stenting of this vessel in [facility].     In June 2022 the patient underwent repeat cardiac catheterization at [facility] and was noted to have 100% occlusion of the LAD, left circumflex.  Vein graft to the diagonal was patent, LIMA to the LAD was patent, RCA had a patent stent.  EF was 55 to 60%.     Patient was seen in our office last month requesting preoperative cardiovascular risk assessment prior to planned left carotid endarterectomy.    Patient underwent  a stress Myoview.  Stress test showed a small fixed inferior lateral wall defect with very minimal  reversibility.  Images were reviewed by Dr. Guerra who felt like there was reverse redistribution.  Patient was not having unstable angina symptoms and was cleared to proceed with surgery as planned.  Review of Systems   Constitutional: Negative for chills and fever.   HENT:  Negative for ear discharge and nosebleeds.    Eyes:  Negative for discharge and redness.   Cardiovascular:  Positive for chest pain. Negative for orthopnea, palpitations, paroxysmal nocturnal dyspnea and syncope.   Respiratory:  Negative for cough, shortness of breath and wheezing.    Endocrine: Negative for heat intolerance.   Skin:  Negative for rash.   Musculoskeletal:  Negative for arthritis and myalgias.   Gastrointestinal:  Negative for abdominal pain, melena, nausea and vomiting.   Genitourinary:  Negative for dysuria and hematuria.   Neurological:  Negative for dizziness, light-headedness, numbness and tremors.   Psychiatric/Behavioral:  Negative for depression. The patient is not nervous/anxious.      Pertinent items are noted in HPI, all other systems reviewed and negative  History  Past Medical History:   Diagnosis Date    Atrial fib/flutter, transient     Bruit of left carotid artery     CAD (coronary artery disease)     unspecified    Coronary artery disease     Hyperlipidemia     Hypertension     Kidney calculus 09/2024    Nonspecific elevation of levels of transaminase or lactic acid dehydrogenase (LDH)     Occlusion and stenosis of bilateral carotid arteries 02/05/2020    Other specified diabetes mellitus without complications     Paroxysmal atrial fibrillation     Seasonal allergic rhinitis due to pollen     Sleep apnea     Sleep apnea, unspecified     Type 2 diabetes mellitus with other circulatory complications     Unstable angina        Past Surgical History:   Procedure Laterality Date    CARDIAC CATHETERIZATION  04/09/2015    BHF Left main distal 60-70% LCX 80-90% RCA proximal 90% and mid 100% with left to right collaterals.     CARDIAC CATHETERIZATION N/A 07/10/2020    Procedure: Left Heart Cath with angiogram;  Surgeon: Job Griffin MD;  Location: [facility] CATH INVASIVE LOCATION;  Service: Cardiovascular;  Laterality: N/A;    CARDIAC CATHETERIZATION N/A 07/10/2020    Procedure: Saphenous Vein Graft;  Surgeon: Job Griffin MD;  Location: [facility] CATH INVASIVE LOCATION;  Service: Cardiovascular;  Laterality: N/A;  svg x 2  lima    CARDIAC CATHETERIZATION N/A 07/10/2020    Procedure: Left ventriculography;  Surgeon: Job Girffin MD;  Location: [facility] CATH INVASIVE LOCATION;  Service: Cardiovascular;  Laterality: N/A;    CARDIAC CATHETERIZATION  2022    [place]    CARDIAC CATHETERIZATION  2016    CARDIAC SURGERY      COLONOSCOPY      2 years back.    CORONARY ARTERY BYPASS GRAFT  2015    Four, left main and three vessel cononary artery disease. Dr. Franklyn Smith    CORONARY ARTERY BYPASS GRAFT  2016    CORONARY STENT PLACEMENT      CYSTOSCOPY, URETEROSCOPY, RETROGRADE PYELOGRAM, STENT INSERTION Right 2024    Procedure: CYSTOSCOPY URETEROSCOPY STONE BASKET EXTRACTION AND STENT INSERTION;  Surgeon: Alejandro López MD;  Location: [facility] OR;  Service: Urology;  Laterality: Right;    VASECTOMY         Family History   Problem Relation Age of Onset    Heart disease Mother         Heart Problems is Positive in Mother  due to heart stopped at age 62.    Heart attack Father 64    Heart disease Father     Heart disease Maternal Aunt     Heart disease Maternal Uncle     Breast cancer Paternal Aunt     Heart disease Paternal Aunt     Ovarian cancer Paternal Aunt     Heart disease Paternal Uncle        Social History     Tobacco Use    Smoking status: Never    Smokeless tobacco: Never    Tobacco comments:     Patient does not smoke.   Vaping Use    Vaping status: Never Used   Substance Use Topics    Alcohol use: No    Drug use: No        Medications Prior to Admission   Medication Sig Dispense Refill Last Dose/Taking     amLODIPine (NORVASC) 10 MG tablet Take 1 tablet by mouth once daily 90 tablet 3 11/6/2024 Morning    clopidogrel (PLAVIX) 75 MG tablet Take 1 tablet by mouth once daily 90 tablet 3 11/6/2024 at  4:30 AM    isosorbide mononitrate (IMDUR) 60 MG 24 hr tablet Take 1 tablet by mouth 2 (Two) Times a Day. 180 tablet 3 11/6/2024 Morning    metFORMIN (GLUCOPHAGE) 1000 MG tablet Take 1 tablet by mouth 2 (Two) Times a Day With Meals. 180 tablet 3 11/3/2024    metoprolol tartrate (LOPRESSOR) 100 MG tablet Take 1 tablet by mouth 2 (Two) Times a Day. 180 tablet 3 11/6/2024 Morning    nitroglycerin (NITROSTAT) 0.4 MG SL tablet DISSOLVE ONE TABLET UNDER THE TONGUE EVERY 5 MINUTES AS NEEDED FOR CHEST PAIN.  DO NOT EXCEED A TOTAL OF 3 DOSES IN 15 MINUTES 100 tablet 0 Taking As Needed    ranolazine (RANEXA) 500 MG 12 hr tablet Take 1 tablet by mouth twice daily 90 tablet 0 11/6/2024 Morning    rosuvastatin (CRESTOR) 40 MG tablet Take 1 tablet by mouth Every Night.   11/5/2024    Semaglutide (OZEMPIC, 1 MG/DOSE, SC) Inject  under the skin into the appropriate area as directed.   10/28/2024    aspirin (aspirin) 81 MG EC tablet Take 1 tablet by mouth Daily.   10/1/2024    docusate sodium (Colace) 100 MG capsule Take 1 capsule by mouth 2 (Two) Times a Day As Needed (post op and while taking narcotics). 30 capsule 1     furosemide (LASIX) 20 MG tablet Take 1 tablet by mouth Daily. Take both the lasix and potassium together. If not taking lasix don't take the potassium 90 tablet 3     ondansetron ODT (ZOFRAN-ODT) 4 MG disintegrating tablet Place 1 tablet on the tongue Every 8 (Eight) Hours As Needed for Nausea or Vomiting. 15 tablet 1     oxyCODONE-acetaminophen (PERCOCET) 5-325 MG per tablet Take 1-2 tablets by mouth Every 6 (Six) Hours As Needed for Severe Pain. 15 tablet 0     phenazopyridine (Pyridium) 100 MG tablet Take 1 tablet by mouth 3 (Three) Times a Day As Needed (dysuria). 15 tablet 0     potassium chloride (KLOR-CON M10) 10 MEQ  "CR tablet Take 1 tablet by mouth Daily. Take both the lasix and potassium together. If not taking lasix don't take the potassium 90 tablet 3     tamsulosin (FLOMAX) 0.4 MG capsule 24 hr capsule Take 1 capsule by mouth Daily. 30 capsule 0          Lumason [sulfur hexaflouride lipid a microspheres], Niacin, and Jardiance [empagliflozin]    Scheduled Meds:acetaminophen, 1,000 mg, Oral, Q8H  amLODIPine, 10 mg, Oral, Daily  aspirin, 81 mg, Oral, Daily  clopidogrel, 75 mg, Oral, Daily  enoxaparin, 40 mg, Subcutaneous, Q24H  insulin lispro, 2-9 Units, Subcutaneous, 4x Daily AC & at Bedtime  isosorbide mononitrate, 60 mg, Oral, BID - Nitrates  metFORMIN, 1,000 mg, Oral, BID With Meals  metoprolol tartrate, 100 mg, Oral, BID  ranolazine, 500 mg, Oral, BID  rosuvastatin, 40 mg, Oral, Nightly  sodium chloride, 10 mL, Intravenous, Q12H  tamsulosin, 0.4 mg, Oral, Daily      Continuous Infusions:     PRN Meds:.  dextrose    dextrose    docusate sodium    glucagon (human recombinant)    hydrALAZINE    HYDROmorphone    labetalol    nitroglycerin    ondansetron ODT **OR** ondansetron    oxyCODONE **OR** oxyCODONE    phenazopyridine    sodium chloride    sodium chloride    Objective     VITAL SIGNS  Vitals:    11/07/24 0954 11/07/24 1000 11/07/24 1100 11/07/24 1153   BP: 92/54 101/60 100/58 100/58   BP Location:    Right arm   Patient Position: Standing   Sitting   Pulse: 80 84 82 77   Resp:    13   Temp:    97.8 °F (36.6 °C)   TempSrc:    Oral   SpO2: 95% 95% 95% 93%   Weight:       Height:           Flowsheet Rows      Flowsheet Row First Filed Value   Admission Height 165.1 cm (65\") Documented at 10/25/2024 1533   Admission Weight 92.5 kg (204 lb) Documented at 10/25/2024 1533            Body mass index is 35.48 kg/m².     TELEMETRY: Sinus rhythm    Physical Exam:  Constitutional:       Appearance: Well-developed.   Eyes:      General: No scleral icterus.        Right eye: No discharge.   HENT:      Head: Normocephalic and " atraumatic.   Neck:      Thyroid: No thyromegaly.      Lymphadenopathy: No cervical adenopathy.      Comments: Left carotid endarterectomy scar is noted  Pulmonary:      Effort: Pulmonary effort is normal. No respiratory distress.      Breath sounds: Normal breath sounds. No wheezing. No rales.   Cardiovascular:      Normal rate. Regular rhythm.      No gallop.    Edema:     Peripheral edema absent.   Abdominal:      Tenderness: There is no abdominal tenderness.   Skin:     Findings: No erythema or rash.   Neurological:      Mental Status: Alert and oriented to person, place, and time.          Results Review:   I reviewed the patient's new clinical results.    CBC    Results from last 7 days   Lab Units 11/07/24  0328   WBC 10*3/mm3 11.23*   HEMOGLOBIN g/dL 11.5*   PLATELETS 10*3/mm3 183     BMP   Results from last 7 days   Lab Units 11/07/24  0328   SODIUM mmol/L 137   POTASSIUM mmol/L 3.8   CHLORIDE mmol/L 106   CO2 mmol/L 22.4   BUN mg/dL 12   CREATININE mg/dL 0.93   GLUCOSE mg/dL 172*     Cr Clearance Estimated Creatinine Clearance: 93.7 mL/min (by C-G formula based on SCr of 0.93 mg/dL).  Coag     HbA1C   Lab Results   Component Value Date    HGBA1C 6.70 (H) 10/28/2024    HGBA1C 7.1 (A) 07/05/2024    HGBA1C 7.2 (A) 04/05/2024     Blood Glucose   Glucose   Date/Time Value Ref Range Status   11/07/2024 1135 183 (H) 70 - 105 mg/dL Final     Comment:     Serial Number: [id number]:  [id number]   11/07/2024 0730 148 (H) 70 - 105 mg/dL Final     Comment:     Serial Number: [id number]:  [id number]   11/06/2024 2059 195 (H) 70 - 105 mg/dL Final     Comment:     Serial Number: [id number]:  [id number]   11/06/2024 1143 162 (H) 70 - 105 mg/dL Final     Comment:     Serial Number: [id number]:  [id number]     Infection   Results from last 7 days   Lab Units 11/07/24  0328   PROCALCITONIN ng/mL 0.08     CMP   Results from last 7 days   Lab Units 11/07/24  0328   SODIUM mmol/L 137   POTASSIUM mmol/L  "3.8   CHLORIDE mmol/L 106   CO2 mmol/L 22.4   BUN mg/dL 12   CREATININE mg/dL 0.93   GLUCOSE mg/dL 172*     ABG      UA      ANGELLA  No results found for: \"POCMETH\", \"POCAMPHET\", \"POCBARBITUR\", \"POCBENZO\", \"POCCOCAINE\", \"POCOPIATES\", \"POCOXYCODO\", \"POCPHENCYC\", \"POCPROPOXY\", \"POCTHC\", \"POCTRICYC\"  [facility]   Results from last 7 days   Lab Units 11/07/24  0328   HEMOGLOBIN g/dL 11.5*   PLATELETS 10*3/mm3 183   CREATININE mg/dL 0.93     Radiology(recent) No radiology results for the last day    Results from last 7 days   Lab Units 11/07/24  0328   HSTROP T ng/L 9       Imaging Results (Last 24 Hours)       ** No results found for the last 24 hours. **            EKG      I personally viewed and interpreted the patient's EKG/Telemetry data:    ECHOCARDIOGRAM:      STRESS MYOVIEW:    CARDIAC CATHETERIZATION:    OTHER:         Assessment & Plan       Bilateral carotid artery stenosis    Carotid stenosis, asymptomatic      Chest Pain  MI ruled out  EKG with no acute ST/T wave abnormalities  Pain free at present  Continue nitrates, ranexa, bb    CAD  Prior CABG 2015  Post CABG PCI to PDA branch of RCA  Cath 6/22:  LAD,% occluded, SVG to Diag patent, LIMA to LAD patent, Patent stent PDA  EF 55-60%    S/p Left Carotid Endarterectomy  POD #1    Labile BP  HTN overnight requiring cardene  Hypotension this am receiving IV fluid bolus        Currently pain free  Observe for recurrent chest pain  Monitor BP/Hemodynamics closely  Echocardiogram to be obtained  Cardiac Catheterization will be planned as outpatient in next few weeks  If recurrent/worsening symptoms timing to be adjusted  Continue GDMT.    Patient is seen and examined and findings are verified.  All data is reviewed by me personally.  Assessment and plan formulated by APC was done after discussion with attending.  I spent more than 50% of time in taking care of the patient.    Patient had an episode of chest pain last night but this morning patient is free of " chest pain.  Hemodynamics are stable.    Blood pressure was high at the time of chest pain    Normal S1 and S2.  No pericardial rub or murmur abdominal exam is benign    MDM:    1.  CAD/CABG:    Patient probably would need cardiac catheterization.  Patient has severe native coronary artery disease.  Last cardiac catheterization in 2022 showed patent SVG graft to diagonal and LIMA.  I would recommend possible cardiac catheterization after patient recovered from the surgery.  Recent stress test was equivocal.  Medical treatment recommended    2.  Chest pain:    EKG is unremarkable.  Troponins are negative.  Recommend medical treatment if patient have recurrence of chest pain patient may need cardiac catheterization.    3.  Hypertension:    Blood pressure currently is stable.  Continue to observe    Electronically signed by Gurjit Guerra MD, 11/07/24, 2:03 PM EST.      Gurjit Guerra MD  11/07/24  14:03 EST

## 2024-11-07 NOTE — PLAN OF CARE
Goal Outcome Evaluation:  Pt is a 60 y/o male with PMH of diabetes type 2, hypertension, CAD, Hx CABG, hyperlipidemia, and paroxysmal A-fib admitted to the hospital for a scheduled left carotid endarterectomy for Bilateral carotid artery stenosis. Pt currently POD #1 and ST consulted for swallowing d/t pt reporting L tongue numbness causing dysphagia. Vascular surgery suspects numbness is d/t hypoglossal nerve irritation. Pt currently prescribed a regular and thin liquid diet. At bedside, pt reports dysphagia primarily w/ solids vs liquids and has been compensating by putting solids on the R side of his oral cavity. He also reports primarily consuming puree and mechanical soft consistencies since his surgery.     Pt assessed w/ meal tray to clinically assess swallow function. Meal tray included meatloaf, green beans, mashed potatoes, and thin liquids by straw. Pt sat upright in bed and self fed all trials. He took appropriate sized bites/sips. Adequate labial seal w/ no anterior  loss. Pt would primarily place trial on R side of oral cavity in attempt to compensate for L tongue numbness. Functional mastication, w/ no oral pocketing/residue noted, however, pt and pt's family endorse intermittent L oral residue of solids. No clinical s/s of aspiration observed at any time. Pt's vocal quality remained clear and unchanged and O2 sats remained > 95. D/t pt being at risk for aspiration/choking episode d/t L tongue numbness, recommend pt initiate a puree and thin liquid diet for now. Pt should continue being upright at 90 degrees for all PO, take small bites/sips, and take multiple swallows w/ each bite/sip. Much education completed w/ pt and pt's family regarding safe swallow and aspiration precautions. They expressed agreement and understanding and report they will be present during meals. Discussed evaluation w/ pt's RN and to monitor closely. If pt begins to show any s/s of difficulty, recommend pt be NPO until ST able  to re-evaluate. ST will continue to follow while in house for ongoing assessment of diet tolerance and readiness for diet upgrade as indicated.                 SLP Swallowing Diagnosis: oral dysphagia, R/O pharyngeal dysphagia (11/07/24 1400)

## 2024-11-07 NOTE — THERAPY EVALUATION
Acute Care - Speech Language Pathology   Swallow Initial Evaluation  Mario     Patient Name: Mendel Melendez  : 1965  MRN: [id number]  Today's Date: 2024               Admit Date: 2024    Visit Dx:     ICD-10-CM ICD-9-CM   1. Bilateral carotid artery stenosis  I65.23 433.10     433.30     Patient Active Problem List   Diagnosis    Encounter for annual general medical examination with abnormal findings in adult    Encounter for colorectal cancer screening    Preop cardiovascular exam    Benign hypertension    Coronary artery disease involving coronary bypass graft of native heart with angina pectoris    Type 2 diabetes mellitus with diabetic peripheral angiopathy without gangrene, without long-term current use of insulin    Mixed hyperlipidemia    Occlusion and stenosis of unspecified carotid artery    Class 2 severe obesity due to excess calories with serious comorbidity and body mass index (BMI) of 36.0 to 36.9 in adult    Paroxysmal atrial fibrillation    Seasonal allergic rhinitis due to pollen    S/P CABG (coronary artery bypass graft)    Vasculogenic erectile dysfunction    Elevated LFTs    Sleep apnea    Bilateral carotid artery stenosis    Carotid artery disease    Hydronephrosis due to obstruction of ureter    Kidney stone    Carotid stenosis, asymptomatic     Past Medical History:   Diagnosis Date    Atrial fib/flutter, transient     Bruit of left carotid artery     CAD (coronary artery disease)     unspecified    Coronary artery disease     Hyperlipidemia     Hypertension     Kidney calculus 2024    Nonspecific elevation of levels of transaminase or lactic acid dehydrogenase (LDH)     Occlusion and stenosis of bilateral carotid arteries 2020    Other specified diabetes mellitus without complications     Paroxysmal atrial fibrillation     Seasonal allergic rhinitis due to pollen     Sleep apnea     Sleep apnea, unspecified     Type 2 diabetes mellitus with other circulatory  complications     Unstable angina      Past Surgical History:   Procedure Laterality Date    CARDIAC CATHETERIZATION  04/09/2015    BHF Left main distal 60-70% LCX 80-90% RCA proximal 90% and mid 100% with left to right collaterals.    CARDIAC CATHETERIZATION N/A 07/10/2020    Procedure: Left Heart Cath with angiogram;  Surgeon: Job Griffin MD;  Location: [facility] CATH INVASIVE LOCATION;  Service: Cardiovascular;  Laterality: N/A;    CARDIAC CATHETERIZATION N/A 07/10/2020    Procedure: Saphenous Vein Graft;  Surgeon: Job Griffin MD;  Location: [facility] CATH INVASIVE LOCATION;  Service: Cardiovascular;  Laterality: N/A;  svg x 2  lima    CARDIAC CATHETERIZATION N/A 07/10/2020    Procedure: Left ventriculography;  Surgeon: oJb Griffin MD;  Location: [facility] CATH INVASIVE LOCATION;  Service: Cardiovascular;  Laterality: N/A;    CARDIAC CATHETERIZATION  06/2022    [place]    CARDIAC CATHETERIZATION  2016    CARDIAC SURGERY      COLONOSCOPY      2 years back.    CORONARY ARTERY BYPASS GRAFT  04/13/2015    Four, left main and three vessel cononary artery disease. Dr. Franklyn Smith    CORONARY ARTERY BYPASS GRAFT  2016    CORONARY STENT PLACEMENT      CYSTOSCOPY, URETEROSCOPY, RETROGRADE PYELOGRAM, STENT INSERTION Right 9/24/2024    Procedure: CYSTOSCOPY URETEROSCOPY STONE BASKET EXTRACTION AND STENT INSERTION;  Surgeon: Alejandro López MD;  Location: [facility] OR;  Service: Urology;  Laterality: Right;    VASECTOMY  1998       SLP Recommendation and Plan  SLP Swallowing Diagnosis: oral dysphagia, R/O pharyngeal dysphagia (11/07/24 1400)  SLP Diet Recommendation: puree, thin liquids (11/07/24 1400)  Recommended Precautions and Strategies: upright posture during/after eating, small bites of food and sips of liquid, multiple swallows per bite of food, general aspiration precautions, 1:1 supervision (11/07/24 1400)  SLP Rec. for Method of Medication Administration: meds whole, meds crushed, with puree (11/07/24 1400)      Monitor for Signs of Aspiration: yes, notify SLP if any concerns (11/07/24 1400)  Recommended Diagnostics: reassess via clinical swallow evaluation (11/07/24 1400)  Swallow Criteria for Skilled Therapeutic Interventions Met: demonstrates skilled criteria (11/07/24 1400)     Rehab Potential/Prognosis, Swallowing: good, to achieve stated therapy goals (11/07/24 1400)  Therapy Frequency (Swallow): 2 days per week, 3 days per week, 4 days per week (11/07/24 1400)  Predicted Duration Therapy Intervention (Days): until discharge (11/07/24 1400)  Oral Care Recommendations: Oral Care BID/PRN, Oral Care before breakfast, after meals and PRN (11/07/24 1400)                                               SWALLOW EVALUATION (Last 72 Hours)       SLP Adult Swallow Evaluation       Row Name 11/07/24 1400       Rehab Evaluation    Document Type evaluation  -LF    Subjective Information complains of  L tongue numbness  -LF    Patient Observations alert;cooperative;agree to therapy  -LF    Patient Effort excellent  -LF    Symptoms Noted During/After Treatment none  -LF       General Information    Patient Profile Reviewed yes  -LF    Pertinent History Of Current Problem Pt is a 58 y/o male with PMH of diabetes type 2, hypertension, CAD, Hx CABG, hyperlipidemia, and paroxysmal A-fib admitted to the hospital for a scheduled left carotid endarterectomy for Bilateral carotid artery stenosis. Pt currently POD #1 and ST consulted for swallowing d/t pt reporting L tongue numbness causing dysphagia. Vascular surgery suspects numbness is d/t hypoglossal nerve irritation. Pt currently prescribed a regular and thin liquid diet. At bedside, pt reports dysphagia primarily w/ solids vs liquids and has been compensating by putting solids on the R side of his oral cavity. He also reports primarily consuming puree and mechanical soft consistencies since his surgery.   -LF    Current Method of Nutrition regular textures;thin liquids  -LF     Precautions/Limitations, Vision WFL;for purposes of eval  -LF    Precautions/Limitations, Hearing WFL;for purposes of eval  -LF    Prior Level of Function-Swallowing no diet consistency restrictions  -LF    Plans/Goals Discussed with patient and family;agreed upon  -    Barriers to Rehab none identified  -LF    Patient's Goals for Discharge return to regular diet  -LF       Oral Motor Structure and Function    Dentition Assessment natural, present and adequate  -LF    Secretion Management WNL/WFL  -LF    Mucosal Quality moist, healthy  -LF       Oral Musculature and Cranial Nerve Assessment    Oral Motor General Assessment WFL  -LF       General Eating/Swallowing Observations    Respiratory Support Currently in Use room air  -LF    Eating/Swallowing Skills self-fed;appropriate self-feeding skills observed  -LF    Positioning During Eating upright 90 degree;upright in bed  -LF    Utensils Used spoon;straw  -LF    Consistencies Trialed soft to chew textures;ground;pureed;thin liquids  -LF       Clinical Swallow Eval    Clinical Swallow Evaluation Summary Pt assessed w/ meal tray to clinically assess swallow function. Meal tray included meatloaf, green beans, mashed potatoes, and thin liquids by straw. Pt sat upright in bed and self fed all trials. He took appropriate sized bites/sips. Adequate labial seal w/ no anterior  loss. Pt would primarily place trial on R side of oral cavity in attempt to compensate for L tongue numbness. Functional mastication, w/ no oral pocketing/residue noted, however, pt and pt's family endorse intermittent L oral residue of solids. No clinical s/s of aspiration observed at any time. Pt's vocal quality remained clear and unchanged and O2 sats remained > 95. D/t pt being at risk for aspiration/choking episode d/t L tongue numbness, recommend pt initiate a puree and thin liquid diet for now. Pt should continue being upright at 90 degrees for all PO, take small bites/sips, and take multiple  swallows w/ each bite/sip. Much education completed w/ pt and pt's family regarding safe swallow and aspiration precautions. They expressed agreement and understanding and report they will be present during meals. Discussed evaluation w/ pt's RN and to monitor closely. If pt begins to show any s/s of difficulty, recommend pt be NPO until ST able to re-evaluate. ST will continue to follow while in house for ongoing assessment of diet tolerance and readiness for diet upgrade as indicated.    -       SLP Evaluation Clinical Impression    SLP Swallowing Diagnosis oral dysphagia;R/O pharyngeal dysphagia  -    Functional Impact risk of aspiration/pneumonia;risk of malnutrition  -    Rehab Potential/Prognosis, Swallowing good, to achieve stated therapy goals  -    Swallow Criteria for Skilled Therapeutic Interventions Met demonstrates skilled criteria  -       Recommendations    Therapy Frequency (Swallow) 2 days per week;3 days per week;4 days per week  -    Predicted Duration Therapy Intervention (Days) until discharge  -    SLP Diet Recommendation puree;thin liquids  -    Recommended Diagnostics reassess via clinical swallow evaluation  -    Recommended Precautions and Strategies upright posture during/after eating;small bites of food and sips of liquid;multiple swallows per bite of food;general aspiration precautions;1:1 supervision  -    Oral Care Recommendations Oral Care BID/PRN;Oral Care before breakfast, after meals and PRN  -    SLP Rec. for Method of Medication Administration meds whole;meds crushed;with puree  -    Monitor for Signs of Aspiration yes;notify SLP if any concerns  -       Swallow Goals (SLP)    Swallow LTGs Swallow Long Term Goal (free text)  -LF    Swallow STGs diet tolerance goal selection (SLP)  -    Diet Tolerance Goal Selection (SLP) Swallow Short Term Goal 1;Swallow Short Term Goal 2  -LF       (LTG) Swallow    (LTG) Swallow The patient will maximize swallow  function for least restrictive po diet, exhibiting no complications associated with dysphagia, adequate po intake, and demonstrating independent use of safe swallow compensations  -LF    Time Frame (Swallow Long Term Goal) by discharge  -LF    Progress/Outcomes (Swallow Long Term Goal) new goal  -LF       (STG) Swallow 1    (STG) Swallow 1 The patient will participate in a full meal assessment to determine safety and adequacy of recommended diet, independent use of safe swallow compensations, and additional goals/recommendations to follow.  -LF    Time Frame (Swallow Short Term Goal 1) 1 week  -LF    Progress/Outcomes (Swallow Short Term Goal 1) new goal  -LF       (STG) Swallow 2    (STG) Swallow 2 The patient will participate in ongoing assessment of swallow, including re-evaluation clinically and/or including instrumental assessment of swallow if indicated, to further assess swallow function.  -LF    Time Frame (Swallow Short Term Goal 2) 1 week  -LF    Progress/Outcomes (Swallow Short Term Goal 2) new goal  -LF              User Key  (r) = Recorded By, (t) = Taken By, (c) = Cosigned By      Initials Name Effective Dates     Dora Dougherty SLP 06/16/21 -                     EDUCATION  The patient has been educated in the following areas:   Dysphagia (Swallowing Impairment) Oral Care/Hydration Modified Diet Instruction.                  Time Calculation:                GERALD Wang  11/7/2024

## 2024-11-07 NOTE — PROGRESS NOTES
Critical Care Progress Note     Mendel Melendez : 1965 MRN:[id number] LOS:1  Rm: 3115/1     Principal Problem: Bilateral carotid artery stenosis     Reason for follow up: All the medical problems listed below    Summary     Mendel Melendez is a 59 y.o. male with PMH of diabetes type 2, hypertension, CAD, Hx CABG, hyperlipidemia, and paroxysmal A-fib admitted to the hospital for a scheduled left carotid endarterectomy for Bilateral carotid artery stenosis. He received a 2L LR bolus pre-operatively.    Significant Events / Subjective     24 : Difficulty maintaining blood pressure goals overnight. Cardene infusion started post-op. PRN antihypertensives given hydralazine x 2, labetalol x 2, and nitroglycerin for chest pain. No chest pain this morning. The patient became hypotensive this morning. Cardene is off, he received 1L LR bolus. For sustained hypotension he will get another 500mL LR bolus. 2 g calcium gluconate replacement. He continues to have tongue numbness; operative team aware.    Assessment / Plan     Bilateral carotid stenosis, asymptomatic  S/p left carotid endarterectomy with Dr. Nix  POD #1  MAP goal: 65  SBP goal 100-115mmHg  q1h neurochecks  PRN BP control: labetalol, hydralazine  PRN pain control: dilaudid, oxycodone  Nicardipine off, hypertensives on hold, fluid boluses being given  Tongue numbness, operative team aware  Continue aspirin, Plavix, and rosuvastatin     Essential hypertension: not well controlled.   Required nicardipine gtt post-operatively, prn medications ordered  Hypotension overnight  Now on hold: amlodipine, Imdur, metoprolol  Titrate medications as needed to reach target -115mmHg    Angina  New onset chest pain developed overnight, nitroglycerin given  No chest pain this morning  Echocardiogram ordered  Telemetry strip reviewed and shows normal sinus rhythm  Cardiology consulted  Plans for outpatient cardiac cath  Continue ranexa and prn  nitroglycerin    Diabetes mellitus Type 2, not insulin-dependent : not well controlled.   Home regimen includes: Metformin, Ozempic  Hold oral agents while in ICU.  Hgb A1C: 6.7  Accu checks ACHS or Every 6 Hours, based on diet, with sliding scale admelog coverage as needed.         CAD/history of CABG: Continue aspirin, Plavix, and rosuvastatin  Hyperlipidemia: Continue Crestor  Paroxysmal Afib: EKGs this admission show NSR; not on antiarrhythmics at home  BPH: continue Flomax    Disposition:  remain in [facility], discharge plan pending cardiology evaluation    Code status:   Level Of Support Discussed With: Patient  Code Status (Patient has no pulse and is not breathing): CPR (Attempt to Resuscitate)  Medical Interventions (Patient has pulse or is breathing): Full Support       Nutrition:   Diet: Cardiac; Healthy Heart (2-3 Na+); Fluid Consistency: Thin (IDDSI 0)   Patient isn't on Tube Feeding     VTE Prophylaxis:  Mechanical VTE prophylaxis orders are present.           Objective / Physical Exam     Vital signs:  Temp: 97.7 °F (36.5 °C)  BP: 101/60  Heart Rate: 84  Resp: 17  SpO2: 95 %  Weight: 96.7 kg (213 lb 3 oz)    Admission Weight: Weight: 92.5 kg (204 lb)  Current Weight: Weight: 96.7 kg (213 lb 3 oz)    Input/Output in last 24 hours:    Intake/Output Summary (Last 24 hours) at 11/7/2024 1033  Last data filed at 11/7/2024 1000  Gross per 24 hour   Intake 4508 ml   Output 3650 ml   Net 858 ml      Net IO Since Admission: 858 mL [11/07/24 1033]     Physical Exam  Vitals and nursing note reviewed.   Constitutional:       Appearance: Normal appearance.   HENT:      Head: Normocephalic and atraumatic.      Mouth/Throat:      Mouth: Mucous membranes are moist.      Pharynx: Oropharynx is clear.   Eyes:      General: No scleral icterus.     Pupils: Pupils are equal, round, and reactive to light.   Cardiovascular:      Rate and Rhythm: Normal rate and regular rhythm.      Pulses: Normal pulses.      Heart sounds:  Normal heart sounds.   Pulmonary:      Effort: Pulmonary effort is normal.      Breath sounds: Normal breath sounds.   Abdominal:      General: Bowel sounds are normal.      Palpations: Abdomen is soft.   Musculoskeletal:         General: No swelling.   Skin:     General: Skin is warm and dry.      Capillary Refill: Capillary refill takes less than 2 seconds.   Neurological:      General: No focal deficit present.      Mental Status: He is alert and oriented to person, place, and time.   Psychiatric:         Thought Content: Thought content normal.          Radiology and Labs     Results from last 7 days   Lab Units 11/07/24  0328   WBC 10*3/mm3 11.23*   HEMOGLOBIN g/dL 11.5*   HEMATOCRIT % 35.0*   PLATELETS 10*3/mm3 183           Results from last 7 days   Lab Units 11/07/24  0328   SODIUM mmol/L 137   POTASSIUM mmol/L 3.8   CHLORIDE mmol/L 106   CO2 mmol/L 22.4   BUN mg/dL 12   CREATININE mg/dL 0.93   GLUCOSE mg/dL 172*          No radiology results for the last day    Current medications     Scheduled Meds:   acetaminophen, 1,000 mg, Oral, Q8H  amLODIPine, 10 mg, Oral, Daily  aspirin, 81 mg, Oral, Daily  clopidogrel, 75 mg, Oral, Daily  insulin lispro, 2-9 Units, Subcutaneous, 4x Daily AC & at Bedtime  isosorbide mononitrate, 60 mg, Oral, BID - Nitrates  metFORMIN, 1,000 mg, Oral, BID With Meals  metoprolol tartrate, 100 mg, Oral, BID  ranolazine, 500 mg, Oral, BID  rosuvastatin, 40 mg, Oral, Nightly  sodium chloride, 10 mL, Intravenous, Q12H  tamsulosin, 0.4 mg, Oral, Daily        Continuous Infusions:          Plan discussed with RN. Reviewed all other data in the last 24 hours, including but not limited to vitals, labs, microbiology, imaging and pertinent notes from other providers.  Plan also discussed with patient at the bedside.      EMBER Alberts   Critical Care  11/07/24   10:33 EST

## 2024-11-07 NOTE — PROGRESS NOTES
[facility] Vascular Surgery Progress Note    Name: Mendel Melendez ADMIT: 2024   : 1965  PCP: Mariajose Hernandez MD    MRN: [id number] LOS: 1 days   AGE/SEX: 59 y.o. male  ROOM: [address] [place] [address]    CC: Postop; status post left CEA on     Subjective     Patient resting in bed.  Reports some numbness in the left side of his tongue since surgery which is causing it to be difficult to swallow. Also reports frequent episodes of chest pain last night.  Denies any current chest pain.  He was on a Cardene drip last night, discontinued this morning and now he is hypotensive.  He is unsure if he is lightheaded or not.    Objective     Scheduled Medications:   acetaminophen, 1,000 mg, Oral, Q8H  amLODIPine, 10 mg, Oral, Daily  aspirin, 81 mg, Oral, Daily  clopidogrel, 75 mg, Oral, Daily  insulin lispro, 2-9 Units, Subcutaneous, 4x Daily AC & at Bedtime  isosorbide mononitrate, 60 mg, Oral, BID - Nitrates  metFORMIN, 1,000 mg, Oral, BID With Meals  metoprolol tartrate, 100 mg, Oral, BID  ranolazine, 500 mg, Oral, BID  rosuvastatin, 40 mg, Oral, Nightly  sodium chloride, 10 mL, Intravenous, Q12H  tamsulosin, 0.4 mg, Oral, Daily        Active Infusions:  phenylephrine, 0.5-3 mcg/kg/min        As Needed Medications:    dextrose    dextrose    docusate sodium    glucagon (human recombinant)    hydrALAZINE    HYDROmorphone    labetalol    nitroglycerin    ondansetron ODT **OR** ondansetron    oxyCODONE **OR** oxyCODONE    phenazopyridine    sodium chloride    sodium chloride    Vital Signs  Vitals:    24 0744   BP: 92/57   Pulse: 83   Resp: 17   Temp: 97.7 °F (36.5 °C)   SpO2: 92%      Body mass index is 35.48 kg/m².     Physical Exam:  NAD, alert and oriented, tongue midline, moves all extremities equally  RRR  Lungs clear  Abd soft, benign  Vascular: Palpable bilateral radial and pedal pulses  Skin: Left neck incision is CDI and soft, no signs of hematoma    Results Review:     CBC    Results from  last 7 days   Lab Units 11/07/24  0328   WBC 10*3/mm3 11.23*   HEMOGLOBIN g/dL 11.5*   PLATELETS 10*3/mm3 183     BMP   Results from last 7 days   Lab Units 11/07/24  0328   SODIUM mmol/L 137   POTASSIUM mmol/L 3.8   CHLORIDE mmol/L 106   CO2 mmol/L 22.4   BUN mg/dL 12   CREATININE mg/dL 0.93   GLUCOSE mg/dL 172*     HbA1C   Lab Results   Component Value Date    HGBA1C 6.70 (H) 10/28/2024    HGBA1C 7.1 (A) 07/05/2024    HGBA1C 7.2 (A) 04/05/2024     Infection   Results from last 7 days   Lab Units 11/07/24  0328   PROCALCITONIN ng/mL 0.08     Radiology(recent) No radiology results for the last day    VTE Prophylaxis:  Mechanical VTE prophylaxis orders are present.         Problems:    Active Hospital Problems:  Active Hospital Problems    Diagnosis  POA    **Bilateral carotid artery stenosis [I65.23]  Yes     Priority: High    Carotid stenosis, asymptomatic [I65.29]  Yes      Resolved Hospital Problems   No resolved problems to display.        Assessment & Plan   Assessment / Plan     Bilateral carotid artery stenosis    Carotid stenosis, asymptomatic      11/6/2024 -left carotid endarterectomy by Dr. Nix    POD1  Hypotensive this morning, receiving an LR bolus right now, appears relatively asymptomatic, hold antihypertensives  Ionized calcium 1.07, give IV calcium gluconate this morning  Remains neurologically intact, no focal deficits noted  He has some numbness in the left side of his tongue causing difficulty to swallow, this is likely due to hypoglossal nerve irritation, ST to see  Left neck incision is CDI and soft, no signs of hematoma  Voiding on own  Increase activity  Cardiology has been consulted for anginal symptoms  Continue DAPT and statin  Anticipate discharge in 1 to 2 days        EMBER Cornejo  [facility]  11/07/24   O: [phone]  F: [phone]

## 2024-11-07 NOTE — CASE MANAGEMENT/SOCIAL WORK
The 10-year ASCVD risk score (Davi MONTALVO, et al., 2019) is: 10%    Values used to calculate the score:      Age: 66 years      Sex: Male      Is Non- : No      Diabetic: No      Tobacco smoker: No      Systolic Blood Pressure: 126 mmHg      Is BP treated: No      HDL Cholesterol: 55 mg/dL      Total Cholesterol: 178 mg/dL   Answers submitted by the patient for this visit:  Patient Health Questionnaire (Submitted on 11/24/2023)  If you checked off any problems, how difficult have these problems made it for you to do your work, take care of things at home, or get along with other people?: Extremely difficult  PHQ9 TOTAL SCORE: 22    Assessment & Plan     Need for Tdap vaccination  - Tdap, tetanus-diptheria-acell pertussis, (BOOSTRIX) 5-2.5-18.5 LF-MCG/0.5 RICO injection; Inject 0.5 mLs into the muscle once for 1 dose    Chronic obstructive pulmonary disease, unspecified COPD type (H)  Although his history is unreliable he is giving a history of smoking for at least 20 years and he smoked at least according to him 3 to 5 packs/day  He does have decreased air entry in the chest on examination  He will be a candidate for lung cancer screening  I will discuss this with him at the next appointment  Currently he is not smoking    Need for hepatitis C screening test    - Hepatitis C Screen Reflex to HCV RNA Quant and Genotype; Future  - Hepatitis C Screen Reflex to HCV RNA Quant and Genotype    Screen for colon cancer    - Fecal colorectal cancer screen FIT - Future (S+30); Future  - Fecal colorectal cancer screen FIT - Future (S+30)    Neuroleptic-induced parkinsonism   He has rigidity/tremor/drooling of saliva  He has secondary Parkinson syndrome  Follows up with psychiatrist  - Basic metabolic panel  (Ca, Cl, CO2, Creat, Gluc, K, Na, BUN); Future  - Basic metabolic panel  (Ca, Cl, CO2, Creat, Gluc, K, Na, BUN)    Paranoid schizophrenia, chronic condition with acute exacerbation (H)  He has  Discharge Planning Assessment   Mario     Patient Name: Mendel Melendez  MRN: [id number]  Today's Date: 11/7/2024    Admit Date: 11/6/2024    Plan: MI Plan: Anticipate routine home with spouse.   Discharge Needs Assessment       Row Name 11/07/24 1528       Living Environment    People in Home spouse    Name(s) of People in Home Gisell Melendez    Current Living Arrangements home    Potentially Unsafe Housing Conditions none    In the past 12 months has the electric, gas, oil, or water company threatened to shut off services in your home? No    Primary Care Provided by self    Provides Primary Care For no one    Family Caregiver if Needed spouse    Family Caregiver Names Gisell Al    Quality of Family Relationships helpful;involved;supportive    Able to Return to Prior Arrangements yes       Resource/Environmental Concerns    Resource/Environmental Concerns none    Transportation Concerns none       Transportation Needs    In the past 12 months, has lack of transportation kept you from medical appointments or from getting medications? no    In the past 12 months, has lack of transportation kept you from meetings, work, or from getting things needed for daily living? No       Food Insecurity    Within the past 12 months, you worried that your food would run out before you got the money to buy more. Never true    Within the past 12 months, the food you bought just didn't last and you didn't have money to get more. Never true       Transition Planning    Patient/Family Anticipates Transition to home with family    Patient/Family Anticipated Services at Transition none    Transportation Anticipated car, drives self;family or friend will provide       Discharge Needs Assessment    Readmission Within the Last 30 Days no previous admission in last 30 days    Equipment Currently Used at Home none    Concerns to be Addressed discharge planning    Anticipated Changes Related to Illness none    Equipment Needed After  "extensive psychiatric history   He has just been in the hospital for almost 170 days  Even today he has active thoughts of hallucinations  He is talking about the devil in the room and also how all of us are going to die from nuclear explosion  He follows up with psychiatrist closely and is on multiple psychotropic medications    Need for prophylactic vaccination and inoculation against influenza      Pulmonary nodule less than 1 cm in diameter with moderate to high risk for malignant neoplasm  Last CT chest in [facility] around 2 years ago showed that he has a 9 mm pulmonary nodule  This needs to be followed up and we will discuss this at the next visit    Encounter for therapeutic drug monitoring  I have ordered CBC as he is on Clozaril but I told the group home that ongoing forward this has to be done by his psychiatrist  - CBC with platelets and differential; Future  - CBC with platelets and differential    Insomnia, unspecified type    - Melatonin 10 MG TABS tablet; Take 1 tablet (10 mg) by mouth nightly as needed for sleep    Prolonged QT interval  He had multiple EKGs in the hospital and he does have prolonged QT  He was closely monitored for this      65 minutes spent by me on the date of the encounter doing chart review, history and exam, documentation and further activities per the note     MED REC REQUIRED  Post Medication Reconciliation Status: discharge medications reconciled and changed, per note/orders  BMI:   Estimated body mass index is 26.73 kg/m  as calculated from the following:    Height as of this encounter: 1.77 m (5' 9.69\").    Weight as of this encounter: 83.7 kg (184 lb 9.6 oz).   Weight management plan: Discussed healthy diet and exercise guidelines    Depression Screening Follow Up        11/24/2023     8:45 AM   PHQ   PHQ-9 Total Score 22   Q9: Thoughts of better off dead/self-harm past 2 weeks Nearly every day   F/U: Thoughts of suicide or self-harm Yes   F/U: Self harm-plan No   F/U: " Discharge none    Provided Post Acute Provider List? N/A    Provided Post Acute Provider Quality & Resource List? N/A                   Discharge Plan       Row Name 11/07/24 1529       Plan    Plan DC Plan: Anticipate routine home with spouse.    Patient/Family in Agreement with Plan yes    Provided Post Acute Provider List? N/A    Provided Post Acute Provider Quality & Resource List? N/A    Plan Comments CM spoke with patient at bedside to discuss admission assessment and discharge planning. Patient confirms PCP and pharmacy. PCP updated in [place]. Patient is already enrolled in meds to bed program. Patient denies any difficulty affording medications or food at this time. Patient denies any additional needs for services or DME at this time. Patient reports IADL's and drives. Patient spouse will provide transportation when ready for DC. CM spoke with intensivist, nursing, and NP to obtain clinical upates in morning rounds. Nursing reports soft blood pressures this morning and Midodrine started. Intensivist feels patient will need monitoring overnight to ensure BP stabilization and possible DC home tomorrow. DC barriers: Cardiac monitoring, and BP monitoring.                 Expected Discharge Date and Time       Expected Discharge Date Expected Discharge Time    Nov 8, 2024            Demographic Summary       Row Name 11/07/24 1528       General Information    Admission Type inpatient    Arrived From home;operating room    Referral Source admission list    Reason for Consult discharge planning    Preferred Language English       Contact Information    Permission Granted to Share Info With                    Functional Status       Row Name 11/07/24 1528       Functional Status    Usual Activity Tolerance good    Current Activity Tolerance good       Physical Activity    On average, how many days per week do you engage in moderate to strenuous exercise (like a brisk walk)? 0 days    On average, how many  "Self-harm action Yes   F/U: Safety concerns No                 Follow Up    Follow Up Actions Taken  Referred patient back to mental health provider    Discussed the following ways the patient can remain in a safe environment:  remove alcohol, remove drugs, and be around others      Franklyn Estrella MD  [facility] GWEN Brock is a 66 year old, presenting for the following health issues:  Hospital F/U and Imm/Inj (Flu Shot)        11/24/2023     8:42 AM   Additional Questions   Roomed by mekhi BRAVO       ED/UC Followup:    Facility:  [facility]   Date of visit: 05/26/2023  Reason for visit: Paranoid schizophrenia   Current Status:         Review of Systems   Unable to complete review of systems as he has got active paranoid schizophrenia and is having hallucinations      Objective    /79 (BP Location: Right arm, Patient Position: Sitting, Cuff Size: Adult Large)   Pulse 90   Resp 14   Ht 1.77 m (5' 9.69\")   Wt 83.7 kg (184 lb 9.6 oz)   SpO2 100%   BMI 26.73 kg/m    Body mass index is 26.73 kg/m .  Physical Exam   GENERAL: elderly and fatigued  HENT: oropharynx clear, oral mucous membranes moist, and having saliva drooling  NECK: no adenopathy, no asymmetry, masses, or scars and thyroid normal to palpation  RESP: decreased breath sounds throughout  CV: regular rate and rhythm, normal S1 S2, no S3 or S4, no murmur, click or rub, no peripheral edema and peripheral pulses strong  ABDOMEN: soft, nontender, no hepatosplenomegaly, no masses and bowel sounds normal  NEURO: He has been noting tremors on both hands  He has got rigidity on examination of both upper extremities  He is got a masked facies  PSYCH: concentration poor, affect flat, judgement and insight impaired, and he is talking about the devil being in the room and also how everyone is going to die from any clear attack                      " minutes do you engage in exercise at this level? 0 min    Number of minutes of exercise per week 0       Functional Status, IADL    Medications independent    Meal Preparation independent    Housekeeping independent    Laundry independent    Shopping independent       Mental Status    General Appearance WDL WDL       Mental Status Summary    Recent Changes in Mental Status/Cognitive Functioning no changes       Employment/    Employment Status self-employed    Current or Previous Occupation not applicable                 Met with patient in room   Maintain distance greater than six feet and spent less than fifteen minutes in the room.      Racheal Zhao RN    Office Phone: [phone]  Office Cell:     [phone]

## 2024-11-08 ENCOUNTER — READMISSION MANAGEMENT (OUTPATIENT)
Dept: CALL CENTER | Facility: HOSPITAL | Age: 59
End: 2024-11-08
Payer: COMMERCIAL

## 2024-11-08 VITALS
SYSTOLIC BLOOD PRESSURE: 113 MMHG | WEIGHT: 213 LBS | DIASTOLIC BLOOD PRESSURE: 68 MMHG | TEMPERATURE: 97.8 F | HEIGHT: 65 IN | OXYGEN SATURATION: 94 % | HEART RATE: 71 BPM | BODY MASS INDEX: 35.49 KG/M2 | RESPIRATION RATE: 18 BRPM

## 2024-11-08 LAB
ANION GAP SERPL CALCULATED.3IONS-SCNC: 6.3 MMOL/L (ref 5–15)
AORTIC DIMENSIONLESS INDEX: 0.97 (DI)
BH CV ECHO MEAS - ACS: 2.1 CM
BH CV ECHO MEAS - AO MAX PG: 9.4 MMHG
BH CV ECHO MEAS - AO MEAN PG: 5 MMHG
BH CV ECHO MEAS - AO V2 MAX: 153 CM/SEC
BH CV ECHO MEAS - AO V2 VTI: 27.5 CM
BH CV ECHO MEAS - AVA(I,D): 3.8 CM2
BH CV ECHO MEAS - EDV(CUBED): 79.5 ML
BH CV ECHO MEAS - EDV(MOD-SP4): 57.7 ML
BH CV ECHO MEAS - EF(MOD-SP4): 53.2 %
BH CV ECHO MEAS - ESV(CUBED): 32.8 ML
BH CV ECHO MEAS - ESV(MOD-SP4): 27 ML
BH CV ECHO MEAS - FS: 25.6 %
BH CV ECHO MEAS - IVS/LVPW: 1.18 CM
BH CV ECHO MEAS - IVSD: 1.3 CM
BH CV ECHO MEAS - LA DIMENSION: 2.8 CM
BH CV ECHO MEAS - LAT PEAK E' VEL: 10.7 CM/SEC
BH CV ECHO MEAS - LV MASS(C)D: 184.7 GRAMS
BH CV ECHO MEAS - LV MAX PG: 8.8 MMHG
BH CV ECHO MEAS - LV MEAN PG: 5 MMHG
BH CV ECHO MEAS - LV V1 MAX: 148 CM/SEC
BH CV ECHO MEAS - LV V1 VTI: 24.9 CM
BH CV ECHO MEAS - LVIDD: 4.3 CM
BH CV ECHO MEAS - LVIDS: 3.2 CM
BH CV ECHO MEAS - LVOT AREA: 4.2 CM2
BH CV ECHO MEAS - LVOT DIAM: 2.3 CM
BH CV ECHO MEAS - LVPWD: 1.1 CM
BH CV ECHO MEAS - MED PEAK E' VEL: 9.4 CM/SEC
BH CV ECHO MEAS - MV A MAX VEL: 87.5 CM/SEC
BH CV ECHO MEAS - MV DEC SLOPE: 579 CM/SEC2
BH CV ECHO MEAS - MV DEC TIME: 0.21 SEC
BH CV ECHO MEAS - MV E MAX VEL: 49.3 CM/SEC
BH CV ECHO MEAS - MV E/A: 0.56
BH CV ECHO MEAS - MV MAX PG: 3.4 MMHG
BH CV ECHO MEAS - MV MEAN PG: 2 MMHG
BH CV ECHO MEAS - MV P1/2T: 41.1 MSEC
BH CV ECHO MEAS - MV V2 VTI: 22.3 CM
BH CV ECHO MEAS - MVA(P1/2T): 5.3 CM2
BH CV ECHO MEAS - MVA(VTI): 4.6 CM2
BH CV ECHO MEAS - PA ACC TIME: 0.12 SEC
BH CV ECHO MEAS - PA V2 MAX: 110 CM/SEC
BH CV ECHO MEAS - RV MAX PG: 2.9 MMHG
BH CV ECHO MEAS - RV V1 MAX: 84.6 CM/SEC
BH CV ECHO MEAS - RV V1 VTI: 14.9 CM
BH CV ECHO MEAS - SV(LVOT): 103.5 ML
BH CV ECHO MEAS - SV(MOD-SP4): 30.7 ML
BH CV ECHO MEAS - TAPSE (>1.6): 2.2 CM
BH CV ECHO MEASUREMENTS AVERAGE E/E' RATIO: 4.91
BH CV XLRA - TDI S': 12.8 CM/SEC
BUN SERPL-MCNC: 10 MG/DL (ref 6–20)
BUN/CREAT SERPL: 10.6 (ref 7–25)
CALCIUM SPEC-SCNC: 8.5 MG/DL (ref 8.6–10.5)
CHLORIDE SERPL-SCNC: 109 MMOL/L (ref 98–107)
CO2 SERPL-SCNC: 25.7 MMOL/L (ref 22–29)
CREAT SERPL-MCNC: 0.94 MG/DL (ref 0.76–1.27)
DEPRECATED RDW RBC AUTO: 46.7 FL (ref 37–54)
EGFRCR SERPLBLD CKD-EPI 2021: 94.6 ML/MIN/1.73
ERYTHROCYTE [DISTWIDTH] IN BLOOD BY AUTOMATED COUNT: 14.1 % (ref 12.3–15.4)
GLUCOSE BLDC GLUCOMTR-MCNC: 105 MG/DL (ref 70–105)
GLUCOSE BLDC GLUCOMTR-MCNC: 138 MG/DL (ref 70–105)
GLUCOSE SERPL-MCNC: 134 MG/DL (ref 65–99)
HCT VFR BLD AUTO: 37 % (ref 37.5–51)
HGB BLD-MCNC: 11.7 G/DL (ref 13–17.7)
MCH RBC QN AUTO: 28.6 PG (ref 26.6–33)
MCHC RBC AUTO-ENTMCNC: 31.6 G/DL (ref 31.5–35.7)
MCV RBC AUTO: 90.5 FL (ref 79–97)
PLATELET # BLD AUTO: 177 10*3/MM3 (ref 140–450)
PMV BLD AUTO: 10 FL (ref 6–12)
POTASSIUM SERPL-SCNC: 4.3 MMOL/L (ref 3.5–5.2)
RBC # BLD AUTO: 4.09 10*6/MM3 (ref 4.14–5.8)
SINUS: 3.7 CM
SODIUM SERPL-SCNC: 141 MMOL/L (ref 136–145)
STJ: 3.2 CM
WBC NRBC COR # BLD AUTO: 9.28 10*3/MM3 (ref 3.4–10.8)

## 2024-11-08 PROCEDURE — 25010000002 ENOXAPARIN PER 10 MG: Performed by: NURSE PRACTITIONER

## 2024-11-08 PROCEDURE — 80048 BASIC METABOLIC PNL TOTAL CA: CPT | Performed by: NURSE PRACTITIONER

## 2024-11-08 PROCEDURE — 85027 COMPLETE CBC AUTOMATED: CPT | Performed by: NURSE PRACTITIONER

## 2024-11-08 PROCEDURE — 99024 POSTOP FOLLOW-UP VISIT: CPT | Performed by: NURSE PRACTITIONER

## 2024-11-08 PROCEDURE — 99232 SBSQ HOSP IP/OBS MODERATE 35: CPT | Performed by: INTERNAL MEDICINE

## 2024-11-08 PROCEDURE — 82948 REAGENT STRIP/BLOOD GLUCOSE: CPT

## 2024-11-08 RX ORDER — ISOSORBIDE MONONITRATE 30 MG/1
60 TABLET, EXTENDED RELEASE ORAL
Status: DISCONTINUED | OUTPATIENT
Start: 2024-11-08 | End: 2024-11-08 | Stop reason: HOSPADM

## 2024-11-08 RX ORDER — ISOSORBIDE MONONITRATE 60 MG/1
60 TABLET, EXTENDED RELEASE ORAL DAILY
Qty: 180 TABLET | Refills: 3 | Status: SHIPPED | OUTPATIENT
Start: 2024-11-08

## 2024-11-08 RX ORDER — AMOXICILLIN 250 MG
2 CAPSULE ORAL 2 TIMES DAILY PRN
Status: DISCONTINUED | OUTPATIENT
Start: 2024-11-08 | End: 2024-11-08 | Stop reason: HOSPADM

## 2024-11-08 RX ADMIN — METFORMIN HYDROCHLORIDE 1000 MG: 500 TABLET ORAL at 17:55

## 2024-11-08 RX ADMIN — ACETAMINOPHEN 1000 MG: 500 TABLET, FILM COATED ORAL at 05:05

## 2024-11-08 RX ADMIN — RANOLAZINE 500 MG: 500 TABLET, FILM COATED, EXTENDED RELEASE ORAL at 08:57

## 2024-11-08 RX ADMIN — SENNOSIDES AND DOCUSATE SODIUM 2 TABLET: 50; 8.6 TABLET ORAL at 12:53

## 2024-11-08 RX ADMIN — ASPIRIN 81 MG: 81 TABLET, COATED ORAL at 08:55

## 2024-11-08 RX ADMIN — Medication 10 ML: at 09:06

## 2024-11-08 RX ADMIN — METOPROLOL TARTRATE 100 MG: 25 TABLET, FILM COATED ORAL at 08:57

## 2024-11-08 RX ADMIN — CLOPIDOGREL BISULFATE 75 MG: 75 TABLET ORAL at 08:57

## 2024-11-08 RX ADMIN — ISOSORBIDE MONONITRATE 60 MG: 60 TABLET, EXTENDED RELEASE ORAL at 09:01

## 2024-11-08 RX ADMIN — AMLODIPINE BESYLATE 10 MG: 5 TABLET ORAL at 08:57

## 2024-11-08 RX ADMIN — METFORMIN HYDROCHLORIDE 1000 MG: 500 TABLET ORAL at 08:56

## 2024-11-08 RX ADMIN — ENOXAPARIN SODIUM 40 MG: 100 INJECTION SUBCUTANEOUS at 17:26

## 2024-11-08 NOTE — CASE MANAGEMENT/SOCIAL WORK
Case Management Discharge Note      Final Note: CM spoke with intensivist, nursing, and NP to obtain clinical upates in morning rounds. Vascular surgery and Cardiology agreeable to DC. Orders placed. No additional service needs identified. Patient to transport home via private vehicle with spouse. No barriers.        Transportation Services  Private: Car (with spouse)    Final Discharge Disposition Code: 01 - home or self-care    Phone communication or documentation only- no physical contact with patient or family.    Racheal Zhao RN    Office Phone: [phone]  Office Cell:     [phone]

## 2024-11-08 NOTE — DISCHARGE SUMMARY
Name: Mendel Melendez ADMIT: 2024   : 1965  PCP: Martin Hicks MD    MRN: [id number] LOS: 2 days   AGE/SEX: 59 y.o. male  ROOM: [facility] 3115/1     Date of Admission: 2024  Date of Discharge:  2024    PCP: Martin Hicks MD      DISCHARGE DIAGNOSIS  Active Hospital Problems    Diagnosis  POA    **Bilateral carotid artery stenosis [I65.23]  Yes     Priority: High    Carotid stenosis, asymptomatic [I65.29]  Yes      Resolved Hospital Problems   No resolved problems to display.       SECONDARY DIAGNOSES  Past Medical History:   Diagnosis Date    Atrial fib/flutter, transient     Bruit of left carotid artery     CAD (coronary artery disease)     unspecified    Coronary artery disease     Hyperlipidemia     Hypertension     Kidney calculus 2024    Nonspecific elevation of levels of transaminase or lactic acid dehydrogenase (LDH)     Occlusion and stenosis of bilateral carotid arteries 2020    Other specified diabetes mellitus without complications     Paroxysmal atrial fibrillation     Seasonal allergic rhinitis due to pollen     Sleep apnea     Sleep apnea, unspecified     Type 2 diabetes mellitus with other circulatory complications     Unstable angina        CONSULTS   Consults       Date and Time Order Name Status Description    2024  7:29 AM Inpatient Cardiology Consult Completed             PROCEDURES PERFORMED    Date: 2024, left carotid endarterectomy by Dr. Cortésyard     [facility] COURSE  Mendel Melendez is a 59 y.o. male with carotid stenosis.  On 2024, he underwent an elective left carotid endarterectomy.  The patient tolerated the procedure well, blood loss was minimal. He was transferred to the PACU postoperatively. He recovered from anesthesia without incident. He was able to maintain his own airway and his vital signs returned to within normal limits. He was transferred to the unit to continue his recovery.  He was on a Cardene drip the night of  "surgery.  This was discontinued on the morning of postop day 1 and he was noted to be slightly hypotensive.  He was given fluid bolus with good response.  He became normotensive for the remainder of his hospital stay.  He did have some anginal symptoms the night of surgery and was given nitroglycerin.  Cardiology was consulted and evaluated.  EKG showed normal sinus rhythm.  Echocardiogram showed an EF of 55 to 60%.  Plan is for outpatient cardiac catheterization once he has recovered from his surgery.  He will follow-up with Dr. Guerra in 4 weeks.  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  Speech therapy evaluated and recommended a puréed diet with thin liquids and upright at 90 degrees for all p.o. intake.  The patient was able to void on his own, and ambulate without difficulties.  His surgical incisions remained intact, soft, no drainage or erythema, no signs of hematoma.  He remained neurologically intact, tongue midline, no focal deficits noted.  Pain was well-controlled on oral pain medication.  Patient was evaluated the ready for discharge.  Verbal and written discharge instructions were provided to the patient.  He verbalized understanding of these.  He will follow-up with Dr. Nix in 2 weeks for a postop visit/incision check.      VITAL SIGNS  /75   Pulse 88   Temp 97.9 °F (36.6 °C) (Oral)   Resp 18   Ht 165.1 cm (65\")   Wt 96.6 kg (213 lb)   SpO2 96%   BMI 35.45 kg/m²   Objective:  Vital signs: (most recent): Blood pressure 130/75, pulse 88, temperature 97.9 °F (36.6 °C), temperature source Oral, resp. rate 18, height 165.1 cm (65\"), weight 96.6 kg (213 lb), SpO2 96%.                Physical Exam:  NAD, alert and oriented, tongue midline, moves all extremities equally  RRR  Lungs clear  Abd soft, benign  Vascular: Palpable bilateral radial and pedal pulses  Skin: Left neck incision is CDI and soft, no signs of hematoma    CONDITION ON " DISCHARGE  Stable.      DISCHARGE DISPOSITION   Home or Self Care      DISCHARGE MEDICATIONS     Discharge Medications        Changes to Medications        Instructions Start Date   isosorbide mononitrate 60 MG 24 hr tablet  Commonly known as: IMDUR  What changed: when to take this   60 mg, Oral, Daily             Continue These Medications        Instructions Start Date   amLODIPine 10 MG tablet  Commonly known as: NORVASC   Take 1 tablet by mouth once daily      aspirin 81 MG EC tablet   81 mg, Daily      clopidogrel 75 MG tablet  Commonly known as: PLAVIX   Take 1 tablet by mouth once daily      docusate sodium 100 MG capsule  Commonly known as: Colace   100 mg, Oral, 2 Times Daily PRN      furosemide 20 MG tablet  Commonly known as: LASIX   20 mg, Oral, Daily, Take both the lasix and potassium together. If not taking lasix don't take the potassium      metFORMIN 1000 MG tablet  Commonly known as: GLUCOPHAGE   1,000 mg, Oral, 2 Times Daily With Meals      metoprolol tartrate 100 MG tablet  Commonly known as: LOPRESSOR   100 mg, Oral, 2 Times Daily      nitroglycerin 0.4 MG SL tablet  Commonly known as: NITROSTAT   0.4 mg, Sublingual, Every 5 Minutes PRN, do not exceed a total of 3 doses in 15 minutes      ondansetron ODT 4 MG disintegrating tablet  Commonly known as: ZOFRAN-ODT   4 mg, Translingual, Every 8 Hours PRN      oxyCODONE-acetaminophen 5-325 MG per tablet  Commonly known as: PERCOCET   1-2 tablets, Oral, Every 6 Hours PRN      OZEMPIC (1 MG/DOSE) SC   Inject  under the skin into the appropriate area as directed.      phenazopyridine 100 MG tablet  Commonly known as: Pyridium   100 mg, Oral, 3 Times Daily PRN      potassium chloride 10 MEQ CR tablet  Commonly known as: KLOR-CON M10   10 mEq, Oral, Daily, Take both the lasix and potassium together. If not taking lasix don't take the potassium      ranolazine 500 MG 12 hr tablet  Commonly known as: RANEXA   500 mg, Oral, 2 Times Daily      rosuvastatin 40  MG tablet  Commonly known as: CRESTOR   40 mg, Nightly      tamsulosin 0.4 MG capsule 24 hr capsule  Commonly known as: FLOMAX   0.4 mg, Oral, Daily             Diet Instructions       Diet: Cardiac Diets; Healthy Heart (2-3 Na+); Pureed (NDD 1); Thin (IDDSI 0)      Discharge Diet: Cardiac Diets    Cardiac Diet: Healthy Heart (2-3 Na+)    Texture: Pureed (NDD 1)    Fluid Consistency: Thin (IDDSI 0)           Future Appointments   Date Time Provider Department Center   3/21/2025  2:45 PM Gurjit Guerra MD [facility]     Additional Instructions for the Follow-ups that You Need to Schedule       Call MD With Problems / Concerns   As directed      Instructions:  Call office at [phone] for any drainage, increased redness, or fever over 101.5    Order Comments: Instructions:  Call office at [phone] for any drainage, increased redness, or fever over 101.5                Follow-up Information       Gurjit Guerra MD. Schedule an appointment as soon as possible for a visit in 4 week(s).    Specialties: Cardiology, Interventional Cardiology  Contact information:  [address] [place] [address] [phone]               Janet Nix MD. Schedule an appointment as soon as possible for a visit in 2 week(s).    Specialty: Vascular Surgery  Contact information:  [address] [place] [id number] [phone]               Martin Gracia MD .    Specialty: Family Medicine  Contact information:  [address] [place] [address]  [place] [address]  [place] [address]  [phone]                             TEST  RESULTS PENDING AT DISCHARGE         Rola Okeefe, EMBER  11/08/24  09:54 EST  O: [phone]

## 2024-11-08 NOTE — PROGRESS NOTES
CARDIOLOGY FOLLOW-UP PROGRESS NOTE      Reason for follow-up:    Chest Pain  CAD  S/P Left carotid endarterectomy     Attending: Silvano Colby MD      Subjective .     Denies chest pain or dyspnea at present     Review of Systems   Constitutional: Negative for chills and fever.   HENT:  Negative for ear discharge and nosebleeds.    Eyes:  Negative for discharge and redness.   Cardiovascular:  Negative for chest pain, orthopnea, palpitations, paroxysmal nocturnal dyspnea and syncope.   Respiratory:  Negative for cough, shortness of breath and wheezing.    Endocrine: Negative for heat intolerance.   Skin:  Negative for rash.   Musculoskeletal:  Negative for arthritis and myalgias.   Gastrointestinal:  Negative for abdominal pain, melena, nausea and vomiting.   Genitourinary:  Negative for dysuria and hematuria.   Neurological:  Negative for dizziness, light-headedness, numbness and tremors.   Psychiatric/Behavioral:  Negative for depression. The patient is not nervous/anxious.      Pertinent items are noted in HPI, all other systems reviewed and negative  Allergies: Lumason [sulfur hexaflouride lipid a microspheres], Niacin, and Jardiance [empagliflozin]    Scheduled Meds:acetaminophen, 1,000 mg, Oral, Q8H  amLODIPine, 10 mg, Oral, Daily  aspirin, 81 mg, Oral, Daily  clopidogrel, 75 mg, Oral, Daily  enoxaparin, 40 mg, Subcutaneous, Q24H  insulin lispro, 2-9 Units, Subcutaneous, 4x Daily AC & at Bedtime  isosorbide mononitrate, 60 mg, Oral, Q24H  metFORMIN, 1,000 mg, Oral, BID With Meals  metoprolol tartrate, 100 mg, Oral, BID  ranolazine, 500 mg, Oral, BID  rosuvastatin, 40 mg, Oral, Nightly  sodium chloride, 10 mL, Intravenous, Q12H  tamsulosin, 0.4 mg, Oral, Daily        Continuous Infusions:     PRN Meds:.  dextrose    dextrose    docusate sodium    glucagon (human recombinant)    hydrALAZINE    HYDROmorphone    labetalol    melatonin    nitroglycerin    ondansetron ODT **OR** ondansetron    oxyCODONE **OR**  "oxyCODONE    phenazopyridine    senna-docusate sodium    sodium chloride    sodium chloride    Objective     VITAL SIGNS  Patient Vitals for the past 24 hrs:   BP Temp Temp src Pulse Resp SpO2 Height Weight   11/08/24 1100 113/68 -- -- 71 -- 94 % -- --   11/08/24 1000 -- -- -- 75 -- 92 % -- --   11/08/24 0901 130/75 -- -- 88 -- -- -- --   11/08/24 0900 130/75 -- -- 86 -- 92 % -- --   11/08/24 0855 128/78 -- -- 89 -- -- -- --   11/08/24 0800 118/75 -- -- 90 -- 94 % -- --   11/08/24 0700 111/75 97.9 °F (36.6 °C) Oral 83 18 93 % -- --   11/08/24 0600 137/72 -- -- 81 -- 96 % -- --   11/08/24 0500 126/79 -- -- 78 -- 95 % -- --   11/08/24 0400 -- -- -- 80 -- 93 % -- --   11/08/24 0300 120/69 98.2 °F (36.8 °C) Oral 79 14 95 % -- --   11/08/24 0200 96/61 -- -- 81 -- 94 % -- --   11/08/24 0100 106/63 -- -- 82 -- 94 % -- --   11/08/24 0000 103/56 98.4 °F (36.9 °C) Oral 90 15 96 % -- --   11/07/24 2300 102/64 -- -- 95 -- 92 % -- --   11/07/24 2200 118/70 -- -- 101 -- 95 % -- --   11/07/24 2132 -- -- -- 109 -- 91 % -- --   11/07/24 2100 123/71 -- -- 105 -- 91 % -- --   11/07/24 2000 109/72 98 °F (36.7 °C) Oral 105 14 94 % -- --   11/07/24 1900 118/67 -- -- 110 -- 92 % -- --   11/07/24 1800 114/68 -- -- 97 -- 93 % -- --   11/07/24 1703 110/67 -- -- 95 -- 94 % 165.1 cm (65\") 96.6 kg (213 lb)   11/07/24 1700 110/67 -- -- 102 -- 94 % -- --   11/07/24 1601 101/64 -- -- 94 -- 93 % -- --   11/07/24 1600 -- -- -- 91 -- 93 % -- --   11/07/24 1546 106/63 97.8 °F (36.6 °C) Oral 91 15 93 % -- --   11/07/24 1505 -- -- -- -- -- 91 % -- --   11/07/24 1504 -- -- -- 94 -- (!) 89 % -- --   11/07/24 1503 -- -- -- -- -- 90 % -- --   11/07/24 1502 -- -- -- -- -- 91 % -- --   11/07/24 1501 -- -- -- -- -- 91 % -- --   11/07/24 1500 106/63 -- -- 95 -- 92 % -- --   11/07/24 1400 109/73 -- -- 99 -- 96 % -- --   11/07/24 1300 122/65 -- -- 80 -- 93 % -- --        Flowsheet Rows      Flowsheet Row First Filed Value   Admission Height 165.1 cm (65\") " Documented at 10/25/2024 1533   Admission Weight 92.5 kg (204 lb) Documented at 10/25/2024 1533            Body mass index is 35.45 kg/m².      Intake/Output Summary (Last 24 hours) at 11/8/2024 1229  Last data filed at 11/8/2024 1000  Gross per 24 hour   Intake 1435 ml   Output 2330 ml   Net -895 ml        TELEMETRY:     SR 90s    Physical Exam:  Constitutional:       Appearance: Well-developed.   Eyes:      General: No scleral icterus.        Right eye: No discharge.   HENT:      Head: Normocephalic and atraumatic.   Neck:      Thyroid: No thyromegaly.      Lymphadenopathy: No cervical adenopathy.   Pulmonary:      Effort: Pulmonary effort is normal. No respiratory distress.      Breath sounds: Normal breath sounds. No wheezing. No rales.   Cardiovascular:      Normal rate. Regular rhythm.      No gallop.    Edema:     Peripheral edema absent.   Abdominal:      Tenderness: There is no abdominal tenderness.   Skin:     Findings: No erythema or rash.   Neurological:      Mental Status: Alert and oriented to person, place, and time.            Results Review:   I reviewed the patient's new clinical results.    CBC    Results from last 7 days   Lab Units 11/08/24  0324 11/07/24  0328   WBC 10*3/mm3 9.28 11.23*   HEMOGLOBIN g/dL 11.7* 11.5*   PLATELETS 10*3/mm3 177 183     BMP   Results from last 7 days   Lab Units 11/08/24  0324 11/07/24  0328   SODIUM mmol/L 141 137   POTASSIUM mmol/L 4.3 3.8   CHLORIDE mmol/L 109* 106   CO2 mmol/L 25.7 22.4   BUN mg/dL 10 12   CREATININE mg/dL 0.94 0.93   GLUCOSE mg/dL 134* 172*     Cr Clearance Estimated Creatinine Clearance: 92.6 mL/min (by C-G formula based on SCr of 0.94 mg/dL).  Coag     HbA1C   Lab Results   Component Value Date    HGBA1C 6.70 (H) 10/28/2024    HGBA1C 7.1 (A) 07/05/2024    HGBA1C 7.2 (A) 04/05/2024     Blood Glucose   Glucose   Date/Time Value Ref Range Status   11/08/2024 0719 138 (H) 70 - 105 mg/dL Final     Comment:     Serial Number: [id number]:   [id number]   11/07/2024 1955 192 (H) 70 - 105 mg/dL Final     Comment:     Serial Number: [id number]:  [id number]   11/07/2024 1700 153 (H) 70 - 105 mg/dL Final     Comment:     Serial Number: [id number]:  [id number]   11/07/2024 1135 183 (H) 70 - 105 mg/dL Final     Comment:     Serial Number: [id number]:  [id number]   11/07/2024 0730 148 (H) 70 - 105 mg/dL Final     Comment:     Serial Number: [id number]:  [id number]   11/06/2024 2059 195 (H) 70 - 105 mg/dL Final     Comment:     Serial Number: [id number]:  [id number]   11/06/2024 1143 162 (H) 70 - 105 mg/dL Final     Comment:     Serial Number: [id number]:  [id number]     Infection   Results from last 7 days   Lab Units 11/07/24  0328   PROCALCITONIN ng/mL 0.08     CMP   Results from last 7 days   Lab Units 11/08/24  0324 11/07/24  0328   SODIUM mmol/L 141 137   POTASSIUM mmol/L 4.3 3.8   CHLORIDE mmol/L 109* 106   CO2 mmol/L 25.7 22.4   BUN mg/dL 10 12   CREATININE mg/dL 0.94 0.93   GLUCOSE mg/dL 134* 172*     ABG      UA      ANGELLA  No results found for: \"POCMETH\", \"POCAMPHET\", \"POCBARBITUR\", \"POCBENZO\", \"POCCOCAINE\", \"POCOPIATES\", \"POCOXYCODO\", \"POCPHENCYC\", \"POCPROPOXY\", \"POCTHC\", \"POCTRICYC\"  Lysis Labs   Results from last 7 days   Lab Units 11/08/24  0324 11/07/24  0328   HEMOGLOBIN g/dL 11.7* 11.5*   PLATELETS 10*3/mm3 177 183   CREATININE mg/dL 0.94 0.93     Radiology(recent) No radiology results for the last day    Imaging Results (Last 24 Hours)       ** No results found for the last 24 hours. **            Results from last 7 days   Lab Units 11/07/24  0328   HSTROP T ng/L 9       EKG          I personally viewed and interpreted the patient's EKG/Telemetry data:        ECHOCARDIOGRAM:     Results for orders placed during the hospital encounter of 11/06/24    Adult Transthoracic Echo Complete W/ Cont if Necessary Per Protocol    Interpretation Summary    Left ventricular systolic function is normal. Left ventricular " ejection fraction appears to be 56 - 60%.    Left ventricular diastolic function is consistent with (grade I) impaired relaxation.    The right ventricular cavity is mildly dilated.        STRESS MYOVIEW:      CARDIAC CATHETERIZATION:      OTHER:         Assessment & Plan            Bilateral carotid artery stenosis    Carotid stenosis, asymptomatic        ASSESSMENT:    Chest Pain  MI ruled out  EKG with no acute ST/T wave abnormalities  Pain free at present  Continue nitrates, ranexa, bb  No recurrent chest pain overnight  Will change timing of long acting nitrates to once daily     CAD  Prior CABG 2015  Post CABG PCI to PDA branch of RCA  Cath 6/22:  LAD,% occluded, SVG to Diag patent, LIMA to LAD patent, Patent stent PDA  EF 55-60%     S/p Left Carotid Endarterectomy  POD #1     Labile BP  Bp this am stable  AM BB held yesterday due to hypotension  Resume meds as scheduled per home course    PLAN:    Dr. Guerra to review echocardiogram  Anticipate outpatient cardiac catheterization  Will schedule op f/u with us in 4 weeks   Continue current Rx and supportive care      Additional recommendations per Dr. Guerra     Patient is seen and examined and findings are verified.  All data is reviewed by me personally.  Assessment and plan formulated by APC was done after discussion with attending.  I spent more than 50% of time in taking care of the patient.    Patient had a good night last night.  Patient denies any symptom of further episode of chest pain.  Patient is not short of breath    Blood pressures are stable.    Heart rate is slightly on the higher side.    Normal S1 and S2.  No pericardial rub or murmur no crackles or rhonchi abdomen is soft.    At this stage, I would recommend that patient continue this current regimen.  I would consider cardiac catheterization as a outpatient if patient have recurrence of chest pain.    Patient can be discharged from cardiac standpoint.    Electronically signed by Gurjit REARDON  MD Charlie, 11/08/24, 12:29 PM EST.      I discussed the patient's findings and my recommendations with patient and RN                  Electronically signed by EMBER Baker, 11/08/24, 8:52 AM EST.          Gurjit Guerra MD  11/08/24  12:29 EST

## 2024-11-08 NOTE — PROGRESS NOTES
[facility] Vascular Surgery Progress Note    Name: Mendel Melendez ADMIT: 2024   : 1965  PCP: Martin Hicks MD    MRN: [id number] LOS: 2 days   AGE/SEX: 59 y.o. male  ROOM: [address] [place] [address]    CC: Postop; status post left CEA on     Subjective     Patient feels like numbness on the left side of his tongue is slightly improved.  Swallowing a little bit better this morning.  No further angina.    Objective     Scheduled Medications:   acetaminophen, 1,000 mg, Oral, Q8H  amLODIPine, 10 mg, Oral, Daily  aspirin, 81 mg, Oral, Daily  clopidogrel, 75 mg, Oral, Daily  enoxaparin, 40 mg, Subcutaneous, Q24H  insulin lispro, 2-9 Units, Subcutaneous, 4x Daily AC & at Bedtime  isosorbide mononitrate, 60 mg, Oral, BID - Nitrates  metFORMIN, 1,000 mg, Oral, BID With Meals  metoprolol tartrate, 100 mg, Oral, BID  ranolazine, 500 mg, Oral, BID  rosuvastatin, 40 mg, Oral, Nightly  sodium chloride, 10 mL, Intravenous, Q12H  tamsulosin, 0.4 mg, Oral, Daily        Active Infusions:         As Needed Medications:    dextrose    dextrose    docusate sodium    glucagon (human recombinant)    hydrALAZINE    HYDROmorphone    labetalol    melatonin    nitroglycerin    ondansetron ODT **OR** ondansetron    oxyCODONE **OR** oxyCODONE    phenazopyridine    sodium chloride    sodium chloride    Vital Signs  Vitals:    24 0700   BP:    Pulse:    Resp: 18   Temp: 97.9 °F (36.6 °C)   SpO2:       Body mass index is 35.45 kg/m².     Physical Exam:  NAD, alert and oriented, tongue midline, moves all extremities equally  RRR  Lungs clear  Abd soft, benign  Vascular: Palpable bilateral radial and pedal pulses  Skin: Left neck incision is CDI and soft, no signs of hematoma    Results Review:     CBC    Results from last 7 days   Lab Units 24  0324 24  0328   WBC 10*3/mm3 9.28 11.23*   HEMOGLOBIN g/dL 11.7* 11.5*   PLATELETS 10*3/mm3 177 183     BMP   Results from last 7 days   Lab Units 24  2167  11/07/24  0328   SODIUM mmol/L 141 137   POTASSIUM mmol/L 4.3 3.8   CHLORIDE mmol/L 109* 106   CO2 mmol/L 25.7 22.4   BUN mg/dL 10 12   CREATININE mg/dL 0.94 0.93   GLUCOSE mg/dL 134* 172*     HbA1C   Lab Results   Component Value Date    HGBA1C 6.70 (H) 10/28/2024    HGBA1C 7.1 (A) 07/05/2024    HGBA1C 7.2 (A) 04/05/2024     Infection   Results from last 7 days   Lab Units 11/07/24  0328   PROCALCITONIN ng/mL 0.08     Radiology(recent) No radiology results for the last day    VTE Prophylaxis:  Pharmacologic & mechanical VTE prophylaxis orders are present.         Problems:    Active Hospital Problems:  Active Hospital Problems    Diagnosis  POA    **Bilateral carotid artery stenosis [I65.23]  Yes     Priority: High    Carotid stenosis, asymptomatic [I65.29]  Yes      Resolved Hospital Problems   No resolved problems to display.        Assessment & Plan   Assessment / Plan     Bilateral carotid artery stenosis    Carotid stenosis, asymptomatic      11/6/2024 -left carotid endarterectomy by Dr. Nix    POD2  Hypotension resolved  Off of supplemental O2 this morning  Remains neurologically intact, no focal deficits noted  He has some numbness in the left side of his tongue causing difficulty to swallow, this is likely due to hypoglossal nerve irritation, ST to see  Left neck incision is CDI and soft, no signs of hematoma  Voiding on own  Increase activity, ambulate in hallway with nursing  Cardiology following for anginal symptoms, plan for outpatient cardiac cath  Continue DAPT and statin  Anticipate discharge this afternoon        EMBER Cornejo  [facility] Vascular Surgery  11/08/24   O: [phone]  F: [phone]

## 2024-11-08 NOTE — OUTREACH NOTE
Prep Survey      Flowsheet Row Responses   [facility] patient discharged from? [facility]   Is LACE score < 7 ? No   Eligibility TCM   Date of Admission 11/06/24   Date of Discharge 11/08/24   Discharge diagnosis Bilateral carotid artery stenosis, LEFT CAROTID ENDARTERECTOMY   Does the patient have one of the following disease processes/diagnoses(primary or secondary)? General Surgery   Does the patient have Home health ordered? No   Is there a DME ordered? No   Prep survey completed? Yes            Miriam COATES - Registered Nurse

## 2024-11-08 NOTE — PROGRESS NOTES
Critical Care Progress Note     Mendel Melendez : 1965 MRN:[id number] LOS:2  Rm: 3115/1     Principal Problem: Bilateral carotid artery stenosis     Reason for follow up: All the medical problems listed below    Summary     Mendel Melendez is a 59 y.o. male with PMH of diabetes type 2, hypertension, CAD, Hx CABG, hyperlipidemia, and paroxysmal A-fib admitted to the hospital for a scheduled left carotid endarterectomy for Bilateral carotid artery stenosis. He received a 2L LR bolus pre-operatively.    Significant Events / Subjective     24 : Stable blood pressures throughout the day and overnight.  Patient was ambulating appropriately yesterday.  His eager and ready to leave the hospital.    Assessment / Plan     Bilateral carotid stenosis, asymptomatic  S/p left carotid endarterectomy with Dr. Nix  POD #2  MAP goal: 65  SBP goal 100-115mmHg  PRN BP control: labetalol, hydralazine  PRN pain control: dilaudid, oxycodone  Tongue numbness, operative team aware  Continue aspirin, Plavix, and rosuvastatin     Essential hypertension: Improved  -continue amlodipine, imdur, metoprolol, ranexa, crestor    Angina  Echo with normal EF  Cardiology following    Diabetes mellitus Type 2, not insulin-dependent : not well controlled.   Home regimen includes: Metformin, Ozempic  Restart metformin  Hgb A1C: 6.7        CAD/history of CABG: Continue aspirin, Plavix, and rosuvastatin  Hyperlipidemia: Continue Crestor  Paroxysmal Afib: EKGs this admission show NSR; not on antiarrhythmics at home  BPH: continue Flomax    Disposition: To be discharged today by vascular surgery.    Code status:   Level Of Support Discussed With: Patient  Code Status (Patient has no pulse and is not breathing): CPR (Attempt to Resuscitate)  Medical Interventions (Patient has pulse or is breathing): Full Support       Nutrition:   Diet: Cardiac; Healthy Heart (2-3 Na+); Texture: Pureed (NDD 1); Fluid Consistency: Thin (IDDSI 0)   Patient isn't  on Tube Feeding     VTE Prophylaxis:  Pharmacologic & mechanical VTE prophylaxis orders are present.           Objective / Physical Exam     Vital signs:  Temp: 97.9 °F (36.6 °C)  BP: 113/68  Heart Rate: 71  Resp: 18  SpO2: 94 %  Weight: 96.6 kg (213 lb)    Admission Weight: Weight: 92.5 kg (204 lb)  Current Weight: Weight: 96.6 kg (213 lb)    Input/Output in last 24 hours:    Intake/Output Summary (Last 24 hours) at 11/8/2024 1248  Last data filed at 11/8/2024 1200  Gross per 24 hour   Intake 1555 ml   Output 2330 ml   Net -775 ml      Net IO Since Admission: -87 mL [11/08/24 1248]     Physical Exam  Vitals and nursing note reviewed.   Constitutional:       Appearance: Normal appearance.      Comments: Sitting up in bed no acute distress.   HENT:      Head: Normocephalic and atraumatic.      Mouth/Throat:      Mouth: Mucous membranes are moist.      Pharynx: Oropharynx is clear.   Eyes:      General: No scleral icterus.     Pupils: Pupils are equal, round, and reactive to light.   Cardiovascular:      Rate and Rhythm: Normal rate and regular rhythm.      Pulses: Normal pulses.      Heart sounds: Normal heart sounds.   Pulmonary:      Effort: Pulmonary effort is normal.      Breath sounds: Normal breath sounds.   Abdominal:      General: Bowel sounds are normal.      Palpations: Abdomen is soft.   Musculoskeletal:         General: No swelling.   Skin:     General: Skin is warm and dry.      Capillary Refill: Capillary refill takes less than 2 seconds.   Neurological:      General: No focal deficit present.      Mental Status: He is alert and oriented to person, place, and time.   Psychiatric:         Thought Content: Thought content normal.          Radiology and Labs     Results from last 7 days   Lab Units 11/08/24 0324 11/07/24 0328   WBC 10*3/mm3 9.28 11.23*   HEMOGLOBIN g/dL 11.7* 11.5*   HEMATOCRIT % 37.0* 35.0*   PLATELETS 10*3/mm3 177 183           Results from last 7 days   Lab Units 11/08/24 0324  11/07/24  0328   SODIUM mmol/L 141 137   POTASSIUM mmol/L 4.3 3.8   CHLORIDE mmol/L 109* 106   CO2 mmol/L 25.7 22.4   BUN mg/dL 10 12   CREATININE mg/dL 0.94 0.93   GLUCOSE mg/dL 134* 172*          No radiology results for the last day    Current medications     Scheduled Meds:   acetaminophen, 1,000 mg, Oral, Q8H  amLODIPine, 10 mg, Oral, Daily  aspirin, 81 mg, Oral, Daily  clopidogrel, 75 mg, Oral, Daily  enoxaparin, 40 mg, Subcutaneous, Q24H  insulin lispro, 2-9 Units, Subcutaneous, 4x Daily AC & at Bedtime  isosorbide mononitrate, 60 mg, Oral, Q24H  metFORMIN, 1,000 mg, Oral, BID With Meals  metoprolol tartrate, 100 mg, Oral, BID  ranolazine, 500 mg, Oral, BID  rosuvastatin, 40 mg, Oral, Nightly  sodium chloride, 10 mL, Intravenous, Q12H  tamsulosin, 0.4 mg, Oral, Daily        Continuous Infusions:          Plan discussed with RN. Reviewed all other data in the last 24 hours, including but not limited to vitals, labs, microbiology, imaging and pertinent notes from other providers.  Plan also discussed with patient at the bedside.      Silvano Colby MD   Critical Care  11/08/24   12:48 EST

## 2024-11-08 NOTE — PAYOR COMM NOTE
"This is a clinical update for Gloria Melendez   Reference/Auth # [id number]     PENDING INPATIENT AUTHORIZATION NUMBER FOR THIS ELECTIVE INPATIENT SURGERY CASE.    Please call or fax determination to contact below.   Thank you.    Tressa Akbar, RN, BSN  Utilization Review Nurse  [facility]  Direct & confidential phone # [phone]  Fax # [phone]'      Gloria Melendez (59 y.o. Male)       Date of Birth   1965    Social Security Number       Address   [address] [place] [address] [place] [facility]    Home Phone   [phone]    MRN   [id number]       Congregation   None    Marital Status                               Admission Date   11/6/24    Admission Type   Elective    Admitting Provider   Silvano Colby MD    Attending Provider   Silvano Colby MD    Department, Room/Bed   [facility] CARDIOVASCULAR CARE UNIT, 4765/1       Discharge Date       Discharge Disposition   Home or Self Care    Discharge Destination                                 Attending Provider: Silvano Colby MD    Allergies: Lumason [Sulfur Hexaflouride Lipid A Microspheres], Niacin, Jardiance [Empagliflozin]    Isolation: None   Infection: None   Code Status: CPR    Ht: 165.1 cm (65\")   Wt: 96.6 kg (213 lb)    Admission Cmt: None   Principal Problem: Bilateral carotid artery stenosis [I65.23]                   Active Insurance as of 11/6/2024       Primary Coverage       Payor Plan Insurance Group Employer/Plan Group    AMBETTER S IND EXCHANGE AMBETTER [facility] IND EXCHANGE [id number]       Payor Plan Address Payor Plan Phone Number Payor Plan Fax Number Effective Dates    [address] [phone]  4/1/2019 - None Entered    [facility] [id number]         Subscriber Name Subscriber Birth Date Member ID       GLORIA MELENDEZ 1965 [id number]                     Emergency Contacts        (Rel.) Home Phone Work Phone Mobile Phone    EMANIEZEQUIEL (Spouse) [phone] -- " [phone]                 Physician Progress Notes (last 24 hours)        Gurjit Guerra MD at 11/08/24 0852          CARDIOLOGY FOLLOW-UP PROGRESS NOTE      Reason for follow-up:    Chest Pain  CAD  S/P Left carotid endarterectomy     Attending: Silvano Colby MD      Subjective .     Denies chest pain or dyspnea at present     Review of Systems   Constitutional: Negative for chills and fever.   HENT:  Negative for ear discharge and nosebleeds.    Eyes:  Negative for discharge and redness.   Cardiovascular:  Negative for chest pain, orthopnea, palpitations, paroxysmal nocturnal dyspnea and syncope.   Respiratory:  Negative for cough, shortness of breath and wheezing.    Endocrine: Negative for heat intolerance.   Skin:  Negative for rash.   Musculoskeletal:  Negative for arthritis and myalgias.   Gastrointestinal:  Negative for abdominal pain, melena, nausea and vomiting.   Genitourinary:  Negative for dysuria and hematuria.   Neurological:  Negative for dizziness, light-headedness, numbness and tremors.   Psychiatric/Behavioral:  Negative for depression. The patient is not nervous/anxious.      Pertinent items are noted in HPI, all other systems reviewed and negative  Allergies: Lumason [sulfur hexaflouride lipid a microspheres], Niacin, and Jardiance [empagliflozin]    Scheduled Meds:acetaminophen, 1,000 mg, Oral, Q8H  amLODIPine, 10 mg, Oral, Daily  aspirin, 81 mg, Oral, Daily  clopidogrel, 75 mg, Oral, Daily  enoxaparin, 40 mg, Subcutaneous, Q24H  insulin lispro, 2-9 Units, Subcutaneous, 4x Daily AC & at Bedtime  isosorbide mononitrate, 60 mg, Oral, Q24H  metFORMIN, 1,000 mg, Oral, BID With Meals  metoprolol tartrate, 100 mg, Oral, BID  ranolazine, 500 mg, Oral, BID  rosuvastatin, 40 mg, Oral, Nightly  sodium chloride, 10 mL, Intravenous, Q12H  tamsulosin, 0.4 mg, Oral, Daily        Continuous Infusions:     PRN Meds:.  dextrose    dextrose    docusate sodium    glucagon (human recombinant)    hydrALAZINE     "HYDROmorphone    labetalol    melatonin    nitroglycerin    ondansetron ODT **OR** ondansetron    oxyCODONE **OR** oxyCODONE    phenazopyridine    senna-docusate sodium    sodium chloride    sodium chloride    Objective     VITAL SIGNS  Patient Vitals for the past 24 hrs:   BP Temp Temp src Pulse Resp SpO2 Height Weight   11/08/24 1100 113/68 -- -- 71 -- 94 % -- --   11/08/24 1000 -- -- -- 75 -- 92 % -- --   11/08/24 0901 130/75 -- -- 88 -- -- -- --   11/08/24 0900 130/75 -- -- 86 -- 92 % -- --   11/08/24 0855 128/78 -- -- 89 -- -- -- --   11/08/24 0800 118/75 -- -- 90 -- 94 % -- --   11/08/24 0700 111/75 97.9 °F (36.6 °C) Oral 83 18 93 % -- --   11/08/24 0600 137/72 -- -- 81 -- 96 % -- --   11/08/24 0500 126/79 -- -- 78 -- 95 % -- --   11/08/24 0400 -- -- -- 80 -- 93 % -- --   11/08/24 0300 120/69 98.2 °F (36.8 °C) Oral 79 14 95 % -- --   11/08/24 0200 96/61 -- -- 81 -- 94 % -- --   11/08/24 0100 106/63 -- -- 82 -- 94 % -- --   11/08/24 0000 103/56 98.4 °F (36.9 °C) Oral 90 15 96 % -- --   11/07/24 2300 102/64 -- -- 95 -- 92 % -- --   11/07/24 2200 118/70 -- -- 101 -- 95 % -- --   11/07/24 2132 -- -- -- 109 -- 91 % -- --   11/07/24 2100 123/71 -- -- 105 -- 91 % -- --   11/07/24 2000 109/72 98 °F (36.7 °C) Oral 105 14 94 % -- --   11/07/24 1900 118/67 -- -- 110 -- 92 % -- --   11/07/24 1800 114/68 -- -- 97 -- 93 % -- --   11/07/24 1703 110/67 -- -- 95 -- 94 % 165.1 cm (65\") 96.6 kg (213 lb)   11/07/24 1700 110/67 -- -- 102 -- 94 % -- --   11/07/24 1601 101/64 -- -- 94 -- 93 % -- --   11/07/24 1600 -- -- -- 91 -- 93 % -- --   11/07/24 1546 106/63 97.8 °F (36.6 °C) Oral 91 15 93 % -- --   11/07/24 1505 -- -- -- -- -- 91 % -- --   11/07/24 1504 -- -- -- 94 -- (!) 89 % -- --   11/07/24 1503 -- -- -- -- -- 90 % -- --   11/07/24 1502 -- -- -- -- -- 91 % -- --   11/07/24 1501 -- -- -- -- -- 91 % -- --   11/07/24 1500 106/63 -- -- 95 -- 92 % -- --   11/07/24 1400 109/73 -- -- 99 -- 96 % -- --   11/07/24 1300 122/65 -- -- 80 " "-- 93 % -- --        Flowsheet Rows      Flowsheet Row First Filed Value   Admission Height 165.1 cm (65\") Documented at 10/25/2024 1533   Admission Weight 92.5 kg (204 lb) Documented at 10/25/2024 1533            Body mass index is 35.45 kg/m².      Intake/Output Summary (Last 24 hours) at 11/8/2024 1229  Last data filed at 11/8/2024 1000  Gross per 24 hour   Intake 1435 ml   Output 2330 ml   Net -895 ml        TELEMETRY:     SR 90s    Physical Exam:  Constitutional:       Appearance: Well-developed.   Eyes:      General: No scleral icterus.        Right eye: No discharge.   HENT:      Head: Normocephalic and atraumatic.   Neck:      Thyroid: No thyromegaly.      Lymphadenopathy: No cervical adenopathy.   Pulmonary:      Effort: Pulmonary effort is normal. No respiratory distress.      Breath sounds: Normal breath sounds. No wheezing. No rales.   Cardiovascular:      Normal rate. Regular rhythm.      No gallop.    Edema:     Peripheral edema absent.   Abdominal:      Tenderness: There is no abdominal tenderness.   Skin:     Findings: No erythema or rash.   Neurological:      Mental Status: Alert and oriented to person, place, and time.            Results Review:   I reviewed the patient's new clinical results.    CBC    Results from last 7 days   Lab Units 11/08/24  0324 11/07/24  0328   WBC 10*3/mm3 9.28 11.23*   HEMOGLOBIN g/dL 11.7* 11.5*   PLATELETS 10*3/mm3 177 183     BMP   Results from last 7 days   Lab Units 11/08/24  0324 11/07/24  0328   SODIUM mmol/L 141 137   POTASSIUM mmol/L 4.3 3.8   CHLORIDE mmol/L 109* 106   CO2 mmol/L 25.7 22.4   BUN mg/dL 10 12   CREATININE mg/dL 0.94 0.93   GLUCOSE mg/dL 134* 172*     Cr Clearance Estimated Creatinine Clearance: 92.6 mL/min (by C-G formula based on SCr of 0.94 mg/dL).  [facility]     HbA1C   Lab Results   Component Value Date    HGBA1C 6.70 (H) 10/28/2024    HGBA1C 7.1 (A) 07/05/2024    HGBA1C 7.2 (A) 04/05/2024     Blood Glucose   Glucose   Date/Time Value Ref Range " "Status   11/08/2024 0719 138 (H) 70 - 105 mg/dL Final     Comment:     Serial Number: [id number]:  [id number]   11/07/2024 1955 192 (H) 70 - 105 mg/dL Final     Comment:     Serial Number: [id number]:  [id number]   11/07/2024 1700 153 (H) 70 - 105 mg/dL Final     Comment:     Serial Number: [id number]:  [id number]   11/07/2024 1135 183 (H) 70 - 105 mg/dL Final     Comment:     Serial Number: [id number]:  [id number]   11/07/2024 0730 148 (H) 70 - 105 mg/dL Final     Comment:     Serial Number: [id number]:  [id number]   11/06/2024 2059 195 (H) 70 - 105 mg/dL Final     Comment:     Serial Number: [id number]:  [id number]   11/06/2024 1143 162 (H) 70 - 105 mg/dL Final     Comment:     Serial Number: [id number]:  [id number]     Infection   Results from last 7 days   Lab Units 11/07/24  0328   PROCALCITONIN ng/mL 0.08     CMP   Results from last 7 days   Lab Units 11/08/24  0324 11/07/24  0328   SODIUM mmol/L 141 137   POTASSIUM mmol/L 4.3 3.8   CHLORIDE mmol/L 109* 106   CO2 mmol/L 25.7 22.4   BUN mg/dL 10 12   CREATININE mg/dL 0.94 0.93   GLUCOSE mg/dL 134* 172*     ABG      UA      ANGELLA  No results found for: \"POCMETH\", \"POCAMPHET\", \"POCBARBITUR\", \"POCBENZO\", \"POCCOCAINE\", \"POCOPIATES\", \"POCOXYCODO\", \"POCPHENCYC\", \"POCPROPOXY\", \"POCTHC\", \"POCTRICYC\"  L[facility] Labs   Results from last 7 days   Lab Units 11/08/24  0324 11/07/24  0328   HEMOGLOBIN g/dL 11.7* 11.5*   PLATELETS 10*3/mm3 177 183   CREATININE mg/dL 0.94 0.93     Radiology(recent) No radiology results for the last day    Imaging Results (Last 24 Hours)       ** No results found for the last 24 hours. **            Results from last 7 days   Lab Units 11/07/24  0328   HSTROP T ng/L 9       EKG          I personally viewed and interpreted the patient's EKG/Telemetry data:        ECHOCARDIOGRAM:     Results for orders placed during the hospital encounter of 11/06/24    Adult Transthoracic Echo Complete W/ Cont if Necessary Per " Protocol    Interpretation Summary    Left ventricular systolic function is normal. Left ventricular ejection fraction appears to be 56 - 60%.    Left ventricular diastolic function is consistent with (grade I) impaired relaxation.    The right ventricular cavity is mildly dilated.        STRESS MYOVIEW:      CARDIAC CATHETERIZATION:      OTHER:         Assessment & Plan            Bilateral carotid artery stenosis    Carotid stenosis, asymptomatic        ASSESSMENT:    Chest Pain  MI ruled out  EKG with no acute ST/T wave abnormalities  Pain free at present  Continue nitrates, ranexa, bb  No recurrent chest pain overnight  Will change timing of long acting nitrates to once daily     CAD  Prior CABG 2015  Post CABG PCI to PDA branch of RCA  Cath 6/22:  LAD,% occluded, SVG to Diag patent, LIMA to LAD patent, Patent stent PDA  EF 55-60%     S/p Left Carotid Endarterectomy  POD #1     Labile BP  Bp this am stable  AM BB held yesterday due to hypotension  Resume meds as scheduled per home course    PLAN:    Dr. Guerra to review echocardiogram  Anticipate outpatient cardiac catheterization  Will schedule op f/u with us in 4 weeks   Continue current Rx and supportive care      Additional recommendations per Dr. Guerra     Patient is seen and examined and findings are verified.  All data is reviewed by me personally.  Assessment and plan formulated by APC was done after discussion with attending.  I spent more than 50% of time in taking care of the patient.    Patient had a good night last night.  Patient denies any symptom of further episode of chest pain.  Patient is not short of breath    Blood pressures are stable.    Heart rate is slightly on the higher side.    Normal S1 and S2.  No pericardial rub or murmur no crackles or rhonchi abdomen is soft.    At this stage, I would recommend that patient continue this current regimen.  I would consider cardiac catheterization as a outpatient if patient have recurrence of  chest pain.    Patient can be discharged from cardiac standpoint.    Electronically signed by Gurjit Guerra MD, 24, 12:29 PM EST.      I discussed the patient's findings and my recommendations with patient and RN                  Electronically signed by EMBER Baker, 24, 8:52 AM EST.          Gurjit Guerra MD  24  12:29 EST                Electronically signed by Gurjit Guerra MD at 24 1229       Rola Okeefe APRN at 24 0800          [facility] Vascular Surgery Progress Note    Name: Mendel Melendez ADMIT: 2024   : 1965  PCP: Martin Hicks MD    MRN: [id number] LOS: 2 days   AGE/SEX: 59 y.o. male  ROOM: [address] [place] [address]    CC: Postop; status post left CEA on     Subjective     Patient feels like numbness on the left side of his tongue is slightly improved.  Swallowing a little bit better this morning.  No further angina.    Objective     Scheduled Medications:   acetaminophen, 1,000 mg, Oral, Q8H  amLODIPine, 10 mg, Oral, Daily  aspirin, 81 mg, Oral, Daily  clopidogrel, 75 mg, Oral, Daily  enoxaparin, 40 mg, Subcutaneous, Q24H  insulin lispro, 2-9 Units, Subcutaneous, 4x Daily AC & at Bedtime  isosorbide mononitrate, 60 mg, Oral, BID - Nitrates  metFORMIN, 1,000 mg, Oral, BID With Meals  metoprolol tartrate, 100 mg, Oral, BID  ranolazine, 500 mg, Oral, BID  rosuvastatin, 40 mg, Oral, Nightly  sodium chloride, 10 mL, Intravenous, Q12H  tamsulosin, 0.4 mg, Oral, Daily        Active Infusions:         As Needed Medications:    dextrose    dextrose    docusate sodium    glucagon (human recombinant)    hydrALAZINE    HYDROmorphone    labetalol    melatonin    nitroglycerin    ondansetron ODT **OR** ondansetron    oxyCODONE **OR** oxyCODONE    phenazopyridine    sodium chloride    sodium chloride    Vital Signs  Vitals:    24 0700   BP:    Pulse:    Resp: 18   Temp: 97.9 °F (36.6 °C)   SpO2:       Body mass index is 35.45 kg/m².      Physical Exam:  NAD, alert and oriented, tongue midline, moves all extremities equally  RRR  Lungs clear  Abd soft, benign  Vascular: Palpable bilateral radial and pedal pulses  Skin: Left neck incision is CDI and soft, no signs of hematoma    Results Review:     CBC    Results from last 7 days   Lab Units 11/08/24 0324 11/07/24  0328   WBC 10*3/mm3 9.28 11.23*   HEMOGLOBIN g/dL 11.7* 11.5*   PLATELETS 10*3/mm3 177 183     BMP   Results from last 7 days   Lab Units 11/08/24  0324 11/07/24  0328   SODIUM mmol/L 141 137   POTASSIUM mmol/L 4.3 3.8   CHLORIDE mmol/L 109* 106   CO2 mmol/L 25.7 22.4   BUN mg/dL 10 12   CREATININE mg/dL 0.94 0.93   GLUCOSE mg/dL 134* 172*     HbA1C   Lab Results   Component Value Date    HGBA1C 6.70 (H) 10/28/2024    HGBA1C 7.1 (A) 07/05/2024    HGBA1C 7.2 (A) 04/05/2024     Infection   Results from last 7 days   Lab Units 11/07/24  0328   PROCALCITONIN ng/mL 0.08     Radiology(recent) No radiology results for the last day    VTE Prophylaxis:  Pharmacologic & mechanical VTE prophylaxis orders are present.         Problems:    Active Hospital Problems:  Active Hospital Problems    Diagnosis  POA    **Bilateral carotid artery stenosis [I65.23]  Yes     Priority: High    Carotid stenosis, asymptomatic [I65.29]  Yes      Resolved Hospital Problems   No resolved problems to display.        Assessment & Plan   Assessment / Plan     Bilateral carotid artery stenosis    Carotid stenosis, asymptomatic      11/6/2024 -left carotid endarterectomy by Dr. Nix    POD2  Hypotension resolved  Off of supplemental O2 this morning  Remains neurologically intact, no focal deficits noted  He has some numbness in the left side of his tongue causing difficulty to swallow, this is likely due to hypoglossal nerve irritation, ST to see  Left neck incision is CDI and soft, no signs of hematoma  Voiding on own  Increase activity, ambulate in hallway with nursing  Cardiology following for anginal symptoms,  plan for outpatient cardiac cath  Continue DAPT and statin  Anticipate discharge this afternoon       EMBER Cornejo  [facility] Vascular Surgery  11/08/24   O: [phone]  F: [phone]    Electronically signed by Rola Okeefe APRN at 11/08/24 0940       Consult Notes (last 24 hours)  Notes from 11/07/24 1248 through 11/08/24 1248   No notes of this type exist for this encounter.

## 2024-11-10 NOTE — PAYOR COMM NOTE
"This is discharge notification for Gloria Joaquin   Reference/Auth # [id number]   Pt discharged on 11/8/24    Still pending inpatient authorization number for this elective inpatient surgery case.     Tressa Akbar RN, BSN  Utilization Review Nurse  [facility]  Direct & confidential phone # [phone]  Fax # [phone]      Gloria Joaquin (59 y.o. Male)       Date of Birth   1965    Social Security Number       Address   [address] [place] [address] [place] [facility]    Home Phone   [phone]    MRN   [id number]       Amish   None    Marital Status                               Admission Date   11/6/24    Admission Type   Elective    Admitting Provider   Silvano Colby MD    Attending Provider       Department, Room/Bed   [facility] CARDIOVASCULAR CARE UNIT, 3115/1       Discharge Date   11/8/2024    Discharge Disposition   Home or Self Care    Discharge Destination                                 Attending Provider: (none)   Allergies: Lumason [Sulfur Hexaflouride Lipid A Microspheres], Niacin, Jardiance [Empagliflozin]    Isolation: None   Infection: None   Code Status: Prior    Ht: 165.1 cm (65\")   Wt: 96.6 kg (213 lb)    Admission Cmt: None   Principal Problem: Bilateral carotid artery stenosis [I65.23]                   Active Insurance as of 11/6/2024       Primary Coverage       Payor Plan Insurance Group Employer/Plan Group    AMBETTER MHS IND EXCHANGE AM[employer] IND EXCHANGE [id number]       Payor Plan Address Payor Plan Phone Number Payor Plan Fax Number Effective Dates    [address] [phone]  4/1/2019 - None Entered    [facility] [address]         Subscriber Name Subscriber Birth Date Member ID       GLORIA JOAQUIN 1965 [id number]                     Emergency Contacts        (Rel.) Home Phone Work Phone Mobile Phone    EMANIEZEQUIEL (Spouse) [phone] -- [phone]                 Discharge Summary        Rola Okeefe " EMBER Castro at 24 0944       Attestation signed by Janet Nix MD at 11/10/24 1151    I have reviewed this documentation and agree.                    Name: Mendel Melendez ADMIT: 2024   : 1965  PCP: Martin Hicks MD    MRN: [id number] LOS: 2 days   AGE/SEX: 59 y.o. male  ROOM: [place] [address]/     Date of Admission: 2024  Date of Discharge:  2024    PCP: Martin Hicks MD      DISCHARGE DIAGNOSIS  Active Hospital Problems    Diagnosis  POA    **Bilateral carotid artery stenosis [I65.23]  Yes     Priority: High    Carotid stenosis, asymptomatic [I65.29]  Yes      Resolved Hospital Problems   No resolved problems to display.       SECONDARY DIAGNOSES  Past Medical History:   Diagnosis Date    Atrial fib/flutter, transient     Bruit of left carotid artery     CAD (coronary artery disease)     unspecified    Coronary artery disease     Hyperlipidemia     Hypertension     Kidney calculus 2024    Nonspecific elevation of levels of transaminase or lactic acid dehydrogenase (LDH)     Occlusion and stenosis of bilateral carotid arteries 2020    Other specified diabetes mellitus without complications     Paroxysmal atrial fibrillation     Seasonal allergic rhinitis due to pollen     Sleep apnea     Sleep apnea, unspecified     Type 2 diabetes mellitus with other circulatory complications     Unstable angina        CONSULTS   Consults       Date and Time Order Name Status Description    2024  7:29 AM Inpatient Cardiology Consult Completed             PROCEDURES PERFORMED    Date: 2024, left carotid endarterectomy by Dr. Nix     HOSPITAL COURSE  Mendel Melendez is a 59 y.o. male with carotid stenosis.  On 2024, he underwent an elective left carotid endarterectomy.  The patient tolerated the procedure well, blood loss was minimal. He was transferred to the PACU postoperatively. He recovered from anesthesia without incident. He was able to maintain his  "own airway and his vital signs returned to within normal limits. He was transferred to the unit to continue his recovery.  He was on a Cardene drip the night of surgery.  This was discontinued on the morning of postop day 1 and he was noted to be slightly hypotensive.  He was given fluid bolus with good response.  He became normotensive for the remainder of his hospital stay.  He did have some anginal symptoms the night of surgery and was given nitroglycerin.  Cardiology was consulted and evaluated.  EKG showed normal sinus rhythm.  Echocardiogram showed an EF of 55 to 60%.  Plan is for outpatient cardiac catheterization once he has recovered from his surgery.  He will follow-up with Dr. Guerra in 4 weeks.  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  Speech therapy evaluated and recommended a puréed diet with thin liquids and upright at 90 degrees for all p.o. intake.  The patient was able to void on his own, and ambulate without difficulties.  His surgical incisions remained intact, soft, no drainage or erythema, no signs of hematoma.  He remained neurologically intact, tongue midline, no focal deficits noted.  Pain was well-controlled on oral pain medication.  Patient was evaluated the ready for discharge.  Verbal and written discharge instructions were provided to the patient.  He verbalized understanding of these.  He will follow-up with Dr. Nix in 2 weeks for a postop visit/incision check.      VITAL SIGNS  /75   Pulse 88   Temp 97.9 °F (36.6 °C) (Oral)   Resp 18   Ht 165.1 cm (65\")   Wt 96.6 kg (213 lb)   SpO2 96%   BMI 35.45 kg/m²   Objective:  Vital signs: (most recent): Blood pressure 130/75, pulse 88, temperature 97.9 °F (36.6 °C), temperature source Oral, resp. rate 18, height 165.1 cm (65\"), weight 96.6 kg (213 lb), SpO2 96%.                Physical Exam:  NAD, alert and oriented, tongue midline, moves all extremities equally  RRR  Lungs " clear  Abd soft, benign  Vascular: Palpable bilateral radial and pedal pulses  Skin: Left neck incision is CDI and soft, no signs of hematoma    CONDITION ON DISCHARGE  Stable.      DISCHARGE DISPOSITION   Home or Self Care      DISCHARGE MEDICATIONS     Discharge Medications        Changes to Medications        Instructions Start Date   isosorbide mononitrate 60 MG 24 hr tablet  Commonly known as: IMDUR  What changed: when to take this   60 mg, Oral, Daily             Continue These Medications        Instructions Start Date   amLODIPine 10 MG tablet  Commonly known as: NORVASC   Take 1 tablet by mouth once daily      aspirin 81 MG EC tablet   81 mg, Daily      clopidogrel 75 MG tablet  Commonly known as: PLAVIX   Take 1 tablet by mouth once daily      docusate sodium 100 MG capsule  Commonly known as: Colace   100 mg, Oral, 2 Times Daily PRN      furosemide 20 MG tablet  Commonly known as: LASIX   20 mg, Oral, Daily, Take both the lasix and potassium together. If not taking lasix don't take the potassium      metFORMIN 1000 MG tablet  Commonly known as: GLUCOPHAGE   1,000 mg, Oral, 2 Times Daily With Meals      metoprolol tartrate 100 MG tablet  Commonly known as: LOPRESSOR   100 mg, Oral, 2 Times Daily      nitroglycerin 0.4 MG SL tablet  Commonly known as: NITROSTAT   0.4 mg, Sublingual, Every 5 Minutes PRN, do not exceed a total of 3 doses in 15 minutes      ondansetron ODT 4 MG disintegrating tablet  Commonly known as: ZOFRAN-ODT   4 mg, Translingual, Every 8 Hours PRN      oxyCODONE-acetaminophen 5-325 MG per tablet  Commonly known as: PERCOCET   1-2 tablets, Oral, Every 6 Hours PRN      OZEMPIC (1 MG/DOSE) SC   Inject  under the skin into the appropriate area as directed.      phenazopyridine 100 MG tablet  Commonly known as: Pyridium   100 mg, Oral, 3 Times Daily PRN      potassium chloride 10 MEQ CR tablet  Commonly known as: KLOR-CON M10   10 mEq, Oral, Daily, Take both the lasix and potassium together.  If not taking lasix don't take the potassium      ranolazine 500 MG 12 hr tablet  Commonly known as: RANEXA   500 mg, Oral, 2 Times Daily      rosuvastatin 40 MG tablet  Commonly known as: CRESTOR   40 mg, Nightly      tamsulosin 0.4 MG capsule 24 hr capsule  Commonly known as: FLOMAX   0.4 mg, Oral, Daily             Diet Instructions       Diet: Cardiac Diets; Healthy Heart (2-3 Na+); Pureed (NDD 1); Thin (IDDSI 0)      Discharge Diet: Cardiac Diets    Cardiac Diet: Healthy Heart (2-3 Na+)    Texture: Pureed (NDD 1)    Fluid Consistency: Thin (IDDSI 0)           Future Appointments   Date Time Provider Department Center   3/21/2025  2:45 PM Gurjit Guerra MD [facility]     Additional Instructions for the Follow-ups that You Need to Schedule       Call MD With Problems / Concerns   As directed      Instructions:  Call office at [phone] for any drainage, increased redness, or fever over 101.5    Order Comments: Instructions:  Call office at [phone] for any drainage, increased redness, or fever over 101.5                Follow-up Information       Gurjit Guerra MD. Schedule an appointment as soon as possible for a visit in 4 week(s).    Specialties: Cardiology, Interventional Cardiology  Contact information:  [address] [facility] [address]  [place] [address]  [phone]               Janet Nix MD. Schedule an appointment as soon as possible for a visit in 2 week(s).    Specialty: Vascular Surgery  Contact information:  [address] [facility] [address]  [place] [address] [phone]               Martin Gracia MD .    Specialty: Family Medicine  Contact information:  [address] [place] [address]  [facility] [address]  [place] [address]  [phone]                             TEST  RESULTS PENDING AT DISCHARGE         Rola Okeefe, EMBER  11/08/24  09:54 EST  O: [phone]    Electronically signed by Janet Nix MD at 11/10/24 4701

## 2024-11-11 ENCOUNTER — TRANSITIONAL CARE MANAGEMENT TELEPHONE ENCOUNTER (OUTPATIENT)
Dept: CALL CENTER | Facility: HOSPITAL | Age: 59
End: 2024-11-11
Payer: COMMERCIAL

## 2024-11-11 NOTE — OUTREACH NOTE
Call Center TCM Note      Flowsheet Row Responses   [facility] facility patient discharged from? [facility]   Does the patient have one of the following disease processes/diagnoses(primary or secondary)? General Surgery   TCM attempt successful? No   Unsuccessful attempts Attempt 1            Joi Doty MA    11/11/2024, 15:12 EST

## 2024-11-11 NOTE — OUTREACH NOTE
Call Center TCM Note      Flowsheet Row Responses   [facility] facility patient discharged from? [facility]   Does the patient have one of the following disease processes/diagnoses(primary or secondary)? General Surgery   TCM attempt successful? No   Unsuccessful attempts Attempt 2            Joi Doty MA    11/11/2024, 16:33 EST

## 2024-11-12 ENCOUNTER — TRANSITIONAL CARE MANAGEMENT TELEPHONE ENCOUNTER (OUTPATIENT)
Dept: CALL CENTER | Facility: HOSPITAL | Age: 59
End: 2024-11-12
Payer: COMMERCIAL

## 2024-11-12 NOTE — OUTREACH NOTE
Call Center TCM Note      Flowsheet Row Responses   [facility] facility patient discharged from? [facility]   Does the patient have one of the following disease processes/diagnoses(primary or secondary)? General Surgery   TCM attempt successful? No   Unsuccessful attempts Attempt 3   Call Status Left message   Comments Post-Op  11/21/2024  2:30 PM            Natalie Van RN    11/12/2024, 08:58 EST

## 2024-11-21 ENCOUNTER — OFFICE VISIT (OUTPATIENT)
Age: 59
End: 2024-11-21
Payer: COMMERCIAL

## 2024-11-21 VITALS
BODY MASS INDEX: 33.15 KG/M2 | SYSTOLIC BLOOD PRESSURE: 138 MMHG | HEIGHT: 65 IN | DIASTOLIC BLOOD PRESSURE: 83 MMHG | WEIGHT: 199 LBS

## 2024-11-21 DIAGNOSIS — I65.22 ASYMPTOMATIC STENOSIS OF LEFT CAROTID ARTERY: Primary | ICD-10-CM

## 2024-11-21 PROCEDURE — 99024 POSTOP FOLLOW-UP VISIT: CPT | Performed by: STUDENT IN AN ORGANIZED HEALTH CARE EDUCATION/TRAINING PROGRAM

## 2024-11-21 NOTE — PROGRESS NOTES
Chief Complaint  Post-op (2 weeks PO and incision check from LT CTD)    Subjective        Mendel Melendez presents to [facility] VASCULAR SURGERY  History of Present Illness  59 y.o. male returns for a post op visit after a left CEA on 11/6/24 for asymptomatic severe left carotid stenosis.  He continues to have left sided tongue numbness and difficulty swallowing, although the difficulty swallowing has improved since hospital discharge.  No other focal deficits or symptoms of stroke or TIA.  He is taking aspirin and plavix.        Past Medical History:   Diagnosis Date    Atrial fib/flutter, transient     Bruit of left carotid artery     CAD (coronary artery disease)     unspecified    Coronary artery disease     Hyperlipidemia     Hypertension     Kidney calculus 09/2024    Nonspecific elevation of levels of transaminase or lactic acid dehydrogenase (LDH)     Occlusion and stenosis of bilateral carotid arteries 02/05/2020    Other specified diabetes mellitus without complications     Paroxysmal atrial fibrillation     Seasonal allergic rhinitis due to pollen     Sleep apnea     Sleep apnea, unspecified     Type 2 diabetes mellitus with other circulatory complications     Unstable angina        Past Surgical History:   Procedure Laterality Date    CARDIAC CATHETERIZATION  04/09/2015    BHF Left main distal 60-70% LCX 80-90% RCA proximal 90% and mid 100% with left to right collaterals.    CARDIAC CATHETERIZATION N/A 07/10/2020    Procedure: Left Heart Cath with angiogram;  Surgeon: Job Griffin MD;  Location: [facility] CATH INVASIVE LOCATION;  Service: Cardiovascular;  Laterality: N/A;    CARDIAC CATHETERIZATION N/A 07/10/2020    Procedure: Saphenous Vein Graft;  Surgeon: Job Griffin MD;  Location: [facility] CATH INVASIVE LOCATION;  Service: Cardiovascular;  Laterality: N/A;  svg x 2  lima    CARDIAC CATHETERIZATION N/A 07/10/2020    Procedure: Left ventriculography;  Surgeon: Job Griffin MD;   Location: [facility] CATH INVASIVE LOCATION;  Service: Cardiovascular;  Laterality: N/A;    CARDIAC CATHETERIZATION  2022    [place]    CARDIAC CATHETERIZATION  2016    CARDIAC SURGERY      CAROTID ENDARTERECTOMY Left 2024    Procedure: LEFT CAROTID ENDARTERECTOMY;  Surgeon: Janet Nix MD;  Location: [facility] MAIN OR;  Service: Vascular;  Laterality: Left;    COLONOSCOPY      2 years back.    CORONARY ARTERY BYPASS GRAFT  2015    Four, left main and three vessel cononary artery disease. Dr. Franklyn Smith    CORONARY ARTERY BYPASS GRAFT  2016    CORONARY STENT PLACEMENT      CYSTOSCOPY, URETEROSCOPY, RETROGRADE PYELOGRAM, STENT INSERTION Right 2024    Procedure: CYSTOSCOPY URETEROSCOPY STONE BASKET EXTRACTION AND STENT INSERTION;  Surgeon: Alejandro López MD;  Location: [facility] MAIN OR;  Service: Urology;  Laterality: Right;    VASECTOMY         Family History   Problem Relation Age of Onset    Heart disease Mother         Heart Problems is Positive in Mother  due to heart stopped at age 62.    Heart attack Father 64    Heart disease Father     Heart disease Maternal Aunt     Heart disease Maternal Uncle     Breast cancer Paternal Aunt     Heart disease Paternal Aunt     Ovarian cancer Paternal Aunt     Heart disease Paternal Uncle          Social History     Tobacco Use    Smoking status: Never    Smokeless tobacco: Never    Tobacco comments:     Patient does not smoke.   Vaping Use    Vaping status: Never Used   Substance Use Topics    Alcohol use: No    Drug use: No       Allergies: Lumason [sulfur hexaflouride lipid a microspheres], Niacin, and Jardiance [empagliflozin]    Prior to Admission medications    Medication Sig Start Date End Date Taking? Authorizing Provider   amLODIPine (NORVASC) 10 MG tablet Take 1 tablet by mouth once daily 24   DavidMariajose MD   aspirin (aspirin) 81 MG EC tablet Take 1 tablet by mouth Daily. 19   Provider, MD Margaret   clopidogrel  (PLAVIX) 75 MG tablet Take 1 tablet by mouth once daily 9/23/24   Mariajose Hernandez MD   docusate sodium (Colace) 100 MG capsule Take 1 capsule by mouth 2 (Two) Times a Day As Needed (post op and while taking narcotics). 9/24/24   Alejandro López MD   furosemide (LASIX) 20 MG tablet Take 1 tablet by mouth Daily. Take both the lasix and potassium together. If not taking lasix don't take the potassium 9/26/24   Mariajose Hernandez MD   isosorbide mononitrate (IMDUR) 60 MG 24 hr tablet Take 1 tablet by mouth Daily. 11/8/24   Rola Okeefe APRN   metFORMIN (GLUCOPHAGE) 1000 MG tablet Take 1 tablet by mouth 2 (Two) Times a Day With Meals. 8/19/24   Mariajose Hernandez MD   metoprolol tartrate (LOPRESSOR) 100 MG tablet Take 1 tablet by mouth 2 (Two) Times a Day. 4/5/24   Mariajose Hernandez MD   nitroglycerin (NITROSTAT) 0.4 MG SL tablet DISSOLVE ONE TABLET UNDER THE TONGUE EVERY 5 MINUTES AS NEEDED FOR CHEST PAIN.  DO NOT EXCEED A TOTAL OF 3 DOSES IN 15 MINUTES 9/23/24   Mariajose Hernandez MD   ondansetron ODT (ZOFRAN-ODT) 4 MG disintegrating tablet Place 1 tablet on the tongue Every 8 (Eight) Hours As Needed for Nausea or Vomiting. 9/24/24   Alejandro López MD   oxyCODONE-acetaminophen (PERCOCET) 5-325 MG per tablet Take 1-2 tablets by mouth Every 6 (Six) Hours As Needed for Severe Pain. 9/24/24   Alejandro López MD   phenazopyridine (Pyridium) 100 MG tablet Take 1 tablet by mouth 3 (Three) Times a Day As Needed (dysuria). 9/24/24   Alejandro López MD   potassium chloride (KLOR-CON M10) 10 MEQ CR tablet Take 1 tablet by mouth Daily. Take both the lasix and potassium together. If not taking lasix don't take the potassium 9/26/24   Mariajose Hernandez MD   ranolazine (RANEXA) 500 MG 12 hr tablet Take 1 tablet by mouth twice daily 10/21/24   Gurjit Guerra MD   rosuvastatin (CRESTOR) 40 MG tablet Take 1 tablet by mouth Every Night.    Provider, MD Margaret   Semaglutide (OZEMPIC, 1 MG/DOSE, SC) Inject  under the skin into  "the appropriate area as directed.    Provider, MD Margaret   tamsulosin (FLOMAX) 0.4 MG capsule 24 hr capsule Take 1 capsule by mouth Daily. 9/24/24   Alejandro López MD         Objective   Vital Signs:  /83 (BP Location: Left arm)   Ht 165.1 cm (65\")   Wt 90.3 kg (199 lb)   BMI 33.12 kg/m²   Estimated body mass index is 33.12 kg/m² as calculated from the following:    Height as of this encounter: 165.1 cm (65\").    Weight as of this encounter: 90.3 kg (199 lb).               Physical Exam     Left cervical incision well healed, no erythema, no fluctuance, no drainage  Tongue protrusion midline, no focal deficits, neurologically intact    Result Review :            Last Echo  Results for orders placed during the hospital encounter of 11/06/24    Adult Transthoracic Echo Complete W/ Cont if Necessary Per Protocol    Interpretation Summary    Left ventricular systolic function is normal. Left ventricular ejection fraction appears to be 56 - 60%.    Left ventricular diastolic function is consistent with (grade I) impaired relaxation.    The right ventricular cavity is mildly dilated.       Last Stress  Results for orders placed during the hospital encounter of 10/21/24    Stress Test With Myocardial Perfusion (1 Day)    Interpretation Summary    Myocardial perfusion imaging indicates a normal myocardial perfusion study with no evidence of ischemia. Impressions are consistent with a low risk study.    Left ventricular ejection fraction is hyperdynamic (Calculated EF > 70%).    There is no prior study available for comparison. Stress and rest imaging compared, there appears to be fixed defect in the high lateral wall as well as decreased counts in the mid basal to apical inferior wall, minimal reversibility, summed difference score of only 2 EF 73% Normal size ventricle with normal regional and global function Intermediate to low risk study by nuclear criteria.    Findings consistent with a normal ECG stress " test.    Small fixed defect inferior inferolateral wall, minimal reversibility  EF 73%  Low risk study by nuclear criteria, cannot rule out minimal inferolateral sami-infarct ischemia  Stress ECG did not restarted heart rate but no changes at submaximal stress with no arrhythmias seen  Sinus rhythm throughout      Results for orders placed during the hospital encounter of 20    Stress Test With Myocardial Perfusion One Day    Interpretation Summary  · Left ventricular ejection fraction is normal (Calculated EF = 56%).  · Myocardial perfusion imaging indicates a large-sized, severe area of ischemia located in the apex and inferior wall.  · Impressions are consistent with a high risk study.       Last Cath  Results for orders placed during the hospital encounter of 07/10/20    Cardiac Catheterization/Vascular Study    Narrative  Heart Cath Report    NAME:              Mendel Melendez  :                1965  AGE/SEX:        55 y.o. male  MRN:                [id number]  ADM DATE:      [unfilled]  DOS:      Pre-Procedure Notes  H&P Performed  [x]  Yes []  No       []  N/A    Indications:  []  ACS <= 24 HRS  []  ACS >24 HRS  [x]  New Onset Angina <= 2 mos  []  Worsening Angina  []  Resuscitated Cardiac Arrest  []  Angina on Exertion:  []  Suspected CAD  []  Valvular Disease  []  Pericardial Disease  []  Cardiac Arrythmia  []  Cardiomyopathy  []  LV Dysfunction  []  Syncope  []  Post Cardiac Transplant  []  Eval. For Exercise Clearance  [x]  Abnormal Stress Test  []  Other  []  Pre-Operative Evaluation  If Pre-Op Eval:  Evaluation for Surgery Type:  []  Cardiac Surgery   []  Non-Cardiac Surgery  Functional Capacity:  []  <4 METS  []  >=4 METS w/o symptoms  []  >= 4 METS with symptoms  []  Unknown  Surgical Risk:  []  Low  []  Intermediate  []  High Risk: Vascular  []  High Risk Non-Vascular    Risks, Benefits, & Complications Discussed:  [x]  Yes  []  No  []  N/A    Questions Answered:  [x]  Yes  []  No  []   N/A    Consent Obtained:  [x]  Yes  []  No  []  N/A    CHF: []  Yes  [x]  No  If Yes:  Newly Diagnosed?  []  Yes  []  No  If Yes:  HF Type:  []  Diastolic  []  Systolic  []  Unknown      Procedure Description  The patient underwent successful [x]  Left  []  Right  []  Left & Right  Heart catheterization and coronary angiography via the  [x]  Femoral approach  []  Radial approach  []  Brachial approach    Procedure Narrative:  Informed consent was obtained from the patient after explaining risks and benefits.  Patient was brought to the cardiac catheterization laboratory and placed on the table in the usual fashion.  Right groin was shaved and prepped in sterile fashion.  2% lidocaine was used for local anesthesia to the right groin.  A total of 20 cc was used.  A 6 Surinamese sheath was placed in the right femoral artery.  A 6 Surinamese pigtail catheter, 6 Surinamese JR4 catheter and a 6 Surinamese JL4 catheter was used for the procedure.  The JR4 catheter was used for the graft also after the cardiac catheterization is complete, all the catheters and sheath were removed.  Patient tolerated the procedure very well.  No complications noted.      Hemodynamic    LVEDP:8   Estimated EF %:60    Initial Aortic Pressure: 130/80    AV Gradient: No gradient    Rt. Heart Pressure:    Wall Motion:  Dominance:  [x]  Left  []  Right  []  Co-Dominant    Coronary Arteriography: (Please Code highest degree of stenosis)    Left Main %:   80-90  Proximal LAD %: 80-90  Mid/Distal LAD %: 100%  LCX %: 100% OM  Ramus:  RCA %: Small nondominant and has a long 70% disease  [place] %: LIMA to the LAD is patent and the distal LAD is patent  SVG(s) %: 50 to the diagonal branch is patent the distal diagonal branch is patent  SVG to the marginal branch is patent the distal marginal branch is patent      Complications: No complication    Estimated Blood Loss:  None      Impression and Recommendation: Severe three-vessel coronary disease  Status post coronary  artery bypass surgery x3 vessels with 3 out of 3 grafts patent  Normal LV function  Continue medical therapy    Electronically signed by Job Griffin MD, 07/10/20, 12:36 PM                Assessment and Plan     Diagnoses and all orders for this visit:    1. Asymptomatic stenosis of left carotid artery (Primary)  -     Ambulatory Referral to ENT (Otolaryngology)  -     Duplex Carotid Ultrasound CAR; Future    Mendel Melendez returns for a follow up visit after a left CEA on 11/6/24.  Post operatively, he had numbness of the left tongue and difficulty swallowing, which I attributed to a hypoglossal neuralgia from the surgery.  This has not improved, but his swallowing his improved since hospital discharge.  No other complaints.  I will refer him to ENT for further evaluation.  He is to continue his current medications and I will follow up with him in 3 months with a carotid duplex for surveillance.          Follow Up     Return in about 3 months (around 2/21/2025).  Patient was given instructions and counseling regarding his condition or for health maintenance advice. Please see specific information pulled into the AVS if appropriate.

## 2024-11-27 RX ORDER — ROSUVASTATIN CALCIUM 40 MG/1
40 TABLET, COATED ORAL DAILY
Qty: 90 TABLET | Refills: 0 | OUTPATIENT
Start: 2024-11-27

## 2024-12-21 DIAGNOSIS — E78.2 MIXED HYPERLIPIDEMIA: ICD-10-CM

## 2024-12-21 DIAGNOSIS — E11.51 TYPE 2 DIABETES MELLITUS WITH DIABETIC PERIPHERAL ANGIOPATHY WITHOUT GANGRENE, WITHOUT LONG-TERM CURRENT USE OF INSULIN: ICD-10-CM

## 2024-12-21 DIAGNOSIS — I10 BENIGN HYPERTENSION: ICD-10-CM

## 2024-12-23 RX ORDER — RANOLAZINE 500 MG/1
500 TABLET, EXTENDED RELEASE ORAL 2 TIMES DAILY
Qty: 90 TABLET | Refills: 0 | Status: SHIPPED | OUTPATIENT
Start: 2024-12-23

## 2024-12-24 RX ORDER — ROSUVASTATIN CALCIUM 40 MG/1
40 TABLET, COATED ORAL NIGHTLY
Qty: 30 TABLET | Refills: 0 | Status: SHIPPED | OUTPATIENT
Start: 2024-12-24

## 2025-01-15 ENCOUNTER — TELEPHONE (OUTPATIENT)
Dept: FAMILY MEDICINE CLINIC | Facility: CLINIC | Age: 60
End: 2025-01-15
Payer: COMMERCIAL

## 2025-01-15 NOTE — TELEPHONE ENCOUNTER
Caller: Mendel Melendez    Relationship to patient: Self    Best call back number: [phone]          Patient is needing: PATIENT IS SCHEDULED TO ESTABLISH WITH [facility] ON 1/22/25 PLEASE ENTER ORDERS FOR YEARLY LABS. PLEASE CALL PATIENT TO SCHEDULE IF LABS NEED TO BE DONE PRIOR TO APPOINTMENT

## 2025-01-21 NOTE — PROGRESS NOTES
Chief Complaint  Establish Care, Annual Exam, Hypertension, Hyperlipidemia, and Diabetes    Subjective          Mendel Melendez presents to [facility] for     HPI    Establish care, former patient of Dr. Hernandez.    Patient presents for annual wellness examination, to discuss health maintenance and disease prevention. Feels well. Activity level: moderate. Diet: Variety of foods.    Patient  reports no history of alcohol use.     Tobacco Use: Low Risk  (2025)    Patient History     Smoking Tobacco Use: Never     Smokeless Tobacco Use: Never     Passive Exposure: Not on file     Colorectal cancer screenin2019, repeat in 10 yrs.     Prostate Cancer Screenin2023    Hypertension  Patient does not check blood pressure at home.   Patient denies  blurred vision, chest pain, dyspnea, headache, palpitations, and peripheral edema   Patient reports they are taking medications as prescribed and they are not having side effects.      Hyperlipidemia  Patient is following a low cholesterol diet.   Currently is on statin therapy.  Patient reports is exercising.  Patient reports they are taking medications as prescribed and they are not having side effects.      Diabetes  Patient does not check blood sugar at home.  Associated symptoms include Neuropathy  Per patient current diet is Variety of foods.  Patient does avoid concentrated sweets.  Patient does not see podiatry.  Patient see's [facility] for diabetic eye exam and last eye exam was greater than 1 year ago.  Patient reports they are taking medications as prescribed and they are not having side effects.  Last A1c was 6.7 on 10/28/2024.     Diabetic Foot Exam Performed and Monofilament Test Performed    Atrial fibrillation  Palpitations 1-2x/week.    Objective   Vital Signs:   /72 (BP Location: Right arm, Patient Position: Sitting, Cuff Size: Adult)   Pulse 89   Temp 98.2 °F (36.8 °C) (Temporal)   Resp 18   Ht 166.4  "cm (65.5\")   Wt 95.3 kg (210 lb 2 oz)   SpO2 97% Comment: room air  BMI 34.43 kg/m²     Physical Exam  Vitals and nursing note reviewed.   Constitutional:       General: He is not in acute distress.     Appearance: Normal appearance. He is not ill-appearing or diaphoretic.   HENT:      Head: Normocephalic and atraumatic.      Nose: No congestion or rhinorrhea.   Eyes:      Extraocular Movements: Extraocular movements intact.      Pupils: Pupils are equal, round, and reactive to light.   Cardiovascular:      Rate and Rhythm: Normal rate and regular rhythm.      Pulses: Normal pulses.   Pulmonary:      Effort: Pulmonary effort is normal.      Breath sounds: Normal breath sounds.   Musculoskeletal:         General: Normal range of motion.      Cervical back: Normal range of motion.   Feet:      Right foot:      Protective Sensation: 10 sites tested.  10 sites sensed.      Skin integrity: Dry skin present.      Left foot:      Protective Sensation: 10 sites tested.  10 sites sensed.      Skin integrity: Dry skin present.   Skin:     General: Skin is warm and dry.   Neurological:      Mental Status: He is alert and oriented to person, place, and time.   Psychiatric:         Mood and Affect: Mood normal.         Behavior: Behavior normal.        Result Review :                 Assessment and Plan    Diagnoses and all orders for this visit:    1. Encounter for annual general medical examination with abnormal findings in adult (Primary)  Overview:  Discussed positive lifestyle changes including healthy diet and regular exercise. Reviewed screenings as indicated above. General safety discussed. Previous labs and specialist documentation reviewed. We will notify of today's lab results as they become available.       2. Encounter for colorectal cancer screening  Overview:  Last colonoscopy 03/04/2019, repeat 2029.      3. Prostate cancer screening  Overview:  Lab Results   Component Value Date    PSA 1.1 09/22/2023    PSA " 0.3 08/19/2022    PSA 0.9 02/15/2021     Continue annual screens.    Orders:  -     PSA Screen    4. Benign hypertension  Overview:  Regimen: Amlodipine 10 mg qd, Lopressor 100 mg bid (afib)    Controlled. Compliant. Continue meds.    Orders:  -     Comprehensive Metabolic Panel  -     TSH Rfx On Abnormal To Free T4  -     CBC (No Diff)    5. Mixed hyperlipidemia  Overview:  Regimen: Crestor 40 mg    Lab Results   Component Value Date    CHOL 143 07/08/2020    CHLPL 148 09/22/2023    TRIG 85 09/22/2023    HDL 68 09/22/2023    LDL 64 09/22/2023     Stable. At target (LDL <70). Continue meds.  LDL at goal.       Assessment & Plan:  Recheck lipid panel today.    Orders:  -     rosuvastatin (CRESTOR) 40 MG tablet; Take 1 tablet by mouth Every Night.  Dispense: 30 tablet; Refill: 0  -     Lipid Panel    6. Type 2 diabetes mellitus with diabetic peripheral angiopathy without gangrene, without long-term current use of insulin  Overview:  Regimen: Ozempic 1 mg weekly, metformin 1000 mg bid.    A1c 6.8 01/22/2025 stable, marginal - implement lifestyle changes, repeat 3 mo, consider Ozempic increase if not improved  A1c 6.7 10/28/2024 improved  A1c 7.1 07/05/2024 stable, marginal added GLP1 tolerating continue  A1c 7.2 04/08/2024 stable  A1c 7.2 09/22/2023 improve diet and exercise.   A1c 6.9 06/16/2023 improved, he reports no hypo or   A1c 6.8 11/28/2022   A1c 6.6 09/24/2021 improved.   A1c 7.2 10/06/2020   A1c 7.3 11/18/2019     A1c 6.9 5/7/2019   A1c 7.0 02/12/2018    A1c 7.1 11/1/2018 new onset    Assessment & Plan:  Controlled, compliant. Continue meds.  Recheck A1c.  Counseled on diet, exercise, and weight loss, with specific dietary interventions discussed in detail.    Orders:  -     Microalbumin / Creatinine Urine Ratio - Urine, Clean Catch  -     metFORMIN (GLUCOPHAGE) 1000 MG tablet; Take 1 tablet by mouth 2 (Two) Times a Day With Meals.  Dispense: 180 tablet; Refill: 3  -     Semaglutide, 1 MG/DOSE, (Ozempic, 1  MG/DOSE,) 2 MG/1.5ML solution pen-injector; Inject 1 mg under the skin into the appropriate area as directed 1 (One) Time Per Week.  Dispense: 1.5 mL; Refill: 12  -     Hemoglobin A1c    7. Class 2 severe obesity due to excess calories with serious comorbidity and body mass index (BMI) of 36.0 to 36.9 in adult  Overview:  Diet exercise and wt loss encouraged Techniques discussed    Assessment & Plan:  Patient's (Body mass index is 34.43 kg/m².) indicates that they are obese (BMI >30) with health conditions that include hypertension, coronary heart disease, diabetes mellitus, and dyslipidemias . Weight is unchanged. BMI  is above average; BMI management plan is completed. We discussed portion control and increasing exercise.       8. Paroxysmal atrial fibrillation  Overview:  Patient new to this provider.  Reportedly isolated incident during heart surgery in 2015 without known subsequent breakthrough.  He is however endorsing intermittent palpitations.  He is not anticoagulated. CHASVASc certainly 2+.  We will get a Holter study. If afib is identified, certainly anticoagulation would be indicated.  If no afib, can discuss R/B further, pt to decide.    Orders:  -     Holter Monitor - 72 Hour Up To 15 Days; Future    9. Coronary artery disease involving coronary bypass graft of native heart with angina pectoris  Orders:  -     nitroglycerin (NITROSTAT) 0.4 MG SL tablet; Place 1 tablet under the tongue Every 5 (Five) Minutes As Needed for Chest Pain. do not exceed a total of 3 doses in 15 minutes  Dispense: 100 tablet; Refill: 0       I spent 50 minutes caring for Mendel on this date of service. This time includes time spent by me in the following activities:preparing for the visit, reviewing tests, performing a medically appropriate examination and/or evaluation , counseling and educating the patient/family/caregiver, ordering medications, tests, or procedures, and documenting information in the medical record  Martin  MD Kurt    NOTE: [facility], per Health Information accessibility laws, allows progress notes to be visible to patients. Please note that these notes will include professional medical terminology that may be somewhat confusing without some interpretation from your medical professional team. The intent of progress notes is to communicate information between any medical professionals involved in your case, or to serve as future reference for myself or any other provider when reviewing your case, as well as a reference for the patient viewing the record. Please ask a member of the medical team if you have any questions about terminology or content of note.    DISCLAIMER: Part of this note may be an electronic transcription/translation of spoken language to printed text using the Dragon Dictation System.

## 2025-01-22 ENCOUNTER — TELEPHONE (OUTPATIENT)
Dept: FAMILY MEDICINE CLINIC | Facility: CLINIC | Age: 60
End: 2025-01-22

## 2025-01-22 ENCOUNTER — OFFICE VISIT (OUTPATIENT)
Dept: FAMILY MEDICINE CLINIC | Facility: CLINIC | Age: 60
End: 2025-01-22
Payer: COMMERCIAL

## 2025-01-22 VITALS
SYSTOLIC BLOOD PRESSURE: 126 MMHG | HEIGHT: 66 IN | RESPIRATION RATE: 18 BRPM | WEIGHT: 210.13 LBS | TEMPERATURE: 98.2 F | DIASTOLIC BLOOD PRESSURE: 72 MMHG | HEART RATE: 89 BPM | BODY MASS INDEX: 33.77 KG/M2 | OXYGEN SATURATION: 97 %

## 2025-01-22 DIAGNOSIS — E66.812 CLASS 2 SEVERE OBESITY DUE TO EXCESS CALORIES WITH SERIOUS COMORBIDITY AND BODY MASS INDEX (BMI) OF 36.0 TO 36.9 IN ADULT: ICD-10-CM

## 2025-01-22 DIAGNOSIS — E66.01 CLASS 2 SEVERE OBESITY DUE TO EXCESS CALORIES WITH SERIOUS COMORBIDITY AND BODY MASS INDEX (BMI) OF 36.0 TO 36.9 IN ADULT: ICD-10-CM

## 2025-01-22 DIAGNOSIS — E11.51 TYPE 2 DIABETES MELLITUS WITH DIABETIC PERIPHERAL ANGIOPATHY WITHOUT GANGRENE, WITHOUT LONG-TERM CURRENT USE OF INSULIN: ICD-10-CM

## 2025-01-22 DIAGNOSIS — Z12.12 ENCOUNTER FOR COLORECTAL CANCER SCREENING: ICD-10-CM

## 2025-01-22 DIAGNOSIS — I25.709 CORONARY ARTERY DISEASE INVOLVING CORONARY BYPASS GRAFT OF NATIVE HEART WITH ANGINA PECTORIS: ICD-10-CM

## 2025-01-22 DIAGNOSIS — I10 BENIGN HYPERTENSION: ICD-10-CM

## 2025-01-22 DIAGNOSIS — Z12.11 ENCOUNTER FOR COLORECTAL CANCER SCREENING: ICD-10-CM

## 2025-01-22 DIAGNOSIS — E78.2 MIXED HYPERLIPIDEMIA: ICD-10-CM

## 2025-01-22 DIAGNOSIS — Z12.5 PROSTATE CANCER SCREENING: ICD-10-CM

## 2025-01-22 DIAGNOSIS — I48.0 PAROXYSMAL ATRIAL FIBRILLATION: ICD-10-CM

## 2025-01-22 DIAGNOSIS — Z00.01 ENCOUNTER FOR ANNUAL GENERAL MEDICAL EXAMINATION WITH ABNORMAL FINDINGS IN ADULT: Primary | ICD-10-CM

## 2025-01-22 RX ORDER — ROSUVASTATIN CALCIUM 40 MG/1
40 TABLET, COATED ORAL NIGHTLY
Qty: 30 TABLET | Refills: 0 | Status: SHIPPED | OUTPATIENT
Start: 2025-01-22

## 2025-01-22 RX ORDER — NITROGLYCERIN 0.4 MG/1
0.4 TABLET SUBLINGUAL
Qty: 100 TABLET | Refills: 0 | Status: SHIPPED | OUTPATIENT
Start: 2025-01-22

## 2025-01-22 RX ORDER — SEMAGLUTIDE 1.34 MG/ML
1 INJECTION, SOLUTION SUBCUTANEOUS WEEKLY
Qty: 1.5 ML | Refills: 12 | Status: SHIPPED | OUTPATIENT
Start: 2025-01-22

## 2025-01-22 NOTE — ASSESSMENT & PLAN NOTE
Patient's (Body mass index is 34.43 kg/m².) indicates that they are obese (BMI >30) with health conditions that include hypertension, coronary heart disease, diabetes mellitus, and dyslipidemias . Weight is unchanged. BMI  is above average; BMI management plan is completed. We discussed portion control and increasing exercise.

## 2025-01-22 NOTE — ASSESSMENT & PLAN NOTE
Controlled, compliant. Continue meds.  Recheck A1c.  Counseled on diet, exercise, and weight loss, with specific dietary interventions discussed in detail.

## 2025-01-23 LAB
ALBUMIN SERPL-MCNC: 4.6 G/DL (ref 3.8–4.9)
ALBUMIN/CREAT UR: 3 MG/G CREAT (ref 0–29)
ALP SERPL-CCNC: 101 IU/L (ref 44–121)
ALT SERPL-CCNC: 33 IU/L (ref 0–44)
AST SERPL-CCNC: 21 IU/L (ref 0–40)
BILIRUB SERPL-MCNC: 0.2 MG/DL (ref 0–1.2)
BUN SERPL-MCNC: 18 MG/DL (ref 6–24)
BUN/CREAT SERPL: 17 (ref 9–20)
CALCIUM SERPL-MCNC: 9.6 MG/DL (ref 8.7–10.2)
CHLORIDE SERPL-SCNC: 101 MMOL/L (ref 96–106)
CHOLEST SERPL-MCNC: 164 MG/DL (ref 100–199)
CO2 SERPL-SCNC: 24 MMOL/L (ref 20–29)
CREAT SERPL-MCNC: 1.04 MG/DL (ref 0.76–1.27)
CREAT UR-MCNC: 88.1 MG/DL
EGFRCR SERPLBLD CKD-EPI 2021: 84 ML/MIN/1.73
ERYTHROCYTE [DISTWIDTH] IN BLOOD BY AUTOMATED COUNT: 12.6 % (ref 11.6–15.4)
GLOBULIN SER CALC-MCNC: 2.6 G/DL (ref 1.5–4.5)
GLUCOSE SERPL-MCNC: 116 MG/DL (ref 70–99)
HBA1C MFR BLD: 6.8 % (ref 4.8–5.6)
HCT VFR BLD AUTO: 48.4 % (ref 37.5–51)
HDLC SERPL-MCNC: 66 MG/DL
HGB BLD-MCNC: 15.4 G/DL (ref 13–17.7)
LDLC SERPL CALC-MCNC: 77 MG/DL (ref 0–99)
MCH RBC QN AUTO: 28.2 PG (ref 26.6–33)
MCHC RBC AUTO-ENTMCNC: 31.8 G/DL (ref 31.5–35.7)
MCV RBC AUTO: 89 FL (ref 79–97)
MICROALBUMIN UR-MCNC: 3 UG/ML
PLATELET # BLD AUTO: 293 X10E3/UL (ref 150–450)
POTASSIUM SERPL-SCNC: 4.8 MMOL/L (ref 3.5–5.2)
PROT SERPL-MCNC: 7.2 G/DL (ref 6–8.5)
PSA SERPL-MCNC: 1 NG/ML (ref 0–4)
RBC # BLD AUTO: 5.47 X10E6/UL (ref 4.14–5.8)
SODIUM SERPL-SCNC: 140 MMOL/L (ref 134–144)
TRIGL SERPL-MCNC: 119 MG/DL (ref 0–149)
TSH SERPL DL<=0.005 MIU/L-ACNC: 2.13 UIU/ML (ref 0.45–4.5)
VLDLC SERPL CALC-MCNC: 21 MG/DL (ref 5–40)
WBC # BLD AUTO: 7.5 X10E3/UL (ref 3.4–10.8)

## 2025-01-24 ENCOUNTER — PATIENT ROUNDING (BHMG ONLY) (OUTPATIENT)
Dept: FAMILY MEDICINE CLINIC | Facility: CLINIC | Age: 60
End: 2025-01-24
Payer: COMMERCIAL

## 2025-01-24 NOTE — PROGRESS NOTES
"January 24, 2025    Hello, may I speak with Mendel Melendez?    My name is Cathleen    I am  with [facility] [address]  [facility]  [address] [place] [address] [address]  [place] [address].    Before we get started may I verify your date of birth? 1965    I am calling to officially welcome you to our practice and ask about your recent visit. Is this a good time to talk? yes    Tell me about your visit with us. What things went well?  \"super duper\"       We're always looking for ways to make our patients' experiences even better. Do you have recommendations on ways we may improve?  no    Overall were you satisfied with your first visit to our practice? yes       I appreciate you taking the time to speak with me today. Is there anything else I can do for you? no      Thank you, and have a great day.      "

## 2025-01-29 ENCOUNTER — HOSPITAL ENCOUNTER (OUTPATIENT)
Dept: RESPIRATORY THERAPY | Facility: HOSPITAL | Age: 60
Discharge: HOME | End: 2025-01-29
Payer: COMMERCIAL

## 2025-01-29 ENCOUNTER — TELEPHONE (OUTPATIENT)
Dept: FAMILY MEDICINE CLINIC | Facility: CLINIC | Age: 60
End: 2025-01-29
Payer: COMMERCIAL

## 2025-01-29 DIAGNOSIS — I48.0 PAROXYSMAL ATRIAL FIBRILLATION: ICD-10-CM

## 2025-01-29 PROCEDURE — 93246 EXT ECG>7D<15D RECORDING: CPT

## 2025-01-29 NOTE — TELEPHONE ENCOUNTER
"----- Message from Martin Hicks sent at 1/26/2025 10:53 AM EST -----  A1c is 6.8 which is about the same as it was 3 months ago.  We do need tighter control of his blood sugars.  Recommend that he continue to focus on reducing carbohydrates from diet, weight loss, and increasing exercise.  We will recheck in 3 months.  If it has not improved at that time, would recommend increasing Ozempic.    Cholesterol is good but LDL (the \"bad\" cholesterol) is a bit above target.  Can likely get better control of this with the same dietary changes that will improve his A1c.    Otherwise, labs are unremarkable.  Electrolytes, kidney, liver, thyroid, and prostate tests are normal.    Thank you.  "

## 2025-01-29 NOTE — TELEPHONE ENCOUNTER
Called and left voicemail letting patient know MyChart message was sent. Let patient know that if he has any questions or concerns to let our office know.

## 2025-02-18 LAB
CV ZIO BASELINE AVG BPM: 86 BPM
CV ZIO BASELINE BPM HIGH: 127 BPM
CV ZIO BASELINE BPM LOW: 61 BPM
CV ZIO DEVICE ANALYSIS TIME: NORMAL
CV ZIO ECT SVE COUNT: 114 EPISODES
CV ZIO ECT SVE CPLT COUNT: 11 EPISODES
CV ZIO ECT SVE CPLT FREQ: NORMAL
CV ZIO ECT SVE FREQ: NORMAL
CV ZIO ECT SVE TPLT COUNT: 0 EPISODES
CV ZIO ECT SVE TPLT FREQ: 0
CV ZIO ECT VE COUNT: 2367 EPISODES
CV ZIO ECT VE CPLT COUNT: 13 EPISODES
CV ZIO ECT VE CPLT FREQ: NORMAL
CV ZIO ECT VE FREQ: NORMAL
CV ZIO ECT VE TPLT COUNT: 0 EPISODES
CV ZIO ECT VE TPLT FREQ: 0
CV ZIO ECTOPIC SVE COUPLET RAW PERCENT: 0 %
CV ZIO ECTOPIC SVE ISOLATED PERCENT: 0.01 %
CV ZIO ECTOPIC SVE TRIPLET RAW PERCENT: 0 %
CV ZIO ECTOPIC VE COUPLET RAW PERCENT: 0 %
CV ZIO ECTOPIC VE ISOLATED PERCENT: 0.14 %
CV ZIO ECTOPIC VE TRIPLET RAW PERCENT: 0 %
CV ZIO ENROLLMENT END: NORMAL
CV ZIO ENROLLMENT START: NORMAL
CV ZIO L TRIGEMINY DUR: 11.9 SEC
CV ZIO L TRIGEMINY END: NORMAL
CV ZIO L TRIGEMINY START: NORMAL
CV ZIO PATIENT EVENTS DIARIES: 6
CV ZIO PATIENT EVENTS TRIGGERS: 5
CV ZIO PAUSE COUNT: 0
CV ZIO PRESCRIPTION STATUS: NORMAL
CV ZIO SVT COUNT: 0
CV ZIO TOTAL  ENROLLMENT PERIOD: NORMAL
CV ZIO VT COUNT: 0

## 2025-02-27 ENCOUNTER — HOSPITAL ENCOUNTER (OUTPATIENT)
Dept: CARDIOLOGY | Facility: HOSPITAL | Age: 60
Discharge: HOME | End: 2025-02-27
Admitting: STUDENT IN AN ORGANIZED HEALTH CARE EDUCATION/TRAINING PROGRAM
Payer: COMMERCIAL

## 2025-02-27 ENCOUNTER — OFFICE VISIT (OUTPATIENT)
Age: 60
End: 2025-02-27
Payer: COMMERCIAL

## 2025-02-27 VITALS
HEIGHT: 66 IN | BODY MASS INDEX: 33.91 KG/M2 | DIASTOLIC BLOOD PRESSURE: 82 MMHG | WEIGHT: 211 LBS | SYSTOLIC BLOOD PRESSURE: 131 MMHG

## 2025-02-27 DIAGNOSIS — I65.22 ASYMPTOMATIC STENOSIS OF LEFT CAROTID ARTERY: ICD-10-CM

## 2025-02-27 DIAGNOSIS — I65.23 BILATERAL CAROTID ARTERY STENOSIS: Primary | ICD-10-CM

## 2025-02-27 LAB
BH CV XLRA MEAS LEFT CAROTID BULB EDV: -19.3 CM/SEC
BH CV XLRA MEAS LEFT CAROTID BULB PSV: -54 CM/SEC
BH CV XLRA MEAS LEFT DIST CCA EDV: -29.5 CM/SEC
BH CV XLRA MEAS LEFT DIST CCA PSV: -81.4 CM/SEC
BH CV XLRA MEAS LEFT DIST ICA EDV: -34.2 CM/SEC
BH CV XLRA MEAS LEFT DIST ICA PSV: -60.3 CM/SEC
BH CV XLRA MEAS LEFT ICA/CCA RATIO: 0.74
BH CV XLRA MEAS LEFT PROX CCA EDV: 23.4 CM/SEC
BH CV XLRA MEAS LEFT PROX CCA PSV: 78.9 CM/SEC
BH CV XLRA MEAS LEFT PROX ECA PSV: -159 CM/SEC
BH CV XLRA MEAS LEFT PROX ICA EDV: -19.9 CM/SEC
BH CV XLRA MEAS LEFT PROX ICA PSV: -42.9 CM/SEC
BH CV XLRA MEAS LEFT PROX SCLA PSV: 113 CM/SEC
BH CV XLRA MEAS LEFT VERTEBRAL A EDV: -11 CM/SEC
BH CV XLRA MEAS LEFT VERTEBRAL A PSV: -30.6 CM/SEC
BH CV XLRA MEAS RIGHT CAROTID BULB EDV: -22.5 CM/SEC
BH CV XLRA MEAS RIGHT CAROTID BULB PSV: -74.5 CM/SEC
BH CV XLRA MEAS RIGHT DIST CCA EDV: 17.4 CM/SEC
BH CV XLRA MEAS RIGHT DIST CCA PSV: 60.9 CM/SEC
BH CV XLRA MEAS RIGHT DIST ICA EDV: -30.3 CM/SEC
BH CV XLRA MEAS RIGHT DIST ICA PSV: -80.6 CM/SEC
BH CV XLRA MEAS RIGHT ICA/CCA RATIO: -1.13
BH CV XLRA MEAS RIGHT PROX CCA EDV: 18.6 CM/SEC
BH CV XLRA MEAS RIGHT PROX CCA PSV: 72.1 CM/SEC
BH CV XLRA MEAS RIGHT PROX ECA PSV: -118 CM/SEC
BH CV XLRA MEAS RIGHT PROX ICA EDV: -36.4 CM/SEC
BH CV XLRA MEAS RIGHT PROX ICA PSV: -81.4 CM/SEC
BH CV XLRA MEAS RIGHT PROX SCLA PSV: 199 CM/SEC
BH CV XLRA MEAS RIGHT VERTEBRAL A EDV: -11.9 CM/SEC
BH CV XLRA MEAS RIGHT VERTEBRAL A PSV: -30.3 CM/SEC

## 2025-02-27 PROCEDURE — 93880 EXTRACRANIAL BILAT STUDY: CPT

## 2025-02-27 NOTE — PROGRESS NOTES
Chief Complaint  Carotid Artery Disease    Subjective        Mendel Melendez presents to [facility] VASCULAR SURGERY  History of Present Illness  59 y.o. male returns for follow up s/p left CEA on 11/6/24.  His post op course has been complicated by hypoglossal neuralgia.  I referred him to ENT for further evaluation but he has not seen them yet.   His tongue numbness has improved and swallowing is now normalized.  He does have persistent left neck numbness around his incision and jawline, I explained this may be permanent but likely will improve over the next months.  No stroke or TIA symptoms.  Angina has improved post operatively.  Otherwise he is doing quite well.  He continues to take aspirin and plavix, along with a statin daily.     Past Medical History:   Diagnosis Date    Atrial fib/flutter, transient     Bruit of left carotid artery     CAD (coronary artery disease)     unspecified    Coronary artery disease     Hyperlipidemia     Hypertension     Kidney calculus 09/2024    Nonspecific elevation of levels of transaminase or lactic acid dehydrogenase (LDH)     Occlusion and stenosis of bilateral carotid arteries 02/05/2020    Other specified diabetes mellitus without complications     Paroxysmal atrial fibrillation     Seasonal allergic rhinitis due to pollen     Sleep apnea     Sleep apnea, unspecified     Type 2 diabetes mellitus with other circulatory complications     Unstable angina        Past Surgical History:   Procedure Laterality Date    CARDIAC CATHETERIZATION  04/09/2015    BHF Left main distal 60-70% LCX 80-90% RCA proximal 90% and mid 100% with left to right collaterals.    CARDIAC CATHETERIZATION N/A 07/10/2020    Procedure: Left Heart Cath with angiogram;  Surgeon: Job Griffin MD;  Location: [facility] CATH INVASIVE LOCATION;  Service: Cardiovascular;  Laterality: N/A;    CARDIAC CATHETERIZATION N/A 07/10/2020    Procedure: Saphenous Vein Graft;  Surgeon: Job Griffin MD;   Location: [facility] CATH INVASIVE LOCATION;  Service: Cardiovascular;  Laterality: N/A;  svg x 2  lima    CARDIAC CATHETERIZATION N/A 07/10/2020    Procedure: Left ventriculography;  Surgeon: Job Griffin MD;  Location: [facility] CATH INVASIVE LOCATION;  Service: Cardiovascular;  Laterality: N/A;    CARDIAC CATHETERIZATION  2022    Council    CARDIAC CATHETERIZATION  2016    CARDIAC SURGERY      CAROTID ENDARTERECTOMY Left 2024    Procedure: LEFT CAROTID ENDARTERECTOMY;  Surgeon: Janet Nix MD;  Location: [facility] MAIN OR;  Service: Vascular;  Laterality: Left;    COLONOSCOPY      2 years back.    CORONARY ARTERY BYPASS GRAFT  2015    Four, left main and three vessel cononary artery disease. Dr. Franklyn Smith    CORONARY ARTERY BYPASS GRAFT  2016    CORONARY STENT PLACEMENT      CYSTOSCOPY, URETEROSCOPY, RETROGRADE PYELOGRAM, STENT INSERTION Right 2024    Procedure: CYSTOSCOPY URETEROSCOPY STONE BASKET EXTRACTION AND STENT INSERTION;  Surgeon: Alejandro López MD;  Location: [facility] MAIN OR;  Service: Urology;  Laterality: Right;    VASECTOMY         Family History   Problem Relation Age of Onset    Heart disease Mother         Heart Problems is Positive in Mother  due to heart stopped at age 62.    Heart attack Father 64    Heart disease Father     Heart disease Maternal Aunt     Heart disease Maternal Uncle     Breast cancer Paternal Aunt     Heart disease Paternal Aunt     Ovarian cancer Paternal Aunt     Heart disease Paternal Uncle          Social History     Tobacco Use    Smoking status: Never     Passive exposure: Never    Smokeless tobacco: Never    Tobacco comments:     Patient does not smoke.   Vaping Use    Vaping status: Never Used   Substance Use Topics    Alcohol use: No    Drug use: No       Allergies: Lumason [sulfur hexaflouride lipid a microspheres], Niacin, and Jardiance [empagliflozin]    Prior to Admission medications    Medication Sig Start Date End Date Taking?  "Authorizing Provider   amLODIPine (NORVASC) 10 MG tablet Take 1 tablet by mouth once daily 8/14/24  Yes Mariajose Hernandez MD   aspirin (aspirin) 81 MG EC tablet Take 1 tablet by mouth Daily. 5/7/19  Yes ProviderMargaret MD   clopidogrel (PLAVIX) 75 MG tablet Take 1 tablet by mouth once daily 9/23/24  Yes Mariajose Hernandez MD   isosorbide mononitrate (IMDUR) 60 MG 24 hr tablet Take 1 tablet by mouth Daily. 11/8/24  Yes Rola Okeefe APRN   metFORMIN (GLUCOPHAGE) 1000 MG tablet Take 1 tablet by mouth 2 (Two) Times a Day With Meals. 1/22/25  Yes Martin Hicks MD   metoprolol tartrate (LOPRESSOR) 100 MG tablet Take 1 tablet by mouth 2 (Two) Times a Day. 4/5/24  Yes Mariajose Hernandez MD   nitroglycerin (NITROSTAT) 0.4 MG SL tablet Place 1 tablet under the tongue Every 5 (Five) Minutes As Needed for Chest Pain. do not exceed a total of 3 doses in 15 minutes 1/22/25  Yes Martin Hicks MD   ondansetron ODT (ZOFRAN-ODT) 4 MG disintegrating tablet Place 1 tablet on the tongue Every 8 (Eight) Hours As Needed for Nausea or Vomiting. 9/24/24  Yes Alejandro López MD   ranolazine (RANEXA) 500 MG 12 hr tablet Take 1 tablet by mouth twice daily 12/23/24  Yes Gurjit Guerra MD   rosuvastatin (CRESTOR) 40 MG tablet Take 1 tablet by mouth Every Night. 1/22/25  Yes Martin Hicks MD   Semaglutide, 1 MG/DOSE, (Ozempic, 1 MG/DOSE,) 2 MG/1.5ML solution pen-injector Inject 1 mg under the skin into the appropriate area as directed 1 (One) Time Per Week. 1/22/25  Yes Martin Hicks MD         Objective   Vital Signs:  /82 (BP Location: Right arm)   Ht 166.4 cm (65.51\")   Wt 95.7 kg (211 lb)   BMI 34.57 kg/m²   Estimated body mass index is 34.57 kg/m² as calculated from the following:    Height as of this encounter: 166.4 cm (65.51\").    Weight as of this encounter: 95.7 kg (211 lb).               Physical Exam  Vitals reviewed.   Constitutional:       General: He is not in acute distress.     " Appearance: Normal appearance.   HENT:      Head: Normocephalic and atraumatic.      Right Ear: External ear normal.      Left Ear: External ear normal.      Nose: Nose normal.      Mouth/Throat:      Mouth: Mucous membranes are moist.      Pharynx: Oropharynx is clear.   Eyes:      Extraocular Movements: Extraocular movements intact.      Conjunctiva/sclera: Conjunctivae normal.      Pupils: Pupils are equal, round, and reactive to light.   Cardiovascular:      Rate and Rhythm: Normal rate and regular rhythm.      Pulses:           Carotid pulses are 2+ on the right side and 2+ on the left side.       Radial pulses are 2+ on the right side and 2+ on the left side.        Dorsalis pedis pulses are 2+ on the right side and 2+ on the left side.        Posterior tibial pulses are 2+ on the right side and 2+ on the left side.      Comments: Left neck incision well healed, no erythema, no drainage    Pulmonary:      Comments: Non labored respirations on room air, equal chest rise  Abdominal:      General: Abdomen is flat. There is no distension.      Palpations: Abdomen is soft.   Musculoskeletal:      Cervical back: Normal range of motion. No rigidity.      Right lower leg: No edema.      Left lower leg: No edema.   Skin:     General: Skin is warm and dry.      Capillary Refill: Capillary refill takes less than 2 seconds.   Neurological:      General: No focal deficit present.      Mental Status: He is alert and oriented to person, place, and time.   Psychiatric:         Mood and Affect: Mood normal.         Behavior: Behavior normal.        Result Review :            Last Echo  Results for orders placed during the hospital encounter of 11/06/24    Adult Transthoracic Echo Complete W/ Cont if Necessary Per Protocol    Interpretation Summary    Left ventricular systolic function is normal. Left ventricular ejection fraction appears to be 56 - 60%.    Left ventricular diastolic function is consistent with (grade I)  impaired relaxation.    The right ventricular cavity is mildly dilated.       Last Stress  Results for orders placed during the hospital encounter of 10/21/24    Stress Test With Myocardial Perfusion (1 Day)    Interpretation Summary    Myocardial perfusion imaging indicates a normal myocardial perfusion study with no evidence of ischemia. Impressions are consistent with a low risk study.    Left ventricular ejection fraction is hyperdynamic (Calculated EF > 70%).    There is no prior study available for comparison. Stress and rest imaging compared, there appears to be fixed defect in the high lateral wall as well as decreased counts in the mid basal to apical inferior wall, minimal reversibility, summed difference score of only 2 EF 73% Normal size ventricle with normal regional and global function Intermediate to low risk study by nuclear criteria.    Findings consistent with a normal ECG stress test.    Small fixed defect inferior inferolateral wall, minimal reversibility  EF 73%  Low risk study by nuclear criteria, cannot rule out minimal inferolateral sami-infarct ischemia  Stress ECG did not restarted heart rate but no changes at submaximal stress with no arrhythmias seen  Sinus rhythm throughout      Results for orders placed during the hospital encounter of 20    Stress Test With Myocardial Perfusion One Day    Interpretation Summary  · Left ventricular ejection fraction is normal (Calculated EF = 56%).  · Myocardial perfusion imaging indicates a large-sized, severe area of ischemia located in the apex and inferior wall.  · Impressions are consistent with a high risk study.       Last Cath  Results for orders placed during the hospital encounter of 07/10/20    Cardiac Catheterization/Vascular Study    Narrative  Heart Cath Report    NAME:              Mendel Melendez  :                1965  AGE/SEX:        55 y.o. male  MRN:                [id number]  ADM DATE:       [unfilled]  DOS:      Pre-Procedure Notes  H&P Performed  [x]  Yes []  No       []  N/A    Indications:  []  ACS <= 24 HRS  []  ACS >24 HRS  [x]  New Onset Angina <= 2 mos  []  Worsening Angina  []  Resuscitated Cardiac Arrest  []  Angina on Exertion:  []  Suspected CAD  []  Valvular Disease  []  Pericardial Disease  []  Cardiac Arrythmia  []  Cardiomyopathy  []  LV Dysfunction  []  Syncope  []  Post Cardiac Transplant  []  Eval. For Exercise Clearance  [x]  Abnormal Stress Test  []  Other  []  Pre-Operative Evaluation  If Pre-Op Eval:  Evaluation for Surgery Type:  []  Cardiac Surgery   []  Non-Cardiac Surgery  Functional Capacity:  []  <4 METS  []  >=4 METS w/o symptoms  []  >= 4 METS with symptoms  []  Unknown  Surgical Risk:  []  Low  []  Intermediate  []  High Risk: Vascular  []  High Risk Non-Vascular    Risks, Benefits, & Complications Discussed:  [x]  Yes  []  No  []  N/A    Questions Answered:  [x]  Yes  []  No  []  N/A    Consent Obtained:  [x]  Yes  []  No  []  N/A    CHF: []  Yes  [x]  No  If Yes:  Newly Diagnosed?  []  Yes  []  No  If Yes:  HF Type:  []  Diastolic  []  Systolic  []  Unknown      Procedure Description  The patient underwent successful [x]  Left  []  Right  []  Left & Right  Heart catheterization and coronary angiography via the  [x]  Femoral approach  []  Radial approach  []  Brachial approach    Procedure Narrative:  Informed consent was obtained from the patient after explaining risks and benefits.  Patient was brought to the cardiac catheterization laboratory and placed on the table in the usual fashion.  Right groin was shaved and prepped in sterile fashion.  2% lidocaine was used for local anesthesia to the right groin.  A total of 20 cc was used.  A 6 Latvian sheath was placed in the right femoral artery.  A 6 Latvian pigtail catheter, 6 Latvian JR4 catheter and a 6 Latvian JL4 catheter was used for the procedure.  The JR4 catheter was used for the graft also after the cardiac  catheterization is complete, all the catheters and sheath were removed.  Patient tolerated the procedure very well.  No complications noted.      Hemodynamic    LVEDP:8   Estimated EF %:60    Initial Aortic Pressure: 130/80    AV Gradient: No gradient    Rt. Heart Pressure:    Wall Motion:  Dominance:  [x]  Left  []  Right  []  Co-Dominant    Coronary Arteriography: (Please Code highest degree of stenosis)    Left Main %:   80-90  Proximal LAD %: 80-90  Mid/Distal LAD %: 100%  LCX %: 100% OM  Ramus:  RCA %: Small nondominant and has a long 70% disease  Lima %: LIMA to the LAD is patent and the distal LAD is patent  SVG(s) %: 50 to the diagonal branch is patent the distal diagonal branch is patent  SVG to the marginal branch is patent the distal marginal branch is patent      Complications: No complication    Estimated Blood Loss:  None      Impression and Recommendation: Severe three-vessel coronary disease  Status post coronary artery bypass surgery x3 vessels with 3 out of 3 grafts patent  Normal LV function  Continue medical therapy    Electronically signed by Job Griffin MD, 07/10/20, 12:36 PM                Assessment and Plan     Diagnoses and all orders for this visit:    1. Bilateral carotid artery stenosis (Primary)  -     Duplex Carotid Ultrasound CAR; Future    Mendel Melendez returns for post op follow up s/p L CEA on 11/6/24.  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  He remains asymptomatic.  He is taking aspirin, plavix, and statin.  Carotid duplex today showed mild, <50% right ICA stenosis and left ICA appears normal with no recurrent stenosis.  He will continue his current medications and we will repeat duplex in 6 months for surveillance.          Follow Up     Return in about 6 months (around 8/27/2025).  Patient was given instructions and counseling regarding his condition  or for health maintenance advice. Please see specific information pulled into the AVS if appropriate.

## 2025-02-28 NOTE — PROGRESS NOTES
"Chief Complaint  Diabetes    Subjective          Mendel Melendez presents to [facility] for     HPI    Diabetes  Patient does not check blood sugar at home daily.  Associated symptoms include None  Per patient current diet is Well Balanced and Variety of foods.  Patient does avoid concentrated sweets.  Patient does not see podiatry.  Patient see's [facility] for diabetic eye exam and last eye exam was greater than 1 yr ago.  Patient reports they are taking medications as prescribed and they are not having side effects.  Last A1c was 6.8 on 1/22/2025.     Afib  Normal Holter study, no signs of afib, atrial arrhythmias.    Had heart cath done 04/22/2025 - showed three-vessel disease, including 100% occlusions of LCA, LAD, LCX.    Objective   Vital Signs:   /76 (BP Location: Right arm, Patient Position: Sitting, Cuff Size: Adult)   Pulse 92   Temp 97.8 °F (36.6 °C) (Temporal)   Resp 18   Ht 166.4 cm (65.5\")   Wt 94.5 kg (208 lb 4 oz)   SpO2 96% Comment: room air  BMI 34.13 kg/m²     Physical Exam  Vitals and nursing note reviewed.   Constitutional:       General: He is not in acute distress.     Appearance: Normal appearance. He is not ill-appearing or diaphoretic.   HENT:      Head: Normocephalic and atraumatic.      Nose: No congestion or rhinorrhea.   Eyes:      Extraocular Movements: Extraocular movements intact.      Pupils: Pupils are equal, round, and reactive to light.   Cardiovascular:      Rate and Rhythm: Normal rate and regular rhythm.      Pulses: Normal pulses.   Pulmonary:      Effort: Pulmonary effort is normal.      Breath sounds: Normal breath sounds.   Musculoskeletal:         General: Normal range of motion.      Cervical back: Normal range of motion.   Skin:     General: Skin is warm and dry.   Neurological:      Mental Status: He is alert and oriented to person, place, and time.   Psychiatric:         Mood and Affect: Mood normal.         Behavior: " Behavior normal.        Result Review :                 Assessment and Plan    Diagnoses and all orders for this visit:    1. Type 2 diabetes mellitus with diabetic peripheral angiopathy without gangrene, without long-term current use of insulin (Primary)  Overview:  Regimen: Ozempic 1 mg weekly, metformin 1000 mg bid.    A1c 6.9 04/25/2025 stable but remains suboptimally controlled.  A1c 6.8 01/22/2025 stable, marginal - implement lifestyle changes, repeat 3 mo, consider Ozempic increase if not improved  A1c 6.7 10/28/2024 improved  A1c 7.1 07/05/2024 stable, marginal added GLP1 tolerating continue  A1c 7.2 04/08/2024 stable  A1c 7.2 09/22/2023 improve diet and exercise.   A1c 6.9 06/16/2023 improved, he reports no hypo or   A1c 6.8 11/28/2022   A1c 6.6 09/24/2021 improved.   A1c 7.2 10/06/2020   A1c 7.3 11/18/2019     A1c 6.9 5/7/2019   A1c 7.0 02/12/2018    A1c 7.1 11/1/2018 new onset    Assessment & Plan:  Do feel the patient would benefit from more aggressive control of his A1c given his CAD.  We discussed various options and patient elects to increase his Ozempic to 2 mg weekly at this time.  He was also counseled extensively on important dietary changes which include significantly reducing carbohydrates and unhealthy fats.  Will repeat an A1c in 3 months.    Orders:  -     POC Glycosylated Hemoglobin (Hb A1C)  -     Semaglutide, 2 MG/DOSE, (OZEMPIC) 8 MG/3ML solution pen-injector; Inject 2 mg under the skin into the appropriate area as directed 1 (One) Time Per Week.  Dispense: 3 mL; Refill: 11    2. Mixed hyperlipidemia  Overview:  Regimen: Crestor 40 mg, Zetia 10 mg qd.    Lab Results   Component Value Date    CHOL 143 07/08/2020    CHLPL 164 01/22/2025    TRIG 119 01/22/2025    HDL 66 01/22/2025    LDL 77 01/22/2025     LDL normal but not at target (<70 given diabetes).    Assessment & Plan:  Recent heart cath showed 3 vessel disease.  30-35% restenosis of RCA stents.  Zetia was added to regimen.  Patient  to continue both Crestor and Zetia.  We will repeat a lipid panel in 3 months.      3. Paroxysmal atrial fibrillation  Overview:  Patient new to this provider.  Reportedly isolated incident during heart surgery in 2015 without known subsequent breakthrough.  He is not anticoagulated. CHADSVASc certainly 2+.  Holter study 2/2025 - unremarkable, no signs of afib or atrial arrhythmias.    Assessment & Plan:  His history of atrial fibrillation was discussed extensively with him.  This seems to be an isolated episode during heart surgery approximately 10 years ago.  There is no evidence of atrial fibrillation or atrial arrhythmias on his Holter study.  He is receiving dual antiplatelet therapy with Plavix and aspirin.  The risks and benefits of starting anticoagulation with something like Eliquis were discussed today.  Patient declines chronic anticoagulation, understanding that there is a small risk of events like CVA associated with this decision given documented A-fib.           Follow Up   Return in about 3 months (around 7/25/2025).      Martin Hicks MD    NOTE: [facility], per Health Information accessibility laws, allows progress notes to be visible to patients. Please note that these notes will include professional medical terminology that may be somewhat confusing without some interpretation from your medical professional team. The intent of progress notes is to communicate information between any medical professionals involved in your case, or to serve as future reference for myself or any other provider when reviewing your case, as well as a reference for the patient viewing the record. Please ask a member of the medical team if you have any questions about terminology or content of note.    DISCLAIMER: Part of this note may be an electronic transcription/translation of spoken language to printed text using the Dragon Dictation System.

## 2025-03-01 DIAGNOSIS — I10 BENIGN HYPERTENSION: ICD-10-CM

## 2025-03-01 DIAGNOSIS — E78.2 MIXED HYPERLIPIDEMIA: ICD-10-CM

## 2025-03-01 DIAGNOSIS — E11.51 TYPE 2 DIABETES MELLITUS WITH DIABETIC PERIPHERAL ANGIOPATHY WITHOUT GANGRENE, WITHOUT LONG-TERM CURRENT USE OF INSULIN: ICD-10-CM

## 2025-03-03 DIAGNOSIS — E78.2 MIXED HYPERLIPIDEMIA: ICD-10-CM

## 2025-03-03 RX ORDER — RANOLAZINE 500 MG/1
500 TABLET, EXTENDED RELEASE ORAL 2 TIMES DAILY
Qty: 90 TABLET | Refills: 0 | Status: SHIPPED | OUTPATIENT
Start: 2025-03-03

## 2025-03-03 RX ORDER — ROSUVASTATIN CALCIUM 40 MG/1
40 TABLET, COATED ORAL NIGHTLY
Qty: 30 TABLET | Refills: 3 | Status: SHIPPED | OUTPATIENT
Start: 2025-03-03

## 2025-03-20 NOTE — PROGRESS NOTES
Date of Office Visit: 2025  Encounter Provider: Dr. Gurjit Guerra  Place of Service: [facility] CARDIOLOGY [place]  Patient Name: Mendel Melendez  :1965  Martin Hicks MD    Chief Complaint   Patient presents with   • Atrial Fibrillation   • Coronary Artery Disease   • Hypertension   • Follow-up     History of Present Illness    I am pleased to see Mr. Melendez in my office today as a follow-up..    As you know, patient is 58-year-old white gentleman whose past medical history is significant for hypertension, hyperlipidemia, diabetes mellitus, CAD, coronary artery stenting, CABG, who came today for follow-up.    In , patient was diagnosed with three-vessel coronary artery disease and underwent CABG.  However patient developed  of PDA branch of RCA.  Patient underwent successful stenting of this vessel at [facility].  Patient did well.  In 2022, patient underwent repeat cardiac catheterization at [facility] and was noted to have 100% occlusion of LAD, LCx.  SVG graft to diagonal was patent.  LIMA to LAD was patent.  RCA patent stent which was patent.  EF was 55 to 60%.    In 2024, patient underwent left carotid endarterectomy and postoperatively patient had an episode of chest pain which was resolved.    Patient came today for follow-up.  Patient reports that his chest pain is still there.  However it is stable.  From last 2 months his chest pain has improved.  Patient denies any active angina pectoris.  No orthopnea PND no syncope or presyncope.    EKG showed normal sinus rhythm no ST changes suggestive of ischemia    Patient has stable angina.  I discussed with him possibility of cardiac catheterization.  He wants to defer it.  After discussion we agreed to proceed with cardiac catheterization in April.  Patient is however advised to come to the hospital if the symptoms worsen.  Patient is on adequate antianginal therapy.  Blood pressure is slightly  high but all blood pressure readings at home are within desirable range.      Past Medical History:   Diagnosis Date   • Atrial fib/flutter, transient    • Bruit of left carotid artery    • CAD (coronary artery disease)     unspecified   • Coronary artery disease    • Hyperlipidemia    • Hypertension    • Kidney calculus 09/2024   • Nonspecific elevation of levels of transaminase or lactic acid dehydrogenase (LDH)    • Occlusion and stenosis of bilateral carotid arteries 02/05/2020   • Other specified diabetes mellitus without complications    • Paroxysmal atrial fibrillation    • Seasonal allergic rhinitis due to pollen    • Sleep apnea    • Sleep apnea, unspecified    • Type 2 diabetes mellitus with other circulatory complications    • Unstable angina          Past Surgical History:   Procedure Laterality Date   • CARDIAC CATHETERIZATION  04/09/2015    BHF Left main distal 60-70% LCX 80-90% RCA proximal 90% and mid 100% with left to right collaterals.   • CARDIAC CATHETERIZATION N/A 07/10/2020    Procedure: Left Heart Cath with angiogram;  Surgeon: Job Griffin MD;  Location: [facility] CATH INVASIVE LOCATION;  Service: Cardiovascular;  Laterality: N/A;   • CARDIAC CATHETERIZATION N/A 07/10/2020    Procedure: Saphenous Vein Graft;  Surgeon: Job Griffin MD;  Location: [facility] CATH INVASIVE LOCATION;  Service: Cardiovascular;  Laterality: N/A;  svg x 2  lima   • CARDIAC CATHETERIZATION N/A 07/10/2020    Procedure: Left ventriculography;  Surgeon: Job Griffin MD;  Location: [facility] CATH INVASIVE LOCATION;  Service: Cardiovascular;  Laterality: N/A;   • CARDIAC CATHETERIZATION  06/2022    [place]   • CARDIAC CATHETERIZATION  2016   • CARDIAC SURGERY     • CAROTID ENDARTERECTOMY Left 11/6/2024    Procedure: LEFT CAROTID ENDARTERECTOMY;  Surgeon: Janet Nix MD;  Location: [facility] MAIN OR;  Service: Vascular;  Laterality: Left;   • COLONOSCOPY      2 years back.   • CORONARY ARTERY BYPASS GRAFT  04/13/2015     Four, left main and three vessel cononary artery disease. Dr. Franklyn Smith   • CORONARY ARTERY BYPASS GRAFT  2016   • CORONARY STENT PLACEMENT     • CYSTOSCOPY, URETEROSCOPY, RETROGRADE PYELOGRAM, STENT INSERTION Right 9/24/2024    Procedure: CYSTOSCOPY URETEROSCOPY STONE BASKET EXTRACTION AND STENT INSERTION;  Surgeon: Alejandro López MD;  Location: [facility];  Service: Urology;  Laterality: Right;   • VASECTOMY  1998           Current Outpatient Medications:   •  amLODIPine (NORVASC) 10 MG tablet, Take 1 tablet by mouth once daily, Disp: 90 tablet, Rfl: 3  •  aspirin (aspirin) 81 MG EC tablet, Take 1 tablet by mouth Daily., Disp: , Rfl:   •  clopidogrel (PLAVIX) 75 MG tablet, Take 1 tablet by mouth once daily, Disp: 90 tablet, Rfl: 3  •  isosorbide mononitrate (IMDUR) 60 MG 24 hr tablet, Take 1 tablet by mouth Daily., Disp: 180 tablet, Rfl: 3  •  metFORMIN (GLUCOPHAGE) 1000 MG tablet, Take 1 tablet by mouth 2 (Two) Times a Day With Meals., Disp: 180 tablet, Rfl: 3  •  metoprolol tartrate (LOPRESSOR) 100 MG tablet, Take 1 tablet by mouth 2 (Two) Times a Day., Disp: 180 tablet, Rfl: 3  •  nitroglycerin (NITROSTAT) 0.4 MG SL tablet, Place 1 tablet under the tongue Every 5 (Five) Minutes As Needed for Chest Pain. do not exceed a total of 3 doses in 15 minutes, Disp: 100 tablet, Rfl: 0  •  ranolazine (RANEXA) 500 MG 12 hr tablet, Take 1 tablet by mouth twice daily, Disp: 90 tablet, Rfl: 0  •  rosuvastatin (CRESTOR) 40 MG tablet, Take 1 tablet by mouth Every Night., Disp: 30 tablet, Rfl: 3  •  Semaglutide, 1 MG/DOSE, (Ozempic, 1 MG/DOSE,) 2 MG/1.5ML solution pen-injector, Inject 1 mg under the skin into the appropriate area as directed 1 (One) Time Per Week., Disp: 1.5 mL, Rfl: 12      Social History     Socioeconomic History   • Marital status:    Tobacco Use   • Smoking status: Never     Passive exposure: Never   • Smokeless tobacco: Never   • Tobacco comments:     Patient does not smoke.   Vaping Use   •  "Vaping status: Never Used   Substance and Sexual Activity   • Alcohol use: No   • Drug use: No   • Sexual activity: Yes     Partners: Female     Birth control/protection: Vasectomy         Review of Systems   Constitutional: Negative for chills and fever.   HENT:  Negative for ear discharge and nosebleeds.    Eyes:  Negative for discharge and redness.   Cardiovascular:  Positive for chest pain. Negative for orthopnea, palpitations, paroxysmal nocturnal dyspnea and syncope.   Respiratory:  Positive for shortness of breath. Negative for cough and wheezing.    Endocrine: Negative for heat intolerance.   Skin:  Negative for rash.   Musculoskeletal:  Negative for arthritis and myalgias.   Gastrointestinal:  Negative for abdominal pain, melena, nausea and vomiting.   Genitourinary:  Negative for dysuria and hematuria.   Neurological:  Negative for dizziness, light-headedness, numbness and tremors.   Psychiatric/Behavioral:  Negative for depression. The patient is not nervous/anxious.        Procedures      ECG 12 Lead    Date/Time: 3/21/2025 9:01 AM  Performed by: Gurjit Guerra MD    Authorized by: Gurjit Guerra MD  Comparison: compared with previous ECG   Similar to previous ECG  Rhythm: sinus rhythm    Clinical impression: normal ECG        ECG 12 Lead    (Results Pending)   ECG 12 Lead    (Results Pending)           Objective:    /66 (BP Location: Right arm, Patient Position: Sitting, Cuff Size: Large Adult)   Pulse 84   Resp 18   Ht 166 cm (65.35\")   Wt 95.3 kg (210 lb)   SpO2 96%   BMI 34.57 kg/m²         Constitutional:       Appearance: Well-developed.   Eyes:      General: No scleral icterus.        Right eye: No discharge.   HENT:      Head: Normocephalic and atraumatic.   Neck:      Thyroid: No thyromegaly.      Lymphadenopathy: No cervical adenopathy.   Pulmonary:      Effort: Pulmonary effort is normal. No respiratory distress.      Breath sounds: Normal breath sounds. No wheezing. No rales. "   Cardiovascular:      Normal rate. Regular rhythm.      No gallop.    Edema:     Peripheral edema absent.   Abdominal:      Tenderness: There is no abdominal tenderness.   Skin:     Findings: No erythema or rash.   Neurological:      Mental Status: Alert and oriented to person, place, and time.           Assessment:       Diagnosis Plan   1. Mixed hyperlipidemia  ECG 12 Lead    Case Request Cath Lab: Left Heart Cath    CBC (No Diff)    Basic Metabolic Panel    Protime-INR    aPTT    ECG 12 Lead      2. Benign hypertension  ECG 12 Lead    Case Request Cath Lab: Left Heart Cath    CBC (No Diff)    Basic Metabolic Panel    Protime-INR    aPTT    ECG 12 Lead      3. Paroxysmal atrial fibrillation  ECG 12 Lead    Case Request Cath Lab: Left Heart Cath    CBC (No Diff)    Basic Metabolic Panel    Protime-INR    aPTT    ECG 12 Lead      4. Coronary artery disease involving coronary bypass graft of native heart with angina pectoris  Case Request Cath Lab: Left Heart Cath    CBC (No Diff)    Basic Metabolic Panel    Protime-INR    aPTT    ECG 12 Lead      5. Type 2 diabetes mellitus with diabetic peripheral angiopathy without gangrene, without long-term current use of insulin  Case Request Cath Lab: Left Heart Cath    CBC (No Diff)    Basic Metabolic Panel    Protime-INR    aPTT    ECG 12 Lead      6. S/P CABG (coronary artery bypass graft)  Case Request Cath Lab: Left Heart Cath    CBC (No Diff)    Basic Metabolic Panel    Protime-INR    aPTT    ECG 12 Lead      7. Angina pectoris  Case Request Cath Lab: Left Heart Cath    CBC (No Diff)    Basic Metabolic Panel    Protime-INR    aPTT    ECG 12 Lead               Plan:       MDM:    1.  Angina pectoris:    I would recommend that patient should proceed with cardiac catheterization risk and benefit and alternative options are explained    2.  CAD/CABG:    Patient would need cardiac catheterization risk factor modification recommended    3.  Hypertension:    Blood pressure is  slightly high but blood pressure readings at home are within desirable range recommend observation    4.  Mixed hyperlipidemia:    Patient is on Crestor    5.  Diabetes mellitus type 2:    Patient is on Ozempic and metformin exercise and weight loss emphasized

## 2025-03-21 ENCOUNTER — OFFICE VISIT (OUTPATIENT)
Dept: CARDIOLOGY | Facility: CLINIC | Age: 60
End: 2025-03-21
Payer: COMMERCIAL

## 2025-03-21 VITALS
HEIGHT: 65 IN | HEART RATE: 84 BPM | DIASTOLIC BLOOD PRESSURE: 66 MMHG | SYSTOLIC BLOOD PRESSURE: 146 MMHG | RESPIRATION RATE: 18 BRPM | OXYGEN SATURATION: 96 % | WEIGHT: 210 LBS | BODY MASS INDEX: 34.99 KG/M2

## 2025-03-21 DIAGNOSIS — E78.2 MIXED HYPERLIPIDEMIA: Primary | ICD-10-CM

## 2025-03-21 DIAGNOSIS — I20.9 ANGINA PECTORIS: ICD-10-CM

## 2025-03-21 DIAGNOSIS — I10 BENIGN HYPERTENSION: ICD-10-CM

## 2025-03-21 DIAGNOSIS — I25.709 CORONARY ARTERY DISEASE INVOLVING CORONARY BYPASS GRAFT OF NATIVE HEART WITH ANGINA PECTORIS: ICD-10-CM

## 2025-03-21 DIAGNOSIS — E11.51 TYPE 2 DIABETES MELLITUS WITH DIABETIC PERIPHERAL ANGIOPATHY WITHOUT GANGRENE, WITHOUT LONG-TERM CURRENT USE OF INSULIN: ICD-10-CM

## 2025-03-21 DIAGNOSIS — Z95.1 S/P CABG (CORONARY ARTERY BYPASS GRAFT): ICD-10-CM

## 2025-03-21 DIAGNOSIS — I48.0 PAROXYSMAL ATRIAL FIBRILLATION: ICD-10-CM

## 2025-04-14 NOTE — ASSESSMENT & PLAN NOTE
Call received from Katie at [facility], she is asking if we are able to move patient to earlier opening as they still do wish to move surgery up to 04/22/25. Writer discussed with Chelo, Chleo is agreeable to see patient in [place] on 4/15 (Dr. Valentine will be here as well tomorrow). Katie called Duy and he is agreeable to 4/15 appointment.     Please give direction on medication holds for surgery next week.   Lipid abnormalities are improving with treatment.  Pharmacotherapy as ordered.  Lipids will be reassessed in 3 months.

## 2025-04-18 ENCOUNTER — LAB (OUTPATIENT)
Dept: LAB | Facility: HOSPITAL | Age: 60
End: 2025-04-18
Payer: COMMERCIAL

## 2025-04-18 DIAGNOSIS — E11.51 TYPE 2 DIABETES MELLITUS WITH DIABETIC PERIPHERAL ANGIOPATHY WITHOUT GANGRENE, WITHOUT LONG-TERM CURRENT USE OF INSULIN: ICD-10-CM

## 2025-04-18 DIAGNOSIS — Z95.1 S/P CABG (CORONARY ARTERY BYPASS GRAFT): ICD-10-CM

## 2025-04-18 DIAGNOSIS — I25.709 CORONARY ARTERY DISEASE INVOLVING CORONARY BYPASS GRAFT OF NATIVE HEART WITH ANGINA PECTORIS: ICD-10-CM

## 2025-04-18 DIAGNOSIS — I10 BENIGN HYPERTENSION: ICD-10-CM

## 2025-04-18 DIAGNOSIS — I48.0 PAROXYSMAL ATRIAL FIBRILLATION: ICD-10-CM

## 2025-04-18 DIAGNOSIS — E78.2 MIXED HYPERLIPIDEMIA: ICD-10-CM

## 2025-04-18 DIAGNOSIS — I20.9 ANGINA PECTORIS: ICD-10-CM

## 2025-04-18 LAB
ANION GAP SERPL CALCULATED.3IONS-SCNC: 8.8 MMOL/L (ref 5–15)
APTT PPP: 24.9 SECONDS (ref 22.7–35.4)
BUN SERPL-MCNC: 19 MG/DL (ref 6–20)
BUN/CREAT SERPL: 17.6 (ref 7–25)
CALCIUM SPEC-SCNC: 9.5 MG/DL (ref 8.6–10.5)
CHLORIDE SERPL-SCNC: 106 MMOL/L (ref 98–107)
CO2 SERPL-SCNC: 24.2 MMOL/L (ref 22–29)
CREAT SERPL-MCNC: 1.08 MG/DL (ref 0.76–1.27)
DEPRECATED RDW RBC AUTO: 44.3 FL (ref 37–54)
EGFRCR SERPLBLD CKD-EPI 2021: 80 ML/MIN/1.73
ERYTHROCYTE [DISTWIDTH] IN BLOOD BY AUTOMATED COUNT: 13.6 % (ref 12.3–15.4)
GLUCOSE SERPL-MCNC: 130 MG/DL (ref 65–99)
HCT VFR BLD AUTO: 46.1 % (ref 37.5–51)
HGB BLD-MCNC: 14.6 G/DL (ref 13–17.7)
INR PPP: 0.96 (ref 0.9–1.1)
MCH RBC QN AUTO: 28 PG (ref 26.6–33)
MCHC RBC AUTO-ENTMCNC: 31.7 G/DL (ref 31.5–35.7)
MCV RBC AUTO: 88.5 FL (ref 79–97)
PLATELET # BLD AUTO: 242 10*3/MM3 (ref 140–450)
PMV BLD AUTO: 10.1 FL (ref 6–12)
POTASSIUM SERPL-SCNC: 4.7 MMOL/L (ref 3.5–5.2)
PROTHROMBIN TIME: 12.7 SECONDS (ref 11.7–14.2)
RBC # BLD AUTO: 5.21 10*6/MM3 (ref 4.14–5.8)
SODIUM SERPL-SCNC: 139 MMOL/L (ref 136–145)
WBC NRBC COR # BLD AUTO: 6.58 10*3/MM3 (ref 3.4–10.8)

## 2025-04-18 PROCEDURE — 85027 COMPLETE CBC AUTOMATED: CPT

## 2025-04-18 PROCEDURE — 85610 PROTHROMBIN TIME: CPT

## 2025-04-18 PROCEDURE — 85730 THROMBOPLASTIN TIME PARTIAL: CPT

## 2025-04-18 PROCEDURE — 80048 BASIC METABOLIC PNL TOTAL CA: CPT

## 2025-04-18 PROCEDURE — 36415 COLL VENOUS BLD VENIPUNCTURE: CPT

## 2025-04-19 DIAGNOSIS — I10 BENIGN HYPERTENSION: ICD-10-CM

## 2025-04-19 DIAGNOSIS — E11.51 TYPE 2 DIABETES MELLITUS WITH DIABETIC PERIPHERAL ANGIOPATHY WITHOUT GANGRENE, WITHOUT LONG-TERM CURRENT USE OF INSULIN: ICD-10-CM

## 2025-04-19 DIAGNOSIS — E78.2 MIXED HYPERLIPIDEMIA: ICD-10-CM

## 2025-04-21 RX ORDER — RANOLAZINE 500 MG/1
500 TABLET, EXTENDED RELEASE ORAL 2 TIMES DAILY
Qty: 90 TABLET | Refills: 0 | Status: SHIPPED | OUTPATIENT
Start: 2025-04-21

## 2025-04-22 ENCOUNTER — HOSPITAL ENCOUNTER (OUTPATIENT)
Facility: HOSPITAL | Age: 60
Setting detail: HOSPITAL OUTPATIENT SURGERY
Discharge: HOME | End: 2025-04-22
Attending: INTERNAL MEDICINE | Admitting: INTERNAL MEDICINE
Payer: COMMERCIAL

## 2025-04-22 VITALS
HEIGHT: 66 IN | OXYGEN SATURATION: 95 % | RESPIRATION RATE: 12 BRPM | DIASTOLIC BLOOD PRESSURE: 77 MMHG | TEMPERATURE: 97.7 F | HEART RATE: 72 BPM | SYSTOLIC BLOOD PRESSURE: 124 MMHG | WEIGHT: 205.47 LBS | BODY MASS INDEX: 33.02 KG/M2

## 2025-04-22 DIAGNOSIS — I48.0 PAROXYSMAL ATRIAL FIBRILLATION: ICD-10-CM

## 2025-04-22 DIAGNOSIS — E78.2 MIXED HYPERLIPIDEMIA: ICD-10-CM

## 2025-04-22 DIAGNOSIS — E11.51 TYPE 2 DIABETES MELLITUS WITH DIABETIC PERIPHERAL ANGIOPATHY WITHOUT GANGRENE, WITHOUT LONG-TERM CURRENT USE OF INSULIN: ICD-10-CM

## 2025-04-22 DIAGNOSIS — I20.9 ANGINA PECTORIS: ICD-10-CM

## 2025-04-22 DIAGNOSIS — Z95.1 S/P CABG (CORONARY ARTERY BYPASS GRAFT): ICD-10-CM

## 2025-04-22 DIAGNOSIS — I10 BENIGN HYPERTENSION: ICD-10-CM

## 2025-04-22 DIAGNOSIS — I25.709 CORONARY ARTERY DISEASE INVOLVING CORONARY BYPASS GRAFT OF NATIVE HEART WITH ANGINA PECTORIS: ICD-10-CM

## 2025-04-22 PROCEDURE — 93459 L HRT ART/GRFT ANGIO: CPT | Performed by: INTERNAL MEDICINE

## 2025-04-22 PROCEDURE — C1894 INTRO/SHEATH, NON-LASER: HCPCS | Performed by: INTERNAL MEDICINE

## 2025-04-22 PROCEDURE — 25510000001 IOPAMIDOL PER 1 ML: Performed by: INTERNAL MEDICINE

## 2025-04-22 PROCEDURE — 25010000002 FENTANYL CITRATE (PF) 100 MCG/2ML SOLUTION: Performed by: INTERNAL MEDICINE

## 2025-04-22 PROCEDURE — C1769 GUIDE WIRE: HCPCS | Performed by: INTERNAL MEDICINE

## 2025-04-22 PROCEDURE — 25010000002 LIDOCAINE 1 % SOLUTION: Performed by: INTERNAL MEDICINE

## 2025-04-22 PROCEDURE — 25010000002 MIDAZOLAM PER 1 MG: Performed by: INTERNAL MEDICINE

## 2025-04-22 PROCEDURE — S0260 H&P FOR SURGERY: HCPCS | Performed by: INTERNAL MEDICINE

## 2025-04-22 RX ORDER — ONDANSETRON 4 MG/1
4 TABLET, ORALLY DISINTEGRATING ORAL EVERY 6 HOURS PRN
Status: DISCONTINUED | OUTPATIENT
Start: 2025-04-22 | End: 2025-04-22 | Stop reason: HOSPADM

## 2025-04-22 RX ORDER — LIDOCAINE HYDROCHLORIDE 10 MG/ML
INJECTION, SOLUTION INFILTRATION; PERINEURAL
Status: DISCONTINUED | OUTPATIENT
Start: 2025-04-22 | End: 2025-04-22 | Stop reason: HOSPADM

## 2025-04-22 RX ORDER — MIDAZOLAM HYDROCHLORIDE 1 MG/ML
INJECTION, SOLUTION INTRAMUSCULAR; INTRAVENOUS
Status: DISCONTINUED | OUTPATIENT
Start: 2025-04-22 | End: 2025-04-22 | Stop reason: HOSPADM

## 2025-04-22 RX ORDER — ACETAMINOPHEN 325 MG/1
650 TABLET ORAL EVERY 4 HOURS PRN
Status: DISCONTINUED | OUTPATIENT
Start: 2025-04-22 | End: 2025-04-22 | Stop reason: HOSPADM

## 2025-04-22 RX ORDER — ONDANSETRON 2 MG/ML
4 INJECTION INTRAMUSCULAR; INTRAVENOUS EVERY 6 HOURS PRN
Status: DISCONTINUED | OUTPATIENT
Start: 2025-04-22 | End: 2025-04-22 | Stop reason: HOSPADM

## 2025-04-22 RX ORDER — ALUMINA, MAGNESIA, AND SIMETHICONE 2400; 2400; 240 MG/30ML; MG/30ML; MG/30ML
15 SUSPENSION ORAL EVERY 6 HOURS PRN
Status: DISCONTINUED | OUTPATIENT
Start: 2025-04-22 | End: 2025-04-22 | Stop reason: HOSPADM

## 2025-04-22 RX ORDER — IOPAMIDOL 755 MG/ML
INJECTION, SOLUTION INTRAVASCULAR
Status: DISCONTINUED | OUTPATIENT
Start: 2025-04-22 | End: 2025-04-22 | Stop reason: HOSPADM

## 2025-04-22 RX ORDER — DIPHENHYDRAMINE HCL 25 MG
25 CAPSULE ORAL EVERY 6 HOURS PRN
Status: DISCONTINUED | OUTPATIENT
Start: 2025-04-22 | End: 2025-04-22 | Stop reason: HOSPADM

## 2025-04-22 RX ORDER — EZETIMIBE 10 MG/1
10 TABLET ORAL DAILY
Qty: 90 TABLET | Refills: 3 | Status: SHIPPED | OUTPATIENT
Start: 2025-04-22

## 2025-04-22 RX ORDER — MONTELUKAST SODIUM 10 MG/1
10 TABLET ORAL NIGHTLY
COMMUNITY

## 2025-04-22 RX ORDER — FENTANYL CITRATE 50 UG/ML
INJECTION, SOLUTION INTRAMUSCULAR; INTRAVENOUS
Status: DISCONTINUED | OUTPATIENT
Start: 2025-04-22 | End: 2025-04-22 | Stop reason: HOSPADM

## 2025-04-22 RX ORDER — NITROGLYCERIN 0.4 MG/1
0.4 TABLET SUBLINGUAL
Status: DISCONTINUED | OUTPATIENT
Start: 2025-04-22 | End: 2025-04-22 | Stop reason: HOSPADM

## 2025-04-22 NOTE — H&P
Referring Provider: Dr. Hicks    Attending: Gurjit Guerra MD    Reason for Consultation:    Angina pectoris  CAD  Coronary artery stenting  Hypertension    Chief complaint:    Chest pain  Shortness of breath       History of present illness:     Patient is 69-year-old white gentleman whose past medical history is significant for hypertension, hyperlipidemia, diabetes mellitus, CAD, coronary artery stenting, CABG, who came today for elective cardiac catheterization.    Patient is having chest pain with exertion which is predictable and stable.  Patient has multiple stents in the past.  Because of his ongoing chest pain patient was recommended to have cardiac cath [place]     In 2015, patient was diagnosed with three-vessel coronary artery disease and underwent CABG.  However patient developed  of PDA branch of RCA.  Patient underwent successful stenting of this vessel at [facility].  Patient did well.  In June 2022, patient underwent repeat cardiac catheterization at [facility] and was noted to have 100% occlusion of LAD, LCx.  SVG graft to diagonal was patent.  LIMA to LAD was patent.  RCA patent stent which was patent.  EF was 55 to 60%.     In November 2024, patient underwent left carotid endarterectomy and postoperatively patient had an episode of chest pain which was resolved.       Review of Systems  Review of Systems   Constitutional: Negative for chills and fever.   HENT:  Negative for ear discharge and nosebleeds.    Eyes:  Negative for discharge and redness.   Cardiovascular:  Positive for chest pain. Negative for orthopnea, palpitations, paroxysmal nocturnal dyspnea and syncope.   Respiratory:  Positive for shortness of breath. Negative for cough and wheezing.    Endocrine: Negative for heat intolerance.   Skin:  Negative for rash.   Musculoskeletal:  Negative for arthritis and myalgias.   Gastrointestinal:  Negative for abdominal pain, melena, nausea and vomiting.    Genitourinary:  Negative for dysuria and hematuria.   Neurological:  Negative for dizziness, light-headedness, numbness and tremors.   Psychiatric/Behavioral:  Negative for depression. The patient is not nervous/anxious.        Past Medical History  Past Medical History:   Diagnosis Date    Atrial fib/flutter, transient     Bruit of left carotid artery     CAD (coronary artery disease)     unspecified    Coronary artery disease     Hyperlipidemia     Hypertension     Kidney calculus 09/2024    Nonspecific elevation of levels of transaminase or lactic acid dehydrogenase (LDH)     Occlusion and stenosis of bilateral carotid arteries 02/05/2020    Other specified diabetes mellitus without complications     Paroxysmal atrial fibrillation     Seasonal allergic rhinitis due to pollen     Sleep apnea     Sleep apnea, unspecified     Type 2 diabetes mellitus with other circulatory complications     Unstable angina     and   Past Surgical History:   Procedure Laterality Date    CARDIAC CATHETERIZATION  04/09/2015    BHF Left main distal 60-70% LCX 80-90% RCA proximal 90% and mid 100% with left to right collaterals.    CARDIAC CATHETERIZATION N/A 07/10/2020    Procedure: Left Heart Cath with angiogram;  Surgeon: Job Griffin MD;  Location: [facility] CATH INVASIVE LOCATION;  Service: Cardiovascular;  Laterality: N/A;    CARDIAC CATHETERIZATION N/A 07/10/2020    Procedure: Saphenous Vein Graft;  Surgeon: Job Griffin MD;  Location: [facility] CATH INVASIVE LOCATION;  Service: Cardiovascular;  Laterality: N/A;  svg x 2  lima    CARDIAC CATHETERIZATION N/A 07/10/2020    Procedure: Left ventriculography;  Surgeon: Job Griffin MD;  Location: [facility] CATH INVASIVE LOCATION;  Service: Cardiovascular;  Laterality: N/A;    CARDIAC CATHETERIZATION  06/2022    [place]    CARDIAC CATHETERIZATION  2016    CARDIAC SURGERY      CAROTID ENDARTERECTOMY Left 11/6/2024    Procedure: LEFT CAROTID ENDARTERECTOMY;  Surgeon: Janet Nix MD;   Location: [facility] MAIN OR;  Service: Vascular;  Laterality: Left;    COLONOSCOPY      2 years back.    CORONARY ARTERY BYPASS GRAFT  2015    Four, left main and three vessel cononary artery disease. Dr. Franklyn Smith    CORONARY ARTERY BYPASS GRAFT  2016    CORONARY STENT PLACEMENT      CYSTOSCOPY, URETEROSCOPY, RETROGRADE PYELOGRAM, STENT INSERTION Right 2024    Procedure: CYSTOSCOPY URETEROSCOPY STONE BASKET EXTRACTION AND STENT INSERTION;  Surgeon: Alejandro López MD;  Location: [facility] MAIN OR;  Service: Urology;  Laterality: Right;    VASECTOMY         Family History  Family History   Problem Relation Age of Onset    Heart disease Mother         Heart Problems is Positive in Mother  due to heart stopped at age 62.    Heart attack Father 64    Heart disease Father     Heart disease Maternal Aunt     Heart disease Maternal Uncle     Breast cancer Paternal Aunt     Heart disease Paternal Aunt     Ovarian cancer Paternal Aunt     Heart disease Paternal Uncle        Social History  Social History     Socioeconomic History    Marital status:    Tobacco Use    Smoking status: Never     Passive exposure: Never    Smokeless tobacco: Never    Tobacco comments:     Patient does not smoke.   Vaping Use    Vaping status: Never Used   Substance and Sexual Activity    Alcohol use: No    Drug use: No    Sexual activity: Yes     Partners: Female     Birth control/protection: Vasectomy       Objective     Physical Exam:    Vital Signs  There were no vitals filed for this visit.    Weight       Constitutional:       Appearance: Well-developed.   Eyes:      General: No scleral icterus.        Right eye: No discharge.   HENT:      Head: Normocephalic and atraumatic.   Neck:      Thyroid: No thyromegaly.      Lymphadenopathy: No cervical adenopathy.   Pulmonary:      Effort: Pulmonary effort is normal. No respiratory distress.      Breath sounds: Normal breath sounds. No wheezing. No rales.   Cardiovascular:       Normal rate. Regular rhythm.      No gallop.    Edema:     Peripheral edema absent.   Abdominal:      Tenderness: There is no abdominal tenderness.   Skin:     Findings: No erythema or rash.   Neurological:      Mental Status: Alert and oriented to person, place, and time.         Results Review:  Lab Results (last 24 hours)       ** No results found for the last 24 hours. **          Imaging Results (Last 72 Hours)       ** No results found for the last 72 hours. **          No orders to display     CBC    Results from last 7 days   Lab Units 04/18/25  0657   WBC 10*3/mm3 6.58   HEMOGLOBIN g/dL 14.6   PLATELETS 10*3/mm3 242     BMP   Results from last 7 days   Lab Units 04/18/25  0657   SODIUM mmol/L 139   POTASSIUM mmol/L 4.7   CHLORIDE mmol/L 106   CO2 mmol/L 24.2   BUN mg/dL 19   CREATININE mg/dL 1.08   GLUCOSE mg/dL 130*     CMP   Results from last 7 days   Lab Units 04/18/25  0657   SODIUM mmol/L 139   POTASSIUM mmol/L 4.7   CHLORIDE mmol/L 106   CO2 mmol/L 24.2   BUN mg/dL 19   CREATININE mg/dL 1.08   GLUCOSE mg/dL 130*     Medication Review  Scheduled Meds:  Continuous Infusions:No current facility-administered medications for this encounter.    PRN Meds:.    Assessment:      Angina pectoris    Benign hypertension    Coronary artery disease involving coronary bypass graft of native heart with angina pectoris    Type 2 diabetes mellitus with diabetic peripheral angiopathy without gangrene, without long-term current use of insulin    Mixed hyperlipidemia    Paroxysmal atrial fibrillation    S/P CABG (coronary artery bypass graft)        Plan:    MDM:    1.  Angina pectoris:    Patient is stable recurrent chest pain.  It is predictable and typical for his previous angina pectoris.  After discussion with the patient we agreed to proceed with cardiac catheterization.  Risk and benefit and recommended options are explained to the patient.    2.  CAD/CABG:    Proceed with cardiac catheterization    3.   Hypertension:    Blood pressure is slightly elevated patient is on amlodipine and metoprolol.  Patient is consistently noted to have elevated blood pressure I would consider losartan.    4.  Mixed hyperlipidemia:    Patient is on Crestor.  Recent LDL is 77 consider Zetia.    I discussed the patient's findings and my recommendations with patient    Gurjti Guerra MD  04/22/25  08:34 EDT

## 2025-04-22 NOTE — DISCHARGE INSTRUCTIONS
Post Cath Instructions  Drink plenty of water for the next 24 hours. This helps to eliminate the dye used in your procedure through urination.  You may resume a normal diet; however, try to avoid foods that would cause gas or constipation.    What to do for pain: acetaminophen (Tylenol), not ibuprofen (Advil) can be used for puncture site tenderness. If your pain is unmanageable with this method, call the Cardiologist's office.     Sedative medication (Anesthesia) given to you during your catheterization may decrease your judgement and reaction time for up to 24-48 hours.  Therefore:  DO NOT drive, operate hazardous machinery, or consume alcoholic beverages for 24 hours.   DO NOT make any important/legal decisions for 24 hours.   Have someone stay with you for at least 24 hours.    For the next 48 hours (to allow proper healing and prevent bleeding):  Avoid excessive bending at wound site  Avoid straining (anything that would tense up muscles around the affected puncture site)  Avoid lifting, pushing, or pulling objects greater than 5 pounds, for 5 days  For Groin Cases:  Refrain from running, vigorous walking, and sexual activity  No prolonged sitting or standing   Limit stair climbing as much as possible    Keep the puncture site clean and dry.  After 24 hours, remove the dressing and replace it with a Band-Aid for at least one additional day.  Gently clean the site with mild soap and water.  No scrubbing/rubbing, and lightly pat the area dry.  Showers are acceptable; however, avoid submerging in water (tub baths, hot tubs, pools, dishwater, etc…) for at least one week.  The site should be completely healed before resuming these activities to reduce the risk of infection. Watch for signs and symptoms of infection and notify the physician of any of the following:  Bleeding or an increase in swelling, redness, or warmth at the puncture site  Fever  Increased soreness around puncture site  Foul odor or significant  drainage from the puncture site  **A bruise or small “pea sized” lump under the skin at the puncture site is not unusual.  This should disappear within 3-4 weeks.**    CONTACT YOUR PHYSICIAN OR CALL 911 IF YOU EXPERIENCE ANY OF THE FOLLOWING:  Increased angina (chest pain) or frequent sensations of pressure, burning, pain, or other discomfort in the chest, arm, jaws, or stomach  Lightheadedness, dizziness, faint feeling, sweating, or difficulty breathing  Odd changes in sedation (like numbness, tingling, coldness, or pain) or color (pale/bluish) in the arm or leg in which the catheter was inserted.    IMPORTANT:  Although this occurs very rarely, if you should develop bright red or excessive bleeding, feel a “pop” inside at the insertion site, or notice a sudden increase in swelling larger than a walnut, you should call 911.  Hold continuous firm pressure to the access site until emergency personnel arrive.  It is best if someone else can do this for you.   Post Cath Instructions  Drink plenty of water for the next 24 hours. This helps to eliminate the dye used in your procedure through urination.  You may resume a normal diet; however, try to avoid foods that would cause gas or constipation.    What to do for pain: acetaminophen (Tylenol), not ibuprofen (Advil) can be used for puncture site tenderness. If your pain is unmanageable with this method, call the Cardiologist's office.     Sedative medication (Anesthesia) given to you during your catheterization may decrease your judgement and reaction time for up to 24-48 hours.  Therefore:  DO NOT drive, operate hazardous machinery, or consume alcoholic beverages for 24 hours.   DO NOT make any important/legal decisions for 24 hours.   Have someone stay with you for at least 24 hours.    For the next 48 hours (to allow proper healing and prevent bleeding):  Avoid excessive bending at wound site  Avoid straining (anything that would tense up muscles around the affected  puncture site)  Avoid lifting, pushing, or pulling objects greater than 5 pounds, for 5 days  For Arm Cases:  No flexing at the puncture site, such as hammering, golfing, bowling, or swinging any objects  For Groin Cases:  Refrain from running, vigorous walking, and sexual activity  No prolonged sitting or standing   Limit stair climbing as much as possible    Keep the puncture site clean and dry.  After 24 hours, remove the dressing and replace it with a Band-Aid for at least one additional day.  Gently clean the site with mild soap and water.  No scrubbing/rubbing, and lightly pat the area dry.  Showers are acceptable; however, avoid submerging in water (tub baths, hot tubs, pools, dishwater, etc…) for at least one week.  The site should be completely healed before resuming these activities to reduce the risk of infection. Watch for signs and symptoms of infection and notify the physician of any of the following:  Bleeding or an increase in swelling, redness, or warmth at the puncture site  Fever  Increased soreness around puncture site  Foul odor or significant drainage from the puncture site  **A bruise or small “pea sized” lump under the skin at the puncture site is not unusual.  This should disappear within 3-4 weeks.**    CONTACT YOUR PHYSICIAN OR CALL 911 IF YOU EXPERIENCE ANY OF THE FOLLOWING:  Increased angina (chest pain) or frequent sensations of pressure, burning, pain, or other discomfort in the chest, arm, jaws, or stomach  Lightheadedness, dizziness, faint feeling, sweating, or difficulty breathing  Odd changes in sedation (like numbness, tingling, coldness, or pain) or color (pale/bluish) in the arm or leg in which the catheter was inserted.    IMPORTANT:  Although this occurs very rarely, if you should develop bright red or excessive bleeding, feel a “pop” inside at the insertion site, or notice a sudden increase in swelling larger than a walnut, you should call 911.  Hold continuous firm pressure  to the access site until emergency personnel arrive.  It is best if someone else can do this for you.

## 2025-04-23 ENCOUNTER — TELEPHONE (OUTPATIENT)
Dept: CARDIOLOGY | Facility: CLINIC | Age: 60
End: 2025-04-23
Payer: COMMERCIAL

## 2025-04-23 DIAGNOSIS — I65.23 BILATERAL CAROTID ARTERY STENOSIS: Primary | ICD-10-CM

## 2025-04-23 NOTE — TELEPHONE ENCOUNTER
Recommend lower extremity EDWAR and possible CT angiography of the lower extremities and vascular consultation in future.     Patient asked if he could go ahead and have these tests done prior to following up with Dr. Guerra in 2months. This was in his heart cath note that Dr. Guerra did.

## 2025-04-23 NOTE — TELEPHONE ENCOUNTER
Caller: Mendel Melendez    Relationship to patient: Self    Best call back number: [phone]    New or established patient?  [] New  [x] Established    Date of discharge: 04.22.2025    Facility discharged from: [facility]    Diagnosis/Symptoms: HEART CATH     Length of stay (If applicable): 22 HOURS    Specialty Only: Did you see a [facility] health provider?    [x] Yes  [] No  If so, who? DR. CRISTINA    Additional Details: PT CALLED TO GET HOSPITAL FOLLOW UP. NO NOTES IN DISCHARGE NOTES FOR FOLLOW UP WITH CARDIOLOGY. PT STATES DR. CRISTINA TOLD HIM 2 MONTHS. PLEASE CALL TO SCHEDULE

## 2025-04-25 ENCOUNTER — OFFICE VISIT (OUTPATIENT)
Dept: FAMILY MEDICINE CLINIC | Facility: CLINIC | Age: 60
End: 2025-04-25
Payer: COMMERCIAL

## 2025-04-25 VITALS
RESPIRATION RATE: 18 BRPM | WEIGHT: 208.25 LBS | HEIGHT: 66 IN | SYSTOLIC BLOOD PRESSURE: 128 MMHG | BODY MASS INDEX: 33.47 KG/M2 | TEMPERATURE: 97.8 F | HEART RATE: 92 BPM | OXYGEN SATURATION: 96 % | DIASTOLIC BLOOD PRESSURE: 76 MMHG

## 2025-04-25 DIAGNOSIS — E11.51 TYPE 2 DIABETES MELLITUS WITH DIABETIC PERIPHERAL ANGIOPATHY WITHOUT GANGRENE, WITHOUT LONG-TERM CURRENT USE OF INSULIN: Primary | ICD-10-CM

## 2025-04-25 DIAGNOSIS — I48.0 PAROXYSMAL ATRIAL FIBRILLATION: ICD-10-CM

## 2025-04-25 DIAGNOSIS — E78.2 MIXED HYPERLIPIDEMIA: ICD-10-CM

## 2025-04-25 LAB
EXPIRATION DATE: ABNORMAL
HBA1C MFR BLD: 6.9 % (ref 4.5–5.7)
Lab: ABNORMAL

## 2025-04-25 NOTE — ASSESSMENT & PLAN NOTE
Do feel the patient would benefit from more aggressive control of his A1c given his CAD.  We discussed various options and patient elects to increase his Ozempic to 2 mg weekly at this time.  He was also counseled extensively on important dietary changes which include significantly reducing carbohydrates and unhealthy fats.  Will repeat an A1c in 3 months.

## 2025-04-25 NOTE — ASSESSMENT & PLAN NOTE
Recent heart cath showed 3 vessel disease.  30-35% restenosis of RCA stents.  Zetia was added to regimen.  Patient to continue both Crestor and Zetia.  We will repeat a lipid panel in 3 months.

## 2025-04-25 NOTE — ASSESSMENT & PLAN NOTE
His history of atrial fibrillation was discussed extensively with him.  This seems to be an isolated episode during heart surgery approximately 10 years ago.  There is no evidence of atrial fibrillation or atrial arrhythmias on his Holter study.  He is receiving dual antiplatelet therapy with Plavix and aspirin.  The risks and benefits of starting anticoagulation with something like Eliquis were discussed today.  Patient declines chronic anticoagulation, understanding that there is a small risk of events like CVA associated with this decision given documented A-fib.

## 2025-05-05 DIAGNOSIS — I25.709 CORONARY ARTERY DISEASE INVOLVING CORONARY BYPASS GRAFT OF NATIVE HEART WITH ANGINA PECTORIS: ICD-10-CM

## 2025-05-06 RX ORDER — ISOSORBIDE MONONITRATE 60 MG/1
60 TABLET, EXTENDED RELEASE ORAL 2 TIMES DAILY
Qty: 180 TABLET | Refills: 1 | Status: SHIPPED | OUTPATIENT
Start: 2025-05-06

## 2025-05-19 ENCOUNTER — HOSPITAL ENCOUNTER (OUTPATIENT)
Dept: CARDIOLOGY | Facility: HOSPITAL | Age: 60
Discharge: HOME | End: 2025-05-19
Admitting: INTERNAL MEDICINE
Payer: COMMERCIAL

## 2025-05-19 DIAGNOSIS — M79.605 LEG PAIN, BILATERAL: ICD-10-CM

## 2025-05-19 DIAGNOSIS — M79.604 LEG PAIN, BILATERAL: ICD-10-CM

## 2025-05-19 DIAGNOSIS — I65.23 BILATERAL CAROTID ARTERY STENOSIS: ICD-10-CM

## 2025-05-19 LAB
BH CV LOWER ARTERIAL LEFT ABI RATIO: 1.18
BH CV LOWER ARTERIAL LEFT DORSALIS PEDIS SYS MAX: 135
BH CV LOWER ARTERIAL LEFT GREAT TOE SYS MAX: 109
BH CV LOWER ARTERIAL LEFT POST TIBIAL SYS MAX: 151
BH CV LOWER ARTERIAL LEFT TBI RATIO: 0.85
BH CV LOWER ARTERIAL RIGHT ABI RATIO: 1.15
BH CV LOWER ARTERIAL RIGHT DORSALIS PEDIS SYS MAX: 147
BH CV LOWER ARTERIAL RIGHT GREAT TOE SYS MAX: 162
BH CV LOWER ARTERIAL RIGHT POST TIBIAL SYS MAX: 145
BH CV LOWER ARTERIAL RIGHT TBI RATIO: 1.27
UPPER ARTERIAL LEFT ARM BRACHIAL SYS MAX: 125
UPPER ARTERIAL RIGHT ARM BRACHIAL SYS MAX: 128

## 2025-05-19 PROCEDURE — 93923 UPR/LXTR ART STDY 3+ LVLS: CPT

## 2025-05-19 PROCEDURE — 93922 UPR/L XTREMITY ART 2 LEVELS: CPT

## 2025-05-31 DIAGNOSIS — I10 BENIGN HYPERTENSION: ICD-10-CM

## 2025-06-02 RX ORDER — METOPROLOL TARTRATE 100 MG/1
100 TABLET ORAL 2 TIMES DAILY
Qty: 180 TABLET | Refills: 3 | Status: SHIPPED | OUTPATIENT
Start: 2025-06-02

## 2025-06-14 DIAGNOSIS — I10 BENIGN HYPERTENSION: ICD-10-CM

## 2025-06-14 DIAGNOSIS — E11.51 TYPE 2 DIABETES MELLITUS WITH DIABETIC PERIPHERAL ANGIOPATHY WITHOUT GANGRENE, WITHOUT LONG-TERM CURRENT USE OF INSULIN: ICD-10-CM

## 2025-06-14 DIAGNOSIS — E78.2 MIXED HYPERLIPIDEMIA: ICD-10-CM

## 2025-06-14 DIAGNOSIS — I25.709 CORONARY ARTERY DISEASE INVOLVING CORONARY BYPASS GRAFT OF NATIVE HEART WITH ANGINA PECTORIS: ICD-10-CM

## 2025-06-16 RX ORDER — RANOLAZINE 500 MG/1
500 TABLET, EXTENDED RELEASE ORAL 2 TIMES DAILY
Qty: 90 TABLET | Refills: 0 | Status: SHIPPED | OUTPATIENT
Start: 2025-06-16

## 2025-06-16 RX ORDER — NITROGLYCERIN 0.4 MG/1
0.4 TABLET SUBLINGUAL
Qty: 100 TABLET | Refills: 0 | Status: SHIPPED | OUTPATIENT
Start: 2025-06-16

## 2025-06-26 NOTE — PROGRESS NOTES
Date of Office Visit: 2025  Encounter Provider: Dr. Gurjit Guerra  Place of Service: [facility]ARDIOLOG[facility] [place]  Patient Name: Mendel Melendez  :1965  Martin Hicks MD    Chief Complaint   Patient presents with    Atrial Fibrillation    Coronary Artery Disease    Hypertension    Hyperlipidemia    Follow-up     History of Present Illness    I am pleased to see Mr. Melendez in my office today as a follow-up..    As you know, patient is 60-year-old white gentleman whose past medical history is significant for hypertension, hyperlipidemia, diabetes mellitus, CAD, coronary artery stenting, CABG, who came today for follow-up.    In , patient was diagnosed with three-vessel coronary artery disease and underwent CABG.  However patient developed  of PDA branch of RCA.  Patient underwent successful stenting of this vessel at [facility].  Patient did well.  In 2022, patient underwent repeat cardiac catheterization at [facility] and was noted to have 100% occlusion of LAD, LCx.  SVG graft to diagonal was patent.  LIMA to LAD was patent.  RCA patent stent which was patent.  EF was 55 to 60%.    In 2024, patient underwent left carotid endarterectomy and postoperatively patient had an episode of chest pain which was resolved.    In 2025, patient underwent cardiac catheterization which showed 100% occlusion of left main.  LIMA to LAD was patent.  SVG graft to diagonal branch of LAD was patent.  SVG graft to OM was patent.  Patient had multiple stent in RCA.  Patient has 30 to 40% stenosis in proximal and mid segment of RCA.  No high-grade stenosis.    Patient came today and complaining of chest pain.  This is relieved with nitroglycerin.  Patient denies any orthopnea PND no syncope or presyncope.  No leg edema.    EKG showed normal sinus rhythm    Patient had cardiac catheterization and had severe coronary artery disease.  Patient had no flow in native left  coronary artery.  I think this is probably because of his angina.  Patient 3 bypasses are patent.  At present I would recommend to increase Ranexa 1000 mg twice daily.  I would consider Xarelto 2.5 mg twice daily in future I would consider vitamin E 400 units daily.    Patient had EDWAR and they are normal  Past Medical History:   Diagnosis Date    Atrial fib/flutter, transient     Bruit of left carotid artery     CAD (coronary artery disease)     unspecified    Coronary artery disease     Hyperlipidemia     Hypertension     Kidney calculus 09/2024    Nonspecific elevation of levels of transaminase or lactic acid dehydrogenase (LDH)     Occlusion and stenosis of bilateral carotid arteries 02/05/2020    Other specified diabetes mellitus without complications     Paroxysmal atrial fibrillation     Seasonal allergic rhinitis due to pollen     Sleep apnea     Sleep apnea, unspecified     Type 2 diabetes mellitus with other circulatory complications     Unstable angina          Past Surgical History:   Procedure Laterality Date    CARDIAC CATHETERIZATION  04/09/2015    BHF Left main distal 60-70% LCX 80-90% RCA proximal 90% and mid 100% with left to right collaterals.    CARDIAC CATHETERIZATION N/A 07/10/2020    Procedure: Left Heart Cath with angiogram;  Surgeon: Job Griffin MD;  Location: [facility] CATH INVASIVE LOCATION;  Service: Cardiovascular;  Laterality: N/A;    CARDIAC CATHETERIZATION N/A 07/10/2020    Procedure: Saphenous Vein Graft;  Surgeon: Job Griffin MD;  Location: [facility] CATH INVASIVE LOCATION;  Service: Cardiovascular;  Laterality: N/A;  svg x 2  lima    CARDIAC CATHETERIZATION N/A 07/10/2020    Procedure: Left ventriculography;  Surgeon: Job Griffin MD;  Location: [facility] CATH INVASIVE LOCATION;  Service: Cardiovascular;  Laterality: N/A;    CARDIAC CATHETERIZATION  06/2022    [facility]    CARDIAC CATHETERIZATION  2016    CARDIAC CATHETERIZATION Right 4/22/2025    Procedure: Left Heart Cath;  Surgeon:  Gurjit Guerra MD;  Location: [facility] CATH INVASIVE LOCATION;  Service: Cardiovascular;  Laterality: Right;    CARDIAC SURGERY      CAROTID ENDARTERECTOMY Left 11/6/2024    Procedure: LEFT CAROTID ENDARTERECTOMY;  Surgeon: Janet Nix MD;  Location: [facility] MAIN OR;  Service: Vascular;  Laterality: Left;    COLONOSCOPY      2 years back.    CORONARY ARTERY BYPASS GRAFT  04/13/2015    Four, left main and three vessel cononary artery disease. Dr. Franklyn Smith    CORONARY ARTERY BYPASS GRAFT  2016    CORONARY STENT PLACEMENT      CYSTOSCOPY, URETEROSCOPY, RETROGRADE PYELOGRAM, STENT INSERTION Right 9/24/2024    Procedure: CYSTOSCOPY URETEROSCOPY STONE BASKET EXTRACTION AND STENT INSERTION;  Surgeon: Alejandro López MD;  Location: [facility] MAIN OR;  Service: Urology;  Laterality: Right;    VASECTOMY  1998           Current Outpatient Medications:     amLODIPine (NORVASC) 10 MG tablet, Take 1 tablet by mouth once daily, Disp: 90 tablet, Rfl: 3    aspirin (aspirin) 81 MG EC tablet, Take 1 tablet by mouth Daily., Disp: , Rfl:     clopidogrel (PLAVIX) 75 MG tablet, Take 1 tablet by mouth once daily, Disp: 90 tablet, Rfl: 3    ezetimibe (ZETIA) 10 MG tablet, Take 1 tablet by mouth Daily., Disp: 90 tablet, Rfl: 3    isosorbide mononitrate (IMDUR) 60 MG 24 hr tablet, Take 1 tablet by mouth twice daily, Disp: 180 tablet, Rfl: 1    metFORMIN (GLUCOPHAGE) 1000 MG tablet, Take 1 tablet by mouth 2 (Two) Times a Day With Meals., Disp: 180 tablet, Rfl: 3    metoprolol tartrate (LOPRESSOR) 100 MG tablet, Take 1 tablet by mouth twice daily, Disp: 180 tablet, Rfl: 3    nitroglycerin (NITROSTAT) 0.4 MG SL tablet, DISSOLVE ONE TABLET UNDER THE TONGUE EVERY 5 MINUTES AS NEEDED FOR CHEST PAIN.  DO NOT EXCEED A TOTAL OF 3 DOSES IN 15 MINUTES, Disp: 100 tablet, Rfl: 0    ranolazine (RANEXA) 1000 MG 12 hr tablet, Take 1 tablet by mouth 2 (Two) Times a Day., Disp: 180 tablet, Rfl: 1    rosuvastatin (CRESTOR) 40 MG tablet, Take 1 tablet by  "mouth Every Night., Disp: 30 tablet, Rfl: 3    Semaglutide, 2 MG/DOSE, (OZEMPIC) 8 MG/3ML solution pen-injector, Inject 2 mg under the skin into the appropriate area as directed 1 (One) Time Per Week., Disp: 3 mL, Rfl: 11      Social History     Socioeconomic History    Marital status:    Tobacco Use    Smoking status: Never     Passive exposure: Never    Smokeless tobacco: Never    Tobacco comments:     Patient does not smoke.   Vaping Use    Vaping status: Never Used   Substance and Sexual Activity    Alcohol use: No    Drug use: No    Sexual activity: Yes     Partners: Female     Birth control/protection: Vasectomy         Review of Systems   Constitutional: Negative for chills and fever.   HENT:  Negative for ear discharge and nosebleeds.    Eyes:  Negative for discharge and redness.   Cardiovascular:  Negative for chest pain, orthopnea, palpitations, paroxysmal nocturnal dyspnea and syncope.   Respiratory:  Negative for cough, shortness of breath and wheezing.    Endocrine: Negative for heat intolerance.   Skin:  Negative for rash.   Musculoskeletal:  Negative for arthritis and myalgias.   Gastrointestinal:  Negative for abdominal pain, melena, nausea and vomiting.   Genitourinary:  Negative for dysuria and hematuria.   Neurological:  Negative for dizziness, light-headedness, numbness and tremors.   Psychiatric/Behavioral:  Negative for depression. The patient is not nervous/anxious.        Procedures      ECG 12 Lead    Date/Time: 6/27/2025 2:20 PM  Performed by: Gurjit Guerra MD    Authorized by: Gurjit Guerra MD  Comparison: compared with previous ECG   Similar to previous ECG  Rhythm: sinus rhythm    Clinical impression: normal ECG          ECG 12 Lead    (Results Pending)           Objective:    /75 (BP Location: Right arm, Patient Position: Sitting, Cuff Size: Large Adult)   Pulse 93   Resp 18   Ht 166 cm (65.35\")   Wt 93 kg (205 lb)   SpO2 96%   BMI 33.75 kg/m²         Constitutional:  "      Appearance: Well-developed.   Eyes:      General: No scleral icterus.        Right eye: No discharge.   HENT:      Head: Normocephalic and atraumatic.   Neck:      Thyroid: No thyromegaly.      Lymphadenopathy: No cervical adenopathy.   Pulmonary:      Effort: Pulmonary effort is normal. No respiratory distress.      Breath sounds: Normal breath sounds. No wheezing. No rales.   Cardiovascular:      Normal rate. Regular rhythm.      No gallop.    Edema:     Peripheral edema absent.   Abdominal:      Tenderness: There is no abdominal tenderness.   Skin:     Findings: No erythema or rash.   Neurological:      Mental Status: Alert and oriented to person, place, and time.             Assessment:       Diagnosis Plan   1. Benign hypertension  ECG 12 Lead    ranolazine (RANEXA) 1000 MG 12 hr tablet      2. Coronary artery disease involving coronary bypass graft of native heart with angina pectoris  ECG 12 Lead      3. Mixed hyperlipidemia  ECG 12 Lead    ranolazine (RANEXA) 1000 MG 12 hr tablet      4. Paroxysmal atrial fibrillation  ECG 12 Lead      5. Type 2 diabetes mellitus with diabetic peripheral angiopathy without gangrene, without long-term current use of insulin  ranolazine (RANEXA) 1000 MG 12 hr tablet               Plan:       MDM:    1.  CAD/CABG:    Patient underwent cardiac catheterization and showed patent grafts.  However severe native coronary artery disease noted.  This probably would contribute to angina pectoris    2.  Angina pectoris:    Increase Ranexa and add vitamin E    3.  Hypertension:    Blood pressure is controlled    4.  Mixed hyperlipidemia:    Patient is on Crestor and Zetia

## 2025-06-27 ENCOUNTER — OFFICE VISIT (OUTPATIENT)
Dept: CARDIOLOGY | Facility: CLINIC | Age: 60
End: 2025-06-27
Payer: COMMERCIAL

## 2025-06-27 VITALS
HEART RATE: 93 BPM | HEIGHT: 65 IN | DIASTOLIC BLOOD PRESSURE: 75 MMHG | OXYGEN SATURATION: 96 % | RESPIRATION RATE: 18 BRPM | WEIGHT: 205 LBS | BODY MASS INDEX: 34.16 KG/M2 | SYSTOLIC BLOOD PRESSURE: 116 MMHG

## 2025-06-27 DIAGNOSIS — E78.2 MIXED HYPERLIPIDEMIA: ICD-10-CM

## 2025-06-27 DIAGNOSIS — I25.709 CORONARY ARTERY DISEASE INVOLVING CORONARY BYPASS GRAFT OF NATIVE HEART WITH ANGINA PECTORIS: ICD-10-CM

## 2025-06-27 DIAGNOSIS — E11.51 TYPE 2 DIABETES MELLITUS WITH DIABETIC PERIPHERAL ANGIOPATHY WITHOUT GANGRENE, WITHOUT LONG-TERM CURRENT USE OF INSULIN: ICD-10-CM

## 2025-06-27 DIAGNOSIS — I10 BENIGN HYPERTENSION: Primary | ICD-10-CM

## 2025-06-27 DIAGNOSIS — I48.0 PAROXYSMAL ATRIAL FIBRILLATION: ICD-10-CM

## 2025-06-27 PROCEDURE — 93000 ELECTROCARDIOGRAM COMPLETE: CPT | Performed by: INTERNAL MEDICINE

## 2025-06-27 PROCEDURE — 99214 OFFICE O/P EST MOD 30 MIN: CPT | Performed by: INTERNAL MEDICINE

## 2025-06-27 RX ORDER — RANOLAZINE 1000 MG/1
1000 TABLET, EXTENDED RELEASE ORAL 2 TIMES DAILY
Qty: 180 TABLET | Refills: 1 | Status: SHIPPED | OUTPATIENT
Start: 2025-06-27

## 2025-07-14 DIAGNOSIS — E78.2 MIXED HYPERLIPIDEMIA: ICD-10-CM

## 2025-07-14 RX ORDER — ROSUVASTATIN CALCIUM 40 MG/1
40 TABLET, COATED ORAL NIGHTLY
Qty: 90 TABLET | Refills: 3 | Status: SHIPPED | OUTPATIENT
Start: 2025-07-14

## 2025-07-24 NOTE — PROGRESS NOTES
"Chief Complaint  Diabetes, Hyperlipidemia, and Back Pain    Subjective          Mendel Melendez presents to [facility] for     HPI    Diabetes  Patient does check blood sugar at home 1 times a week.  Per patient fasting blood sugars range between 140 to 165.  Associated symptoms include None  Per patient current diet is Diabetic and Low Carb .  Patient does avoid concentrated sweets.  Patient does not see podiatry.  Patient see's [facility]  for diabetic eye exam and last eye exam was a couple years ago.  Patient reports they are taking medications as prescribed and they are having side effects. Mendel states he is having back pain and thinks it's because of the ozempic injections.   Last A1c was 6.9 on 04/25/2025.     Hyperlipidemia  Patient is following a low cholesterol diet.   Currently is on statin therapy.  Patient reports he is exercising.  Patient reports they are taking medications as prescribed and they are not having side effects.    Back pain  Started a few months ago  Is described as a 6/10 pain and is described as right flank pain.   Pain quality is described as shooting/stabbing.   Patient also reports he passed a small kidney stone on Sunday. He has an extensive hx of kidney stone. Known stones in left kidney, his urologist is considering intervention he says. He says that imaging in March showed a large stone in his left kidney that his urologist did not believe he would pass.  Denies dysuria, hematuria, abdominal pain, fever.      Objective   Vital Signs:   /80 (BP Location: Right arm, Patient Position: Sitting, Cuff Size: Adult)   Pulse 85   Temp 98 °F (36.7 °C) (Temporal)   Resp 20   Ht 166 cm (65.35\")   Wt 90.2 kg (198 lb 12.8 oz)   SpO2 96%   BMI 32.72 kg/m²     Physical Exam  Vitals and nursing note reviewed.   Constitutional:       General: He is not in acute distress.     Appearance: Normal appearance. He is not ill-appearing or diaphoretic. "   HENT:      Head: Normocephalic and atraumatic.      Nose: No congestion or rhinorrhea.   Eyes:      Extraocular Movements: Extraocular movements intact.      Pupils: Pupils are equal, round, and reactive to light.   Cardiovascular:      Rate and Rhythm: Normal rate and regular rhythm.      Pulses: Normal pulses.   Pulmonary:      Effort: Pulmonary effort is normal.      Breath sounds: Normal breath sounds.   Abdominal:      General: Abdomen is flat.      Palpations: Abdomen is soft.      Tenderness: There is no abdominal tenderness. There is right CVA tenderness and left CVA tenderness. There is no guarding or rebound.   Musculoskeletal:         General: Normal range of motion.      Cervical back: Normal range of motion.   Skin:     General: Skin is warm and dry.   Neurological:      Mental Status: He is alert and oriented to person, place, and time.   Psychiatric:         Mood and Affect: Mood normal.         Behavior: Behavior normal.        Result Review :                 Assessment and Plan    Diagnoses and all orders for this visit:    1. Type 2 diabetes mellitus with diabetic peripheral angiopathy without gangrene, without long-term current use of insulin (Primary)  Overview:  Regimen: Ozempic 2 mg weekly, metformin 1000 mg bid.    A1c 6.5 07/25/2025  A1c 6.9 04/25/2025 stable but remains suboptimally controlled.  A1c 6.8 01/22/2025 stable, marginal - implement lifestyle changes, repeat 3 mo, consider Ozempic increase if not improved  A1c 6.7 10/28/2024 improved  A1c 7.1 07/05/2024 stable, marginal added GLP1 tolerating continue  A1c 7.2 04/08/2024 stable  A1c 7.2 09/22/2023 improve diet and exercise.   A1c 6.9 06/16/2023 improved, he reports no hypo or   A1c 6.8 11/28/2022   A1c 6.6 09/24/2021 improved.   A1c 7.2 10/06/2020   A1c 7.3 11/18/2019     A1c 6.9 5/7/2019   A1c 7.0 02/12/2018    A1c 7.1 11/1/2018 new onset    Assessment & Plan:  A1c has improved with increased Ozempic.  He was again counseled  extensively on important dietary changes which include significantly reducing carbohydrates and unhealthy fats.  Will repeat an A1c in 3 months.    Orders:  -     POC Glycosylated Hemoglobin (Hb A1C)    2. Mixed hyperlipidemia  Overview:  Regimen: Crestor 40 mg, Zetia 10 mg qd.    Lab Results   Component Value Date    CHOL 143 07/08/2020    CHLPL 164 01/22/2025    TRIG 119 01/22/2025    HDL 66 01/22/2025    LDL 77 01/22/2025     LDL normal but not at target (<70 given diabetes).    Assessment & Plan:  Zetia added previously.  We will get an updated lipid panel and ensure he is at target.  Updated - lipid panel confirms he is now at target with total cholesterol of 125 and LDL of 50.    Orders:  -     Lipid Panel    3. Bilateral flank pain  -     CT Abdomen Pelvis Stone Protocol; Future  -     Comprehensive Metabolic Panel; Future  -     Lipase; Future  -     POCT urinalysis dipstick, manual    4. Bilateral nephrolithiasis  Patient's flank pain suspected to be d/t nephrolithiasis of which he has an extensive hx. UA was unremarkable in the office today. CT confirmed bilateral nephrolithiasis with numerous punctate stones. He was offered medication for pain relief but declined. He will contact his urologist to schedule close follow up.      Martin Hicks MD    NOTE: [employer], per Health Information accessibility laws, allows progress notes to be visible to patients. Please note that these notes will include professional medical terminology that may be somewhat confusing without some interpretation from your medical professional team. The intent of progress notes is to communicate information between any medical professionals involved in your case, or to serve as future reference for myself or any other provider when reviewing your case, as well as a reference for the patient viewing the record. Please ask a member of the medical team if you have any questions about terminology or content of note.    DISCLAIMER:  Part of this note may be an electronic transcription/translation of spoken language to printed text using the Dragon Dictation System.

## 2025-07-25 ENCOUNTER — OFFICE VISIT (OUTPATIENT)
Dept: FAMILY MEDICINE CLINIC | Facility: CLINIC | Age: 60
End: 2025-07-25
Payer: COMMERCIAL

## 2025-07-25 VITALS
SYSTOLIC BLOOD PRESSURE: 118 MMHG | WEIGHT: 198.8 LBS | DIASTOLIC BLOOD PRESSURE: 80 MMHG | RESPIRATION RATE: 20 BRPM | HEART RATE: 85 BPM | TEMPERATURE: 98 F | OXYGEN SATURATION: 96 % | HEIGHT: 65 IN | BODY MASS INDEX: 33.12 KG/M2

## 2025-07-25 DIAGNOSIS — R10.9 BILATERAL FLANK PAIN: ICD-10-CM

## 2025-07-25 DIAGNOSIS — E11.51 TYPE 2 DIABETES MELLITUS WITH DIABETIC PERIPHERAL ANGIOPATHY WITHOUT GANGRENE, WITHOUT LONG-TERM CURRENT USE OF INSULIN: Primary | ICD-10-CM

## 2025-07-25 DIAGNOSIS — N20.0 BILATERAL NEPHROLITHIASIS: ICD-10-CM

## 2025-07-25 DIAGNOSIS — E78.2 MIXED HYPERLIPIDEMIA: ICD-10-CM

## 2025-07-25 LAB
BILIRUB BLD-MCNC: NEGATIVE MG/DL
CLARITY, POC: CLEAR
COLOR UR: YELLOW
EXPIRATION DATE: ABNORMAL
GLUCOSE UR STRIP-MCNC: NEGATIVE MG/DL
HBA1C MFR BLD: 6.5 % (ref 4.5–5.7)
KETONES UR QL: NEGATIVE
LEUKOCYTE EST, POC: NEGATIVE
Lab: ABNORMAL
NITRITE UR-MCNC: NEGATIVE MG/ML
PH UR: 5 [PH] (ref 5–8)
PROT UR STRIP-MCNC: NEGATIVE MG/DL
RBC # UR STRIP: NEGATIVE /UL
SP GR UR: 1.02 (ref 1–1.03)
UROBILINOGEN UR QL: NORMAL

## 2025-07-25 PROCEDURE — 99214 OFFICE O/P EST MOD 30 MIN: CPT

## 2025-07-25 PROCEDURE — 81002 URINALYSIS NONAUTO W/O SCOPE: CPT

## 2025-07-26 NOTE — ASSESSMENT & PLAN NOTE
A1c has improved with increased Ozempic.  He was again counseled extensively on important dietary changes which include significantly reducing carbohydrates and unhealthy fats.  Will repeat an A1c in 3 months.

## 2025-07-26 NOTE — ASSESSMENT & PLAN NOTE
uJne added previously.  We will get an updated lipid panel and ensure he is at target.  Updated - lipid panel confirms he is now at target with total cholesterol of 125 and LDL of 50.

## 2025-08-28 ENCOUNTER — OFFICE VISIT (OUTPATIENT)
Age: 60
End: 2025-08-28
Payer: COMMERCIAL

## 2025-08-28 ENCOUNTER — HOSPITAL ENCOUNTER (OUTPATIENT)
Dept: CARDIOLOGY | Facility: HOSPITAL | Age: 60
Discharge: HOME | End: 2025-08-28
Admitting: STUDENT IN AN ORGANIZED HEALTH CARE EDUCATION/TRAINING PROGRAM
Payer: COMMERCIAL

## 2025-08-28 VITALS
HEART RATE: 76 BPM | BODY MASS INDEX: 32.86 KG/M2 | HEIGHT: 65 IN | DIASTOLIC BLOOD PRESSURE: 70 MMHG | WEIGHT: 197.2 LBS | SYSTOLIC BLOOD PRESSURE: 117 MMHG | OXYGEN SATURATION: 97 % | RESPIRATION RATE: 18 BRPM

## 2025-08-28 DIAGNOSIS — I65.23 BILATERAL CAROTID ARTERY STENOSIS: ICD-10-CM

## 2025-08-28 DIAGNOSIS — I65.22 ASYMPTOMATIC STENOSIS OF LEFT CAROTID ARTERY: Primary | ICD-10-CM

## 2025-08-28 LAB
BH CV XLRA MEAS LEFT CAROTID BULB EDV: -24.2 CM/SEC
BH CV XLRA MEAS LEFT CAROTID BULB PSV: -81.4 CM/SEC
BH CV XLRA MEAS LEFT DIST CCA EDV: -24.2 CM/SEC
BH CV XLRA MEAS LEFT DIST CCA PSV: -87 CM/SEC
BH CV XLRA MEAS LEFT DIST ICA EDV: -25.9 CM/SEC
BH CV XLRA MEAS LEFT DIST ICA PSV: -57.5 CM/SEC
BH CV XLRA MEAS LEFT ICA/CCA RATIO: -0.86
BH CV XLRA MEAS LEFT MID ICA EDV: -27.7 CM/SEC
BH CV XLRA MEAS LEFT MID ICA PSV: -77.7 CM/SEC
BH CV XLRA MEAS LEFT PROX CCA EDV: 23.6 CM/SEC
BH CV XLRA MEAS LEFT PROX CCA PSV: 90.7 CM/SEC
BH CV XLRA MEAS LEFT PROX ECA PSV: -164 CM/SEC
BH CV XLRA MEAS LEFT PROX ICA EDV: -19.2 CM/SEC
BH CV XLRA MEAS LEFT PROX ICA PSV: -44.3 CM/SEC
BH CV XLRA MEAS LEFT PROX SCLA PSV: 125 CM/SEC
BH CV XLRA MEAS LEFT VERTEBRAL A PSV: -31.7 CM/SEC
BH CV XLRA MEAS RIGHT CAROTID BULB EDV: 44.5 CM/SEC
BH CV XLRA MEAS RIGHT CAROTID BULB PSV: 109 CM/SEC
BH CV XLRA MEAS RIGHT DIST CCA EDV: 18 CM/SEC
BH CV XLRA MEAS RIGHT DIST CCA PSV: 80.8 CM/SEC
BH CV XLRA MEAS RIGHT DIST ICA EDV: -26.3 CM/SEC
BH CV XLRA MEAS RIGHT DIST ICA PSV: -71.9 CM/SEC
BH CV XLRA MEAS RIGHT ICA/CCA RATIO: -1.34
BH CV XLRA MEAS RIGHT PROX CCA EDV: 19.9 CM/SEC
BH CV XLRA MEAS RIGHT PROX CCA PSV: 93.8 CM/SEC
BH CV XLRA MEAS RIGHT PROX ECA PSV: 154 CM/SEC
BH CV XLRA MEAS RIGHT PROX ICA EDV: -47.7 CM/SEC
BH CV XLRA MEAS RIGHT PROX ICA PSV: -126 CM/SEC
BH CV XLRA MEAS RIGHT PROX SCLA PSV: 221 CM/SEC
BH CV XLRA MEAS RIGHT VERTEBRAL A PSV: -47.2 CM/SEC
LEFT ARM BP: NORMAL MMHG
RIGHT ARM BP: NORMAL MMHG

## 2025-08-28 PROCEDURE — 93880 EXTRACRANIAL BILAT STUDY: CPT

## (undated) DEVICE — VASCULAR CDS: Brand: MEDLINE INDUSTRIES, INC.

## (undated) DEVICE — PINNACLE INTRODUCER SHEATH: Brand: PINNACLE

## (undated) DEVICE — KT SURG TURNOVER 050

## (undated) DEVICE — CATH DIAG IMPULSE FR4 6F 100CM

## (undated) DEVICE — DGW .035 FC J3MM 260CM TEF: Brand: EMERALD

## (undated) DEVICE — ANTIBACTERIAL UNDYED BRAIDED (POLYGLACTIN 910), SYNTHETIC ABSORBABLE SUTURE: Brand: COATED VICRYL

## (undated) DEVICE — SUT VIC 2/0 CT1 36IN

## (undated) DEVICE — ADHS SKIN SURG TISS VISC PREMIERPRO EXOFIN HI/VISC FAST/DRY

## (undated) DEVICE — CATH DIAG IMPULSE IM 5F 100CM

## (undated) DEVICE — GW DIAG EMERALD HEPCOAT MOVE JTIP STD .035 3MM 150CM

## (undated) DEVICE — TOWEL,OR,DSP,ST,WHITE,DLX,4/PK,20PK/CS: Brand: MEDLINE

## (undated) DEVICE — GLV SURG BIOGEL LTX PF 6 1/2

## (undated) DEVICE — SYR LUERLOK 30CC

## (undated) DEVICE — SOLUTION,WATER,IRRIGATION,1000ML,STERILE: Brand: MEDLINE

## (undated) DEVICE — SOL IRR NACL 0.9PCT BO 1000ML

## (undated) DEVICE — PK TRY HEART CATH 50

## (undated) DEVICE — SUT SILK 3/0 SH CR8 18IN C013D

## (undated) DEVICE — IRRIGATOR BULB ASEPTO 60CC STRL

## (undated) DEVICE — CATH DIAG IMPULSE PIG .056 6F 110CM

## (undated) DEVICE — GW WHOLEY HITORQ MOD/J .035 145CM

## (undated) DEVICE — GLV SURG SIGNATURE ESSENTIAL PF LTX SZ7

## (undated) DEVICE — DGW .035 FC J3MM 150CM TEF HEP: Brand: EMERALD

## (undated) DEVICE — DRAPE,INSTRUMENT,MAGNETIC,10X16: Brand: MEDLINE

## (undated) DEVICE — CATH DIAG IMPULSE FL4 6F 100CM

## (undated) DEVICE — TUBING, SUCTION, 1/4" X 12', STRAIGHT: Brand: MEDLINE

## (undated) DEVICE — THE STERILE CAMERA HANDLE COVER IS FOR USE WITH THE STERIS SURGICAL LIGHTING AND VISUALIZATION SYSTEMS.

## (undated) DEVICE — SYR LL TP 10ML STRL

## (undated) DEVICE — NITINOL STONE RETRIEVAL BASKET: Brand: ZERO TIP

## (undated) DEVICE — GOWN,REINFRCE,POLY,SIRUS,BREATH SLV,XXLG: Brand: MEDLINE

## (undated) DEVICE — SUT PROLN 6/0 BV1 D/A 30IN 8709H

## (undated) DEVICE — PK CYSTO 50

## (undated) DEVICE — CATH F6INF TL 3DRC 100CM: Brand: INFINITI

## (undated) DEVICE — FIBR LASR HOLMIUM LOW WATTAGE USAGE STAND-BY

## (undated) DEVICE — SOL IRRG H2O BG 3000ML STRL

## (undated) DEVICE — CVR PROB 96IN LF STRL

## (undated) DEVICE — FLEXCEL CAROTID SHUNT (8F, 10F, 12F, 14F): Brand: FLEXCEL CAROTID SHUNT

## (undated) DEVICE — TP UMB COTN 1/8X18 TU10T

## (undated) DEVICE — CATHETER,URETHRAL,REDRUBBER,STERILE,20FR: Brand: MEDLINE

## (undated) DEVICE — PREP SPRY PVPI 10P 2OZ

## (undated) DEVICE — APPL COTN TP PLSTC 6IN STRL LF PK/2

## (undated) DEVICE — THE STERILE LIGHT HANDLE COVER IS USED WITH STERIS SURGICAL LIGHTING AND VISUALIZATION SYSTEMS.

## (undated) DEVICE — SKIN PREP TRAY W/CHG: Brand: MEDLINE INDUSTRIES, INC.

## (undated) DEVICE — MICRO TIP WIPE: Brand: DEVON